# Patient Record
Sex: MALE | Race: WHITE | NOT HISPANIC OR LATINO | Employment: FULL TIME | URBAN - METROPOLITAN AREA
[De-identification: names, ages, dates, MRNs, and addresses within clinical notes are randomized per-mention and may not be internally consistent; named-entity substitution may affect disease eponyms.]

---

## 2017-02-07 DIAGNOSIS — I10 HYPERTENSION GOAL BP (BLOOD PRESSURE) < 140/90: Primary | ICD-10-CM

## 2017-02-08 NOTE — TELEPHONE ENCOUNTER
Amlodipine       Last Written Prescription Date: 2/10/2016  Last Fill Quantity: 90, # refills: 3  Last Office Visit with [facility], [facility] or [facility] prescribing provider: 12/28/2016       POTASSIUM   Date Value Ref Range Status   02/10/2016 4.1 3.4 - 5.3 mmol/L Final     CREATININE   Date Value Ref Range Status   02/10/2016 0.94 0.66 - 1.25 mg/dL Final     BP Readings from Last 3 Encounters:   12/28/16 150/32   12/15/16 138/64   10/14/16 148/48

## 2017-02-09 DIAGNOSIS — E78.5 HYPERLIPIDEMIA LDL GOAL <130: Primary | ICD-10-CM

## 2017-02-09 NOTE — TELEPHONE ENCOUNTER
Atorvastatin     Last Written Prescription Date: 8/16/16  Last Fill Quantity: 90, # refills: 1  Last Office Visit with G, P or [facility] prescribing provider: 12/15/16       CHOL      168   2/10/2016  HDL       72   2/10/2016  LDL       74   2/10/2016  TRIG      111   2/10/2016  CHOLHDLRATIO      3.0   11/6/2013  Cassandra Lerner MA 2/9/2017

## 2017-02-10 RX ORDER — AMLODIPINE BESYLATE 2.5 MG/1
TABLET ORAL
Qty: 90 TABLET | Refills: 0 | Status: SHIPPED | OUTPATIENT
Start: 2017-02-10 | End: 2017-05-12

## 2017-02-10 NOTE — TELEPHONE ENCOUNTER
Routing refill request to provider for review/approval because:  Labs out of range:  BP  Ila Marr RN

## 2017-02-13 NOTE — TELEPHONE ENCOUNTER
Left message for patient to call back and speak with any .    Thank you,  Dona Montoya   for [facility]

## 2017-02-14 ENCOUNTER — MYC MEDICAL ADVICE (OUTPATIENT)
Dept: FAMILY MEDICINE | Facility: CLINIC | Age: 47
End: 2017-02-14

## 2017-02-14 RX ORDER — ATORVASTATIN CALCIUM 40 MG/1
40 TABLET, FILM COATED ORAL DAILY
Qty: 90 TABLET | Refills: 0 | Status: SHIPPED
Start: 2017-02-14 | End: 2017-05-12

## 2017-02-14 NOTE — TELEPHONE ENCOUNTER
Your Atorvastatin has been approved for 90 days. You are due for a fasting lipid check this month. Future orders have been placed. Please call and set up lat appt. ....................LEDA Faith

## 2017-02-24 ENCOUNTER — MYC MEDICAL ADVICE (OUTPATIENT)
Dept: FAMILY MEDICINE | Facility: CLINIC | Age: 47
End: 2017-02-24

## 2017-02-24 DIAGNOSIS — E78.5 HYPERLIPIDEMIA LDL GOAL <130: ICD-10-CM

## 2017-02-24 DIAGNOSIS — I10 HYPERTENSION GOAL BP (BLOOD PRESSURE) < 140/90: Primary | ICD-10-CM

## 2017-02-28 ENCOUNTER — HOSPITAL ENCOUNTER (OUTPATIENT)
Dept: CARDIOLOGY | Facility: CLINIC | Age: 47
Discharge: HOME | End: 2017-02-28
Attending: INTERNAL MEDICINE | Admitting: INTERNAL MEDICINE
Payer: COMMERCIAL

## 2017-02-28 ENCOUNTER — TELEPHONE (OUTPATIENT)
Dept: CARDIOLOGY | Facility: CLINIC | Age: 47
End: 2017-02-28

## 2017-02-28 DIAGNOSIS — E78.5 HYPERLIPIDEMIA LDL GOAL <130: ICD-10-CM

## 2017-02-28 DIAGNOSIS — I10 HYPERTENSION GOAL BP (BLOOD PRESSURE) < 140/90: ICD-10-CM

## 2017-02-28 DIAGNOSIS — I35.1 AORTIC REGURGITATION: ICD-10-CM

## 2017-02-28 LAB
ANION GAP SERPL CALCULATED.3IONS-SCNC: 7 MMOL/L (ref 3–14)
BUN SERPL-MCNC: 13 MG/DL (ref 7–30)
CALCIUM SERPL-MCNC: 8.7 MG/DL (ref 8.5–10.1)
CHLORIDE SERPL-SCNC: 106 MMOL/L (ref 94–109)
CHOLEST SERPL-MCNC: 161 MG/DL
CO2 SERPL-SCNC: 29 MMOL/L (ref 20–32)
CREAT SERPL-MCNC: 0.87 MG/DL (ref 0.66–1.25)
GFR SERPL CREATININE-BSD FRML MDRD: ABNORMAL ML/MIN/1.7M2
GLUCOSE SERPL-MCNC: 108 MG/DL (ref 70–99)
HDLC SERPL-MCNC: 59 MG/DL
LDLC SERPL CALC-MCNC: 73 MG/DL
NONHDLC SERPL-MCNC: 102 MG/DL
POTASSIUM SERPL-SCNC: 3.8 MMOL/L (ref 3.4–5.3)
SODIUM SERPL-SCNC: 142 MMOL/L (ref 133–144)
TRIGL SERPL-MCNC: 144 MG/DL

## 2017-02-28 PROCEDURE — 80061 LIPID PANEL: CPT | Performed by: FAMILY MEDICINE

## 2017-02-28 PROCEDURE — 36415 COLL VENOUS BLD VENIPUNCTURE: CPT | Performed by: FAMILY MEDICINE

## 2017-02-28 PROCEDURE — 93306 TTE W/DOPPLER COMPLETE: CPT | Mod: 26 | Performed by: INTERNAL MEDICINE

## 2017-02-28 PROCEDURE — 80048 BASIC METABOLIC PNL TOTAL CA: CPT | Performed by: FAMILY MEDICINE

## 2017-02-28 PROCEDURE — 93306 TTE W/DOPPLER COMPLETE: CPT

## 2017-02-28 NOTE — TELEPHONE ENCOUNTER
last OV 10-  We talked again about the fact that he has significant AI. He has not manifested profound evidence that he needs to rush to getting an aortic valve replacement, but rather that we would continue to follow it and intervene as needed. He understands that the timetable for worsening of his AI and symptoms is unpredictable. I asked to see him in 6 months to review at all.         Echocardiogram done early not scheduled until April 2017  Interpretation Summary     Left ventricular systolic function is normal. The visual ejection fraction is  estimated at 60%. The left ventricle is borderline dilated.  A bicuspid aortic valve cannot be excluded. There may be partial fusion of the  right and left leaflets.  No hemodynamically significant valvular aortic stenosis noted but there is  again probably moderate to mod-severe (2-3+ or 3+) aortic regurgitation with  minimal holodiastolic flow reversal in the descending aorta..  The aortic root is normal size.  The aortic insufficiency appears to be moderate or moderately severe and the  blood pressure aremains elevated. Over time, the LV has slowly increased in  size and is now borderline dilated.

## 2017-03-01 DIAGNOSIS — Q23.1 CONGENITAL INSUFFICIENCY OF AORTIC VALVE: Primary | ICD-10-CM

## 2017-03-01 NOTE — TELEPHONE ENCOUNTER
Patient returned call and I reviewed results of Echo 2/28/17 with him. Informed him that the degree of aortic stenosis looked similar to the previous study done 10/6/16. Preet will be due for 6 month follow up with Dr. Gonzalez in 4/2017. I provided him with the number to the scheduling desk at our [facility] to set up his 6 month follow up. He stated understanding and agreement with this plan. LYNDON Noel RN

## 2017-03-23 ENCOUNTER — TELEPHONE (OUTPATIENT)
Dept: FAMILY MEDICINE | Facility: CLINIC | Age: 47
End: 2017-03-23

## 2017-03-23 NOTE — TELEPHONE ENCOUNTER
Panel Management Review      Patient has the following on his problem list:     Hypertension   Last three blood pressure readings:  BP Readings from Last 3 Encounters:   12/28/16 (!) 150/32   12/15/16 138/64   10/14/16 148/48     Blood pressure: FAILED    HTN Guidelines:  Age 18-59 BP range:  Less than 140/90  Age 60-85 with Diabetes:  Less than 140/90  Age 60-85 without Diabetes:  less than 150/90      Composite cancer screening  Chart review shows that this patient is due/due soon for the following None  Summary:    Patient is due/failing the following:   BP CHECK    Action needed:   Patient needs office visit for bp check.    Type of outreach:    Phone, left message for patient to call back.     Questions for provider review:    None                                                                                                                                    Jayleen Padilla CMA      Chart routed to Care Team .

## 2017-03-24 NOTE — TELEPHONE ENCOUNTER
Patient did call back.  He'll call another time to schedule, he would like to coordinate the visit with one with Niesha MORRIS

## 2017-04-09 DIAGNOSIS — I10 HYPERTENSION GOAL BP (BLOOD PRESSURE) < 140/90: ICD-10-CM

## 2017-04-11 RX ORDER — LISINOPRIL 20 MG/1
TABLET ORAL
Qty: 90 TABLET | Refills: 0 | Status: SHIPPED | OUTPATIENT
Start: 2017-04-11 | End: 2018-06-22

## 2017-04-11 NOTE — TELEPHONE ENCOUNTER
Routing refill request to provider for review/approval because:  Bp 150/32 ?  Huma Bush RN

## 2017-04-11 NOTE — TELEPHONE ENCOUNTER
Lisinopril       Last Written Prescription Date: 2/10/2016  Last Fill Quantity: 180, # refills: 3  Last Office Visit with G, P or [facility] prescribing provider: 12/28/2016       Potassium   Date Value Ref Range Status   02/28/2017 3.8 3.4 - 5.3 mmol/L Final     Creatinine   Date Value Ref Range Status   02/28/2017 0.87 0.66 - 1.25 mg/dL Final     BP Readings from Last 3 Encounters:   12/28/16 (!) 150/32   12/15/16 138/64   10/14/16 148/48

## 2017-04-25 ENCOUNTER — OFFICE VISIT (OUTPATIENT)
Dept: CARDIOLOGY | Facility: CLINIC | Age: 47
End: 2017-04-25
Attending: INTERNAL MEDICINE
Payer: COMMERCIAL

## 2017-04-25 VITALS
BODY MASS INDEX: 25.67 KG/M2 | SYSTOLIC BLOOD PRESSURE: 146 MMHG | OXYGEN SATURATION: 97 % | HEIGHT: 69 IN | WEIGHT: 173.3 LBS | HEART RATE: 92 BPM | RESPIRATION RATE: 12 BRPM | DIASTOLIC BLOOD PRESSURE: 50 MMHG

## 2017-04-25 DIAGNOSIS — I10 ESSENTIAL HYPERTENSION: Primary | ICD-10-CM

## 2017-04-25 DIAGNOSIS — Q23.1 CONGENITAL INSUFFICIENCY OF AORTIC VALVE: ICD-10-CM

## 2017-04-25 PROCEDURE — 99214 OFFICE O/P EST MOD 30 MIN: CPT | Performed by: INTERNAL MEDICINE

## 2017-04-25 RX ORDER — CARVEDILOL 6.25 MG/1
6.25 TABLET ORAL 2 TIMES DAILY WITH MEALS
Qty: 60 TABLET | Refills: 11 | Status: SHIPPED | OUTPATIENT
Start: 2017-04-25 | End: 2018-05-21

## 2017-04-25 ASSESSMENT — PAIN SCALES - GENERAL: PAINLEVEL: NO PAIN (0)

## 2017-04-25 NOTE — LETTER
4/25/2017    Yan Lucas MD  [address]   [place] [address]    RE: Toney Lemos       Dear Colleague,    I had the pleasure of seeing Toney Lemos in the [facility].    I saw Toney Lemos today.  We have followed him for chronic moderately severe aortic insufficiency.  Serially he generally has not had dramatic symptoms, although he has noted he is a little slower than he used to be, although at age 47.  He does not identify definite shortness of breath or profound limitations.  He lives his life quietly and actively but is not an avid exerciser.      He has had a bit of a tendency to hypertension.      PHYSICAL EXAMINATION:   VITAL SIGNS:  Showed a blood pressure that has been tending to run a bit on the high side at 146/50, heart rate 80 beats per minute.  He weighs 173 pounds.   HEAD:  Normal.   NECK:  Free of neck vein distention or bruit.  Carotid upstroke is brisk but not pulsatile.   HEART:  Regular with a 1/6 systolic ejection murmur and a 1 to 2/6 diastolic murmur of AI heard best at the right midsternal border and left midsternal border.  No S3 present.  PMI feels normal.   LUNGS:  Clear.   ABDOMEN:  Soft without organomegaly.   EXTREMITIES:  Free of edema.  Pedal pulses are +2.        Toney is 47.  A surveillance echo was done in February of this year that showed his left ventricle has crept up to be upper limits of normal, systolic function is well preserved, end-diastolic volume is 4.4 cm and nowhere near the point of demanding consideration of aortic valve replacement, which is around 5.5 cm.  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      Today, I added carvedilol 6.25 mg twice a day.  He and his family are going on a cruise, and he will initiate this treatment when he returns.  I have asked him to follow up with you with regards to blood pressure assessment.  If the  carvedilol is inadequate, it could be titrated.  A little bit of beta blocker, I think, would be helpful.  He has also had a tendency to PVCs, and a little beta blockade might feel decent.  I have generally had better luck with carvedilol in young healthy men than I have had with metoprolol.  The side effects tend to be a little less, but you never know.      His lisinopril is 20 mg a day; that could be titrated.  His Norvasc is 2.5 mg a day.     Outpatient Encounter Prescriptions as of 4/25/2017   Medication Sig Dispense Refill     carvedilol (COREG) 6.25 MG tablet Take 1 tablet (6.25 mg) by mouth 2 times daily (with meals) (Patient not taking: Reported on 5/24/2017) 60 tablet 11     lisinopril (PRINIVIL/ZESTRIL) 20 MG tablet TAKE 1 TABLET(20 MG) BY MOUTH TWICE DAILY 90 tablet 0     [DISCONTINUED] atorvastatin (LIPITOR) 40 MG tablet Take 1 tablet (40 mg) by mouth daily 90 tablet 0     [DISCONTINUED] amLODIPine (NORVASC) 2.5 MG tablet TAKE 1 TABLET(2.5 MG) BY MOUTH DAILY 90 tablet 0     Dextromethorphan-guaiFENesin (CORICIDIN HBP CONGESTION/COUGH)  MG CAPS Take 2 tablets by mouth daily       DM-Doxylamine-Acetaminophen (NYQUIL COLD & FLU PO)        promethazine-codeine (PHENERGAN WITH CODEINE) 6.25-10 MG/5ML syrup Take 5-10 mLs by mouth nightly as needed for cough (Patient not taking: Reported on 5/24/2017) 240 mL 0     CLARITIN 10 MG PO TABS 1 TABLET DAILY AS NEEDED       [DISCONTINUED] azithromycin (ZITHROMAX) 250 MG tablet Two tablets first day, then one tablet daily for four days 6 tablet 0     No facility-administered encounter medications on file as of 4/25/2017.       IMPRESSION:   1.  Mild progressive LV enlargement, although not significantly at this time as noted above.   2.  +2 to more likely +3 aortic insufficiency.   3.  Well-preserved systolic function.   4.  Treated hypertension.   5.  Treated hyperlipidemia.   6.  Carvedilol 6.25 mg b.i.d. added.      From my standpoint, I will see him in 6  months.  I ordered an echo at that time.  If there are any questions with regards to his antihypertensive or cardiac treatment, let me know.  Thanks for the privilege.  Warm regards.     Sincerely,    MONIQUE GRESHAM MD     [facility]

## 2017-04-25 NOTE — LETTER
4/25/2017      RE: Toney Lemos  [address] [facility] [address]       Dear Colleague,    Thank you for the opportunity to participate in the care of your patient, Toney Lemos, at the [facility]. Please see a copy of my visit note below.    HPI and Plan:   See dictation    Interpretation Summary og echo 2-:     Left ventricular systolic function is normal. The visual ejection fraction is  estimated at 60%. The left ventricle is borderline dilated.  A bicuspid aortic valve cannot be excluded. There may be partial fusion of the  right and left leaflets.  No hemodynamically significant valvular aortic stenosis noted but there is  again probably moderate to mod-severe (2-3+ or 3+) aortic regurgitation with  minimal holodiastolic flow reversal in the descending aorta..  The aortic root is normal size.  The aortic insufficiency appears to be moderate or moderately severe and the  blood pressure aremains elevated. Over time, the LV has slowly increased in  size and is now borderline dilated.    Orders Placed This Encounter   Procedures     Follow-Up with Cardiologist     Echocardiogram     Orders Placed This Encounter   Medications     carvedilol (COREG) 6.25 MG tablet     Sig: Take 1 tablet (6.25 mg) by mouth 2 times daily (with meals)     Dispense:  60 tablet     Refill:  11     There are no discontinued medications.      Encounter Diagnoses   Name Primary?     Congenital insufficiency of aortic valve      Essential hypertension Yes       CURRENT MEDICATIONS:  Current Outpatient Prescriptions   Medication Sig Dispense Refill     carvedilol (COREG) 6.25 MG tablet Take 1 tablet (6.25 mg) by mouth 2 times daily (with meals) 60 tablet 11     lisinopril (PRINIVIL/ZESTRIL) 20 MG tablet TAKE 1 TABLET(20 MG) BY MOUTH TWICE DAILY 90 tablet 0     atorvastatin (LIPITOR) 40 MG tablet Take 1 tablet (40 mg) by mouth daily 90 tablet 0      amLODIPine (NORVASC) 2.5 MG tablet TAKE 1 TABLET(2.5 MG) BY MOUTH DAILY 90 tablet 0     Dextromethorphan-guaiFENesin (CORICIDIN HBP CONGESTION/COUGH)  MG CAPS Take 2 tablets by mouth daily       DM-Doxylamine-Acetaminophen (NYQUIL COLD & FLU PO)        promethazine-codeine (PHENERGAN WITH CODEINE) 6.25-10 MG/5ML syrup Take 5-10 mLs by mouth nightly as needed for cough 240 mL 0     CLARITIN 10 MG PO TABS 1 TABLET DAILY AS NEEDED       azithromycin (ZITHROMAX) 250 MG tablet Two tablets first day, then one tablet daily for four days (Patient not taking: Reported on 4/25/2017) 6 tablet 0       ALLERGIES     Allergies   Allergen Reactions     No Known Drug Allergies      Seasonal Allergies        PAST MEDICAL HISTORY:  Past Medical History:   Diagnosis Date     Aortic valve disorders      Hypertension        PAST SURGICAL HISTORY:  No past surgical history on file.    FAMILY HISTORY:  Family History   Problem Relation Age of Onset     HEART DISEASE Brother      CAD     HEART DISEASE Maternal Grandmother      Lipids Maternal Grandmother      Hypertension Maternal Grandmother      Hypertension Paternal Grandfather      HEART DISEASE Paternal Grandfather      heart murmur     Prostate Cancer Paternal Grandfather      CANCER Paternal Grandfather      bone ca     Lipids Paternal Grandfather      DIABETES Maternal Grandfather      senior onset     Hypertension Maternal Grandfather      CEREBROVASCULAR DISEASE Maternal Grandfather      CANCER Paternal Grandmother      rectal cancer     Lipids Paternal Grandmother      Pancreatic Cancer Cousin        SOCIAL HISTORY:  Social History     Social History     Marital status:      Spouse name: N/A     Number of children: N/A     Years of education: N/A     Social History Main Topics     Smoking status: Never Smoker     Smokeless tobacco: Never Used     Alcohol use 0.0 oz/week     0 Standard drinks or equivalent per week      Comment: beer or two wkly     Drug use: No      "Sexual activity: Yes     Partners: Female      Comment: partner is on bcp     Other Topics Concern      Service No     Blood Transfusions No     Caffeine Concern No     Occupational Exposure No     Hobby Hazards No     Sleep Concern Yes     Stress Concern No     Weight Concern No     Special Diet Yes     Back Care Yes     Exercise Yes     Bike Helmet Yes     Seat Belt Yes     Self-Exams Yes     Parent/Sibling W/ Cabg, Mi Or Angioplasty Before 65f 55m? Yes     brother had heart attack &  at age 38yrs     Social History Narrative         Review of Systems:  Skin:  Negative       Eyes:  Negative      ENT:  Negative      Respiratory:  Negative sleep apnea     Cardiovascular:  Negative;chest pain;edema;fatigue;lightheadedness;dizziness      Gastroenterology: Negative heartburn rare  Genitourinary:  Negative      Musculoskeletal:  Negative back pain on occasion/ neck  Neurologic:  Positive for numbness or tingling of hands Fingers will tingle on both hands   Psychiatric:  Negative excessive stress    Heme/Lymph/Imm:  Negative      Endocrine:  Negative        Physical Exam:  Vitals: /50 (BP Location: Right arm, Cuff Size: Adult Large)  Pulse 92  Resp 12  Ht 1.753 m (5' 9\")  Wt 78.6 kg (173 lb 4.8 oz)  SpO2 97%  BMI 25.59 kg/m2    Constitutional:  cooperative;alert and oriented;well developed;well nourished        Skin:  warm and dry to the touch        Head:  normocephalic        Eyes:  pupils equal and round        ENT:  no pallor or cyanosis        Neck:  JVP normal;no carotid bruit        Chest:  clear to auscultation          Cardiac: regular rhythm       systolic ejection murmur;grade 1;RUSB   decrescendo;early diastolic murmur;LUSB;grade 2      Abdomen:  abdomen soft;no masses;non-tender        Vascular: pulses full and equal                                        Extremities and Back:  no deformities, clubbing, cyanosis, erythema observed              Neurological:  affect appropriate, " oriented to time, person and place          Recent Lab Results:  LIPID RESULTS:  Lab Results   Component Value Date    CHOL 161 02/28/2017    HDL 59 02/28/2017    LDL 73 02/28/2017    TRIG 144 02/28/2017    CHOLHDLRATIO 3.0 11/06/2013       LIVER ENZYME RESULTS:  Lab Results   Component Value Date    AST 20 02/10/2016    ALT 39 02/10/2016       CBC RESULTS:  Lab Results   Component Value Date    WBC 6.2 02/10/2016    RBC 4.76 02/10/2016    HGB 15.1 02/10/2016    HCT 43.8 02/10/2016    MCV 92 02/10/2016    MCH 31.7 02/10/2016    MCHC 34.5 02/10/2016    RDW 13.2 02/10/2016     02/10/2016       BMP RESULTS:  Lab Results   Component Value Date     02/28/2017    POTASSIUM 3.8 02/28/2017    CHLORIDE 106 02/28/2017    CO2 29 02/28/2017    ANIONGAP 7 02/28/2017     (H) 02/28/2017    BUN 13 02/28/2017    CR 0.87 02/28/2017    GFRESTIMATED >90  Non  GFR Calc   02/28/2017    GFRESTBLACK >90   GFR Calc   02/28/2017    BRIANA 8.7 02/28/2017        A1C RESULTS:  No results found for: A1C    INR RESULTS:  No results found for: INR        CC  Monique Gonzalez MD   [facility]  [address]  [place] [address]        Please do not hesitate to contact me if you have any questions/concerns.     Sincerely,     MONIQUE GONZALEZ MD

## 2017-04-25 NOTE — MR AVS SNAPSHOT
After Visit Summary   4/25/2017    Toney Lemos    MRN: [id number]           Patient Information     Date Of Birth          1970        Visit Information        Provider Department      4/25/2017 3:00 PM Charbel Gonzalez MD [facility]        Today's Diagnoses     Essential hypertension    -  1    Congenital insufficiency of aortic valve           Follow-ups after your visit        Additional Services     Follow-Up with Cardiologist                 Your next 10 appointments already scheduled     May 09, 2017 11:00 AM CDT   PHYSICAL with Yan Lucas MD   [facility]    [address] [place] [id number]   [phone]              Future tests that were ordered for you today     Open Future Orders        Priority Expected Expires Ordered    Follow-Up with Cardiologist Routine 10/22/2017 4/25/2018 4/25/2017    Echocardiogram Routine 10/22/2017 4/25/2018 4/25/2017            Who to contact     If you have questions or need follow up information about today's clinic visit or your schedule please contact [facility] directly at [phone].  Normal or non-critical lab and imaging results will be communicated to you by [email or web address], letter or phone within 4 business days after the clinic has received the results. If you do not hear from us within 7 days, please contact the clinic through [email or web address] or phone. If you have a critical or abnormal lab result, we will notify you by phone as soon as possible.  Submit refill requests through [employer] or call your pharmacy and they will forward the refill request to us. Please allow 3 business days for your refill to be completed.          Additional Information About Your Visit        [email or web address] Information     [employer] gives you secure access to your electronic health record. If you see a primary care provider, you can also send messages to your care team and make appointments.  "If you have questions, please call your primary care clinic.  If you do not have a primary care provider, please call [phone] and they will assist you.        Care EveryWhere ID     This is your Care EveryWhere ID. This could be used by other organizations to access your [place] medical records  [id number]        Your Vitals Were     Pulse Respirations Height Pulse Oximetry BMI (Body Mass Index)       92 12 1.753 m (5' 9\") 97% 25.59 kg/m2        Blood Pressure from Last 3 Encounters:   04/25/17 146/50   12/28/16 (!) 150/32   12/15/16 138/64    Weight from Last 3 Encounters:   04/25/17 78.6 kg (173 lb 4.8 oz)   12/28/16 78.9 kg (174 lb)   12/15/16 81.8 kg (180 lb 6.4 oz)              We Performed the Following     Follow-Up with Cardiologist          Today's Medication Changes          These changes are accurate as of: 4/25/17  3:45 PM.  If you have any questions, ask your nurse or doctor.               Start taking these medicines.        Dose/Directions    carvedilol 6.25 MG tablet   Commonly known as:  COREG   Used for:  Essential hypertension, Congenital insufficiency of aortic valve        Dose:  6.25 mg   Take 1 tablet (6.25 mg) by mouth 2 times daily (with meals)   Quantity:  60 tablet   Refills:  11            Where to get your medicines      These medications were sent to [facility] [address] [place] [address] [facility] [address]  [address] [facility], [facility] [address]     Phone:  [phone]     carvedilol 6.25 MG tablet                Primary Care Provider Office Phone # Fax #    Yan Lucas [phone]       [address] [place] [address]        Thank you!     Thank you for choosing [facility]  for your care. Our goal is always to provide you with excellent care. Hearing back from our patients is one way we can continue to improve our services. Please take a few minutes to complete the " written survey that you may receive in the mail after your visit with us. Thank you!             Your Updated Medication List - Protect others around you: Learn how to safely use, store and throw away your medicines at www.disposemymeds.org.          This list is accurate as of: 4/25/17  3:45 PM.  Always use your most recent med list.                   Brand Name Dispense Instructions for use    amLODIPine 2.5 MG tablet    NORVASC    90 tablet    TAKE 1 TABLET(2.5 MG) BY MOUTH DAILY       atorvastatin 40 MG tablet    LIPITOR    90 tablet    Take 1 tablet (40 mg) by mouth daily       azithromycin 250 MG tablet    ZITHROMAX    6 tablet    Two tablets first day, then one tablet daily for four days       carvedilol 6.25 MG tablet    COREG    60 tablet    Take 1 tablet (6.25 mg) by mouth 2 times daily (with meals)       CLARITIN 10 MG tablet   Generic drug:  loratadine      1 TABLET DAILY AS NEEDED       CORICIDIN HBP CONGESTION/COUGH  MG Caps   Generic drug:  Dextromethorphan-guaiFENesin      Take 2 tablets by mouth daily       lisinopril 20 MG tablet    PRINIVIL/ZESTRIL    90 tablet    TAKE 1 TABLET(20 MG) BY MOUTH TWICE DAILY       NYQUIL COLD & FLU PO          promethazine-codeine 6.25-10 MG/5ML syrup    PHENERGAN with codeine    240 mL    Take 5-10 mLs by mouth nightly as needed for cough

## 2017-04-25 NOTE — PROGRESS NOTES
HPI and Plan:   See dictation    Interpretation Summary og echo 2-:     Left ventricular systolic function is normal. The visual ejection fraction is  estimated at 60%. The left ventricle is borderline dilated.  A bicuspid aortic valve cannot be excluded. There may be partial fusion of the  right and left leaflets.  No hemodynamically significant valvular aortic stenosis noted but there is  again probably moderate to mod-severe (2-3+ or 3+) aortic regurgitation with  minimal holodiastolic flow reversal in the descending aorta..  The aortic root is normal size.  The aortic insufficiency appears to be moderate or moderately severe and the  blood pressure aremains elevated. Over time, the LV has slowly increased in  size and is now borderline dilated.    Orders Placed This Encounter   Procedures     Follow-Up with Cardiologist     Echocardiogram     Orders Placed This Encounter   Medications     carvedilol (COREG) 6.25 MG tablet     Sig: Take 1 tablet (6.25 mg) by mouth 2 times daily (with meals)     Dispense:  60 tablet     Refill:  11     There are no discontinued medications.      Encounter Diagnoses   Name Primary?     Congenital insufficiency of aortic valve      Essential hypertension Yes       CURRENT MEDICATIONS:  Current Outpatient Prescriptions   Medication Sig Dispense Refill     carvedilol (COREG) 6.25 MG tablet Take 1 tablet (6.25 mg) by mouth 2 times daily (with meals) 60 tablet 11     lisinopril (PRINIVIL/ZESTRIL) 20 MG tablet TAKE 1 TABLET(20 MG) BY MOUTH TWICE DAILY 90 tablet 0     atorvastatin (LIPITOR) 40 MG tablet Take 1 tablet (40 mg) by mouth daily 90 tablet 0     amLODIPine (NORVASC) 2.5 MG tablet TAKE 1 TABLET(2.5 MG) BY MOUTH DAILY 90 tablet 0     Dextromethorphan-guaiFENesin (CORICIDIN HBP CONGESTION/COUGH)  MG CAPS Take 2 tablets by mouth daily       DM-Doxylamine-Acetaminophen (NYQUIL COLD & FLU PO)        promethazine-codeine (PHENERGAN WITH CODEINE) 6.25-10 MG/5ML syrup Take  5-10 mLs by mouth nightly as needed for cough 240 mL 0     CLARITIN 10 MG PO TABS 1 TABLET DAILY AS NEEDED       azithromycin (ZITHROMAX) 250 MG tablet Two tablets first day, then one tablet daily for four days (Patient not taking: Reported on 4/25/2017) 6 tablet 0       ALLERGIES     Allergies   Allergen Reactions     No Known Drug Allergies      Seasonal Allergies        PAST MEDICAL HISTORY:  Past Medical History:   Diagnosis Date     Aortic valve disorders      Hypertension        PAST SURGICAL HISTORY:  No past surgical history on file.    FAMILY HISTORY:  Family History   Problem Relation Age of Onset     HEART DISEASE Brother      CAD     HEART DISEASE Maternal Grandmother      Lipids Maternal Grandmother      Hypertension Maternal Grandmother      Hypertension Paternal Grandfather      HEART DISEASE Paternal Grandfather      heart murmur     Prostate Cancer Paternal Grandfather      CANCER Paternal Grandfather      bone ca     Lipids Paternal Grandfather      DIABETES Maternal Grandfather      senior onset     Hypertension Maternal Grandfather      CEREBROVASCULAR DISEASE Maternal Grandfather      CANCER Paternal Grandmother      rectal cancer     Lipids Paternal Grandmother      Pancreatic Cancer Cousin        SOCIAL HISTORY:  Social History     Social History     Marital status:      Spouse name: N/A     Number of children: N/A     Years of education: N/A     Social History Main Topics     Smoking status: Never Smoker     Smokeless tobacco: Never Used     Alcohol use 0.0 oz/week     0 Standard drinks or equivalent per week      Comment: beer or two wkly     Drug use: No     Sexual activity: Yes     Partners: Female      Comment: partner is on bcp     Other Topics Concern      Service No     Blood Transfusions No     Caffeine Concern No     Occupational Exposure No     Hobby Hazards No     Sleep Concern Yes     Stress Concern No     Weight Concern No     Special Diet Yes     Back Care Yes      "Exercise Yes     Bike Helmet Yes     Seat Belt Yes     Self-Exams Yes     Parent/Sibling W/ Cabg, Mi Or Angioplasty Before 65f 55m? Yes     brother had heart attack &  at age 38yrs     Social History Narrative         Review of Systems:  Skin:  Negative       Eyes:  Negative      ENT:  Negative      Respiratory:  Negative sleep apnea     Cardiovascular:  Negative;chest pain;edema;fatigue;lightheadedness;dizziness      Gastroenterology: Negative heartburn rare  Genitourinary:  Negative      Musculoskeletal:  Negative back pain on occasion/ neck  Neurologic:  Positive for numbness or tingling of hands Fingers will tingle on both hands   Psychiatric:  Negative excessive stress    Heme/Lymph/Imm:  Negative      Endocrine:  Negative        Physical Exam:  Vitals: /50 (BP Location: Right arm, Cuff Size: Adult Large)  Pulse 92  Resp 12  Ht 1.753 m (5' 9\")  Wt 78.6 kg (173 lb 4.8 oz)  SpO2 97%  BMI 25.59 kg/m2    Constitutional:  cooperative;alert and oriented;well developed;well nourished        Skin:  warm and dry to the touch        Head:  normocephalic        Eyes:  pupils equal and round        ENT:  no pallor or cyanosis        Neck:  JVP normal;no carotid bruit        Chest:  clear to auscultation          Cardiac: regular rhythm       systolic ejection murmur;grade 1;RUSB   decrescendo;early diastolic murmur;LUSB;grade 2      Abdomen:  abdomen soft;no masses;non-tender        Vascular: pulses full and equal                                        Extremities and Back:  no deformities, clubbing, cyanosis, erythema observed              Neurological:  affect appropriate, oriented to time, person and place          Recent Lab Results:  LIPID RESULTS:  Lab Results   Component Value Date    CHOL 161 2017    HDL 59 2017    LDL 73 2017    TRIG 144 2017    CHOLHDLRATIO 3.0 2013       LIVER ENZYME RESULTS:  Lab Results   Component Value Date    AST 20 02/10/2016    ALT 39 " 02/10/2016       CBC RESULTS:  Lab Results   Component Value Date    WBC 6.2 02/10/2016    RBC 4.76 02/10/2016    HGB 15.1 02/10/2016    HCT 43.8 02/10/2016    MCV 92 02/10/2016    MCH 31.7 02/10/2016    MCHC 34.5 02/10/2016    RDW 13.2 02/10/2016     02/10/2016       BMP RESULTS:  Lab Results   Component Value Date     02/28/2017    POTASSIUM 3.8 02/28/2017    CHLORIDE 106 02/28/2017    CO2 29 02/28/2017    ANIONGAP 7 02/28/2017     (H) 02/28/2017    BUN 13 02/28/2017    CR 0.87 02/28/2017    GFRESTIMATED >90  Non  GFR Calc   02/28/2017    GFRESTBLACK >90   GFR Calc   02/28/2017    BRIANA 8.7 02/28/2017        A1C RESULTS:  No results found for: A1C    INR RESULTS:  No results found for: INR        CC  Charbel Gonzalez MD   [facility]  [address]  [place] [address]

## 2017-04-26 NOTE — PROGRESS NOTES
HISTORY OF PRESENT ILLNESS:  I saw Toney Lemos today.  We have followed him for chronic moderately severe aortic insufficiency.  Serially he generally has not had dramatic symptoms, although he has noted he is a little slower than he used to be, although at age 47.  He does not identify definite shortness of breath or profound limitations.  He lives his life quietly and actively but is not an avid exerciser.      He has had a bit of a tendency to hypertension.      PHYSICAL EXAMINATION:   VITAL SIGNS:  Showed a blood pressure that has been tending to run a bit on the high side at 146/50, heart rate 80 beats per minute.  He weighs 173 pounds.   HEAD:  Normal.   NECK:  Free of neck vein distention or bruit.  Carotid upstroke is brisk but not pulsatile.   HEART:  Regular with a 1/6 systolic ejection murmur and a 1 to 2/6 diastolic murmur of AI heard best at the right midsternal border and left midsternal border.  No S3 present.  PMI feels normal.   LUNGS:  Clear.   ABDOMEN:  Soft without organomegaly.   EXTREMITIES:  Free of edema.  Pedal pulses are +2.        Toney is 47.  A surveillance echo was done in February of this year that showed his left ventricle has crept up to be upper limits of normal, systolic function is well preserved, end-diastolic volume is 4.4 cm and nowhere near the point of demanding consideration of aortic valve replacement, which is around 5.5 cm.  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      Today, I added carvedilol 6.25 mg twice a day.  He and his family are going on a cruise, and he will initiate this treatment when he returns.  I have asked him to follow up with you with regards to blood pressure assessment.  If the carvedilol is inadequate, it could be titrated.  A little bit of beta blocker, I think, would be helpful.  He has also had a tendency to PVCs, and a little beta blockade might feel decent.  I have generally had  better luck with carvedilol in young healthy men than I have had with metoprolol.  The side effects tend to be a little less, but you never know.      His lisinopril is 20 mg a day; that could be titrated.  His Norvasc is 2.5 mg a day.      IMPRESSION:   1.  Mild progressive LV enlargement, although not significantly at this time as noted above.   2.  +2 to more likely +3 aortic insufficiency.   3.  Well-preserved systolic function.   4.  Treated hypertension.   5.  Treated hyperlipidemia.   6.  Carvedilol 6.25 mg b.i.d. added.      From my standpoint, I will see him in 6 months.  I ordered an echo at that time.  If there are any questions with regards to his antihypertensive or cardiac treatment, let me know.  Thanks for the privilege.  Warm regards.      cc:   [place] [address]         MONIQUE GRESHAM MD, Merged with [facility]             D: 2017 15:49   T: 2017 14:01   MT: SHELBY      Name:     NAHID BONE   MRN:      [id number]        Account:      [id number]   :      1970           Service Date: 2017      Document: [id number]

## 2017-05-12 DIAGNOSIS — I10 HYPERTENSION GOAL BP (BLOOD PRESSURE) < 140/90: ICD-10-CM

## 2017-05-12 DIAGNOSIS — E78.5 HYPERLIPIDEMIA LDL GOAL <130: ICD-10-CM

## 2017-05-12 NOTE — TELEPHONE ENCOUNTER
Chief Complaint   Patient presents with     Refill Request     Amlodipine and atorvastatin     Amlodipine:      Last Written Prescription Date: 2/10/17  Last Fill Quantity: 90, # refills: 0  Last Office Visit with [facility], [facility] or  Health prescribing provider: 12/15/16  Next 5 appointments (look out 90 days)     May 24, 2017  8:20 AM CDT   PHYSICAL with Yan Lucas MD   [facility] ([address] [id number] [phone]                   Potassium   Date Value Ref Range Status   02/28/2017 3.8 3.4 - 5.3 mmol/L Final     Creatinine   Date Value Ref Range Status   02/28/2017 0.87 0.66 - 1.25 mg/dL Final     BP Readings from Last 3 Encounters:   04/25/17 146/50   12/28/16 (!) 150/32   12/15/16 138/64     Chief Complaint   Patient presents with     Refill Request     Amlodipine and atorvastatin    Atorvastatin:     Last Written Prescription Date: 2/14/17  Last Fill Quantity: 90, # refills: 0  Last Office Visit with [facility], [facility] or  Health prescribing provider: 12/15/16  Next 5 appointments (look out 90 days)     May 24, 2017  8:20 AM CDT   [facility]YSICAL with Yan Lucas MD   [facility]    [address] [place] [id number] [phone]                   Lab Results   Component Value Date    CHOL 161 02/28/2017     Lab Results   Component Value Date    HDL 59 02/28/2017     Lab Results   Component Value Date    LDL 73 02/28/2017     Lab Results   Component Value Date    TRIG 144 02/28/2017     Lab Results   Component Value Date    CHOLHDLRATIO 3.0 11/06/2013

## 2017-05-15 RX ORDER — AMLODIPINE BESYLATE 2.5 MG/1
TABLET ORAL
Qty: 90 TABLET | Refills: 0 | Status: SHIPPED | OUTPATIENT
Start: 2017-05-15 | End: 2017-08-09

## 2017-05-15 RX ORDER — ATORVASTATIN CALCIUM 40 MG/1
40 TABLET, FILM COATED ORAL DAILY
Qty: 90 TABLET | Refills: 0 | Status: SHIPPED | OUTPATIENT
Start: 2017-05-15 | End: 2017-08-09

## 2017-05-15 NOTE — TELEPHONE ENCOUNTER
Amlodipine  Routing refill request to provider for review/approval because:  Elevated blood pressure  BP Readings from Last 3 Encounters:   04/25/17 146/50   12/28/16 (!) 150/32   12/15/16 138/64       Atorvastatin  Medication is being filled for 1 time refill only due to:  Patient needs to be seen because it has been more than one year since last visit.--appt scheduled 5.24.17    Randy Garcia RN, BSN

## 2017-05-24 ENCOUNTER — OFFICE VISIT (OUTPATIENT)
Dept: FAMILY MEDICINE | Facility: CLINIC | Age: 47
End: 2017-05-24
Payer: COMMERCIAL

## 2017-05-24 VITALS
TEMPERATURE: 98.1 F | DIASTOLIC BLOOD PRESSURE: 78 MMHG | HEART RATE: 84 BPM | OXYGEN SATURATION: 97 % | WEIGHT: 181.7 LBS | BODY MASS INDEX: 26.83 KG/M2 | SYSTOLIC BLOOD PRESSURE: 132 MMHG | RESPIRATION RATE: 20 BRPM

## 2017-05-24 DIAGNOSIS — I10 HYPERTENSION GOAL BP (BLOOD PRESSURE) < 140/90: ICD-10-CM

## 2017-05-24 DIAGNOSIS — Z00.00 ROUTINE GENERAL MEDICAL EXAMINATION AT A HEALTH CARE FACILITY: Primary | ICD-10-CM

## 2017-05-24 DIAGNOSIS — Z12.11 SPECIAL SCREENING FOR MALIGNANT NEOPLASMS, COLON: ICD-10-CM

## 2017-05-24 DIAGNOSIS — Z13.220 SCREENING FOR HYPERLIPIDEMIA: ICD-10-CM

## 2017-05-24 PROBLEM — Z82.49 FH: EARLY CORONARY ARTERY DISEASE: Status: ACTIVE | Noted: 2017-05-24

## 2017-05-24 LAB
ANION GAP SERPL CALCULATED.3IONS-SCNC: 3 MMOL/L (ref 3–14)
BUN SERPL-MCNC: 17 MG/DL (ref 7–30)
CALCIUM SERPL-MCNC: 8.6 MG/DL (ref 8.5–10.1)
CHLORIDE SERPL-SCNC: 108 MMOL/L (ref 94–109)
CO2 SERPL-SCNC: 29 MMOL/L (ref 20–32)
CREAT SERPL-MCNC: 0.94 MG/DL (ref 0.66–1.25)
GFR SERPL CREATININE-BSD FRML MDRD: 87 ML/MIN/1.7M2
GLUCOSE SERPL-MCNC: 111 MG/DL (ref 70–99)
POTASSIUM SERPL-SCNC: 4 MMOL/L (ref 3.4–5.3)
SODIUM SERPL-SCNC: 140 MMOL/L (ref 133–144)

## 2017-05-24 PROCEDURE — 36415 COLL VENOUS BLD VENIPUNCTURE: CPT | Performed by: FAMILY MEDICINE

## 2017-05-24 PROCEDURE — 80048 BASIC METABOLIC PNL TOTAL CA: CPT | Performed by: FAMILY MEDICINE

## 2017-05-24 PROCEDURE — 99213 OFFICE O/P EST LOW 20 MIN: CPT | Mod: 25 | Performed by: FAMILY MEDICINE

## 2017-05-24 PROCEDURE — 99396 PREV VISIT EST AGE 40-64: CPT | Performed by: FAMILY MEDICINE

## 2017-05-24 ASSESSMENT — PAIN SCALES - GENERAL: PAINLEVEL: NO PAIN (0)

## 2017-05-24 NOTE — NURSING NOTE
"Chief Complaint   Patient presents with     Physical       Initial /78 (BP Location: Left arm, Patient Position: Chair, Cuff Size: Adult Regular)  Pulse 84  Temp 98.1  F (36.7  C) (Temporal)  Resp 20  Wt 181 lb 11.2 oz (82.4 kg)  SpO2 97%  BMI 26.83 kg/m2 Estimated body mass index is 26.83 kg/(m^2) as calculated from the following:    Height as of 4/25/17: 5' 9\" (1.753 m).    Weight as of this encounter: 181 lb 11.2 oz (82.4 kg).  Medication Reconciliation: complete   Jayleen Padilla CMA     "

## 2017-05-24 NOTE — MR AVS SNAPSHOT
After Visit Summary   5/24/2017    Toney Lemos    MRN: [id number]           Patient Information     Date Of Birth          1970        Visit Information        Provider Department      5/24/2017 8:20 AM Yan Lucas MD [facility]        Today's Diagnoses     Routine general medical examination at a health care facility    -  1    Screening for hyperlipidemia        Special screening for malignant neoplasms, colon        Hypertension goal BP (blood pressure) < 140/90          Care Instructions      Preventive Health Recommendations  Male Ages 40 to 49    Yearly exam:             See your health care provider every year in order to  o   Review health changes.   o   Discuss preventive care.    o   Review your medicines if your doctor has prescribed any.    You should be tested each year for STDs (sexually transmitted diseases) if you re at risk.     Have a cholesterol test every 5 years.     Have a colonoscopy (test for colon cancer) if someone in your family has had colon cancer or polyps before age 50.     After age 45, have a diabetes test (fasting glucose). If you are at risk for diabetes, you should have this test every 3 years.      Talk with your health care provider about whether or not a prostate cancer screening test (PSA) is right for you.    Shots: Get a flu shot each year. Get a tetanus shot every 10 years.     Nutrition:    Eat at least 5 servings of fruits and vegetables daily.     Eat whole-grain bread, whole-wheat pasta and brown rice instead of white grains and rice.     Talk to your provider about Calcium and Vitamin D.     Lifestyle    Exercise for at least 150 minutes a week (30 minutes a day, 5 days a week). This will help you control your weight and prevent disease.     Limit alcohol to one drink per day.     No smoking.     Wear sunscreen to prevent skin cancer.     See your dentist every six months for an exam and cleaning.              Follow-ups  after your visit        Your next 10 appointments already scheduled     Nov 21, 2017  8:20 AM CST   SHORT with Yan Lucas MD   [facility]    [address] [place] [id number] [phone]              Future tests that were ordered for you today     Open Future Orders        Priority Expected Expires Ordered    Fecal colorectal cancer screen (FIT) Routine 6/14/2017 8/16/2017 5/24/2017    Lipid panel reflex to direct LDL Routine  5/24/2018 5/24/2017            Who to contact     If you have questions or need follow up information about today's clinic visit or your schedule please contact [facility] directly at [phone].  Normal or non-critical lab and imaging results will be communicated to you by ScribeStormhart, letter or phone within 4 business days after the clinic has received the results. If you do not hear from us within 7 days, please contact the clinic through AdoTubet or phone. If you have a critical or abnormal lab result, we will notify you by phone as soon as possible.  Submit refill requests through Giveter or call your pharmacy and they will forward the refill request to us. Please allow 3 business days for your refill to be completed.          Additional Information About Your Visit        ScribeStormharAdeptence Information     Giveter gives you secure access to your electronic health record. If you see a primary care provider, you can also send messages to your care team and make appointments. If you have questions, please call your primary care clinic.  If you do not have a primary care provider, please call [phone] and they will assist you.        Care EveryWhere ID     This is your Care EveryWhere ID. This could be used by other organizations to access your [place] medical records  [id number]        Your Vitals Were     Pulse Temperature Respirations Pulse Oximetry BMI (Body Mass Index)       84 98.1  F (36.7  C) (Temporal)  20 97% 26.83 kg/m2        Blood Pressure from Last 3 Encounters:   05/24/17 132/78   04/25/17 146/50   12/28/16 (!) 150/32    Weight from Last 3 Encounters:   05/24/17 181 lb 11.2 oz (82.4 kg)   04/25/17 173 lb 4.8 oz (78.6 kg)   12/28/16 174 lb (78.9 kg)              We Performed the Following     Basic metabolic panel        Primary Care Provider Office Phone # Fax #    Yan Lucas [phone]       [address] [place] [address]        Thank you!     Thank you for choosing [facility]  for your care. Our goal is always to provide you with excellent care. Hearing back from our patients is one way we can continue to improve our services. Please take a few minutes to complete the written survey that you may receive in the mail after your visit with us. Thank you!             Your Updated Medication List - Protect others around you: Learn how to safely use, store and throw away your medicines at www.disposemymeds.org.          This list is accurate as of: 5/24/17  2:36 PM.  Always use your most recent med list.                   Brand Name Dispense Instructions for use    amLODIPine 2.5 MG tablet    NORVASC    90 tablet    TAKE 1 TABLET(2.5 MG) BY MOUTH DAILY       atorvastatin 40 MG tablet    LIPITOR    90 tablet    Take 1 tablet (40 mg) by mouth daily       carvedilol 6.25 MG tablet    COREG    60 tablet    Take 1 tablet (6.25 mg) by mouth 2 times daily (with meals)       CLARITIN 10 MG tablet   Generic drug:  loratadine      1 TABLET DAILY AS NEEDED       CORICIDIN HBP CONGESTION/COUGH  MG Caps   Generic drug:  Dextromethorphan-guaiFENesin      Take 2 tablets by mouth daily       lisinopril 20 MG tablet    PRINIVIL/ZESTRIL    90 tablet    TAKE 1 TABLET(20 MG) BY MOUTH TWICE DAILY       NYQUIL COLD & FLU PO          promethazine-codeine 6.25-10 MG/5ML syrup    PHENERGAN with codeine    240 mL    Take 5-10 mLs by mouth nightly as needed  for cough

## 2017-05-24 NOTE — PROGRESS NOTES
SUBJECTIVE:     CC: Toney Lemos is an 47 year old male who presents for preventative health visit.     Physical   Annual:     Getting at least 3 servings of Calcium per day::  NO    Bi-annual eye exam::  NO    Dental care twice a year::  Yes    Sleep apnea or symptoms of sleep apnea::  Excessive snoring and Sleep apnea    Diet::  Regular (no restrictions)    Frequency of exercise::  None    Taking medications regularly::  Yes    Medication side effects::  Not applicable    Additional concerns today::  No    bp check - 130s.  Checking daily. Tolerating all medication well. He is also on a daily statin. It was this was started 10+ years ago. This is mainly based on his high lipid panel. He also carries a significant early family history of coronary artery disease. His brother  at age 30 from ACS. We discussed the use of a statin in this role as primary prevention.    The 10-year ASCVD risk score (Prisca HEBERT Jr, et al., 2013) is: 1.6%    Values used to calculate the score:      Age: 47 years      Sex: Male      Is Non- : No      Diabetic: No      Tobacco smoker: No      Systolic Blood Pressure: 132 mmHg      Is BP treated: Yes      HDL Cholesterol: 59 mg/dL      Total Cholesterol: 161 mg/dL         Today's PHQ-2 Score:   PHQ-2 (  [employer]) 2017   Q1: Little interest or pleasure in doing things -   Q2: Feeling down, depressed or hopeless -   PHQ-2 Score -   Q1: Little interest or pleasure in doing things Not at all   Q2: Feeling down, depressed or hopeless Not at all   PHQ-2 Score 0       Abuse: Current or Past(Physical, Sexual or Emotional)- No  Do you feel safe in your environment - Yes    Social History   Substance Use Topics     Smoking status: Never Smoker     Smokeless tobacco: Never Used     Alcohol use 0.0 oz/week     0 Standard drinks or equivalent per week      Comment: beer or two wkly     The patient does not drink >3 drinks per day nor >7 drinks per week.    Last PSA:    PSA   Date Value Ref Range Status   02/10/2016 0.19 0 - 4 ug/L Final       Recent Labs   Lab Test  02/28/17   0831  02/10/16   1003  11/06/13   0833  09/06/12   0957   CHOL  161  168  151  210*   HDL  59  72  58  56   LDL  73  74  81  131*   TRIG  144  111  66  120   CHOLHDLRATIO   --    --   3.0  4.0   NHDL  102  96   --    --        Reviewed orders with patient. Reviewed health maintenance and updated orders accordingly - Yes    Reviewed and updated as needed this visit by clinical staff  Tobacco  Allergies  Meds         Reviewed and updated as needed this visit by Provider            ROS:  C: NEGATIVE for fever, chills, change in weight  I: NEGATIVE for worrisome rashes, moles or lesions  E: NEGATIVE for vision changes or irritation  ENT: NEGATIVE for ear, mouth and throat problems  R: NEGATIVE for significant cough or SOB  CV: NEGATIVE for chest pain, palpitations or peripheral edema  GI: NEGATIVE for nausea, abdominal pain, heartburn, or change in bowel habits   male: negative for dysuria, hematuria, decreased urinary stream, erectile dysfunction, urethral discharge  M: NEGATIVE for significant arthralgias or myalgia  N: NEGATIVE for weakness, dizziness or paresthesias  P: NEGATIVE for changes in mood or affect      OBJECTIVE:     /78 (BP Location: Left arm, Patient Position: Chair, Cuff Size: Adult Regular)  Pulse 84  Temp 98.1  F (36.7  C) (Temporal)  Resp 20  Wt 181 lb 11.2 oz (82.4 kg)  SpO2 97%  BMI 26.83 kg/m2  EXAM:  GENERAL: healthy, alert and no distress  EYES: Eyes grossly normal to inspection, PERRL and conjunctivae and sclerae normal  HENT: ear canals and TM's normal, nose and mouth without ulcers or lesions  NECK: no adenopathy, no asymmetry, masses, or scars and thyroid normal to palpation  RESP: lungs clear to auscultation - no rales, rhonchi or wheezes  CV: regular rate and rhythm, normal S1 S2, no S3 or S4, no murmur, click or rub, no peripheral edema, and peripheral pulses  "strong  ABDOMEN: soft, nontender, no hepatosplenomegaly, no masses and bowel sounds normal  MS: no gross musculoskeletal defects noted, no edema  SKIN: no suspicious lesions or rashes  NEURO: Normal strength and tone, mentation intact and speech normal  PSYCH: mentation appears normal, affect normal/bright    ASSESSMENT/PLAN:         ICD-10-CM    1. Routine general medical examination at a health care facility Z00.00    2. Screening for hyperlipidemia Z13.220 Lipid panel reflex to direct LDL   3. Special screening for malignant neoplasms, colon Z12.11 Fecal colorectal cancer screen (FIT)   4. Hypertension goal BP (blood pressure) < 140/90 I10 Basic metabolic panel     Long discussion about the role of statin primary prevention, as well as the importance of blood pressure controlled. Continue home monitoring. We'll change today. Updated labs. He wishes to stop his atorvastatin and see where his cholesterol and about 6 months. I agreed.    COUNSELING:   Reviewed preventive health counseling, as reflected in patient instructions         reports that he has never smoked. He has never used smokeless tobacco.    Estimated body mass index is 26.83 kg/(m^2) as calculated from the following:    Height as of 4/25/17: 5' 9\" (1.753 m).    Weight as of this encounter: 181 lb 11.2 oz (82.4 kg).       Counseling Resources:  ATP IV Guidelines  Pooled Cohorts Equation Calculator  FRAX Risk Assessment  ICSI Preventive Guidelines  Dietary Guidelines for Americans, 2010  USDA's MyPlate  ASA Prophylaxis  Lung CA Screening    Yan Lucas MD  [facility]  "

## 2017-06-27 PROCEDURE — 82274 ASSAY TEST FOR BLOOD FECAL: CPT | Performed by: FAMILY MEDICINE

## 2017-06-29 DIAGNOSIS — Z12.11 SPECIAL SCREENING FOR MALIGNANT NEOPLASMS, COLON: ICD-10-CM

## 2017-06-29 LAB — HEMOCCULT STL QL IA: NEGATIVE

## 2017-08-09 DIAGNOSIS — E78.5 HYPERLIPIDEMIA LDL GOAL <130: ICD-10-CM

## 2017-08-09 DIAGNOSIS — I10 HYPERTENSION GOAL BP (BLOOD PRESSURE) < 140/90: ICD-10-CM

## 2017-08-09 NOTE — TELEPHONE ENCOUNTER
atorvastatin     Last Written Prescription Date: 5/15/2017  Last Fill Quantity: 90, # refills: 0  [place] [facility] [place] [facility] or  Health prescribing provider: 12/28/2016       Lab Results   Component Value Date    CHOL 161 02/28/2017     Lab Results   Component Value Date    HDL 59 02/28/2017     Lab Results   Component Value Date    LDL 73 02/28/2017     Lab Results   Component Value Date    TRIG 144 02/28/2017     Lab Results   Component Value Date    CHOLHDLRATIO 3.0 11/06/2013     Amlodipine      Last Written Prescription Date: 5/15/2017  Last Fill Quantity: 90, # refills: 0  Last [place] [facility] [place] [facility] or [facility] prescribing provider: 12/28/2016       Potassium   Date Value Ref Range Status   05/24/2017 4.0 3.4 - 5.3 mmol/L Final     Creatinine   Date Value Ref Range Status   05/24/2017 0.94 0.66 - 1.25 mg/dL Final     BP Readings from Last 3 Encounters:   05/24/17 132/78   04/25/17 146/50   12/28/16 (!) 150/32

## 2017-08-10 RX ORDER — ATORVASTATIN CALCIUM 40 MG/1
TABLET, FILM COATED ORAL
Qty: 90 TABLET | Refills: 1 | Status: SHIPPED | OUTPATIENT
Start: 2017-08-10 | End: 2018-02-06

## 2017-08-10 RX ORDER — AMLODIPINE BESYLATE 2.5 MG/1
TABLET ORAL
Qty: 90 TABLET | Refills: 1 | Status: SHIPPED | OUTPATIENT
Start: 2017-08-10 | End: 2018-02-06

## 2017-08-25 ENCOUNTER — HOSPITAL ENCOUNTER (EMERGENCY)
Facility: CLINIC | Age: 47
Discharge: HOME | End: 2017-08-25
Attending: FAMILY MEDICINE | Admitting: FAMILY MEDICINE
Payer: COMMERCIAL

## 2017-08-25 VITALS
HEART RATE: 72 BPM | RESPIRATION RATE: 16 BRPM | SYSTOLIC BLOOD PRESSURE: 120 MMHG | OXYGEN SATURATION: 99 % | TEMPERATURE: 97.1 F | DIASTOLIC BLOOD PRESSURE: 78 MMHG

## 2017-08-25 DIAGNOSIS — L03.113 CELLULITIS OF RIGHT UPPER EXTREMITY: Primary | ICD-10-CM

## 2017-08-25 PROCEDURE — 93005 ELECTROCARDIOGRAM TRACING: CPT | Performed by: FAMILY MEDICINE

## 2017-08-25 PROCEDURE — 99284 EMERGENCY DEPT VISIT MOD MDM: CPT | Mod: Z6 | Performed by: FAMILY MEDICINE

## 2017-08-25 PROCEDURE — 99283 EMERGENCY DEPT VISIT LOW MDM: CPT | Performed by: FAMILY MEDICINE

## 2017-08-25 RX ORDER — CEPHALEXIN 500 MG/1
500 CAPSULE ORAL 4 TIMES DAILY
Qty: 28 CAPSULE | Refills: 0 | Status: SHIPPED | OUTPATIENT
Start: 2017-08-25 | End: 2017-09-01

## 2017-08-25 NOTE — ED AVS SNAPSHOT
[facility] Emergency Department    [address] [place] [address]    Phone:  [phone]    Fax:  [phone]                                       Toney Lemos   MRN: [id number]    Department:  [facility] Emergency Department   Date of Visit:  8/25/2017           After Visit Summary Signature Page     I have received my discharge instructions, and my questions have been answered. I have discussed any challenges I see with this plan with the nurse or doctor.    ..........................................................................................................................................  Patient/Patient Representative Signature      ..........................................................................................................................................  Patient Representative Print Name and Relationship to Patient    ..................................................               ................................................  Date                                            Time    ..........................................................................................................................................  Reviewed by Signature/Title    ...................................................              ..............................................  Date                                                            Time

## 2017-08-25 NOTE — ED AVS SNAPSHOT
[facility] Emergency Department    [address]     [place] [address]    Phone:  [phone]    Fax:  [phone]                                       Toney Lemos   MRN: [id number]    Department:  [facility] Emergency Department   Date of Visit:  8/25/2017           Patient Information     Date Of Birth          1970        Your diagnoses for this visit were:     Cellulitis of right upper extremity- right forearm        You were seen by Toney Noriega MD.      Follow-up Information     Schedule an appointment as soon as possible for a visit with Yan Lucas MD.    Specialty:  Family Practice    Why:  As needed    Contact information:    [address] [place] [address] [phone]          Discharge Instructions         Discharge Instructions for Cellulitis (Skin Infection) of the right arm  You have been diagnosed with cellulitis. This is an infection in the deepest layer of the skin. In some cases, the infection also affects the muscle. Cellulitis is caused by bacteria. The bacteria can enter the body through broken skin. This can happen with a cut, scratch, animal bite, or an insect bite that has been scratched. You may have been treated in the hospital with antibiotics and fluids. You will likely be given a prescription for antibiotics to take at home. This sheet will help you take care of yourself at home.  Home care  When you are home:    I sent a prescription to the pharmacy upstairs.  Take the prescribed antibiotic medicine you are given as directed until it is gone. Take it even if you feel better. It treats the infection and stops it from returning. Not taking all the medicine can make future infections hard to treat.    Keep the infected area clean.    When possible, raise the infected area above the level of your heart. This helps keep swelling down.    Talk with your healthcare provider if you are in pain. Ask what kind of over-the-counter  medicine you can take for pain.    Apply clean bandages as advised.    Take your temperature once a day for a week.    Wash your hands often to prevent spreading the infection.  In the future, wash your hands before and after you touch cuts, scratches, or bandages. This will help prevent infection.   Here are a couple of other things that will help:    1. Zyrtec is an over the counter medicine that will help stop this itching, and you can take two of the 10 mg tabs daily.     2. Zantac is an over the counter medicine that will help stop this itching and you can take two of the 150 mg tablets daily.    3. Benadryl is helpful as well, and you can take one or two of them, but these will likely make you tired.  This can be helpful at night.        When to call your healthcare provider  Call your healthcare provider immediately if you have any of the following:    Difficulty or pain when moving the joints above or below the infected area    Discharge or pus draining from the area    Fever of 100.4 F (38 C) or higher, or as directed by your healthcare provider    Pain that gets worse in or around the infected     Redness that gets worse in or around the infected area, particularly if the area of redness expands to a wider area    Shaking chills    Swelling of the infected area    Vomiting       Thank you for choosing our Emergency Department for your care.     Sincerely,    Dr Preet Noriega M.D.            Future Appointments        Provider Department Dept Phone Center    11/21/2017 8:20 AM Yan Lucas MD [facility] [phone] [facility]      24 Hour Appointment Hotline       To make an appointment at any [facility], call [phone] ([phone]). If you don't have a family doctor or clinic, we will help you find one. [facility] are conveniently located to serve the needs of you and your family.             Review of your medicines      START taking        Dose / Directions  Last dose taken    cephALEXin 500 MG capsule   Commonly known as:  KEFLEX   Dose:  500 mg   Quantity:  28 capsule        Take 1 capsule (500 mg) by mouth 4 times daily for 7 days   Refills:  0          Our records show that you are taking the medicines listed below. If these are incorrect, please call your family doctor or clinic.        Dose / Directions Last dose taken    amLODIPine 2.5 MG tablet   Commonly known as:  NORVASC   Quantity:  90 tablet        TAKE 1 TABLET BY MOUTH EVERY DAY   Refills:  1        atorvastatin 40 MG tablet   Commonly known as:  LIPITOR   Quantity:  90 tablet        TAKE 1 TABLET BY MOUTH EVERY DAY   Refills:  1        carvedilol 6.25 MG tablet   Commonly known as:  COREG   Dose:  6.25 mg   Quantity:  60 tablet        Take 1 tablet (6.25 mg) by mouth 2 times daily (with meals)   Refills:  11        CLARITIN 10 MG tablet   Generic drug:  loratadine        1 TABLET DAILY AS NEEDED   Refills:  0        CORICIDIN HBP CONGESTION/COUGH  MG Caps   Dose:  2 tablet   Generic drug:  Dextromethorphan-guaiFENesin        Take 2 tablets by mouth daily   Refills:  0        lisinopril 20 MG tablet   Commonly known as:  PRINIVIL/ZESTRIL   Quantity:  90 tablet        TAKE 1 TABLET(20 MG) BY MOUTH TWICE DAILY   Refills:  0        NYQUIL COLD & FLU PO        Refills:  0        promethazine-codeine 6.25-10 MG/5ML syrup   Commonly known as:  PHENERGAN with codeine   Dose:  5-10 mL   Quantity:  240 mL        Take 5-10 mLs by mouth nightly as needed for cough   Refills:  0                Prescriptions were sent or printed at these locations (1 Prescription)                   [facility]   [address] [address]    Telephone:  [phone]   Fax:  [phone]   Hours:                  These medications are not ready yet because we are checking if your insurance will help you pay for them. Call your pharmacy to confirm that your medication is ready for pickup.  It may take up to 24 hours for them to receive the prescription. If the prescription is not ready within 3 business days, please contact your clinic or your provider (1 of 1)         cephALEXin (KEFLEX) 500 MG capsule                Procedures and tests performed during your visit     EKG 12-lead, tracing only      Orders Needing Specimen Collection     None      Pending Results     No orders found from 8/23/2017 to 8/26/2017.            Pending Culture Results     No orders found from 8/23/2017 to 8/26/2017.            Pending Results Instructions     If you had any lab results that were not finalized at the time of your Discharge, you can call the ED Lab Result RN at [phone]. You will be contacted by this team for any positive Lab results or changes in treatment. The nurses are available 7 days a week from 10A to 6:30P.  You can leave a message 24 hours per day and they will return your call.        Thank you for choosing [facility]       Thank you for choosing [facility] for your care. Our goal is always to provide you with excellent care. Hearing back from our patients is one way we can continue to improve our services. Please take a few minutes to complete the written survey that you may receive in the mail after you visit with us. Thank you!        [employer] Information     [employer] gives you secure access to your electronic health record. If you see a primary care provider, you can also send messages to your care team and make appointments. If you have questions, please call your primary care clinic.  If you do not have a primary care provider, please call [phone] and they will assist you.        Care EveryWhere ID     This is your Care EveryWhere ID. This could be used by other organizations to access your [facility] medical records  [id number]        Equal Access to Services     NEGAR LINDA : Kelly Fonseca, courtney solomon, cirilo rodríguez  la'sapna andrea. So [facility] [phone].    ATENCIÓN: Si habla español, tiene a torres disposición servicios gratuitos de asistencia lingüística. Llame al [phone].    We comply with applicable federal civil rights laws and [place] laws. We do not discriminate on the basis of race, color, national origin, age, disability sex, sexual orientation or gender identity.            After Visit Summary       This is your record. Keep this with you and show to your community pharmacist(s) and doctor(s) at your next visit.

## 2017-08-25 NOTE — ED NOTES
Pt presents with redness and swelling to right lower arm. Pt mentions he was bit by a bee on Saturday. Pt noticed redness and swelling yesterday. Ice placed to area.

## 2017-08-25 NOTE — ED PROVIDER NOTES
History     Chief Complaint   Patient presents with     Insect Bite     Left lower arm pain and redness. Bee sting one week ago.      The history is provided by the patient.     Toney Lemos is a 47 year old male who is here with redness on his right arm.  He had been stung by a bee on Saturday, 6 days ago.  By Sunday morning most of the swelling and redness was gone.  A couple of days ago he noticed some itching and yesterday he had some swelling and redness over the area.  He tried to put Benadryl cream on it and today there is more swelling and more redness.  He doesn't have pain but it is pretty itchy and warm to the touch.    I have reviewed the Medications, Allergies, Past Medical and Surgical History, and Social History in the Epic system.    Allergies:   Allergies   Allergen Reactions     No Known Drug Allergies      Seasonal Allergies          No current facility-administered medications on file prior to encounter.   Current Outpatient Prescriptions on File Prior to Encounter:  amLODIPine (NORVASC) 2.5 MG tablet TAKE 1 TABLET BY MOUTH EVERY DAY   atorvastatin (LIPITOR) 40 MG tablet TAKE 1 TABLET BY MOUTH EVERY DAY   carvedilol (COREG) 6.25 MG tablet Take 1 tablet (6.25 mg) by mouth 2 times daily (with meals) (Patient not taking: Reported on 5/24/2017)   lisinopril (PRINIVIL/ZESTRIL) 20 MG tablet TAKE 1 TABLET(20 MG) BY MOUTH TWICE DAILY   Dextromethorphan-guaiFENesin (CORICIDIN HBP CONGESTION/COUGH)  MG CAPS Take 2 tablets by mouth daily   DM-Doxylamine-Acetaminophen (NYQUIL COLD & FLU PO)    promethazine-codeine (PHENERGAN WITH CODEINE) 6.25-10 MG/5ML syrup Take 5-10 mLs by mouth nightly as needed for cough (Patient not taking: Reported on 5/24/2017)   CLARITIN 10 MG PO TABS 1 TABLET DAILY AS NEEDED       Patient Active Problem List   Diagnosis     Congenital insufficiency of aortic valve     Backache     HYPERLIPIDEMIA LDL GOAL <130     Hypertension goal BP (blood pressure) < 140/90      "Encounter for long-term current use of medication     FH: early coronary artery disease- bro 30s       History reviewed. No pertinent surgical history.    Social History   Substance Use Topics     Smoking status: Never Smoker     Smokeless tobacco: Never Used     Alcohol use 0.0 oz/week     0 Standard drinks or equivalent per week      Comment: beer or two wkly       Most Recent Immunizations   Administered Date(s) Administered     TDAP Vaccine (Adacel) 09/13/2010       BMI: Estimated body mass index is 26.83 kg/(m^2) as calculated from the following:    Height as of 4/25/17: 1.753 m (5' 9\").    Weight as of 5/24/17: 82.4 kg (181 lb 11.2 oz).      Review of Systems   Skin: Positive for rash.   All other systems reviewed and are negative.      Physical Exam   BP: 128/78  Pulse: 72  Temp: 97.1  F (36.2  C)  Resp: 16  SpO2: 99 %    Physical Exam   Constitutional: He is oriented to person, place, and time. He appears well-developed and well-nourished. No distress.   HENT:   Head: Normocephalic and atraumatic.   Right Ear: External ear normal.   Left Ear: External ear normal.   Eyes: Conjunctivae and EOM are normal.   Neck: Normal range of motion. Neck supple.   Pulmonary/Chest: No respiratory distress.   Neurological: He is alert and oriented to person, place, and time.   Skin:   He has an area of redness on the right forearm which is erythematous and raised.  He's been putting ice on it, so it is not warm to the touch right now.  It appears consistent with cellulitis.   Nursing note and vitals reviewed.      ED Course     ED Course     Procedures      Assessments & Plan (with Medical Decision Making)  Toney is a 47-year-old male here with a skin rash on his right arm.  He was stung by a bee about one week ago and the redness had dissipated by the next day.  He had no symptoms for a few days, and then yesterday noticed redness.  Today the redness has increased and he has itching.  It was warm to the touch, although he " has ice on it here in the ED.  Vital signs are stable here.  Exam is consistent with cellulitis.  We will treat with Keflex.       I have reviewed the nursing notes.    I have reviewed the findings, diagnosis, plan and need for follow up with the patient.       New Prescriptions    CEPHALEXIN (KEFLEX) 500 MG CAPSULE    Take 1 capsule (500 mg) by mouth 4 times daily for 7 days       Final diagnoses:   Cellulitis of right upper extremity- right forearm       8/25/2017   [facility] EMERGENCY DEPARTMENT     Toney Noriega MD  08/25/17 0773

## 2017-08-25 NOTE — DISCHARGE INSTRUCTIONS
Discharge Instructions for Cellulitis (Skin Infection) of the right arm  You have been diagnosed with cellulitis. This is an infection in the deepest layer of the skin. In some cases, the infection also affects the muscle. Cellulitis is caused by bacteria. The bacteria can enter the body through broken skin. This can happen with a cut, scratch, animal bite, or an insect bite that has been scratched. You may have been treated in the hospital with antibiotics and fluids. You will likely be given a prescription for antibiotics to take at home. This sheet will help you take care of yourself at home.  Home care  When you are home:    I sent a prescription to the pharmacy upstairs.  Take the prescribed antibiotic medicine you are given as directed until it is gone. Take it even if you feel better. It treats the infection and stops it from returning. Not taking all the medicine can make future infections hard to treat.    Keep the infected area clean.    When possible, raise the infected area above the level of your heart. This helps keep swelling down.    Talk with your healthcare provider if you are in pain. Ask what kind of over-the-counter medicine you can take for pain.    Apply clean bandages as advised.    Take your temperature once a day for a week.    Wash your hands often to prevent spreading the infection.  In the future, wash your hands before and after you touch cuts, scratches, or bandages. This will help prevent infection.   Here are a couple of other things that will help:    1. Zyrtec is an over the counter medicine that will help stop this itching, and you can take two of the 10 mg tabs daily.     2. Zantac is an over the counter medicine that will help stop this itching and you can take two of the 150 mg tablets daily.    3. Benadryl is helpful as well, and you can take one or two of them, but these will likely make you tired.  This can be helpful at night.        When to call your healthcare  provider  Call your healthcare provider immediately if you have any of the following:    Difficulty or pain when moving the joints above or below the infected area    Discharge or pus draining from the area    Fever of 100.4 F (38 C) or higher, or as directed by your healthcare provider    Pain that gets worse in or around the infected     Redness that gets worse in or around the infected area, particularly if the area of redness expands to a wider area    Shaking chills    Swelling of the infected area    Vomiting       Thank you for choosing our Emergency Department for your care.     Sincerely,    Dr Preet Noriega M.D.

## 2017-10-06 ENCOUNTER — HOSPITAL ENCOUNTER (OUTPATIENT)
Dept: CARDIOLOGY | Facility: CLINIC | Age: 47
Discharge: HOME | End: 2017-10-06
Attending: INTERNAL MEDICINE | Admitting: INTERNAL MEDICINE
Payer: COMMERCIAL

## 2017-10-06 DIAGNOSIS — Q23.1 CONGENITAL INSUFFICIENCY OF AORTIC VALVE: ICD-10-CM

## 2017-10-06 DIAGNOSIS — I10 ESSENTIAL HYPERTENSION: ICD-10-CM

## 2017-10-06 DIAGNOSIS — Z13.220 SCREENING FOR HYPERLIPIDEMIA: ICD-10-CM

## 2017-10-06 LAB
CHOLEST SERPL-MCNC: 170 MG/DL
HDLC SERPL-MCNC: 62 MG/DL
LDLC SERPL CALC-MCNC: 83 MG/DL
NONHDLC SERPL-MCNC: 108 MG/DL
TRIGL SERPL-MCNC: 124 MG/DL

## 2017-10-06 PROCEDURE — 36415 COLL VENOUS BLD VENIPUNCTURE: CPT | Performed by: FAMILY MEDICINE

## 2017-10-06 PROCEDURE — 80061 LIPID PANEL: CPT | Performed by: FAMILY MEDICINE

## 2017-10-06 PROCEDURE — 93306 TTE W/DOPPLER COMPLETE: CPT

## 2017-10-06 PROCEDURE — 93306 TTE W/DOPPLER COMPLETE: CPT | Mod: 26 | Performed by: INTERNAL MEDICINE

## 2017-10-18 ENCOUNTER — OFFICE VISIT (OUTPATIENT)
Dept: CARDIOLOGY | Facility: CLINIC | Age: 47
End: 2017-10-18
Payer: COMMERCIAL

## 2017-10-18 VITALS
HEART RATE: 64 BPM | DIASTOLIC BLOOD PRESSURE: 60 MMHG | OXYGEN SATURATION: 97 % | SYSTOLIC BLOOD PRESSURE: 128 MMHG | HEIGHT: 69 IN | BODY MASS INDEX: 26.62 KG/M2 | WEIGHT: 179.7 LBS

## 2017-10-18 DIAGNOSIS — I10 ESSENTIAL HYPERTENSION: ICD-10-CM

## 2017-10-18 DIAGNOSIS — Q23.1 CONGENITAL INSUFFICIENCY OF AORTIC VALVE: ICD-10-CM

## 2017-10-18 PROCEDURE — 99214 OFFICE O/P EST MOD 30 MIN: CPT | Performed by: INTERNAL MEDICINE

## 2017-10-18 NOTE — PROGRESS NOTES
2017         Yan Lucas MD    [address] [address]      RE: Toney Lemos   MRN: [id number]   : 1970      Dear Yan:      I saw Toney and his wife today.  Toney is 47.  He is an incredibly sweet max who has had a chronic congenital bicuspid aortic valve that has gone on to have +2 to +3, more likely +3 aortic insufficiency.  This has been relatively asymptomatic.  We have followed him clinically with physical exams but primarily echocardiography which I have done on a yearly basis.  His most recent echo done 10 days ago showed an LV at the upper limits of normal, normal systolic function with an EF of 60%-65% which is typically seen with volume overload of the left ventricle.  The other cardiac valves were normal.  Right heart pressures could not be calculated.  His AR was approximated at +3 which has been similar to his previous assessments.  Once again he denies any obvious symptoms of exertional shortness of breath, palpitations, dizziness, syncope, chest pain, etc.  No signs of left or right heart failure.      He is remarkably stable.  He seems to be doing well clinically.  I anticipate at some point he will need an aortic valve replacement.  We discussed this at length.  We reviewed the issues briefly of tissue versus mechanical valve.  At his young age, I would suspect he would choose a mechanical valve and accept the annoyance of long-term anticoagulation.      PAST MEDICAL HISTORY:  Includes:   1.  Mild hypertension.   2.  Hyperlipidemia, on a statin.   3.  Bicuspid aortic valve, congenital, with moderately severe aortic insufficiency.      CURRENT MEDICATIONS:  Mainly directed at trying to minimize any tendency to hypertension.  He is on:    1.  Carvedilol 6.25 mg b.i.d.   2.  Amlodipine 2.5 mg a day.   3.  Lisinopril 20 mg b.i.d.    4.  Atorvastatin 40 mg at bedtime.      PHYSICAL EXAMINATION:     GENERAL:  Very stable.   VITAL SIGNS:  Blood  pressure was 120/50, heart rate 64 beats a minute.  He weighs 179 pounds which is stable.   HEAD:  Normal.   NECK:  Free of neck vein distention or bruit.  Carotid upstroke is brisk, typical of the increased systolic ejection associated with aortic insufficiency.   HEART:  Regular with a 2/6 decrescendo murmur of AI at the left sternal border.  No S3 is heard.   LUNGS:  Clear.   ABDOMEN:  Soft without organomegaly.   EXTREMITIES:  Show +2 to +3 pedal and radial pulses.      LDL is 83 on the atorvastatin.  Renal function normal.      IMPRESSION:  Stable aortic insufficiency.  I will recheck an echo and see him in a year.  Currently, his heart is being tolerant of this degree of AI.  If he has problems, let us know.  Dyspnea would be his symptom suggesting the AI was more significant.      PROGNOSIS:  Good      Sincerely,         MONIQUE GRESHAM MD, [facility]             D: 10/18/2017 09:38   T: 10/18/2017 11:12   MT: KONSTANTIN      Name:     NAHID BONE   MRN:      [id number]        Account:      [id number]   :      1970           Service Date: 10/18/2017      Document: [id number]

## 2017-10-18 NOTE — LETTER
2017         Yan Lucas MD    [address] [address]      RE: Toney Lemos   MRN: [id number]   : 1970      Dear Yan:      I saw Toney and his wife today.  Toney is 47.  He is an incredibly sweet max who has had a chronic congenital bicuspid aortic valve that has gone on to have +2 to +3, more likely +3 aortic insufficiency.  This has been relatively asymptomatic.  We have followed him clinically with physical exams but primarily echocardiography which I have done on a yearly basis.  His most recent echo done 10 days ago showed an LV at the upper limits of normal, normal systolic function with an EF of 60%-65% which is typically seen with volume overload of the left ventricle.  The other cardiac valves were normal.  Right heart pressures could not be calculated.  His AR was approximated at +3 which has been similar to his previous assessments.  Once again he denies any obvious symptoms of exertional shortness of breath, palpitations, dizziness, syncope, chest pain, etc.  No signs of left or right heart failure.      He is remarkably stable.  He seems to be doing well clinically.  I anticipate at some point he will need an aortic valve replacement.  We discussed this at length.  We reviewed the issues briefly of tissue versus mechanical valve.  At his young age, I would suspect he would choose a mechanical valve and accept the annoyance of long-term anticoagulation.      PAST MEDICAL HISTORY:  Includes:   1.  Mild hypertension.   2.  Hyperlipidemia, on a statin.   3.  Bicuspid aortic valve, congenital, with moderately severe aortic insufficiency.      CURRENT MEDICATIONS:  Mainly directed at trying to minimize any tendency to hypertension.  He is on:    1.  Carvedilol 6.25 mg b.i.d.   2.  Amlodipine 2.5 mg a day.   3.  Lisinopril 20 mg b.i.d.    4.  Atorvastatin 40 mg at bedtime.      PHYSICAL EXAMINATION:     GENERAL:  Very stable.   VITAL SIGNS:  Blood  pressure was 120/50, heart rate 64 beats a minute.  He weighs 179 pounds which is stable.   HEAD:  Normal.   NECK:  Free of neck vein distention or bruit.  Carotid upstroke is brisk, typical of the increased systolic ejection associated with aortic insufficiency.   HEART:  Regular with a 2/6 decrescendo murmur of AI at the left sternal border.  No S3 is heard.   LUNGS:  Clear.   ABDOMEN:  Soft without organomegaly.   EXTREMITIES:  Show +2 to +3 pedal and radial pulses.      LDL is 83 on the atorvastatin.  Renal function normal.      IMPRESSION:  Stable aortic insufficiency.  I will recheck an echo and see him in a year.  Currently, his heart is being tolerant of this degree of AI.  If he has problems, let us know.  Dyspnea would be his symptom suggesting the AI was more significant.      PROGNOSIS:  Good      Sincerely,         MONIQUE GRESHAM MD, FACC

## 2017-10-18 NOTE — PROGRESS NOTES
HPI and Plan:   See dictation  I recommend continuing current treatment plans in the chart.    No orders of the defined types were placed in this encounter.    No orders of the defined types were placed in this encounter.    Medications Discontinued During This Encounter   Medication Reason     CLARITIN 10 MG PO TABS Therapy completed     Dextromethorphan-guaiFENesin (CORICIDIN HBP CONGESTION/COUGH)  MG CAPS Therapy completed     DM-Doxylamine-Acetaminophen (NYQUIL COLD & FLU PO) Therapy completed         Encounter Diagnoses   Name Primary?     Congenital insufficiency of aortic valve      Essential hypertension        CURRENT MEDICATIONS:  Current Outpatient Prescriptions   Medication Sig Dispense Refill     amLODIPine (NORVASC) 2.5 MG tablet TAKE 1 TABLET BY MOUTH EVERY DAY 90 tablet 1     atorvastatin (LIPITOR) 40 MG tablet TAKE 1 TABLET BY MOUTH EVERY DAY 90 tablet 1     carvedilol (COREG) 6.25 MG tablet Take 1 tablet (6.25 mg) by mouth 2 times daily (with meals) 60 tablet 11     lisinopril (PRINIVIL/ZESTRIL) 20 MG tablet TAKE 1 TABLET(20 MG) BY MOUTH TWICE DAILY (Patient not taking: Reported on 10/18/2017) 90 tablet 0     promethazine-codeine (PHENERGAN WITH CODEINE) 6.25-10 MG/5ML syrup Take 5-10 mLs by mouth nightly as needed for cough (Patient not taking: Reported on 5/24/2017) 240 mL 0       ALLERGIES     Allergies   Allergen Reactions     No Known Drug Allergies      Seasonal Allergies        PAST MEDICAL HISTORY:  Past Medical History:   Diagnosis Date     Aortic valve disorders      Hypertension        PAST SURGICAL HISTORY:  History reviewed. No pertinent surgical history.    FAMILY HISTORY:  Family History   Problem Relation Age of Onset     HEART DISEASE Brother      CAD     HEART DISEASE Maternal Grandmother      Lipids Maternal Grandmother      Hypertension Maternal Grandmother      Hypertension Paternal Grandfather      HEART DISEASE Paternal Grandfather      heart murmur     Prostate Cancer  "Paternal Grandfather      CANCER Paternal Grandfather      bone ca     Lipids Paternal Grandfather      DIABETES Maternal Grandfather      senior onset     Hypertension Maternal Grandfather      CEREBROVASCULAR DISEASE Maternal Grandfather      CANCER Paternal Grandmother      rectal cancer     Lipids Paternal Grandmother      Pancreatic Cancer Cousin        SOCIAL HISTORY:  Social History     Social History     Marital status:      Spouse name: N/A     Number of children: N/A     Years of education: N/A     Social History Main Topics     Smoking status: Never Smoker     Smokeless tobacco: Never Used     Alcohol use 0.0 oz/week     0 Standard drinks or equivalent per week      Comment: beer or two wkly     Drug use: No     Sexual activity: Yes     Partners: Female      Comment: partner is on bcp     Other Topics Concern      Service No     Blood Transfusions No     Caffeine Concern No     Occupational Exposure No     Hobby Hazards No     Sleep Concern Yes     Stress Concern No     Weight Concern No     Special Diet Yes     Back Care Yes     Exercise Yes     Bike Helmet Yes     Seat Belt Yes     Self-Exams Yes     Parent/Sibling W/ Cabg, Mi Or Angioplasty Before 65f 55m? Yes     brother had heart attack &  at age 38yrs     Social History Narrative         Review of Systems:  Skin:  Negative       Eyes:  Negative      ENT:  Negative      Respiratory:  Positive for sleep apnea     Cardiovascular:  chest pain;edema;fatigue;lightheadedness;dizziness;Negative for;palpitations      Gastroenterology: Negative      Genitourinary:  Negative      Musculoskeletal:  Negative      Neurologic:  Negative      Psychiatric:  Negative      Heme/Lymph/Imm:  Negative      Endocrine:  Negative        Physical Exam:  Vitals: /60 (BP Location: Right arm, Patient Position: Fowlers, Cuff Size: Adult Large)  Pulse 64  Ht 1.753 m (5' 9\")  Wt 81.5 kg (179 lb 11.2 oz)  SpO2 97%  BMI 26.54 kg/m2    Constitutional:  " cooperative;alert and oriented;well developed;well nourished        Skin:  warm and dry to the touch        Head:  normocephalic        Eyes:  pupils equal and round        ENT:  no pallor or cyanosis        Neck:  JVP normal;no carotid bruit        Chest:  clear to auscultation          Cardiac: regular rhythm       systolic ejection murmur;grade 1;RUSB   decrescendo;early diastolic murmur;LUSB;grade 2      Abdomen:  abdomen soft;no masses;non-tender        Vascular: pulses full and equal                                   brisk carotid impulse with his AR.    Extremities and Back:  no deformities, clubbing, cyanosis, erythema observed              Neurological:  affect appropriate, oriented to time, person and place          Recent Lab Results:  LIPID RESULTS:  Lab Results   Component Value Date    CHOL 170 10/06/2017    HDL 62 10/06/2017    LDL 83 10/06/2017    TRIG 124 10/06/2017    CHOLHDLRATIO 3.0 11/06/2013       LIVER ENZYME RESULTS:  Lab Results   Component Value Date    AST 20 02/10/2016    ALT 39 02/10/2016       CBC RESULTS:  Lab Results   Component Value Date    WBC 6.2 02/10/2016    RBC 4.76 02/10/2016    HGB 15.1 02/10/2016    HCT 43.8 02/10/2016    MCV 92 02/10/2016    MCH 31.7 02/10/2016    MCHC 34.5 02/10/2016    RDW 13.2 02/10/2016     02/10/2016       BMP RESULTS:  Lab Results   Component Value Date     05/24/2017    POTASSIUM 4.0 05/24/2017    CHLORIDE 108 05/24/2017    CO2 29 05/24/2017    ANIONGAP 3 05/24/2017     (H) 05/24/2017    BUN 17 05/24/2017    CR 0.94 05/24/2017    GFRESTIMATED 87 05/24/2017    GFRESTBLACK >90   GFR Calc   05/24/2017    BRIANA 8.6 05/24/2017        A1C RESULTS:  No results found for: A1C    INR RESULTS:  No results found for: INR        CC  Charbel Gonzalez MD  [address]  [place] [address]

## 2017-10-18 NOTE — LETTER
10/18/2017      RE: Toney Lemos  [address] [facility] [address]       Dear Colleague,    Thank you for the opportunity to participate in the care of your patient, Toney Lemos, at the [facility]. Please see a copy of my visit note below.    HPI and Plan:   See dictation  I recommend continuing current treatment plans in the chart.    No orders of the defined types were placed in this encounter.    No orders of the defined types were placed in this encounter.    Medications Discontinued During This Encounter   Medication Reason     CLARITIN 10 MG PO TABS Therapy completed     Dextromethorphan-guaiFENesin (CORICIDIN HBP CONGESTION/COUGH)  MG CAPS Therapy completed     DM-Doxylamine-Acetaminophen (NYQUIL COLD & FLU PO) Therapy completed         Encounter Diagnoses   Name Primary?     Congenital insufficiency of aortic valve      Essential hypertension        CURRENT MEDICATIONS:  Current Outpatient Prescriptions   Medication Sig Dispense Refill     amLODIPine (NORVASC) 2.5 MG tablet TAKE 1 TABLET BY MOUTH EVERY DAY 90 tablet 1     atorvastatin (LIPITOR) 40 MG tablet TAKE 1 TABLET BY MOUTH EVERY DAY 90 tablet 1     carvedilol (COREG) 6.25 MG tablet Take 1 tablet (6.25 mg) by mouth 2 times daily (with meals) 60 tablet 11     lisinopril (PRINIVIL/ZESTRIL) 20 MG tablet TAKE 1 TABLET(20 MG) BY MOUTH TWICE DAILY (Patient not taking: Reported on 10/18/2017) 90 tablet 0     promethazine-codeine (PHENERGAN WITH CODEINE) 6.25-10 MG/5ML syrup Take 5-10 mLs by mouth nightly as needed for cough (Patient not taking: Reported on 5/24/2017) 240 mL 0       ALLERGIES     Allergies   Allergen Reactions     No Known Drug Allergies      Seasonal Allergies        PAST MEDICAL HISTORY:  Past Medical History:   Diagnosis Date     Aortic valve disorders      Hypertension        PAST SURGICAL HISTORY:  History reviewed. No pertinent surgical  history.    FAMILY HISTORY:  Family History   Problem Relation Age of Onset     HEART DISEASE Brother      CAD     HEART DISEASE Maternal Grandmother      Lipids Maternal Grandmother      Hypertension Maternal Grandmother      Hypertension Paternal Grandfather      HEART DISEASE Paternal Grandfather      heart murmur     Prostate Cancer Paternal Grandfather      CANCER Paternal Grandfather      bone ca     Lipids Paternal Grandfather      DIABETES Maternal Grandfather      senior onset     Hypertension Maternal Grandfather      CEREBROVASCULAR DISEASE Maternal Grandfather      CANCER Paternal Grandmother      rectal cancer     Lipids Paternal Grandmother      Pancreatic Cancer Cousin        SOCIAL HISTORY:  Social History     Social History     Marital status:      Spouse name: N/A     Number of children: N/A     Years of education: N/A     Social History Main Topics     Smoking status: Never Smoker     Smokeless tobacco: Never Used     Alcohol use 0.0 oz/week     0 Standard drinks or equivalent per week      Comment: beer or two wkly     Drug use: No     Sexual activity: Yes     Partners: Female      Comment: partner is on bcp     Other Topics Concern      Service No     Blood Transfusions No     Caffeine Concern No     Occupational Exposure No     Hobby Hazards No     Sleep Concern Yes     Stress Concern No     Weight Concern No     Special Diet Yes     Back Care Yes     Exercise Yes     Bike Helmet Yes     Seat Belt Yes     Self-Exams Yes     Parent/Sibling W/ Cabg, Mi Or Angioplasty Before 65f 55m? Yes     brother had heart attack &  at age 38yrs     Social History Narrative         Review of Systems:  Skin:  Negative       Eyes:  Negative      ENT:  Negative      Respiratory:  Positive for sleep apnea     Cardiovascular:  chest pain;edema;fatigue;lightheadedness;dizziness;Negative for;palpitations      Gastroenterology: Negative      Genitourinary:  Negative      Musculoskeletal:  Negative     "  Neurologic:  Negative      Psychiatric:  Negative      Heme/Lymph/Imm:  Negative      Endocrine:  Negative        Physical Exam:  Vitals: /60 (BP Location: Right arm, Patient Position: Fowlers, Cuff Size: Adult Large)  Pulse 64  Ht 1.753 m (5' 9\")  Wt 81.5 kg (179 lb 11.2 oz)  SpO2 97%  BMI 26.54 kg/m2    Constitutional:  cooperative;alert and oriented;well developed;well nourished        Skin:  warm and dry to the touch        Head:  normocephalic        Eyes:  pupils equal and round        ENT:  no pallor or cyanosis        Neck:  JVP normal;no carotid bruit        Chest:  clear to auscultation          Cardiac: regular rhythm       systolic ejection murmur;grade 1;RUSB   decrescendo;early diastolic murmur;LUSB;grade 2      Abdomen:  abdomen soft;no masses;non-tender        Vascular: pulses full and equal                                   brisk carotid impulse with his AR.    Extremities and Back:  no deformities, clubbing, cyanosis, erythema observed              Neurological:  affect appropriate, oriented to time, person and place          Recent Lab Results:  LIPID RESULTS:  Lab Results   Component Value Date    CHOL 170 10/06/2017    HDL 62 10/06/2017    LDL 83 10/06/2017    TRIG 124 10/06/2017    CHOLHDLRATIO 3.0 11/06/2013       LIVER ENZYME RESULTS:  Lab Results   Component Value Date    AST 20 02/10/2016    ALT 39 02/10/2016       CBC RESULTS:  Lab Results   Component Value Date    WBC 6.2 02/10/2016    RBC 4.76 02/10/2016    HGB 15.1 02/10/2016    HCT 43.8 02/10/2016    MCV 92 02/10/2016    MCH 31.7 02/10/2016    MCHC 34.5 02/10/2016    RDW 13.2 02/10/2016     02/10/2016       BMP RESULTS:  Lab Results   Component Value Date     05/24/2017    POTASSIUM 4.0 05/24/2017    CHLORIDE 108 05/24/2017    CO2 29 05/24/2017    ANIONGAP 3 05/24/2017     (H) 05/24/2017    BUN 17 05/24/2017    CR 0.94 05/24/2017    GFRESTIMATED 87 05/24/2017    GFRESTBLACK >90   GFR " Calc   2017    BRIANA 8.6 2017        A1C RESULTS:  No results found for: A1C    INR RESULTS:  No results found for: INR        CC  Charbel Gonzalez MD  [address]  [place] [address]                    2017         Yan Lucas MD    [address] [address]      RE: Toney Lemos   MRN: [id number]   : 1970      Dear Yan:      I saw Toney and his wife today.  Toney is 47.  He is an incredibly sweet max who has had a chronic congenital bicuspid aortic valve that has gone on to have +2 to +3, more likely +3 aortic insufficiency.  This has been relatively asymptomatic.  We have followed him clinically with physical exams but primarily echocardiography which I have done on a yearly basis.  His most recent echo done 10 days ago showed an LV at the upper limits of normal, normal systolic function with an EF of 60%-65% which is typically seen with volume overload of the left ventricle.  The other cardiac valves were normal.  Right heart pressures could not be calculated.  His AR was approximated at +3 which has been similar to his previous assessments.  Once again he denies any obvious symptoms of exertional shortness of breath, palpitations, dizziness, syncope, chest pain, etc.  No signs of left or right heart failure.      He is remarkably stable.  He seems to be doing well clinically.  I anticipate at some point he will need an aortic valve replacement.  We discussed this at length.  We reviewed the issues briefly of tissue versus mechanical valve.  At his young age, I would suspect he would choose a mechanical valve and accept the annoyance of long-term anticoagulation.      PAST MEDICAL HISTORY:  Includes:   1.  Mild hypertension.   2.  Hyperlipidemia, on a statin.   3.  Bicuspid aortic valve, congenital, with moderately severe aortic insufficiency.      CURRENT MEDICATIONS:  Mainly directed at trying to minimize any tendency to  hypertension.  He is on:    1.  Carvedilol 6.25 mg b.i.d.   2.  Amlodipine 2.5 mg a day.   3.  Lisinopril 20 mg b.i.d.    4.  Atorvastatin 40 mg at bedtime.      PHYSICAL EXAMINATION:     GENERAL:  Very stable.   VITAL SIGNS:  Blood pressure was 120/50, heart rate 64 beats a minute.  He weighs 179 pounds which is stable.   HEAD:  Normal.   NECK:  Free of neck vein distention or bruit.  Carotid upstroke is brisk, typical of the increased systolic ejection associated with aortic insufficiency.   HEART:  Regular with a 2/6 decrescendo murmur of AI at the left sternal border.  No S3 is heard.   LUNGS:  Clear.   ABDOMEN:  Soft without organomegaly.   EXTREMITIES:  Show +2 to +3 pedal and radial pulses.      LDL is 83 on the atorvastatin.  Renal function normal.      IMPRESSION:  Stable aortic insufficiency.  I will recheck an echo and see him in a year.  Currently, his heart is being tolerant of this degree of AI.  If he has problems, let us know.  Dyspnea would be his symptom suggesting the AI was more significant.      PROGNOSIS:  Good      Sincerely,         MONIQUE GRESHAM MD, [facility]             D: 10/18/2017 09:38   T: 10/18/2017 11:12   MT: KONSTANTIN      Name:     NAHID BONE   MRN:      [id number]        Account:      [id number]   :      1970           Service Date: 10/18/2017      Document: [id number]       Please do not hesitate to contact me if you have any questions/concerns.     Sincerely,     MONIQUE GRESHAM MD

## 2017-10-18 NOTE — MR AVS SNAPSHOT
After Visit Summary   10/18/2017    Toney Lemos    MRN: [id number]           Patient Information     Date Of Birth          1970        Visit Information        Provider Department      10/18/2017 9:00 AM Charbel Gonzalez MD [facility]        Today's Diagnoses     Congenital insufficiency of aortic valve        Essential hypertension           Follow-ups after your visit        Your next 10 appointments already scheduled     Nov 21, 2017  8:20 AM CST   SHORT with Yan Lucas MD   [facility]    [address] [place] [id number] [phone]              Who to contact     If you have questions or need follow up information about today's clinic visit or your schedule please contact [facility] directly at [phone].  Normal or non-critical lab and imaging results will be communicated to you by MyChart, letter or phone within 4 business days after the clinic has received the results. If you do not hear from us within 7 days, please contact the clinic through MyChart or phone. If you have a critical or abnormal lab result, we will notify you by phone as soon as possible.  Submit refill requests through [facility] or call your pharmacy and they will forward the refill request to us. Please allow 3 business days for your refill to be completed.          Additional Information About Your Visit        MyChart Information     [facility] gives you secure access to your electronic health record. If you see a primary care provider, you can also send messages to your care team and make appointments. If you have questions, please call your primary care clinic.  If you do not have a primary care provider, please call [phone] and they will assist you.        Care EveryWhere ID     This is your Care EveryWhere ID. This could be used by other organizations to access your [facility] medical records  [id number]       "  Your Vitals Were     Pulse Height Pulse Oximetry BMI (Body Mass Index)          64 1.753 m (5' 9\") 97% 26.54 kg/m2         Blood Pressure from Last 3 Encounters:   10/18/17 128/60   08/25/17 120/78   05/24/17 132/78    Weight from Last 3 Encounters:   10/18/17 81.5 kg (179 lb 11.2 oz)   05/24/17 82.4 kg (181 lb 11.2 oz)   04/25/17 78.6 kg (173 lb 4.8 oz)              We Performed the Following     Follow-Up with Cardiologist        Primary Care Provider Office Phone # Fax #    Yan Lucas [phone] [address] [place] [address]        Equal Access to Services     NEGAR LINDA : Hadii aad ku hadasho Soomaali, waaxda luqadaha, qaybta kaalmada adeegyada, cirilo birch . So [facility] [phone].    ATENCIÓN: Si habla español, tiene a torres disposición servicios gratuitos de asistencia lingüística. Llame al [phone].    We comply with applicable federal civil rights laws and [place] laws. We do not discriminate on the basis of race, color, national origin, age, disability, sex, sexual orientation, or gender identity.            Thank you!     Thank you for choosing [facility]  for your care. Our goal is always to provide you with excellent care. Hearing back from our patients is one way we can continue to improve our services. Please take a few minutes to complete the written survey that you may receive in the mail after your visit with us. Thank you!             Your Updated Medication List - Protect others around you: Learn how to safely use, store and throw away your medicines at www.disposemymeds.org.          This list is accurate as of: 10/18/17  9:32 AM.  Always use your most recent med list.                   Brand Name Dispense Instructions for use Diagnosis    amLODIPine 2.5 MG tablet    NORVASC    90 tablet    TAKE 1 TABLET BY MOUTH EVERY DAY    Hypertension goal BP (blood pressure) < 140/90       atorvastatin 40 MG tablet "    LIPITOR    90 tablet    TAKE 1 TABLET BY MOUTH EVERY DAY    Hyperlipidemia LDL goal <130       carvedilol 6.25 MG tablet    COREG    60 tablet    Take 1 tablet (6.25 mg) by mouth 2 times daily (with meals)    Essential hypertension, Congenital insufficiency of aortic valve       lisinopril 20 MG tablet    PRINIVIL/ZESTRIL    90 tablet    TAKE 1 TABLET(20 MG) BY MOUTH TWICE DAILY    Hypertension goal BP (blood pressure) < 140/90       promethazine-codeine 6.25-10 MG/5ML syrup    PHENERGAN with codeine    240 mL    Take 5-10 mLs by mouth nightly as needed for cough    Upper respiratory tract infection, unspecified type

## 2017-11-20 ENCOUNTER — TELEPHONE (OUTPATIENT)
Dept: FAMILY MEDICINE | Facility: CLINIC | Age: 47
End: 2017-11-20

## 2017-11-20 DIAGNOSIS — E78.5 HYPERLIPIDEMIA LDL GOAL <130: Primary | ICD-10-CM

## 2017-11-20 NOTE — TELEPHONE ENCOUNTER
Please advise. Does patient need this again just had one in Oct. And results per Lance look great  Cassandra Madsen MA 11/20/2017

## 2017-11-20 NOTE — TELEPHONE ENCOUNTER
Patient called back. Info was given. He will call back to get that scheduled.  Thank you,  Dona Montoya   for [facility]

## 2017-11-20 NOTE — TELEPHONE ENCOUNTER
Reason for Call: Request for an order or referral:    Order or referral being requested: Cholesterol    Date needed: as soon as possible    Has the patient been seen by the PCP for this problem? YES    Additional comments: Pt would like to do this today so the results are at his appt tomorrow. Can a covering provider put these orders out today so he can come in? Please call pt back and advise.     Phone number Patient can be reached at:  Cell number on file:    Telephone Information:   Mobile [phone]       Best Time:  anytime    Can we leave a detailed message on this number?  YES    Call taken on 11/20/2017 at 10:58 AM by Anastasiya Espinoza

## 2017-11-20 NOTE — TELEPHONE ENCOUNTER
I have attempted to contact this patient by phone with the following results: left message to return my call on answering machine.    Per Lance, this is ok to do. Placed order. Please set up appt for his cholesterol recheck and do a lab only appt for him the day before that for fasting labs.      Mindy Horowitz, [facility]

## 2018-02-06 DIAGNOSIS — I10 HYPERTENSION GOAL BP (BLOOD PRESSURE) < 140/90: ICD-10-CM

## 2018-02-06 DIAGNOSIS — E78.5 HYPERLIPIDEMIA LDL GOAL <130: ICD-10-CM

## 2018-02-07 RX ORDER — AMLODIPINE BESYLATE 2.5 MG/1
TABLET ORAL
Qty: 90 TABLET | Refills: 2 | Status: SHIPPED | OUTPATIENT
Start: 2018-02-07 | End: 2018-11-05

## 2018-02-07 RX ORDER — ATORVASTATIN CALCIUM 40 MG/1
TABLET, FILM COATED ORAL
Qty: 90 TABLET | Refills: 2 | Status: SHIPPED | OUTPATIENT
Start: 2018-02-07 | End: 2018-11-05

## 2018-02-07 NOTE — TELEPHONE ENCOUNTER
"Requested Prescriptions   Pending Prescriptions Disp Refills     atorvastatin (LIPITOR) 40 MG tablet [Pharmacy Med Name: ATORVASTATIN 40MG TABLETS] 90 tablet 0     Sig: TAKE 1 TABLET BY MOUTH EVERY DAY    Statins Protocol Passed    2/6/2018  6:27 AM       Passed - LDL on file in past 12 months    Recent Labs   Lab Test  10/06/17   0939   LDL  83            Passed - No abnormal creatine kinase in past 12 months    No lab results found.         Passed - Recent or future visit with authorizing provider    Patient had office visit in the last year or has a visit in the next 30 days with authorizing provider.  See \"Patient Info\" tab in inbasket, or \"Choose Columns\" in Meds & Orders section of the refill encounter.            Passed - Patient is age 18 or older        amLODIPine (NORVASC) 2.5 MG tablet [Pharmacy Med Name: AMLODIPINE BESYLATE 2.5MG TABLETS] 90 tablet 0     Sig: TAKE 1 TABLET BY MOUTH EVERY DAY    Calcium Channel Blockers Protocol  Passed    2/6/2018  6:27 AM       Passed - Blood pressure under 140/90    BP Readings from Last 3 Encounters:   10/18/17 128/60   08/25/17 120/78   05/24/17 132/78                Passed - Recent or future visit with authorizing provider    Patient had office visit in the last year or has a visit in the next 30 days with authorizing provider.  See \"Patient Info\" tab in inbasket, or \"Choose Columns\" in Meds & Orders section of the refill encounter.            Passed - Patient is age 18 or older       Passed - Normal serum creatinine on file in past 12 months    Recent Labs   Lab Test  05/24/17   0904   CR  0.94                 Last Written Prescription Date:  8/10/17  Last Fill Quantity: 90,  # refills: 1   Last Office Visit with [facility] provider:  5/24/17   Future Office Visit:         Last Written Prescription Date:  8/10/17  Last Fill Quantity: 90,  # refills: 1   Last Office Visit with [facility] provider:  5/24/17   Future Office Visit:       "

## 2018-02-07 NOTE — TELEPHONE ENCOUNTER
Prescription approved per [facility] [place] [place] Refill Protocol...................LEDA Faith

## 2018-05-21 DIAGNOSIS — I10 ESSENTIAL HYPERTENSION: ICD-10-CM

## 2018-05-21 DIAGNOSIS — Q23.1 CONGENITAL INSUFFICIENCY OF AORTIC VALVE: ICD-10-CM

## 2018-05-21 RX ORDER — CARVEDILOL 6.25 MG/1
6.25 TABLET ORAL 2 TIMES DAILY WITH MEALS
Qty: 180 TABLET | Refills: 3 | Status: SHIPPED | OUTPATIENT
Start: 2018-05-21 | End: 2019-05-21

## 2018-06-22 ENCOUNTER — OFFICE VISIT (OUTPATIENT)
Dept: FAMILY MEDICINE | Facility: CLINIC | Age: 48
End: 2018-06-22
Payer: COMMERCIAL

## 2018-06-22 VITALS
DIASTOLIC BLOOD PRESSURE: 78 MMHG | SYSTOLIC BLOOD PRESSURE: 126 MMHG | HEART RATE: 80 BPM | OXYGEN SATURATION: 96 % | BODY MASS INDEX: 26.61 KG/M2 | TEMPERATURE: 97.4 F | WEIGHT: 180.2 LBS

## 2018-06-22 DIAGNOSIS — Z11.4 SCREENING FOR HIV (HUMAN IMMUNODEFICIENCY VIRUS): ICD-10-CM

## 2018-06-22 DIAGNOSIS — E78.5 HYPERLIPIDEMIA LDL GOAL <130: ICD-10-CM

## 2018-06-22 DIAGNOSIS — H69.93 DISORDER OF BOTH EUSTACHIAN TUBES: ICD-10-CM

## 2018-06-22 DIAGNOSIS — I10 HYPERTENSION GOAL BP (BLOOD PRESSURE) < 140/90: Primary | ICD-10-CM

## 2018-06-22 LAB
ANION GAP SERPL CALCULATED.3IONS-SCNC: 9 MMOL/L (ref 3–14)
BUN SERPL-MCNC: 11 MG/DL (ref 7–30)
CALCIUM SERPL-MCNC: 8.9 MG/DL (ref 8.5–10.1)
CHLORIDE SERPL-SCNC: 106 MMOL/L (ref 94–109)
CHOLEST SERPL-MCNC: 163 MG/DL
CO2 SERPL-SCNC: 26 MMOL/L (ref 20–32)
CREAT SERPL-MCNC: 0.92 MG/DL (ref 0.66–1.25)
GFR SERPL CREATININE-BSD FRML MDRD: 88 ML/MIN/1.7M2
GLUCOSE SERPL-MCNC: 112 MG/DL (ref 70–99)
HDLC SERPL-MCNC: 58 MG/DL
HIV 1+2 AB+HIV1 P24 AG SERPL QL IA: NONREACTIVE
LDLC SERPL CALC-MCNC: 88 MG/DL
NONHDLC SERPL-MCNC: 105 MG/DL
POTASSIUM SERPL-SCNC: 4.4 MMOL/L (ref 3.4–5.3)
SODIUM SERPL-SCNC: 141 MMOL/L (ref 133–144)
TRIGL SERPL-MCNC: 84 MG/DL

## 2018-06-22 PROCEDURE — 87389 HIV-1 AG W/HIV-1&-2 AB AG IA: CPT | Performed by: FAMILY MEDICINE

## 2018-06-22 PROCEDURE — 99213 OFFICE O/P EST LOW 20 MIN: CPT | Performed by: FAMILY MEDICINE

## 2018-06-22 PROCEDURE — 36415 COLL VENOUS BLD VENIPUNCTURE: CPT | Performed by: FAMILY MEDICINE

## 2018-06-22 PROCEDURE — 80048 BASIC METABOLIC PNL TOTAL CA: CPT | Performed by: FAMILY MEDICINE

## 2018-06-22 PROCEDURE — 80061 LIPID PANEL: CPT | Performed by: FAMILY MEDICINE

## 2018-06-22 ASSESSMENT — PAIN SCALES - GENERAL: PAINLEVEL: MILD PAIN (2)

## 2018-06-22 NOTE — PROGRESS NOTES
SUBJECTIVE:                                                      Chief Complaint   Patient presents with     Otalgia        Acute Illness   Acute illness concerns: ear ache  Onset: 1 week    Fever: no    Chills/Sweats: YES- possible medication reaction    Headache (location?): no    Sinus Pressure:no    Conjunctivitis:  no    Ear Pain: YES: right mostly, left bugs him too    Rhinorrhea: no    Congestion: no    Sore Throat: no     Cough: no    Wheeze: no    Decreased Appetite: no    Nausea: no    Vomiting: no    Diarrhea:  no    Dysuria/Freq.: no    Fatigue/Achiness: no    Sick/Strep Exposure: no     Therapies Tried and outcome: n/a      Toney is a 48 year old few days of fluid in the ear. Now getting a little painful on the R, and feels fluid on the L. Just finished seasonal allergies    ROS:  Constitutional, HEENT, cardiovascular, pulmonary, gi and gu systems are negative, except as otherwise noted.    OBJECTIVE:                                                    /78 (BP Location: Right arm, Patient Position: Chair, Cuff Size: Adult Regular)  Pulse 80  Temp 97.4  F (36.3  C) (Temporal)  Wt 180 lb 3.2 oz (81.7 kg)  SpO2 96%  BMI 26.61 kg/m2  Body mass index is 26.61 kg/(m^2).    GENERAL: healthy, alert and no distress  EYES: Eyes grossly normal to inspection, PERRL and conjunctivae and sclerae normal  HENT: ear canals and TM's normal, nose and mouth without ulcers or lesions  NECK: no adenopathy, no asymmetry, masses, or scars and thyroid normal to palpation  RESP: lungs clear to auscultation - no rales, rhonchi or wheezes  CV: regular rate and rhythm, normal S1 S2, no S3 or S4, no murmur, click or rub, no peripheral edema and peripheral pulses strong  ABDOMEN: soft, nontender, no hepatosplenomegaly, no masses and bowel sounds normal  MS: no gross musculoskeletal defects noted, no edema    Diagnostic Test Results:  none      ASSESSMENT/PLAN:                                                         ICD-10-CM    1. Hypertension goal BP (blood pressure) < 140/90 I10 Basic metabolic panel  (Ca, Cl, CO2, Creat, Gluc, K, Na, BUN)   2. Hyperlipidemia LDL goal <130 E78.5 Lipid panel reflex to direct LDL Fasting   3. Screening for HIV (human immunodeficiency virus) Z11.4 HIV Antigen Antibody Combo   4. Disorder of both eustachian tubes H69.93        Likely summation tube dysfunction.  Discussed his benign course.  May try a nasal steroid or antihistamine over-the-counter.  Lab work ordered an updated see him back for physical    Yan Lucas MD  [facility]

## 2018-06-22 NOTE — MR AVS SNAPSHOT
After Visit Summary   6/22/2018    Toney Lemos    MRN: [id number]           Patient Information     Date Of Birth          1970        Visit Information        Provider Department      6/22/2018 8:00 AM Yan Lucas MD [facility]        Today's Diagnoses     Hypertension goal BP (blood pressure) < 140/90    -  1    Hyperlipidemia LDL goal <130        Screening for HIV (human immunodeficiency virus)        Disorder of both eustachian tubes           Follow-ups after your visit        Your next 10 appointments already scheduled     Jul 12, 2018  8:40 AM CDT   PHYSICAL with Yan Lucas MD   [facility]    [address] [place] [address] [phone]              Who to contact     If you have questions or need follow up information about today's clinic visit or your schedule please contact [facility] directly at [phone].  Normal or non-critical lab and imaging results will be communicated to you by MyChart, letter or phone within 4 business days after the clinic has received the results. If you do not hear from us within 7 days, please contact the clinic through bodaplanes[employer] or phone. If you have a critical or abnormal lab result, we will notify you by phone as soon as possible.  Submit refill requests through [employer] or call your pharmacy and they will forward the refill request to us. Please allow 3 business days for your refill to be completed.          Additional Information About Your Visit        bodaplaneshart Information     [employer] gives you secure access to your electronic health record. If you see a primary care provider, you can also send messages to your care team and make appointments. If you have questions, please call your primary care clinic.  If you do not have a primary care provider, please call [phone] and they will assist you.        Care EveryWhere ID     This is your  Care EveryWhere ID. This could be used by other organizations to access your [place] medical records  [id number]        Your Vitals Were     Pulse Temperature Pulse Oximetry BMI (Body Mass Index)          80 97.4  F (36.3  C) (Temporal) 96% 26.61 kg/m2         Blood Pressure from Last 3 Encounters:   06/22/18 126/78   10/18/17 128/60   08/25/17 120/78    Weight from Last 3 Encounters:   06/22/18 180 lb 3.2 oz (81.7 kg)   10/18/17 179 lb 11.2 oz (81.5 kg)   05/24/17 181 lb 11.2 oz (82.4 kg)              We Performed the Following     Basic metabolic panel  (Ca, Cl, CO2, Creat, Gluc, K, Na, BUN)     HIV Antigen Antibody Combo     Lipid panel reflex to direct LDL Fasting        Primary Care Provider Office Phone # Fax #    Yan Lucas [phone] [address] [place] [address]        Equal Access to Services     THUY LINDA : Hadii aad ku hadasho Soomaali, waaxda luqadaha, qaybta kaalmada adeegyada, waxay idiin haybreen silas birch . So [facility] [phone].    ATENCIÓN: Si habla español, tiene a torres disposición servicios gratuitos de asistencia lingüística. LlSt[facility] [phone].    We comply with applicable federal civil rights laws and [place] laws. We do not discriminate on the basis of race, color, national origin, age, disability, sex, sexual orientation, or gender identity.            Thank you!     Thank you for choosing [facility]  for your care. Our goal is always to provide you with excellent care. Hearing back from our patients is one way we can continue to improve our services. Please take a few minutes to complete the written survey that you may receive in the mail after your visit with us. Thank you!             Your Updated Medication List - Protect others around you: Learn how to safely use, store and throw away your medicines at www.disposemymeds.org.          This list is accurate as of 6/22/18 11:18 AM.  Always use your most recent med  list.                   Brand Name Dispense Instructions for use Diagnosis    amLODIPine 2.5 MG tablet    NORVASC    90 tablet    TAKE 1 TABLET BY MOUTH EVERY DAY    Hypertension goal BP (blood pressure) < 140/90       atorvastatin 40 MG tablet    LIPITOR    90 tablet    TAKE 1 TABLET BY MOUTH EVERY DAY    Hyperlipidemia LDL goal <130       carvedilol 6.25 MG tablet    COREG    180 tablet    Take 1 tablet (6.25 mg) by mouth 2 times daily (with meals)    Essential hypertension, Congenital insufficiency of aortic valve       promethazine-codeine 6.25-10 MG/5ML syrup    PHENERGAN with codeine    240 mL    Take 5-10 mLs by mouth nightly as needed for cough    Upper respiratory tract infection, unspecified type

## 2018-06-22 NOTE — NURSING NOTE
Health Maintenance Due   Topic Date Due     HIV SCREEN (SYSTEM ASSIGNED)  04/09/1989     BMP Q1 YR  05/24/2018     PHQ-2 Q1 YR  05/24/2018       Health Maintenance reviewed at today's visit patient asked to schedule/complete:   Patient is aware.

## 2018-07-12 ENCOUNTER — OFFICE VISIT (OUTPATIENT)
Dept: FAMILY MEDICINE | Facility: CLINIC | Age: 48
End: 2018-07-12
Payer: COMMERCIAL

## 2018-07-12 VITALS
OXYGEN SATURATION: 97 % | BODY MASS INDEX: 26.55 KG/M2 | TEMPERATURE: 97.9 F | HEART RATE: 80 BPM | SYSTOLIC BLOOD PRESSURE: 122 MMHG | WEIGHT: 179.8 LBS | DIASTOLIC BLOOD PRESSURE: 66 MMHG

## 2018-07-12 DIAGNOSIS — R73.01 ELEVATED FASTING GLUCOSE: ICD-10-CM

## 2018-07-12 DIAGNOSIS — Z80.0 FH: COLON CANCER: ICD-10-CM

## 2018-07-12 DIAGNOSIS — Z00.00 ANNUAL PHYSICAL EXAM: Primary | ICD-10-CM

## 2018-07-12 DIAGNOSIS — Z23 NEED FOR VACCINATION: ICD-10-CM

## 2018-07-12 PROCEDURE — 99396 PREV VISIT EST AGE 40-64: CPT | Mod: 25 | Performed by: FAMILY MEDICINE

## 2018-07-12 PROCEDURE — 90471 IMMUNIZATION ADMIN: CPT | Performed by: FAMILY MEDICINE

## 2018-07-12 PROCEDURE — 90715 TDAP VACCINE 7 YRS/> IM: CPT | Performed by: FAMILY MEDICINE

## 2018-07-12 ASSESSMENT — PAIN SCALES - GENERAL: PAINLEVEL: MILD PAIN (2)

## 2018-07-12 NOTE — MR AVS SNAPSHOT
After Visit Summary   7/12/2018    Toney Lemos    MRN: [id number]           Patient Information     Date Of Birth          1970        Visit Information        Provider Department      7/12/2018 8:40 AM Yan Lucas MD [facility]        Today's Diagnoses     Annual physical exam    -  1    FH: colon cancer        Need for vaccination        Elevated fasting glucose           Follow-ups after your visit        Future tests that were ordered for you today     Open Future Orders        Priority Expected Expires Ordered    Fecal colorectal cancer screen (FIT) Routine 8/2/2018 10/4/2018 7/12/2018            Who to contact     If you have questions or need follow up information about today's clinic visit or your schedule please contact [facility] directly at [phone].  Normal or non-critical lab and imaging results will be communicated to you by [employer], letter or phone within 4 business days after the clinic has received the results. If you do not hear from us within 7 days, please contact the clinic through [employer] or phone. If you have a critical or abnormal lab result, we will notify you by phone as soon as possible.  Submit refill requests through [employer] or call your pharmacy and they will forward the refill request to us. Please allow 3 business days for your refill to be completed.          Additional Information About Your Visit        MyChart Information     [employer] gives you secure access to your electronic health record. If you see a primary care provider, you can also send messages to your care team and make appointments. If you have questions, please call your primary care clinic.  If you do not have a primary care provider, please call [phone] and they will assist you.        Care EveryWhere ID     This is your Care EveryWhere ID. This could be used by other organizations to access your [place] medical records  [id number]        Your  Vitals Were     Pulse Temperature Pulse Oximetry BMI (Body Mass Index)          80 97.9  F (36.6  C) (Temporal) 97% 26.55 kg/m2         Blood Pressure from Last 3 Encounters:   07/12/18 122/66   06/22/18 126/78   10/18/17 128/60    Weight from Last 3 Encounters:   07/12/18 179 lb 12.8 oz (81.6 kg)   06/22/18 180 lb 3.2 oz (81.7 kg)   10/18/17 179 lb 11.2 oz (81.5 kg)              We Performed the Following     1st  Administration  [00289]     TDAP VACCINE (ADACEL) [85656.002]        Primary Care Provider Office Phone # Fax #    Yan Lucas [phone] [address] [place] [address]        Equal Access to Services     THUY LINDA : Hadii aad ku hadasho Sotushar, waaxda luqadaha, qaybta kaalmada adeegyafrancisco, cirilo birch . So [facility] [place] [phone].    ATENCIÓN: Si habla español, tiene a torres disposición servicios gratuitos de asistencia lingüística. Edelmira al [phone].    We comply with applicable federal civil rights laws and [place] laws. We do not discriminate on the basis of race, color, national origin, age, disability, sex, sexual orientation, or gender identity.            Thank you!     Thank you for choosing [facility]  for your care. Our goal is always to provide you with excellent care. Hearing back from our patients is one way we can continue to improve our services. Please take a few minutes to complete the written survey that you may receive in the mail after your visit with us. Thank you!             Your Updated Medication List - Protect others around you: Learn how to safely use, store and throw away your medicines at www.disposemymeds.org.          This list is accurate as of 7/12/18 11:59 PM.  Always use your most recent med list.                   Brand Name Dispense Instructions for use Diagnosis    amLODIPine 2.5 MG tablet    NORVASC    90 tablet    TAKE 1 TABLET BY MOUTH EVERY DAY    Hypertension goal BP (blood  pressure) < 140/90       atorvastatin 40 MG tablet    LIPITOR    90 tablet    TAKE 1 TABLET BY MOUTH EVERY DAY    Hyperlipidemia LDL goal <130       carvedilol 6.25 MG tablet    COREG    180 tablet    Take 1 tablet (6.25 mg) by mouth 2 times daily (with meals)    Essential hypertension, Congenital insufficiency of aortic valve       promethazine-codeine 6.25-10 MG/5ML syrup    PHENERGAN with codeine    240 mL    Take 5-10 mLs by mouth nightly as needed for cough    Upper respiratory tract infection, unspecified type

## 2018-07-12 NOTE — NURSING NOTE
Screening Questionnaire for Adult Immunization    Are you sick today?   No   Do you have allergies to medications, food, a vaccine component or latex?   No   Have you ever had a serious reaction after receiving a vaccination?   No   Do you have a long-term health problem with heart disease, lung disease, asthma, kidney disease, metabolic disease (e.g. diabetes), anemia, or other blood disorder?   Yes   Do you have cancer, leukemia, HIV/AIDS, or any other immune system problem?   No   In the past 3 months, have you taken medications that affect  your immune system, such as prednisone, other steroids, or anticancer drugs; drugs for the treatment of rheumatoid arthritis, Crohn s disease, or psoriasis; or have you had radiation treatments?   No   Have you had a seizure, or a brain or other nervous system problem?   No   During the past year, have you received a transfusion of blood or blood     products, or been given immune (gamma) globulin or antiviral drug?   No   For women: Are you pregnant or is there a chance you could become        pregnant during the next month?   No   Have you received any vaccinations in the past 4 weeks?   No     Immunization questionnaire was positive for at least one answer.  Notified Dr. Lucas.        Per orders of Dr. Lucas , injection of TDAP given by Polina López. Patient instructed to remain in clinic for 15 minutes afterwards, and to report any adverse reaction to me immediately.       Screening performed by Polina López on 7/12/2018 at 9:46 AM.

## 2018-07-12 NOTE — PROGRESS NOTES
SUBJECTIVE:   CC: Toney Lemos is an 48 year old male who presents for preventative health visit.     Physical   Annual:     Getting at least 3 servings of Calcium per day:  NO    Bi-annual eye exam:  NO    Dental care twice a year:  Yes    Sleep apnea or symptoms of sleep apnea:  Excessive snoring and Sleep apnea    Diet:  Low salt    Frequency of exercise:  None    Taking medications regularly:  Yes    Medication side effects:  Not applicable    Additional concerns today:  No      Wt Readings from Last 4 Encounters:   07/12/18 179 lb 12.8 oz (81.6 kg)   06/22/18 180 lb 3.2 oz (81.7 kg)   10/18/17 179 lb 11.2 oz (81.5 kg)   05/24/17 181 lb 11.2 oz (82.4 kg)           Today's PHQ-2 Score:   PHQ-2 ( 1999 Pfizer) 7/9/2018   Q1: Little interest or pleasure in doing things 0   Q2: Feeling down, depressed or hopeless 0   PHQ-2 Score 0   Q1: Little interest or pleasure in doing things Not at all   Q2: Feeling down, depressed or hopeless Not at all   PHQ-2 Score 0       Abuse: Current or Past(Physical, Sexual or Emotional)- No  Do you feel safe in your environment - Yes    Social History   Substance Use Topics     Smoking status: Never Smoker     Smokeless tobacco: Never Used     Alcohol use 0.0 oz/week     0 Standard drinks or equivalent per week      Comment: beer or two wkly     Alcohol Use 7/9/2018   If you drink alcohol do you typically have greater than 3 drinks per day OR greater than 7 drinks per week? No   No flowsheet data found.    Last PSA:   PSA   Date Value Ref Range Status   02/10/2016 0.19 0 - 4 ug/L Final       Reviewed orders with patient. Reviewed health maintenance and updated orders accordingly - Yes      Reviewed and updated as needed this visit by clinical staff  Tobacco  Allergies         Reviewed and updated as needed this visit by Provider            Review of Systems  ROS: 10 point ROS neg other than the symptoms noted above in the HPI.    OBJECTIVE:   /66 (BP Location: Right arm,  "Patient Position: Chair, Cuff Size: Adult Regular)  Pulse 80  Temp 97.9  F (36.6  C) (Temporal)  Wt 179 lb 12.8 oz (81.6 kg)  SpO2 97%  BMI 26.55 kg/m2    Physical Exam  GENERAL: healthy, alert and no distress  EYES: Eyes grossly normal to inspection, PERRL and conjunctivae and sclerae normal  HENT: ear canals and TM's normal, nose and mouth without ulcers or lesions  NECK: no adenopathy, no asymmetry, masses, or scars and thyroid normal to palpation  RESP: lungs clear to auscultation - no rales, rhonchi or wheezes  CV: regular rate and rhythm, normal S1 S2, no S3 or S4, no murmur, click or rub, no peripheral edema, and peripheral pulses strong  ABDOMEN: soft, nontender, no hepatosplenomegaly, no masses and bowel sounds normal  MS: no gross musculoskeletal defects noted, no edema  SKIN: no suspicious lesions or rashes  NEURO: Normal strength and tone, mentation intact and speech normal  PSYCH: mentation appears normal, affect normal/bright        ASSESSMENT/PLAN:       ICD-10-CM    1. Annual physical exam Z00.00    2. FH: colon cancer Z80.0 Fecal colorectal cancer screen (FIT)   3. Need for vaccination Z23 TDAP VACCINE (ADACEL) [97824.002]     1st  Administration  [20981]   4. Elevated fasting glucose R73.01      Has multiple second-degree relatives with colon cancer.  He elects for starting annual fecal occult blood testing, declines colonoscopy.  History of elevated fasting glucose and will recheck again this year.  Encouraged to lose weight.  He agrees.  Other annual topics covered.      COUNSELING:   Reviewed preventive health counseling, as reflected in patient instructions    BP Readings from Last 1 Encounters:   07/12/18 122/66     Estimated body mass index is 26.55 kg/(m^2) as calculated from the following:    Height as of 10/18/17: 5' 9\" (1.753 m).    Weight as of this encounter: 179 lb 12.8 oz (81.6 kg).           reports that he has never smoked. He has never used smokeless tobacco.      Counseling " Resources:  ATP IV Guidelines  Pooled Cohorts Equation Calculator  FRAX Risk Assessment  ICSI Preventive Guidelines  Dietary Guidelines for Americans, 2010  USDA's MyPlate  ASA Prophylaxis  Lung CA Screening    Yan Lucas MD  [facility]

## 2018-07-13 PROBLEM — R73.01 ELEVATED FASTING GLUCOSE: Status: ACTIVE | Noted: 2018-07-13

## 2018-11-05 DIAGNOSIS — E78.5 HYPERLIPIDEMIA LDL GOAL <130: ICD-10-CM

## 2018-11-05 DIAGNOSIS — I10 HYPERTENSION GOAL BP (BLOOD PRESSURE) < 140/90: ICD-10-CM

## 2018-11-06 RX ORDER — ATORVASTATIN CALCIUM 40 MG/1
40 TABLET, FILM COATED ORAL DAILY
Qty: 90 TABLET | Refills: 1 | Status: SHIPPED | OUTPATIENT
Start: 2018-11-06 | End: 2019-04-25

## 2018-11-06 RX ORDER — AMLODIPINE BESYLATE 2.5 MG/1
2.5 TABLET ORAL DAILY
Qty: 90 TABLET | Refills: 1 | Status: SHIPPED | OUTPATIENT
Start: 2018-11-06 | End: 2019-04-25

## 2018-11-06 NOTE — TELEPHONE ENCOUNTER
"Amlodipine  Last Written Prescription Date:  02/07/2018  Last Fill Quantity: 90,  # refills: 2   Last office visit: 7/12/2018 with prescribing provider:  Lance   Future Office Visit:  None    Atorvastatin   Last Written Prescription Date:  02/07/2018  Last Fill Quantity: 90,  # refills: 2   Last office visit: 7/12/2018 with prescribing provider:  Lance   Future Office Visit:  None  Prescription approved per [facility] Refill Protocol.    Requested Prescriptions   Pending Prescriptions Disp Refills     atorvastatin (LIPITOR) 40 MG tablet [Pharmacy Med Name: ATORVASTATIN 40MG TABLETS] 90 tablet 0     Sig: TAKE 1 TABLET BY MOUTH EVERY DAY    Statins Protocol Passed    11/5/2018  6:30 AM       Passed - LDL on file in past 12 months    Recent Labs   Lab Test  06/22/18   0829   LDL  88          Passed - No abnormal creatine kinase in past 12 months    No lab results found.        Passed - Recent (12 mo) or future (30 days) visit within the authorizing provider's specialty    Patient had office visit in the last 12 months or has a visit in the next 30 days with authorizing provider or within the authorizing provider's specialty.  See \"Patient Info\" tab in inbasket, or \"Choose Columns\" in Meds & Orders section of the refill encounter.         Passed - Patient is age 18 or older        amLODIPine (NORVASC) 2.5 MG tablet [Pharmacy Med Name: AMLODIPINE BESYLATE 2.5MG TABLETS] 90 tablet 0     Sig: TAKE 1 TABLET BY MOUTH EVERY DAY    Calcium Channel Blockers Protocol  Passed    11/5/2018  6:30 AM       Passed - Blood pressure under 140/90 in past 12 months    BP Readings from Last 3 Encounters:   07/12/18 122/66   06/22/18 126/78   10/18/17 128/60          Passed - Recent (12 mo) or future (30 days) visit within the authorizing provider's specialty    Patient had office visit in the last 12 months or has a visit in the next 30 days with authorizing provider or within the authorizing provider's specialty.  See \"Patient Info\" tab " "in inbasket, or \"Choose Columns\" in Meds & Orders section of the refill encounter.        Passed - Patient is age 18 or older       Passed - Normal serum creatinine on file in past 12 months    Recent Labs   Lab Test  06/22/18   0829   CR  0.92           "

## 2019-04-25 DIAGNOSIS — I10 HYPERTENSION GOAL BP (BLOOD PRESSURE) < 140/90: ICD-10-CM

## 2019-04-25 DIAGNOSIS — E78.5 HYPERLIPIDEMIA LDL GOAL <130: ICD-10-CM

## 2019-04-25 NOTE — LETTER
[address] [address]  Phone: [phone]  Fax: [phone]        April 30, 2019      Toney Lemos                                                                                                                                [address] [address] [place] [id number]            Dear Mr. Lemos,    We are concerned about your health care.  We recently provided you with a medication refill.  Many medications require routine follow-up with your Doctor.      At this time we ask that: You schedule a routine office visit with your physician to follow your medication    Your prescription: Has been refilled for 1 month so you may have time for the above noted follow-up.      Thank you,      Yan Lucas MD

## 2019-04-29 DIAGNOSIS — E78.5 HYPERLIPIDEMIA LDL GOAL <130: ICD-10-CM

## 2019-04-29 DIAGNOSIS — I10 HYPERTENSION GOAL BP (BLOOD PRESSURE) < 140/90: ICD-10-CM

## 2019-04-29 RX ORDER — AMLODIPINE BESYLATE 2.5 MG/1
TABLET ORAL
Qty: 60 TABLET | Refills: 0 | Status: SHIPPED | OUTPATIENT
Start: 2019-04-29 | End: 2019-04-29

## 2019-04-29 RX ORDER — ATORVASTATIN CALCIUM 40 MG/1
TABLET, FILM COATED ORAL
Qty: 60 TABLET | Refills: 0 | Status: SHIPPED | OUTPATIENT
Start: 2019-04-29 | End: 2019-04-29

## 2019-04-29 NOTE — TELEPHONE ENCOUNTER
"Alyssia refill given per RN protocol.   Please contact patient to have them schedule the following:  Due for annual exam after 7/12/2019  Alesia Salvador RN, BSN    Norvasc  Last Written Prescription Date:  11/6/2018  Last Fill Quantity: 90,  # refills: 1   Last office visit: 7/12/2018 with prescribing provider:  joan   Lipitor  Last Written Prescription Date:  11/6/2018  Last Fill Quantity: 90,  # refills: 1   Last office visit: 7/12/2018 with prescribing provider:  joan   Future Office Visit:      Requested Prescriptions   Pending Prescriptions Disp Refills     amLODIPine (NORVASC) 2.5 MG tablet [Pharmacy Med Name: AMLODIPINE BESYLATE 2.5MG TABLETS] 90 tablet 0     Sig: TAKE 1 TABLET(2.5 MG) BY MOUTH DAILY       Calcium Channel Blockers Protocol  Passed - 4/25/2019  6:30 AM        Passed - Blood pressure under 140/90 in past 12 months     BP Readings from Last 3 Encounters:   07/12/18 122/66   06/22/18 126/78   10/18/17 128/60                 Passed - Recent (12 mo) or future (30 days) visit within the authorizing provider's specialty     Patient had office visit in the last 12 months or has a visit in the next 30 days with authorizing provider or within the authorizing provider's specialty.  See \"Patient Info\" tab in inbasket, or \"Choose Columns\" in Meds & Orders section of the refill encounter.              Passed - Medication is active on med list        Passed - Patient is age 18 or older        Passed - Normal serum creatinine on file in past 12 months     Recent Labs   Lab Test 06/22/18  0829   CR 0.92             atorvastatin (LIPITOR) 40 MG tablet [Pharmacy Med Name: ATORVASTATIN 40MG TABLETS] 90 tablet 0     Sig: TAKE 1 TABLET(40 MG) BY MOUTH DAILY       Statins Protocol Passed - 4/25/2019  6:30 AM        Passed - LDL on file in past 12 months     Recent Labs   Lab Test 06/22/18  0829   LDL 88             Passed - No abnormal creatine kinase in past 12 months     No lab results found.             " "Passed - Recent (12 mo) or future (30 days) visit within the authorizing provider's specialty     Patient had office visit in the last 12 months or has a visit in the next 30 days with authorizing provider or within the authorizing provider's specialty.  See \"Patient Info\" tab in inbasket, or \"Choose Columns\" in Meds & Orders section of the refill encounter.              Passed - Medication is active on med list        Passed - Patient is age 18 or older          Alesia Salvador RN on 4/29/2019 at 8:15 AM    "

## 2019-04-30 RX ORDER — AMLODIPINE BESYLATE 2.5 MG/1
TABLET ORAL
Qty: 90 TABLET | Refills: 0 | Status: SHIPPED | OUTPATIENT
Start: 2019-04-30 | End: 2019-07-17

## 2019-04-30 RX ORDER — ATORVASTATIN CALCIUM 40 MG/1
TABLET, FILM COATED ORAL
Qty: 90 TABLET | Refills: 0 | Status: SHIPPED | OUTPATIENT
Start: 2019-04-30 | End: 2019-07-17

## 2019-04-30 NOTE — TELEPHONE ENCOUNTER
"Patient requesting 90 supply.     Norvasc  Last Written Prescription Date:  04/29/2019  Last Fill Quantity: 60,  # refills: 0   Last office visit: 7/12/2018 with prescribing provider:  Lance   Future Office Visit:  none  Prescription approved per [facility].    Lipitor  Last Written Prescription Date:  04/29/2019  Last Fill Quantity: 60,  # refills: 0   Last office visit: 7/12/2018 with prescribing provider:  Lance   Future Office Visit:  None  Prescription approved per [facility].    Requested Prescriptions   Pending Prescriptions Disp Refills     atorvastatin (LIPITOR) 40 MG tablet [Pharmacy Med Name: ATORVASTATIN 40MG TABLETS] 90 tablet 0     Sig: TAKE 1 TABLET BY MOUTH DAILY       Statins Protocol Passed - 4/29/2019  9:00 AM        Passed - LDL on file in past 12 months     Recent Labs   Lab Test 06/22/18  0829   LDL 88           Passed - No abnormal creatine kinase in past 12 months     No lab results found.         Passed - Recent (12 mo) or future (30 days) visit within the authorizing provider's specialty     Patient had office visit in the last 12 months or has a visit in the next 30 days with authorizing provider or within the authorizing provider's specialty.  See \"Patient Info\" tab in inbasket, or \"Choose Columns\" in Meds & Orders section of the refill encounter.          Passed - Medication is active on med list        Passed - Patient is age 18 or older        amLODIPine (NORVASC) 2.5 MG tablet [Pharmacy Med Name: AMLODIPINE BESYLATE 2.5MG TABLETS] 90 tablet 0     Sig: TAKE 1 TABLET BY MOUTH DAILY       Calcium Channel Blockers Protocol  Passed - 4/29/2019  9:00 AM        Passed - Blood pressure under 140/90 in past 12 months     BP Readings from Last 3 Encounters:   07/12/18 122/66   06/22/18 126/78   10/18/17 128/60           Passed - Recent (12 mo) or future (30 days) visit within the authorizing provider's specialty     Patient had office visit in the last 12 months or has a " "visit in the next 30 days with authorizing provider or within the authorizing provider's specialty.  See \"Patient Info\" tab in inbasket, or \"Choose Columns\" in Meds & Orders section of the refill encounter.          Passed - Medication is active on med list        Passed - Patient is age 18 or older        Passed - Normal serum creatinine on file in past 12 months     Recent Labs   Lab Test 06/22/18  0829   CR 0.92         Carole Benjamin RN   "

## 2019-04-30 NOTE — TELEPHONE ENCOUNTER
2nd attempt to reach out to patient. Left message for patient to call back and speak with any . Will route back to team so a letter can be sent.    Thank you,  Dona Montoya   for [facility]

## 2019-05-21 DIAGNOSIS — I10 ESSENTIAL HYPERTENSION: ICD-10-CM

## 2019-05-21 DIAGNOSIS — Q23.1 CONGENITAL INSUFFICIENCY OF AORTIC VALVE: ICD-10-CM

## 2019-05-21 RX ORDER — CARVEDILOL 6.25 MG/1
6.25 TABLET ORAL 2 TIMES DAILY WITH MEALS
Qty: 180 TABLET | Refills: 0 | Status: SHIPPED | OUTPATIENT
Start: 2019-05-21 | End: 2019-07-17

## 2019-07-15 ASSESSMENT — ENCOUNTER SYMPTOMS
ABDOMINAL PAIN: 0
HEMATURIA: 0
ARTHRALGIAS: 0
NERVOUS/ANXIOUS: 0
FREQUENCY: 0
NAUSEA: 0
JOINT SWELLING: 0
EYE PAIN: 0
DIARRHEA: 0
CONSTIPATION: 0
MYALGIAS: 0
DYSURIA: 0
CHILLS: 0
SHORTNESS OF BREATH: 0
HEMATOCHEZIA: 0
HEARTBURN: 0
COUGH: 0
DIZZINESS: 0
PALPITATIONS: 0
HEADACHES: 0
SORE THROAT: 0
FEVER: 0
WEAKNESS: 0
PARESTHESIAS: 0

## 2019-07-17 ENCOUNTER — OFFICE VISIT (OUTPATIENT)
Dept: FAMILY MEDICINE | Facility: CLINIC | Age: 49
End: 2019-07-17
Payer: COMMERCIAL

## 2019-07-17 VITALS
WEIGHT: 175 LBS | HEIGHT: 69 IN | DIASTOLIC BLOOD PRESSURE: 70 MMHG | SYSTOLIC BLOOD PRESSURE: 138 MMHG | BODY MASS INDEX: 25.92 KG/M2 | OXYGEN SATURATION: 97 % | RESPIRATION RATE: 18 BRPM | HEART RATE: 80 BPM | TEMPERATURE: 96.8 F

## 2019-07-17 DIAGNOSIS — I10 ESSENTIAL HYPERTENSION: ICD-10-CM

## 2019-07-17 DIAGNOSIS — Z00.00 ROUTINE GENERAL MEDICAL EXAMINATION AT A HEALTH CARE FACILITY: Primary | ICD-10-CM

## 2019-07-17 DIAGNOSIS — Q23.1 CONGENITAL INSUFFICIENCY OF AORTIC VALVE: ICD-10-CM

## 2019-07-17 DIAGNOSIS — I10 HYPERTENSION GOAL BP (BLOOD PRESSURE) < 140/90: ICD-10-CM

## 2019-07-17 DIAGNOSIS — E78.5 HYPERLIPIDEMIA LDL GOAL <130: ICD-10-CM

## 2019-07-17 LAB
ANION GAP SERPL CALCULATED.3IONS-SCNC: 6 MMOL/L (ref 3–14)
BUN SERPL-MCNC: 12 MG/DL (ref 7–30)
CALCIUM SERPL-MCNC: 8.5 MG/DL (ref 8.5–10.1)
CHLORIDE SERPL-SCNC: 106 MMOL/L (ref 94–109)
CHOLEST SERPL-MCNC: 181 MG/DL
CO2 SERPL-SCNC: 27 MMOL/L (ref 20–32)
CREAT SERPL-MCNC: 0.91 MG/DL (ref 0.66–1.25)
CREAT UR-MCNC: 89 MG/DL
ERYTHROCYTE [DISTWIDTH] IN BLOOD BY AUTOMATED COUNT: 12.6 % (ref 10–15)
GFR SERPL CREATININE-BSD FRML MDRD: >90 ML/MIN/{1.73_M2}
GLUCOSE SERPL-MCNC: 109 MG/DL (ref 70–99)
HCT VFR BLD AUTO: 44.1 % (ref 40–53)
HDLC SERPL-MCNC: 65 MG/DL
HGB BLD-MCNC: 15.4 G/DL (ref 13.3–17.7)
LDLC SERPL CALC-MCNC: 87 MG/DL
MCH RBC QN AUTO: 32.5 PG (ref 26.5–33)
MCHC RBC AUTO-ENTMCNC: 34.9 G/DL (ref 31.5–36.5)
MCV RBC AUTO: 93 FL (ref 78–100)
MICROALBUMIN UR-MCNC: <5 MG/L
MICROALBUMIN/CREAT UR: NORMAL MG/G CR (ref 0–17)
NONHDLC SERPL-MCNC: 116 MG/DL
PLATELET # BLD AUTO: 190 10E9/L (ref 150–450)
POTASSIUM SERPL-SCNC: 4.1 MMOL/L (ref 3.4–5.3)
RBC # BLD AUTO: 4.74 10E12/L (ref 4.4–5.9)
SODIUM SERPL-SCNC: 139 MMOL/L (ref 133–144)
TRIGL SERPL-MCNC: 145 MG/DL
WBC # BLD AUTO: 6.7 10E9/L (ref 4–11)

## 2019-07-17 PROCEDURE — 99213 OFFICE O/P EST LOW 20 MIN: CPT | Mod: 25 | Performed by: FAMILY MEDICINE

## 2019-07-17 PROCEDURE — 36415 COLL VENOUS BLD VENIPUNCTURE: CPT | Performed by: FAMILY MEDICINE

## 2019-07-17 PROCEDURE — 80061 LIPID PANEL: CPT | Performed by: FAMILY MEDICINE

## 2019-07-17 PROCEDURE — 85027 COMPLETE CBC AUTOMATED: CPT | Performed by: FAMILY MEDICINE

## 2019-07-17 PROCEDURE — 99396 PREV VISIT EST AGE 40-64: CPT | Performed by: FAMILY MEDICINE

## 2019-07-17 PROCEDURE — 80048 BASIC METABOLIC PNL TOTAL CA: CPT | Performed by: FAMILY MEDICINE

## 2019-07-17 PROCEDURE — 82043 UR ALBUMIN QUANTITATIVE: CPT | Performed by: FAMILY MEDICINE

## 2019-07-17 RX ORDER — AMLODIPINE BESYLATE 2.5 MG/1
5 TABLET ORAL DAILY
Qty: 180 TABLET | Refills: 3 | Status: SHIPPED | OUTPATIENT
Start: 2019-07-17 | End: 2019-09-19

## 2019-07-17 RX ORDER — AMLODIPINE BESYLATE 2.5 MG/1
2.5 TABLET ORAL DAILY
Qty: 90 TABLET | Refills: 3 | Status: SHIPPED | OUTPATIENT
Start: 2019-07-17 | End: 2019-07-17

## 2019-07-17 RX ORDER — CARVEDILOL 6.25 MG/1
6.25 TABLET ORAL 2 TIMES DAILY WITH MEALS
Qty: 180 TABLET | Refills: 3 | Status: SHIPPED | OUTPATIENT
Start: 2019-07-17 | End: 2020-01-15

## 2019-07-17 RX ORDER — ATORVASTATIN CALCIUM 40 MG/1
40 TABLET, FILM COATED ORAL DAILY
Qty: 90 TABLET | Refills: 3 | Status: SHIPPED | OUTPATIENT
Start: 2019-07-17 | End: 2020-02-24

## 2019-07-17 ASSESSMENT — ENCOUNTER SYMPTOMS
CHILLS: 0
HEMATOCHEZIA: 0
ARTHRALGIAS: 0
PALPITATIONS: 0
SORE THROAT: 0
NAUSEA: 0
HEADACHES: 0
MYALGIAS: 0
SHORTNESS OF BREATH: 0
HEMATURIA: 0
DIZZINESS: 0
NERVOUS/ANXIOUS: 0
DYSURIA: 0
CONSTIPATION: 0
EYE PAIN: 0
ABDOMINAL PAIN: 0
DIARRHEA: 0
PARESTHESIAS: 0
HEARTBURN: 0
FREQUENCY: 0
WEAKNESS: 0
COUGH: 0
FEVER: 0
JOINT SWELLING: 0

## 2019-07-17 ASSESSMENT — MIFFLIN-ST. JEOR: SCORE: 1652.58

## 2019-07-17 ASSESSMENT — PAIN SCALES - GENERAL: PAINLEVEL: NO PAIN (0)

## 2019-07-17 NOTE — PROGRESS NOTES
SUBJECTIVE:   CC: Toney Lemos is an 49 year old male who presents for preventative health visit.     Healthy Habits:     Getting at least 3 servings of Calcium per day:  NO    Bi-annual eye exam:  NO    Dental care twice a year:  Yes    Sleep apnea or symptoms of sleep apnea:  Sleep apnea    Diet:  Low salt    Frequency of exercise:  None    Taking medications regularly:  Yes    Medication side effects:  None    PHQ-2 Total Score: 0    Additional concerns today:  No          Hypertension Follow-up      Do you check your blood pressure regularly outside of the clinic? Yes     Are you following a low salt diet? No    Are your blood pressures ever more than 140 on the top number (systolic) OR more   than 90 on the bottom number (diastolic), for example 140/90? Yes  130s/60s      Today's PHQ-2 Score:   PHQ-2 ( 1999 [employer]) 7/15/2019   Q1: Little interest or pleasure in doing things 0   Q2: Feeling down, depressed or hopeless 0   PHQ-2 Score 0   Q1: Little interest or pleasure in doing things Not at all   Q2: Feeling down, depressed or hopeless Not at all   PHQ-2 Score 0       Abuse: Current or Past(Physical, Sexual or Emotional)- No  Do you feel safe in your environment? Yes    Social History     Tobacco Use     Smoking status: Never Smoker     Smokeless tobacco: Never Used   Substance Use Topics     Alcohol use: Yes     Alcohol/week: 0.0 oz     Comment: beer or two wkly     If you drink alcohol do you typically have >3 drinks per day or >7 drinks per week? No    Alcohol Use 7/15/2019   Prescreen: >3 drinks/day or >7 drinks/week? No   Prescreen: >3 drinks/day or >7 drinks/week? -   No flowsheet data found.    Last PSA:   PSA   Date Value Ref Range Status   02/10/2016 0.19 0 - 4 ug/L Final       Reviewed orders with patient. Reviewed health maintenance and updated orders accordingly - Yes      Reviewed and updated as needed this visit by clinical staff  Tobacco  Allergies  Meds         Reviewed and updated as  "needed this visit by Provider            Review of Systems   Constitutional: Negative for chills and fever.   HENT: Negative for congestion, ear pain, hearing loss and sore throat.    Eyes: Negative for pain and visual disturbance.   Respiratory: Negative for cough and shortness of breath.    Cardiovascular: Negative for chest pain, palpitations and peripheral edema.   Gastrointestinal: Negative for abdominal pain, constipation, diarrhea, heartburn, hematochezia and nausea.   Genitourinary: Negative for discharge, dysuria, frequency, genital sores, hematuria, impotence and urgency.   Musculoskeletal: Negative for arthralgias, joint swelling and myalgias.   Skin: Negative for rash.   Neurological: Negative for dizziness, weakness, headaches and paresthesias.   Psychiatric/Behavioral: Negative for mood changes. The patient is not nervous/anxious.          OBJECTIVE:   /70   Pulse 80   Temp 96.8  F (36  C) (Temporal)   Resp 18   Ht 1.75 m (5' 8.9\")   Wt 79.4 kg (175 lb)   SpO2 97%   BMI 25.92 kg/m      Physical Exam  GENERAL: healthy, alert and no distress  EYES: Eyes grossly normal to inspection, PERRL and conjunctivae and sclerae normal  HENT: ear canals and TM's normal, nose and mouth without ulcers or lesions  NECK: no adenopathy, no asymmetry, masses, or scars and thyroid normal to palpation  RESP: lungs clear to auscultation - no rales, rhonchi or wheezes  CV: regular rate and rhythm, normal S1 S2, no S3 or S4, no murmur, click or rub, no peripheral edema and peripheral pulses strong  ABDOMEN: soft, nontender, no hepatosplenomegaly, no masses and bowel sounds normal  MS: no gross musculoskeletal defects noted, no edema  SKIN: no suspicious lesions or rashes  NEURO: Normal strength and tone, mentation intact and speech normal  PSYCH: mentation appears normal, affect normal/bright    Diagnostic Test Results:  Labs reviewed in Epic    ASSESSMENT/PLAN:       ICD-10-CM    1. Routine general medical " "examination at a health care facility Z00.00    2. Hypertension goal BP (blood pressure) < 140/90 I10 Basic metabolic panel     Lipid panel reflex to direct LDL Fasting     Albumin Random Urine Quantitative with Creat Ratio     CBC with platelets     amLODIPine (NORVASC) 2.5 MG tablet     DISCONTINUED: amLODIPine (NORVASC) 2.5 MG tablet   3. Hyperlipidemia LDL goal <130 E78.5 atorvastatin (LIPITOR) 40 MG tablet   4. Essential hypertension I10 carvedilol (COREG) 6.25 MG tablet   5. Congenital insufficiency of aortic valve Q23.1 carvedilol (COREG) 6.25 MG tablet     Home blood pressures have been ranging in the high 130s.  Does have a notable diastolic systolic gap.  We will cautiously increase his amlodipine to 5 mg daily and see how he does with that.  He will watch out for symptoms of hypotension.  We looked at his last 2 echoes had a long discussion about the risks and benefits related to his aortic disease.  He starting to show structural heart change.  I basically asked him more seriously considered the valve surgery.  He is weighing that against being on lifelong anticoagulation which she is appropriately cautious about.  He had a grandfather with a poor outcome.  But I think the very real danger of heart failure is outweighing that.  He agrees.  May be with tighter blood pressure control, we can also take some strain off the heart.  Other annual topics covered.  Labs updated today.  Further recommendations to follow.  He will establish with a new cardiologist    COUNSELING:   Reviewed preventive health counseling, as reflected in patient instructions    Estimated body mass index is 25.92 kg/m  as calculated from the following:    Height as of this encounter: 1.75 m (5' 8.9\").    Weight as of this encounter: 79.4 kg (175 lb).          reports that he has never smoked. He has never used smokeless tobacco.      Counseling Resources:  ATP IV Guidelines  Pooled Cohorts Equation Calculator  FRAX Risk " Assessment  ICSI Preventive Guidelines  Dietary Guidelines for Americans, 2010  USDA's MyPlate  ASA Prophylaxis  Lung CA Screening    Yan Lucas MD  [facility]

## 2019-07-30 ENCOUNTER — MYC MEDICAL ADVICE (OUTPATIENT)
Dept: FAMILY MEDICINE | Facility: CLINIC | Age: 49
End: 2019-07-30

## 2019-07-30 DIAGNOSIS — Q23.1 CONGENITAL INSUFFICIENCY OF AORTIC VALVE: Primary | ICD-10-CM

## 2019-08-05 NOTE — TELEPHONE ENCOUNTER
Referral to Cardiology/Dr Mederos is made.  [employer] message is sent to Preet inform him.  Number for [facility] is given so he can call and make an appointment.

## 2019-08-09 ENCOUNTER — TELEPHONE (OUTPATIENT)
Dept: FAMILY MEDICINE | Facility: CLINIC | Age: 49
End: 2019-08-09

## 2019-09-16 ENCOUNTER — MYC MEDICAL ADVICE (OUTPATIENT)
Dept: FAMILY MEDICINE | Facility: CLINIC | Age: 49
End: 2019-09-16

## 2019-09-16 DIAGNOSIS — I10 HYPERTENSION GOAL BP (BLOOD PRESSURE) < 140/90: ICD-10-CM

## 2019-09-19 ENCOUNTER — MYC MEDICAL ADVICE (OUTPATIENT)
Dept: FAMILY MEDICINE | Facility: CLINIC | Age: 49
End: 2019-09-19

## 2019-09-19 RX ORDER — AMLODIPINE BESYLATE 2.5 MG/1
5 TABLET ORAL DAILY
Qty: 180 TABLET | Refills: 3 | Status: SHIPPED | OUTPATIENT
Start: 2019-09-19 | End: 2020-02-24

## 2019-10-07 DIAGNOSIS — G47.33 OSA (OBSTRUCTIVE SLEEP APNEA): Primary | ICD-10-CM

## 2019-10-14 ENCOUNTER — HOSPITAL ENCOUNTER (OUTPATIENT)
Dept: CARDIOLOGY | Facility: CLINIC | Age: 49
Discharge: HOME | End: 2019-10-14
Attending: INTERNAL MEDICINE | Admitting: INTERNAL MEDICINE
Payer: COMMERCIAL

## 2019-10-14 DIAGNOSIS — Q23.1 CONGENITAL INSUFFICIENCY OF AORTIC VALVE: ICD-10-CM

## 2019-10-14 DIAGNOSIS — I10 ESSENTIAL HYPERTENSION: ICD-10-CM

## 2019-10-14 PROCEDURE — 93306 TTE W/DOPPLER COMPLETE: CPT

## 2019-10-14 PROCEDURE — 93306 TTE W/DOPPLER COMPLETE: CPT | Mod: 26 | Performed by: INTERNAL MEDICINE

## 2019-10-15 ENCOUNTER — DOCUMENTATION ONLY (OUTPATIENT)
Dept: CARDIOLOGY | Facility: CLINIC | Age: 49
End: 2019-10-15

## 2019-10-15 DIAGNOSIS — I35.9 AORTIC VALVE DISORDER: Primary | ICD-10-CM

## 2019-10-15 NOTE — PROGRESS NOTES
Echo ready for review. Scheduled follow up with Dr. Mederos on 11/5/19. Was formerly seen by Dr. Gonzalez in 2017.   Aortic regurgitation has worsened from moderately severe to now severe.   Routing to Dr. Mederos to review and advise.     Echo: 10/14/19     Interpretation Summary     1. Possible bicuspid aortic valve with severe aortic valve regurgitation, no  significant stenosis.  2. Aortic regurgitant volume 72 mL, effective regurgitant orifice area 0.38  cmÂ . Holodiastolic flow reversal in the descending thoracic aorta.  3. The aortic valve cusps were not well visualized but is likely bicuspid  based on doming of the valve.  4. The left ventricle is mildly dilated (measurements not obtained) with  mildly decreased systolic function. Biplane LVEF 52% with global hypokinesis.     Compared to the previous study dated 10/6/2017, significant changes have  occurred. Left ventricle is more dilated, LVEF has decreased from 65% to 52%,  aortic valve regurgitation has worsened from moderately severe to severe.  Surgical consultation recommended.  _____________________________________________________________________________  __        Left Ventricle  The left ventricle is mildly dilated. There is normal left ventricular wall  thickness. Mildly decreased left ventricular systolic function. LVEF 52% based  on biplane 2D tracing. Grade I or early diastolic dysfunction.     Right Ventricle  The right ventricle is normal in size and function.     Atria  The left atrium is mildly dilated. Right atrial size is normal. There is no  color Doppler evidence of an atrial shunt.     Mitral Valve  The mitral valve leaflets appear normal. There is no evidence of stenosis,  fluttering, or prolapse. There is trace mitral regurgitation.        Tricuspid Valve  Normal tricuspid valve. There is trace tricuspid regurgitation. Right  ventricular systolic pressure could not be approximated due to inadequate  tricuspid regurgitation.     Aortic  Valve  A bicuspid aortic valve cannot be excluded. There is severe (4+) aortic  regurgitation. No hemodynamically significant valvular aortic stenosis.     Pulmonic Valve  The pulmonic valve is not well seen, but is grossly normal. There is trace  pulmonic valvular regurgitation.     Vessels  The aortic root is normal size. 3.6 cm. Normal size ascending aorta. 3.5 cm.  The inferior vena cava is normal.     Pericardium  There is no pericardial effusion.        Rhythm  Sinus rhythm was noted.  ______________________________________________________________________  Report approved by: Dr Beth Tran 10/14/2019 05:09 PM

## 2019-10-16 NOTE — PROGRESS NOTES
I have not seen the patient in past but the recent most echo is worrisome for severe AR and decline in LVEF and increase in LV size. I agree with reader's recommendation of CV surgery evaluation for consideration of AV replacement. Could you please arrange CV surgery consultation. Can keep the clinic appointment with me as well.    Thanks  Evin

## 2019-10-16 NOTE — PROGRESS NOTES
CV surgery consult ordered. Called patient to discuss. Unable to reach. Left VM requesting callback.     Addendum: Patient returned call. Agrees with plan. Routing to Negar Anderson, CV Surgery Care Coordinator.    Awilda Ceja, KECIAN, RN, PHN  Care Coordinator - Cardiology

## 2019-10-18 ENCOUNTER — MYC MEDICAL ADVICE (OUTPATIENT)
Dept: CARDIOLOGY | Facility: CLINIC | Age: 49
End: 2019-10-18

## 2019-10-28 ENCOUNTER — OFFICE VISIT (OUTPATIENT)
Dept: CARDIOLOGY | Facility: CLINIC | Age: 49
End: 2019-10-28
Payer: COMMERCIAL

## 2019-10-28 VITALS
DIASTOLIC BLOOD PRESSURE: 58 MMHG | WEIGHT: 172 LBS | HEIGHT: 69 IN | SYSTOLIC BLOOD PRESSURE: 150 MMHG | BODY MASS INDEX: 25.48 KG/M2 | HEART RATE: 72 BPM

## 2019-10-28 DIAGNOSIS — Q23.1 CONGENITAL INSUFFICIENCY OF AORTIC VALVE: Primary | ICD-10-CM

## 2019-10-28 DIAGNOSIS — I35.9 AORTIC VALVE DISORDER: ICD-10-CM

## 2019-10-28 ASSESSMENT — MIFFLIN-ST. JEOR: SCORE: 1638.95

## 2019-10-28 NOTE — LETTER
10/28/2019      RE: Toney Lemos  [id number] [facility] [address]       Dear Colleague,    Thank you for the opportunity to participate in the care of your patient, Toney Lemos, at the [facility]. Please see a copy of my visit note below.    CV Surgery    Patient seen, clinic note dictated #[id number].    Cindy Matthews MD    Please do not hesitate to contact me if you have any questions/concerns.     Sincerely,     Cindy Matthews MD

## 2019-10-29 NOTE — PROGRESS NOTES
Service Date: 10/28/2019      REFERRING CARDIOLOGIST:  Evin Mederos MD      REASON FOR CONSULTATION:  Evaluation for severe aortic valve insufficiency.      HISTORY OF PRESENT ILLNESS:  Mr. Lemos is a very pleasant 49-year-old gentleman with a known bicuspid valve and aortic insufficiency for almost 2 decades.  He is a patient of Dr. Gonzalez, who was following him for quite a while, but since he retired, his care was carried on by Dr. Mederos.        He has been relatively asymptomatic for over the years, but his most recent echocardiogram performed demonstrated progression of his aortic valve insufficiency to the severe range and also a new finding of left ventricular dilatation.  He also endorses having very mild symptoms of more fatigue this year.  For these reasons, he was referred to me for surgical evaluation for aortic valve replacement.      PAST MEDICAL HISTORY:  Mild hypertension, hyperlipidemia and a bicuspid aortic valve with aortic valve insufficiency.      ALLERGIES:  NO KNOWN DRUG ALLERGIES.      CURRENT MEDICATIONS:   1.  Amlodipine 5 mg p.o. daily.   2.  Lipitor 40 mg p.o. daily.   3.  Carvedilol 6.25 mg p.o. daily.      FAMILY HISTORY:  Significant for premature coronary artery disease as one of his brothers  from coronary disease in his late 30s.  He also had a grandfather who had a bicuspid aortic valve requiring aortic valve replacement at age 49.      SOCIAL HISTORY:  Denies any smoking.  He does not have any problem with alcohol use.      REVIEW OF SYSTEMS:  As per HPI.  All other 10-point review of systems is completed and were otherwise negative unless stated above.      PHYSICAL EXAMINATION:   VITAL SIGNS:  Blood pressure is 150/58, pulse is 72.  He is 78 kg, 5 feet 8 inches.     GENERAL:  He appears well, in no acute distress.   HEENT:  Within normal limits.   NECK:  Supple, no lymphadenopathy.   CARDIOVASCULAR:  Regular rate and rhythm, normal S1 and S2.  Grade 2/6 diastolic murmur at  the right upper sternal border.   LUNGS:  Clear bilaterally.   ABDOMEN:  Soft, nontender, nondistended.   EXTREMITIES:  Negative for edema, cyanosis or clubbing.   NEUROLOGIC:  A&O x3 with no focal deficits.      LABORATORY DATA:  His most recent transthoracic echo from 10/14/2019 demonstrated likely bicuspid valve with severe regurgitation, no stenosis.  Mildly dilated left ventricle with mildly decreased systolic LV function with EF of 52%.  These are all new changes from his last echocardiogram from 2017.  His RV function is normal.  His mitral valve is normal as is his tricuspid valve.  His aortic root appears to be within normal range at 3.6 cm on the echocardiogram.      IMPRESSION AND PLAN:  Mr. Lemos is a very pleasant, otherwise healthy 49-year-old gentleman with known bicuspid aortic valve, who now has severe aortic valve insufficiency with new findings of LV dilatation and a mildly diminished LV systolic function.  My recommendation is aortic valve replacement with a mechanical valve.  He will need a coronary angiogram and I would also like to get a CT angiogram of the chest to make sure he does not have any aortic root or ascending aortic dilatation given his bicuspid aortic valve status.        If these studies are both normal, my recommendation is aortic valve replacement via a standard sternotomy approach.  I explained to the patient the reason for recommending the proposed operation, the diagnosis in detail and the risks and benefits of the operation.  I also went over the potential complications associated with the operation, which include but are not strictly limited to infection, bleeding, stroke, postop MI, postop arrhythmias, pulmonary or renal complications.  I think overall he is an excellent surgical candidate and I quoted an operative mortality risk of 1% or less.        The patient understands and agrees to proceed.  After the preoperative workup studies are completed, we will schedule him  for a median sternotomy, aortic valve replacement with a mechanical valve within the next 1-2 months.      It was a pleasure to meet Mr. Bone and thank you very much for this referral.         GEOVANNI SHULTZ MD             D: 10/28/2019   T: 10/29/2019   MT: LÓPEZ      Name:     NAHID BONE   MRN:      [id number]        Account:      [id number]   :      1970           Service Date: 10/28/2019      Document: [id number]

## 2019-11-04 ENCOUNTER — HEALTH MAINTENANCE LETTER (OUTPATIENT)
Age: 49
End: 2019-11-04

## 2019-11-12 ENCOUNTER — TELEPHONE (OUTPATIENT)
Dept: CARDIOLOGY | Facility: CLINIC | Age: 49
End: 2019-11-12

## 2019-11-12 NOTE — TELEPHONE ENCOUNTER
Talked to pt to schedule surgery with Dr. Matthews. Scheduled surgery for 2/3. Will call if anything changes

## 2019-11-12 NOTE — TELEPHONE ENCOUNTER
Per task, pt needs to schedule surgery with Dr. Barnhart after 1/8. LM to schedule. Will try again later

## 2019-12-10 ENCOUNTER — OFFICE VISIT (OUTPATIENT)
Dept: CARDIOLOGY | Facility: CLINIC | Age: 49
End: 2019-12-10
Payer: COMMERCIAL

## 2019-12-10 VITALS
BODY MASS INDEX: 26.51 KG/M2 | OXYGEN SATURATION: 98 % | DIASTOLIC BLOOD PRESSURE: 58 MMHG | HEIGHT: 69 IN | WEIGHT: 179 LBS | HEART RATE: 70 BPM | SYSTOLIC BLOOD PRESSURE: 132 MMHG

## 2019-12-10 DIAGNOSIS — Q23.1 CONGENITAL INSUFFICIENCY OF AORTIC VALVE: ICD-10-CM

## 2019-12-10 DIAGNOSIS — I10 ESSENTIAL HYPERTENSION: ICD-10-CM

## 2019-12-10 DIAGNOSIS — I35.9 AORTIC VALVE DISORDER: Primary | ICD-10-CM

## 2019-12-10 PROCEDURE — 99214 OFFICE O/P EST MOD 30 MIN: CPT | Performed by: NURSE PRACTITIONER

## 2019-12-10 ASSESSMENT — MIFFLIN-ST. JEOR: SCORE: 1670.73

## 2019-12-10 NOTE — PROGRESS NOTES
Cardiology Clinic Progress Note  Toney Lemos MRN# [id number]   YOB: 1970 Age: 49 year old     Primary cardiologist: Dr. Mederos    Reason for visit: Preprocedural assessment prior to a coronary angiogram    History of presenting illness:    Toney Lemos, a pleasant 49 year old patient who has a known bicuspid aortic valve and aortic insufficiency.  He was previously asymptomatic but most recently an echocardiogram demonstrated progression of his aortic valve insufficiency with a new finding of LV dilation.  He does have some very mild symptoms of fatigue and therefore was referred to CV surgery and met with Dr. Matthews in 2019. The patient's angiogram is scheduled for 2020 and aortic valve replacement is scheduled for 2020.    Past medical history :    1. Bicuspid aortic valve (grandfather had bicuspid aortic valve with aortic valve replacement at age 49) with aortic insufficiency  2. Family history for premature coronary artery disease (1 of his brother  from coronary artery disease in his late 30s)  3. Hypertension  4. Hyperlipidemia         Assessment and Plan:     ASSESSMENT:    1. Bicuspid aortic valve with severe aortic insufficiency    Mild left ventricular [id number] with preserved EF    Remains asymptomatic    Scheduled for aortic valve replacement with Dr. Matthews in 2020    2. Hypertension    Well-controlled on amlodipine 5 mg daily and carvedilol 6.25 mg daily    3. Hyperlipidemia    Atorvastatin 40 mg daily    PLAN:     1. Proceed with coronary angiogram  2. Follow-up post procedurally     All risks and benefits for this procedure have been explained to this patient and accepted.  This includes but is not limited to death, heart attack, stroke, blood clots, cardiac or vascular perforation, bleeding including the need for blood transfusion and the risk thereof, allergic reaction to x-ray dye, arrhythmia necessitating cardioversion, contrast dye  "nephropathy (including risks of temporary or permanent dialysis).  We talked about intracoronary stenting and the risks thereof and bypass surgery.      No history of bleeding problems or current bleeding, and no scheduled surgeries or procedures in the next year. Patient understands and wishes to proceed with it.         Review of Systems:     Review of Systems:  Skin:  Negative     Eyes:  Negative    ENT:  Negative    Respiratory:  Positive for sleep apnea  Cardiovascular:  Negative for;chest pain;edema;lightheadedness;dizziness Positive for;palpitations  Gastroenterology: Negative    Genitourinary:  Negative    Musculoskeletal:  Negative    Neurologic:  Negative    Psychiatric:  Negative    Heme/Lymph/Imm:  Negative    Endocrine:  Negative              Physical Exam:     Vitals: /58 (BP Location: Right arm, Patient Position: Fowlers, Cuff Size: Adult Regular)   Pulse 70   Ht 1.75 m (5' 8.9\")   Wt 81.2 kg (179 lb)   SpO2 98%   BMI 26.51 kg/m    Constitutional:  cooperative;well developed        Skin:  warm and dry to the touch        Head:  normocephalic        Eyes:  pupils equal and round        ENT:  no pallor or cyanosis        Neck:  JVP normal        Chest:  clear to auscultation        Cardiac: regular rhythm       systolic ejection murmur;grade 1;RUSB   decrescendo;early diastolic murmur;LUSB;grade 2      Abdomen:  abdomen soft;no masses;non-tender        Vascular: pulses full and equal                                 brisk carotid impulse with his AR.    Extremities and Back:  no deformities, clubbing, cyanosis, erythema observed        Neurological:  affect appropriate               Medications:     Current Outpatient Medications   Medication Sig Dispense Refill     amLODIPine (NORVASC) 2.5 MG tablet Take 2 tablets (5 mg) by mouth daily 180 tablet 3     atorvastatin (LIPITOR) 40 MG tablet Take 1 tablet (40 mg) by mouth daily 90 tablet 3     carvedilol (COREG) 6.25 MG tablet Take 1 tablet (6.25 " mg) by mouth 2 times daily (with meals) 180 tablet 3       Family History   Problem Relation Age of Onset     Heart Disease Maternal Grandmother      Lipids Maternal Grandmother      Hypertension Maternal Grandmother      Hypertension Paternal Grandfather      Heart Disease Paternal Grandfather         heart murmur     Prostate Cancer Paternal Grandfather      Cancer Paternal Grandfather         bone ca     Lipids Paternal Grandfather      Diabetes Maternal Grandfather         senior onset     Hypertension Maternal Grandfather      Cerebrovascular Disease Maternal Grandfather      Cancer Paternal Grandmother         rectal cancer     Lipids Paternal Grandmother      Pancreatic Cancer Cousin      Heart Disease Brother         CAD, age 38       Social History     Socioeconomic History     Marital status:      Spouse name: Not on file     Number of children: Not on file     Years of education: Not on file     Highest education level: Not on file   Occupational History     Not on file   Social Needs     Financial resource strain: Not on file     Food insecurity:     Worry: Not on file     Inability: Not on file     Transportation needs:     Medical: Not on file     Non-medical: Not on file   Tobacco Use     Smoking status: Never Smoker     Smokeless tobacco: Never Used   Substance and Sexual Activity     Alcohol use: Yes     Alcohol/week: 0.0 standard drinks     Comment: beer or two wkly     Drug use: No     Sexual activity: Yes     Partners: Female     Comment: partner is on bcp   Lifestyle     Physical activity:     Days per week: Not on file     Minutes per session: Not on file     Stress: Not on file   Relationships     Social connections:     Talks on phone: Not on file     Gets together: Not on file     Attends Restoration service: Not on file     Active member of club or organization: Not on file     Attends meetings of clubs or organizations: Not on file     Relationship status: Not on file     Intimate  partner violence:     Fear of current or ex partner: Not on file     Emotionally abused: Not on file     Physically abused: Not on file     Forced sexual activity: Not on file   Other Topics Concern      Service No     Blood Transfusions No     Caffeine Concern No     Occupational Exposure No     Hobby Hazards No     Sleep Concern Yes     Stress Concern No     Weight Concern No     Special Diet Yes     Back Care Yes     Exercise Yes     Bike Helmet Yes     Seat Belt Yes     Self-Exams Yes     Parent/sibling w/ CABG, MI or angioplasty before 65F 55M? Yes     Comment: brother had heart attack &  at age 38yrs   Social History Narrative     Not on file            Past Medical History:     Past Medical History:   Diagnosis Date     Aortic valve disorders      Hypertension               Past Surgical History:   No past surgical history on file.           Allergies:   No known drug allergies and Seasonal allergies       Data:   All laboratory data reviewed:    Recent Labs   Lab Test 19  0822 18  0829 10/06/17  0939   LDL 87 88 83   HDL 65 58 62   NHDL 116 105 108   CHOL 181 163 170   TRIG 145 84 124       Lab Results   Component Value Date    WBC 6.7 2019    RBC 4.74 2019    HGB 15.4 2019    HCT 44.1 2019    MCV 93 2019    MCH 32.5 2019    MCHC 34.9 2019    RDW 12.6 2019     2019       Lab Results   Component Value Date     2019    POTASSIUM 4.1 2019    CHLORIDE 106 2019    CO2 27 2019    ANIONGAP 6 2019     (H) 2019    BUN 12 2019    CR 0.91 2019    GFRESTIMATED >90 2019    GFRESTBLACK >90 2019    BRIANA 8.5 2019      Lab Results   Component Value Date    AST 20 02/10/2016    ALT 39 02/10/2016       No results found for: A1C    No results found for: INR      MACRINA DICKSON CNP  [facility]  Pager: [phone]  RN phone: [phone]

## 2019-12-10 NOTE — PATIENT INSTRUCTIONS
TODAY'S RECOMMENDATIONS    1. Coronary angiogram  2. Follow up post procedure    If you have questions or concerns please call clinic at [phone].    Please call [phone] for scheduling.      It was a pleasure seeing you today!

## 2019-12-10 NOTE — LETTER
12/10/2019    Yan Lucas MD  [address] [place] [address]    RE: Toney Lemos       Dear Colleague,    I had the pleasure of seeing Toney Lemos in the [facility].    Cardiology Clinic Progress Note  Toney Lemos MRN# [id number]   YOB: 1970 Age: 49 year old     Primary cardiologist: Dr. Mederos    Reason for visit: Preprocedural assessment prior to a coronary angiogram    History of presenting illness:    Toney Lemos, a pleasant 49 year old patient who has a known bicuspid aortic valve and aortic insufficiency.  He was previously asymptomatic but most recently an echocardiogram demonstrated progression of his aortic valve insufficiency with a new finding of LV dilation.  He does have some very mild symptoms of fatigue and therefore was referred to CV surgery and met with Dr. Matthews in 2019. The patient's angiogram is scheduled for 2020 and aortic valve replacement is scheduled for 2020.    Past medical history :    1. Bicuspid aortic valve (grandfather had bicuspid aortic valve with aortic valve replacement at age 49) with aortic insufficiency  2. Family history for premature coronary artery disease (1 of his brother  from coronary artery disease in his late 30s)  3. Hypertension  4. Hyperlipidemia         Assessment and Plan:     ASSESSMENT:    1. Bicuspid aortic valve with severe aortic insufficiency    Mild left ventricular [id number] with preserved EF    Remains asymptomatic    Scheduled for aortic valve replacement with Dr. Matthews in 2020    2. Hypertension    Well-controlled on amlodipine 5 mg daily and carvedilol 6.25 mg daily    3. Hyperlipidemia    Atorvastatin 40 mg daily    PLAN:     1. Proceed with coronary angiogram  2. Follow-up post procedurally     All risks and benefits for this procedure have been explained to this patient and accepted.  This includes but is not limited to death, heart  "attack, stroke, blood clots, cardiac or vascular perforation, bleeding including the need for blood transfusion and the risk thereof, allergic reaction to x-ray dye, arrhythmia necessitating cardioversion, contrast dye nephropathy (including risks of temporary or permanent dialysis).  We talked about intracoronary stenting and the risks thereof and bypass surgery.      No history of bleeding problems or current bleeding, and no scheduled surgeries or procedures in the next year. Patient understands and wishes to proceed with it.         Review of Systems:     Review of Systems:  Skin:  Negative     Eyes:  Negative    ENT:  Negative    Respiratory:  Positive for sleep apnea  Cardiovascular:  Negative for;chest pain;edema;lightheadedness;dizziness Positive for;palpitations  Gastroenterology: Negative    Genitourinary:  Negative    Musculoskeletal:  Negative    Neurologic:  Negative    Psychiatric:  Negative    Heme/Lymph/Imm:  Negative    Endocrine:  Negative              Physical Exam:     Vitals: /58 (BP Location: Right arm, Patient Position: Fowlers, Cuff Size: Adult Regular)   Pulse 70   Ht 1.75 m (5' 8.9\")   Wt 81.2 kg (179 lb)   SpO2 98%   BMI 26.51 kg/m     Constitutional:  cooperative;well developed        Skin:  warm and dry to the touch        Head:  normocephalic        Eyes:  pupils equal and round        ENT:  no pallor or cyanosis        Neck:  JVP normal        Chest:  clear to auscultation        Cardiac: regular rhythm       systolic ejection murmur;grade 1;RUSB   decrescendo;early diastolic murmur;LUSB;grade 2      Abdomen:  abdomen soft;no masses;non-tender        Vascular: pulses full and equal                                 brisk carotid impulse with his AR.    Extremities and Back:  no deformities, clubbing, cyanosis, erythema observed        Neurological:  affect appropriate               Medications:     Current Outpatient Medications   Medication Sig Dispense Refill     amLODIPine " (NORVASC) 2.5 MG tablet Take 2 tablets (5 mg) by mouth daily 180 tablet 3     atorvastatin (LIPITOR) 40 MG tablet Take 1 tablet (40 mg) by mouth daily 90 tablet 3     carvedilol (COREG) 6.25 MG tablet Take 1 tablet (6.25 mg) by mouth 2 times daily (with meals) 180 tablet 3       Family History   Problem Relation Age of Onset     Heart Disease Maternal Grandmother      Lipids Maternal Grandmother      Hypertension Maternal Grandmother      Hypertension Paternal Grandfather      Heart Disease Paternal Grandfather         heart murmur     Prostate Cancer Paternal Grandfather      Cancer Paternal Grandfather         bone ca     Lipids Paternal Grandfather      Diabetes Maternal Grandfather         senior onset     Hypertension Maternal Grandfather      Cerebrovascular Disease Maternal Grandfather      Cancer Paternal Grandmother         rectal cancer     Lipids Paternal Grandmother      Pancreatic Cancer Cousin      Heart Disease Brother         CAD, age 38       Social History     Socioeconomic History     Marital status:      Spouse name: Not on file     Number of children: Not on file     Years of education: Not on file     Highest education level: Not on file   Occupational History     Not on file   Social Needs     Financial resource strain: Not on file     Food insecurity:     Worry: Not on file     Inability: Not on file     Transportation needs:     Medical: Not on file     Non-medical: Not on file   Tobacco Use     Smoking status: Never Smoker     Smokeless tobacco: Never Used   Substance and Sexual Activity     Alcohol use: Yes     Alcohol/week: 0.0 standard drinks     Comment: beer or two wkly     Drug use: No     Sexual activity: Yes     Partners: Female     Comment: partner is on bcp   Lifestyle     Physical activity:     Days per week: Not on file     Minutes per session: Not on file     Stress: Not on file   Relationships     Social connections:     Talks on phone: Not on file     Gets together:  Not on file     Attends Worship service: Not on file     Active member of club or organization: Not on file     Attends meetings of clubs or organizations: Not on file     Relationship status: Not on file     Intimate partner violence:     Fear of current or ex partner: Not on file     Emotionally abused: Not on file     Physically abused: Not on file     Forced sexual activity: Not on file   Other Topics Concern      Service No     Blood Transfusions No     Caffeine Concern No     Occupational Exposure No     Hobby Hazards No     Sleep Concern Yes     Stress Concern No     Weight Concern No     Special Diet Yes     Back Care Yes     Exercise Yes     Bike Helmet Yes     Seat Belt Yes     Self-Exams Yes     Parent/sibling w/ CABG, MI or angioplasty before 65F 55M? Yes     Comment: brother had heart attack &  at age 38yrs   Social History Narrative     Not on file            Past Medical History:     Past Medical History:   Diagnosis Date     Aortic valve disorders      Hypertension               Past Surgical History:   No past surgical history on file.           Allergies:   No known drug allergies and Seasonal allergies       Data:   All laboratory data reviewed:    Recent Labs   Lab Test 19  0822 18  0829 10/06/17  0939   LDL 87 88 83   HDL 65 58 62   NHDL 116 105 108   CHOL 181 163 170   TRIG 145 84 124       Lab Results   Component Value Date    WBC 6.7 2019    RBC 4.74 2019    HGB 15.4 2019    HCT 44.1 2019    MCV 93 2019    MCH 32.5 2019    MCHC 34.9 2019    RDW 12.6 2019     2019       Lab Results   Component Value Date     2019    POTASSIUM 4.1 2019    CHLORIDE 106 2019    CO2 27 2019    ANIONGAP 6 2019     (H) 2019    BUN 12 2019    CR 0.91 2019    GFRESTIMATED >90 2019    GFRESTBLACK >90 2019    BRIANA 8.5 2019      Lab Results   Component Value  Date    AST 20 02/10/2016    ALT 39 02/10/2016       No results found for: A1C    No results found for: INR      MACRINA DICKSON CNP  [facility]  Pager: [phone]  RN phone: [phone]    Thank you for allowing me to participate in the care of your patient.    Sincerely,     MACRINA DICKSON CNP     [facility]

## 2019-12-23 ENCOUNTER — HOSPITAL ENCOUNTER (OUTPATIENT)
Dept: CT IMAGING | Facility: CLINIC | Age: 49
Discharge: HOME | End: 2019-12-23
Attending: SURGERY | Admitting: SURGERY
Payer: COMMERCIAL

## 2019-12-23 DIAGNOSIS — Q23.1 CONGENITAL INSUFFICIENCY OF AORTIC VALVE: ICD-10-CM

## 2019-12-23 LAB — RADIOLOGIST FLAGS: ABNORMAL

## 2019-12-23 PROCEDURE — 71275 CT ANGIOGRAPHY CHEST: CPT

## 2019-12-23 PROCEDURE — 25000125 ZZHC RX 250: Performed by: SURGERY

## 2019-12-23 PROCEDURE — 25000128 H RX IP 250 OP 636: Performed by: SURGERY

## 2019-12-23 RX ORDER — IOPAMIDOL 755 MG/ML
500 INJECTION, SOLUTION INTRAVASCULAR ONCE
Status: COMPLETED | OUTPATIENT
Start: 2019-12-23 | End: 2019-12-23

## 2019-12-23 RX ADMIN — SODIUM CHLORIDE 80 ML: 9 INJECTION, SOLUTION INTRAVENOUS at 10:42

## 2019-12-23 RX ADMIN — IOPAMIDOL 75 ML: 755 INJECTION, SOLUTION INTRAVENOUS at 10:42

## 2020-01-03 ENCOUNTER — TELEPHONE (OUTPATIENT)
Dept: CARDIOLOGY | Facility: CLINIC | Age: 50
End: 2020-01-03

## 2020-01-03 RX ORDER — LIDOCAINE 40 MG/G
CREAM TOPICAL
Status: CANCELLED | OUTPATIENT
Start: 2020-01-03

## 2020-01-03 RX ORDER — SODIUM CHLORIDE 9 MG/ML
INJECTION, SOLUTION INTRAVENOUS CONTINUOUS
Status: CANCELLED | OUTPATIENT
Start: 2020-01-03

## 2020-01-03 RX ORDER — POTASSIUM CHLORIDE 1500 MG/1
20 TABLET, EXTENDED RELEASE ORAL
Status: CANCELLED | OUTPATIENT
Start: 2020-01-03

## 2020-01-03 NOTE — TELEPHONE ENCOUNTER
Left detailed message for  patient to review Left heart cath scheduled for Monday 1/6/2020 at [facility] to arrive at 6:30 AM for a  8:30 AM procedure. Instructed patient to not eat or drink after midnight and take AM medications. Verified per patients chart to NOT have a known contrast allergy. Instructed he will NOT be able to drive  home and will need someone to be available 24 hours post heart cath.  Asked that he call into the clinic, to team 3 nurse line with any questions or concerns. Orders placed in EPIC.     Clarification: 1/3/2020-1:45 PM  Patient called back. Confirmed that he understood the above instructions, however he changed the date of his procedure to Thursday 1/9/2020.

## 2020-01-09 ENCOUNTER — TELEPHONE (OUTPATIENT)
Dept: CARDIOLOGY | Facility: CLINIC | Age: 50
End: 2020-01-09

## 2020-01-09 ENCOUNTER — HOSPITAL ENCOUNTER (OUTPATIENT)
Facility: CLINIC | Age: 50
Discharge: HOME | End: 2020-01-09
Admitting: INTERNAL MEDICINE
Payer: COMMERCIAL

## 2020-01-09 ENCOUNTER — SURGERY (OUTPATIENT)
Age: 50
End: 2020-01-09
Payer: COMMERCIAL

## 2020-01-09 ENCOUNTER — APPOINTMENT (OUTPATIENT)
Dept: GENERAL RADIOLOGY | Facility: CLINIC | Age: 50
End: 2020-01-09
Payer: COMMERCIAL

## 2020-01-09 ENCOUNTER — TELEPHONE (OUTPATIENT)
Dept: NURSING | Facility: CLINIC | Age: 50
End: 2020-01-09

## 2020-01-09 VITALS
SYSTOLIC BLOOD PRESSURE: 127 MMHG | TEMPERATURE: 98.1 F | HEART RATE: 65 BPM | DIASTOLIC BLOOD PRESSURE: 58 MMHG | OXYGEN SATURATION: 96 % | HEIGHT: 69 IN | WEIGHT: 174 LBS | RESPIRATION RATE: 20 BRPM | BODY MASS INDEX: 25.77 KG/M2

## 2020-01-09 DIAGNOSIS — I25.10 ATHEROSCLEROSIS OF NATIVE CORONARY ARTERY OF NATIVE HEART WITHOUT ANGINA PECTORIS: ICD-10-CM

## 2020-01-09 DIAGNOSIS — Q23.1 CONGENITAL INSUFFICIENCY OF AORTIC VALVE: ICD-10-CM

## 2020-01-09 PROBLEM — Z98.890 STATUS POST CORONARY ANGIOGRAM: Status: ACTIVE | Noted: 2020-01-09

## 2020-01-09 LAB
ALBUMIN SERPL-MCNC: 4.2 G/DL (ref 3.4–5)
ALBUMIN UR-MCNC: NEGATIVE MG/DL
ALP SERPL-CCNC: 94 U/L (ref 40–150)
ALT SERPL W P-5'-P-CCNC: 40 U/L (ref 0–70)
ANION GAP SERPL CALCULATED.3IONS-SCNC: 4 MMOL/L (ref 3–14)
APPEARANCE UR: CLEAR
APTT PPP: 30 SEC (ref 22–37)
AST SERPL W P-5'-P-CCNC: 21 U/L (ref 0–45)
BACTERIA #/AREA URNS HPF: ABNORMAL /HPF
BILIRUB SERPL-MCNC: 0.8 MG/DL (ref 0.2–1.3)
BILIRUB UR QL STRIP: NEGATIVE
BUN SERPL-MCNC: 14 MG/DL (ref 7–30)
CALCIUM SERPL-MCNC: 8.8 MG/DL (ref 8.5–10.1)
CHLORIDE SERPL-SCNC: 112 MMOL/L (ref 94–109)
CO2 SERPL-SCNC: 27 MMOL/L (ref 20–32)
COLOR UR AUTO: YELLOW
CREAT SERPL-MCNC: 0.81 MG/DL (ref 0.66–1.25)
ERYTHROCYTE [DISTWIDTH] IN BLOOD BY AUTOMATED COUNT: 13 % (ref 10–15)
GFR SERPL CREATININE-BSD FRML MDRD: >90 ML/MIN/{1.73_M2}
GLUCOSE SERPL-MCNC: 107 MG/DL (ref 70–99)
GLUCOSE UR STRIP-MCNC: NEGATIVE MG/DL
HBA1C MFR BLD: 5.3 % (ref 0–5.6)
HCT VFR BLD AUTO: 42.6 % (ref 40–53)
HGB BLD-MCNC: 14.8 G/DL (ref 13.3–17.7)
HGB UR QL STRIP: NEGATIVE
HYALINE CASTS #/AREA URNS LPF: 1 /LPF (ref 0–2)
INR PPP: 0.95 (ref 0.86–1.14)
KETONES UR STRIP-MCNC: NEGATIVE MG/DL
LEUKOCYTE ESTERASE UR QL STRIP: NEGATIVE
MCH RBC QN AUTO: 32.5 PG (ref 26.5–33)
MCHC RBC AUTO-ENTMCNC: 34.7 G/DL (ref 31.5–36.5)
MCV RBC AUTO: 94 FL (ref 78–100)
MUCOUS THREADS #/AREA URNS LPF: PRESENT /LPF
NITRATE UR QL: NEGATIVE
PH UR STRIP: 6.5 PH (ref 5–7)
PLATELET # BLD AUTO: 186 10E9/L (ref 150–450)
POTASSIUM SERPL-SCNC: 3.9 MMOL/L (ref 3.4–5.3)
PROT SERPL-MCNC: 7.3 G/DL (ref 6.8–8.8)
RBC # BLD AUTO: 4.55 10E12/L (ref 4.4–5.9)
RBC #/AREA URNS AUTO: 1 /HPF (ref 0–2)
SODIUM SERPL-SCNC: 143 MMOL/L (ref 133–144)
SOURCE: ABNORMAL
SP GR UR STRIP: 1.01 (ref 1–1.03)
UROBILINOGEN UR STRIP-MCNC: NORMAL MG/DL (ref 0–2)
WBC # BLD AUTO: 6 10E9/L (ref 4–11)
WBC #/AREA URNS AUTO: <1 /HPF (ref 0–5)

## 2020-01-09 PROCEDURE — 93454 CORONARY ARTERY ANGIO S&I: CPT | Performed by: INTERNAL MEDICINE

## 2020-01-09 PROCEDURE — 40000852 ZZH STATISTIC HEART CATH LAB OR EP LAB

## 2020-01-09 PROCEDURE — 83036 HEMOGLOBIN GLYCOSYLATED A1C: CPT

## 2020-01-09 PROCEDURE — 85027 COMPLETE CBC AUTOMATED: CPT

## 2020-01-09 PROCEDURE — 25000125 ZZHC RX 250: Performed by: INTERNAL MEDICINE

## 2020-01-09 PROCEDURE — 36415 COLL VENOUS BLD VENIPUNCTURE: CPT

## 2020-01-09 PROCEDURE — 86923 COMPATIBILITY TEST ELECTRIC: CPT

## 2020-01-09 PROCEDURE — 99152 MOD SED SAME PHYS/QHP 5/>YRS: CPT | Performed by: INTERNAL MEDICINE

## 2020-01-09 PROCEDURE — 93010 ELECTROCARDIOGRAM REPORT: CPT | Performed by: INTERNAL MEDICINE

## 2020-01-09 PROCEDURE — 93005 ELECTROCARDIOGRAM TRACING: CPT

## 2020-01-09 PROCEDURE — 86901 BLOOD TYPING SEROLOGIC RH(D): CPT

## 2020-01-09 PROCEDURE — C1769 GUIDE WIRE: HCPCS | Performed by: INTERNAL MEDICINE

## 2020-01-09 PROCEDURE — 86900 BLOOD TYPING SEROLOGIC ABO: CPT

## 2020-01-09 PROCEDURE — C1894 INTRO/SHEATH, NON-LASER: HCPCS | Performed by: INTERNAL MEDICINE

## 2020-01-09 PROCEDURE — 71046 X-RAY EXAM CHEST 2 VIEWS: CPT

## 2020-01-09 PROCEDURE — 85610 PROTHROMBIN TIME: CPT

## 2020-01-09 PROCEDURE — 25800030 ZZH RX IP 258 OP 636: Performed by: INTERNAL MEDICINE

## 2020-01-09 PROCEDURE — 25000132 ZZH RX MED GY IP 250 OP 250 PS 637: Performed by: INTERNAL MEDICINE

## 2020-01-09 PROCEDURE — 27210794 ZZH OR GENERAL SUPPLY STERILE: Performed by: INTERNAL MEDICINE

## 2020-01-09 PROCEDURE — 85730 THROMBOPLASTIN TIME PARTIAL: CPT

## 2020-01-09 PROCEDURE — 40000065 ZZH STATISTIC EKG NON-CHARGEABLE

## 2020-01-09 PROCEDURE — C1887 CATHETER, GUIDING: HCPCS | Performed by: INTERNAL MEDICINE

## 2020-01-09 PROCEDURE — 25000128 H RX IP 250 OP 636: Performed by: INTERNAL MEDICINE

## 2020-01-09 PROCEDURE — 40000235 ZZH STATISTIC TELEMETRY

## 2020-01-09 PROCEDURE — 93454 CORONARY ARTERY ANGIO S&I: CPT | Mod: 26 | Performed by: INTERNAL MEDICINE

## 2020-01-09 PROCEDURE — 86850 RBC ANTIBODY SCREEN: CPT

## 2020-01-09 PROCEDURE — 81001 URINALYSIS AUTO W/SCOPE: CPT

## 2020-01-09 PROCEDURE — 80053 COMPREHEN METABOLIC PANEL: CPT

## 2020-01-09 RX ORDER — FENTANYL CITRATE 50 UG/ML
INJECTION, SOLUTION INTRAMUSCULAR; INTRAVENOUS
Status: DISCONTINUED | OUTPATIENT
Start: 2020-01-09 | End: 2020-01-09 | Stop reason: HOSPADM

## 2020-01-09 RX ORDER — LIDOCAINE 40 MG/G
CREAM TOPICAL
Status: DISCONTINUED | OUTPATIENT
Start: 2020-01-09 | End: 2020-01-09 | Stop reason: HOSPADM

## 2020-01-09 RX ORDER — HEPARIN SODIUM 1000 [USP'U]/ML
INJECTION, SOLUTION INTRAVENOUS; SUBCUTANEOUS
Status: DISCONTINUED | OUTPATIENT
Start: 2020-01-09 | End: 2020-01-09 | Stop reason: HOSPADM

## 2020-01-09 RX ORDER — VERAPAMIL HYDROCHLORIDE 2.5 MG/ML
INJECTION, SOLUTION INTRAVENOUS
Status: DISCONTINUED | OUTPATIENT
Start: 2020-01-09 | End: 2020-01-09 | Stop reason: HOSPADM

## 2020-01-09 RX ORDER — ACETAMINOPHEN 325 MG/1
650 TABLET ORAL EVERY 4 HOURS PRN
Status: DISCONTINUED | OUTPATIENT
Start: 2020-01-09 | End: 2020-01-09 | Stop reason: HOSPADM

## 2020-01-09 RX ORDER — NALOXONE HYDROCHLORIDE 0.4 MG/ML
.1-.4 INJECTION, SOLUTION INTRAMUSCULAR; INTRAVENOUS; SUBCUTANEOUS
Status: DISCONTINUED | OUTPATIENT
Start: 2020-01-09 | End: 2020-01-09 | Stop reason: HOSPADM

## 2020-01-09 RX ORDER — ATROPINE SULFATE 0.1 MG/ML
0.5 INJECTION INTRAVENOUS EVERY 5 MIN PRN
Status: DISCONTINUED | OUTPATIENT
Start: 2020-01-09 | End: 2020-01-09 | Stop reason: HOSPADM

## 2020-01-09 RX ORDER — POTASSIUM CHLORIDE 1500 MG/1
20 TABLET, EXTENDED RELEASE ORAL
Status: COMPLETED | OUTPATIENT
Start: 2020-01-09 | End: 2020-01-09

## 2020-01-09 RX ORDER — NALOXONE HYDROCHLORIDE 0.4 MG/ML
.2-.4 INJECTION, SOLUTION INTRAMUSCULAR; INTRAVENOUS; SUBCUTANEOUS
Status: DISCONTINUED | OUTPATIENT
Start: 2020-01-09 | End: 2020-01-09 | Stop reason: HOSPADM

## 2020-01-09 RX ORDER — FLUMAZENIL 0.1 MG/ML
0.2 INJECTION, SOLUTION INTRAVENOUS
Status: DISCONTINUED | OUTPATIENT
Start: 2020-01-09 | End: 2020-01-09 | Stop reason: HOSPADM

## 2020-01-09 RX ORDER — SODIUM CHLORIDE 9 MG/ML
INJECTION, SOLUTION INTRAVENOUS CONTINUOUS
Status: DISCONTINUED | OUTPATIENT
Start: 2020-01-09 | End: 2020-01-09 | Stop reason: HOSPADM

## 2020-01-09 RX ORDER — NITROGLYCERIN 5 MG/ML
VIAL (ML) INTRAVENOUS
Status: DISCONTINUED | OUTPATIENT
Start: 2020-01-09 | End: 2020-01-09 | Stop reason: HOSPADM

## 2020-01-09 RX ORDER — FENTANYL CITRATE 50 UG/ML
25-50 INJECTION, SOLUTION INTRAMUSCULAR; INTRAVENOUS
Status: DISCONTINUED | OUTPATIENT
Start: 2020-01-09 | End: 2020-01-09 | Stop reason: HOSPADM

## 2020-01-09 RX ADMIN — MIDAZOLAM 1 MG: 1 INJECTION INTRAMUSCULAR; INTRAVENOUS at 08:51

## 2020-01-09 RX ADMIN — HEPARIN SODIUM 5000 UNITS: 1000 INJECTION, SOLUTION INTRAVENOUS; SUBCUTANEOUS at 08:57

## 2020-01-09 RX ADMIN — FENTANYL CITRATE 50 MCG: 50 INJECTION, SOLUTION INTRAMUSCULAR; INTRAVENOUS at 08:51

## 2020-01-09 RX ADMIN — FENTANYL CITRATE 50 MCG: 50 INJECTION, SOLUTION INTRAMUSCULAR; INTRAVENOUS at 08:58

## 2020-01-09 RX ADMIN — POTASSIUM CHLORIDE 20 MEQ: 1500 TABLET, EXTENDED RELEASE ORAL at 07:58

## 2020-01-09 RX ADMIN — VERAPAMIL HYDROCHLORIDE 2.5 MG: 2.5 INJECTION, SOLUTION INTRAVENOUS at 08:57

## 2020-01-09 RX ADMIN — LIDOCAINE HYDROCHLORIDE 2 ML: 10 INJECTION, SOLUTION EPIDURAL; INFILTRATION; INTRACAUDAL; PERINEURAL at 08:56

## 2020-01-09 RX ADMIN — MIDAZOLAM 1 MG: 1 INJECTION INTRAMUSCULAR; INTRAVENOUS at 08:54

## 2020-01-09 RX ADMIN — SODIUM CHLORIDE: 9 INJECTION, SOLUTION INTRAVENOUS at 07:04

## 2020-01-09 RX ADMIN — NITROGLYCERIN 400 MCG: 5 INJECTION, SOLUTION INTRAVENOUS at 08:57

## 2020-01-09 ASSESSMENT — MIFFLIN-ST. JEOR: SCORE: 1649.64

## 2020-01-09 NOTE — PROGRESS NOTES
Pt returned to [facility] via cart from cath lab. Denies pain, nausea or vomiting. Resting comfortably. Family at bedside and MD in to see pt.

## 2020-01-09 NOTE — PROGRESS NOTES
Attempted to remove first 3 ml air from TR band. Immediate bleeding from site and additional 3 ml air added back to band. No bleeding noted.

## 2020-01-09 NOTE — PRE-PROCEDURE
GENERAL PRE-PROCEDURE:   Procedure:  Coronary angiogram  Date/Time:  1/9/2020 8:18 AM    Verbal consent obtained?: Yes    Written consent obtained?: Yes    Risks and benefits: Risks, benefits and alternatives were discussed    Consent given by:  Patient  Patient states understanding of procedure being performed: Yes    Patient's understanding of procedure matches consent: Yes    Procedure consent matches procedure scheduled: Yes    Expected level of sedation:  Moderate  Appropriately NPO:  Yes  ASA Class:  Class 3- Severe systemic disease, definite functional limitations  Mallampati  :  Grade 2- soft palate, base of uvula, tonsillar pillars, and portion of posterior pharyngeal wall visible  Lungs:  Lungs clear with good breath sounds bilaterally  Heart:  Normal heart sounds and rate and diastolic murmur  Statement of review:  I have reviewed the lab findings, diagnostic data, medications, and the plan for sedation  I have examined the patient, reviewed the history, medications and pre procedural tests. I have explained to the patient the risks of death, MI, stroke, hematoma,  possible peripheral vascular complications, arrhythmia, the use of FFR in clinical decision-making during  coronary angiography. The patient voiced understanding and wishes to proceed. The patient has a good right radial pulse, normal ulnar pulse and a normal Malcolm's sign.

## 2020-01-09 NOTE — PROGRESS NOTES
Pre procedure plan of care reviewed with pt and spouse. All questions answered and pt appears to accept and understand.

## 2020-01-09 NOTE — DISCHARGE INSTRUCTIONS
Cardiac Angiogram Discharge Instructions - Radial    After you go home:      Have an adult stay with you until tomorrow.    Drink extra fluids for 2 days.    You may resume your normal diet.    No smoking       For 24 hours - due to the sedation you received:    Relax and take it easy.    Do NOT make any important or legal decisions.    Do NOT drive or operate machines at home or at work.    Do NOT drink alcohol.    Care of Wrist Puncture Site:      For the first 24 hrs - check the puncture site every 1-2 hours while awake.    It is normal to have soreness at the puncture site and mild tingling in your hand for up to 3 days.    Remove the bandaid after 24 hours. If there is minor oozing, apply another bandaid and remove it after 12 hours.    You may shower tomorrow.  Do NOT take a bath, or use a hot tub or pool for at least 3 days. Do NOT scrub the site. Do not use lotion or powder near the puncture site.           Activity:        For 2 days:     do not use your hand or arm to support your weight (such as rising from a chair)     do not bend your wrist (such as lifting a garage door).    do not lift more than 5 pounds or exercise your arm (such as tennis, golf or bowling).    Do NOT do any heavy activity such as exercise, lifting, or straining.     Bleeding:      If you start bleeding from the site in your wrist, sit down and press firmly on/above the site for 10 minutes.     Once bleeding stops, keep arm still for 2 hours.     Call [facility] as soon as you can.       Call 911 right away if you have heavy bleeding or bleeding that does not stop.      Medicines:          Take your medications,  unless your provider tells you not to.      If you have stopped any medicines, check with your provider about when to restart them.    Follow Up Appointments:      Follow up with [facility] Nurse Practitioner at [facility] of patient preference in 7-10 days.    Call the clinic if:      You have a large or growing hard  lump around the site.    The site is red, swollen, hot or tender.    Blood or fluid is draining from the site.    You have chills or a fever greater than 101 F (38 C).    Your arm feels numb, cool or changes color.    You have hives, a rash or unusual itching.    Any questions or concerns.          [facility] [place]:    [phone] UMP (7 days a week)

## 2020-01-09 NOTE — TELEPHONE ENCOUNTER
CV Surgery:    Patient left care suites before we were able to do pre-op teaching. Called and talked with patient and wife, Na. Dental clearance done and sent to us. They have the soap and will do showers before surgery.     Will plan AVR with Dr. Matthews on 2/3/20 at 0720. They will call with any questions.     Moni Cartagena PA-C  CV Surgery

## 2020-01-09 NOTE — TELEPHONE ENCOUNTER
Wife calling regarding procedure pt had done today at [facility] Cath lab. . Wife asking for specific areas IV   was placed now due to pain. Pain is in opposite arm/shoulder where they thought the IV was. Unable to answer question. Advised she call the cath lab directly. No triage was needed.  Jennifer Mai RN [facility]

## 2020-01-09 NOTE — PROGRESS NOTES
Discharge instructions reviewed with pt and wife prior to discharge to home. All questions answered and pt appears to accept and understand.  No additional questions at this time.  Pt aware of follow up appt scheduled at [facility].

## 2020-01-09 NOTE — PROGRESS NOTES
Care Suites Admission Nursing Note    Reason for admission: coronary angiogram  CS arrival time: 0630  Accompanied by: wife  Name/phone of DC : see epic  Medications held: none  Consent signed: MD to obtain  Abnormal assessment/labs: pending  If abnormal, provider notified: pending  Education/questions answered: yes  Plan: per procedural plan of care

## 2020-01-09 NOTE — PROCEDURES
[facility]    Procedure: *Cath without PCI  Date/Time: 1/9/2020 9:14 AM  Performed by: Faisal Telles MD  Authorized by: Faisal Telles MD     UNIVERSAL PROTOCOL   Site Marked: Yes  Prior Images Obtained and Reviewed:  Yes  Required items: Required blood products, implants, devices and special equipment available    Patient identity confirmed:  Verbally with patient  Patient was reevaluated immediately before administering moderate or deep sedation or anesthesia  Confirmation Checklist:  Patient's identity using two indicators  Time out: Immediately prior to the procedure a time out was called    Universal Protocol: the Joint Commission Universal Protocol was followed    Preparation: Patient was prepped and draped in usual sterile fashion           ANESTHESIA    Anesthesia: Local infiltration  Local Anesthetic:  Lidocaine 1% without epinephrine      SEDATION    Patient Sedated: Yes    Sedation:  Fentanyl and midazolam  Vital signs: Vital signs monitored during sedation    PROCEDURE   Patient Tolerance:  Patient tolerated the procedure well with no immediate complications    Length of time physician/provider present for 1:1 monitoring during sedation: 15     Findings  Normal RCA dominant Coronaries.    Koki

## 2020-01-10 ENCOUNTER — TELEPHONE (OUTPATIENT)
Dept: CARDIOLOGY | Facility: CLINIC | Age: 50
End: 2020-01-10

## 2020-01-10 NOTE — TELEPHONE ENCOUNTER
"Pt called reporting left shoulder pain. The pain is in the muscle area (deltoid) and hurts really bad when he moves it. Started after he has his angio yesterday and woke up with it hurting more. Pain is dull and pain is alleviated if he doesn't move it. Pain 9 out of 10 with activity.The incision site is right wrist.    Per , \"Hi Lianne, I am sorry to hear his left shoulder is bothering him, but it is very unlikely to be related to his heart cath. It could be musculoskeletal, but if it is really bothering him a lot, he should see his primary care providers. His procedure was very brief and we noted NO complications. Thanks \"    Called patient and discussed recommendations. Pt to call PMD if pain continues to get worse. Pt understands. Visit on 1/15/20 w/ RAJAN post angio. LEDA [place]    "

## 2020-01-12 LAB — INTERPRETATION ECG - MUSE: NORMAL

## 2020-01-15 ENCOUNTER — OFFICE VISIT (OUTPATIENT)
Dept: CARDIOLOGY | Facility: CLINIC | Age: 50
End: 2020-01-15
Payer: COMMERCIAL

## 2020-01-15 VITALS
SYSTOLIC BLOOD PRESSURE: 146 MMHG | DIASTOLIC BLOOD PRESSURE: 58 MMHG | OXYGEN SATURATION: 97 % | HEART RATE: 70 BPM | HEIGHT: 69 IN | BODY MASS INDEX: 26.63 KG/M2 | WEIGHT: 179.8 LBS

## 2020-01-15 DIAGNOSIS — Q23.1 CONGENITAL INSUFFICIENCY OF AORTIC VALVE: ICD-10-CM

## 2020-01-15 DIAGNOSIS — I10 ESSENTIAL HYPERTENSION: ICD-10-CM

## 2020-01-15 PROCEDURE — 99214 OFFICE O/P EST MOD 30 MIN: CPT | Performed by: NURSE PRACTITIONER

## 2020-01-15 RX ORDER — CARVEDILOL 12.5 MG/1
12.5 TABLET ORAL 2 TIMES DAILY WITH MEALS
Qty: 180 TABLET | Refills: 3 | Status: ON HOLD | OUTPATIENT
Start: 2020-01-15 | End: 2020-02-09

## 2020-01-15 ASSESSMENT — MIFFLIN-ST. JEOR: SCORE: 1674.36

## 2020-01-15 NOTE — PATIENT INSTRUCTIONS
TODAY'S RECOMMENDATIONS    1. Increase carvedilol to 12.5 mg BID  2. Follow up as directed by CV surgery    If you have questions or concerns please call clinic at [phone].    Please call [phone] for scheduling.      It was a pleasure seeing you today!

## 2020-01-15 NOTE — LETTER
1/15/2020    Yan Lucas MD  [address] [place] [address]    RE: Toney Lemos       Dear Colleague,    I had the pleasure of seeing Toney Lemos in the [facility].    Cardiology Clinic Progress Note  Toney Lemos MRN# [id number]   YOB: 1970 Age: 49 year old     Primary cardiologist: Dr. Mederos    Reason for visit: Post coronary angiogram follow up    History of presenting illness:    Toney Lemos, a pleasant 49 year old patient who has a known bicuspid aortic valve and aortic insufficiency.  He was previously asymptomatic but most recently an echocardiogram demonstrated progression of his aortic valve insufficiency with a new finding of LV dilation.  He does have some very mild symptoms of fatigue and therefore was referred to CV surgery and met with Dr. Matthews in 2019.    He underwent a preoperative coronary angiogram on 2020 via a right radial approach.  There was no evidence of coronary artery disease with 0% stenosis noted in all vessels.  There was severe AI due to bicuspid aortic valve.  The day post procedure he called our office noting some right shoulder pain and it was recommended that he follow-up with primary care provider as it was likely musculoskeletal in nature and not related to the coronary artery catheterization.  He is planned to have a surgical AVR on February 3, 2020.    Past medical history :    1. Bicuspid aortic valve (grandfather had bicuspid aortic valve with aortic valve replacement at age 49) with aortic insufficiency  2. Family history for premature coronary artery disease (1 of his brother  from coronary artery disease in his late 30s)  3. Hypertension  4. Hyperlipidemia         Assessment and Plan:     ASSESSMENT:    1. Bicuspid aortic valve with severe aortic insufficiency    Mild left ventricular dilatation with preserved EF    Remains asymptomatic    No coronary artery disease on recent coronary  "angiogram (1/9/2020)    Scheduled for aortic valve replacement with Dr. Matthews     2. Hypertension    Well-controlled on amlodipine 5 mg daily and carvedilol 6.25 mg daily    3. Hyperlipidemia    Atorvastatin 40 mg daily    PLAN:  1. Increase carvedilol to 12.5 mg BID  2. Proceed with MVR as scheduled          Review of Systems:     Review of Systems:  Skin:  Positive for bruising   Eyes:  Negative    ENT:  Negative    Respiratory:  Positive for sleep apnea  Cardiovascular:  Negative for;chest pain;edema;lightheadedness;dizziness Positive for;palpitations  Gastroenterology: Negative    Genitourinary:  Negative    Musculoskeletal:  Positive for    Neurologic:  Negative    Psychiatric:  Negative    Heme/Lymph/Imm:  Negative    Endocrine:  Negative              Physical Exam:     Vitals: BP (!) 146/58 (BP Location: Right arm, Patient Position: Fowlers, Cuff Size: Adult Regular)   Pulse 70   Ht 1.75 m (5' 8.9\")   Wt 81.6 kg (179 lb 12.8 oz)   SpO2 97%   BMI 26.63 kg/m     Constitutional:  cooperative;well developed        Skin:  warm and dry to the touch        Head:  normocephalic        Eyes:  pupils equal and round        ENT:  no pallor or cyanosis        Neck:  JVP normal        Chest:  clear to auscultation        Cardiac: regular rhythm       systolic ejection murmur;grade 1;RUSB   decrescendo;early diastolic murmur;LUSB;grade 2      Abdomen:  abdomen soft;no masses;non-tender        Vascular: pulses full and equal                                 Right radial site CDI without hematoma    Extremities and Back:  no deformities, clubbing, cyanosis, erythema observed        Neurological:  affect appropriate             Medications:     Current Outpatient Medications   Medication Sig Dispense Refill     amLODIPine (NORVASC) 2.5 MG tablet Take 2 tablets (5 mg) by mouth daily 180 tablet 3     aspirin (ASA) 325 MG EC tablet Take 325 mg by mouth daily       atorvastatin (LIPITOR) 40 MG tablet Take 1 tablet (40 mg) " by mouth daily 90 tablet 3     carvedilol (COREG) 12.5 MG tablet Take 1 tablet (12.5 mg) by mouth 2 times daily (with meals) 180 tablet 3       Family History   Problem Relation Age of Onset     Heart Disease Maternal Grandmother      Lipids Maternal Grandmother      Hypertension Maternal Grandmother      Hypertension Paternal Grandfather      Heart Disease Paternal Grandfather         heart murmur     Prostate Cancer Paternal Grandfather      Cancer Paternal Grandfather         bone ca     Lipids Paternal Grandfather      Diabetes Maternal Grandfather         senior onset     Hypertension Maternal Grandfather      Cerebrovascular Disease Maternal Grandfather      Cancer Paternal Grandmother         rectal cancer     Lipids Paternal Grandmother      Pancreatic Cancer Cousin      Heart Disease Brother         CAD, age 38       Social History     Socioeconomic History     Marital status:      Spouse name: Not on file     Number of children: Not on file     Years of education: Not on file     Highest education level: Not on file   Occupational History     Not on file   Social Needs     Financial resource strain: Not on file     Food insecurity:     Worry: Not on file     Inability: Not on file     Transportation needs:     Medical: Not on file     Non-medical: Not on file   Tobacco Use     Smoking status: Never Smoker     Smokeless tobacco: Never Used   Substance and Sexual Activity     Alcohol use: Yes     Alcohol/week: 0.0 standard drinks     Comment: beer or two wkly     Drug use: No     Sexual activity: Yes     Partners: Female     Comment: partner is on bcp   Lifestyle     Physical activity:     Days per week: Not on file     Minutes per session: Not on file     Stress: Not on file   Relationships     Social connections:     Talks on phone: Not on file     Gets together: Not on file     Attends Islam service: Not on file     Active member of club or organization: Not on file     Attends meetings of  clubs or organizations: Not on file     Relationship status: Not on file     Intimate partner violence:     Fear of current or ex partner: Not on file     Emotionally abused: Not on file     Physically abused: Not on file     Forced sexual activity: Not on file   Other Topics Concern      Service No     Blood Transfusions No     Caffeine Concern No     Occupational Exposure No     Hobby Hazards No     Sleep Concern Yes     Stress Concern No     Weight Concern No     Special Diet Yes     Back Care Yes     Exercise Yes     Bike Helmet Yes     Seat Belt Yes     Self-Exams Yes     Parent/sibling w/ CABG, MI or angioplasty before 65F 55M? Yes     Comment: brother had heart attack &  at age 38yrs   Social History Narrative     Not on file            Past Medical History:     Past Medical History:   Diagnosis Date     Aortic valve disorders      Hypertension               Past Surgical History:     Past Surgical History:   Procedure Laterality Date     CV CORONARY ANGIOGRAM N/A 2020    Procedure: Coronary Angiogram;  Surgeon: Faisal Telles MD;  Location:  [facility]              Allergies:   No known drug allergies and Seasonal allergies       Data:   All laboratory data reviewed:    Recent Labs   Lab Test 19  0822 18  0829 10/06/17  0939   LDL 87 88 83   HDL 65 58 62   NHDL 116 105 108   CHOL 181 163 170   TRIG 145 84 124       Lab Results   Component Value Date    WBC 6.0 2020    RBC 4.55 2020    HGB 14.8 2020    HCT 42.6 2020    MCV 94 2020    MCH 32.5 2020    MCHC 34.7 2020    RDW 13.0 2020     2020       Lab Results   Component Value Date     2020    POTASSIUM 3.9 2020    CHLORIDE 112 (H) 2020    CO2 27 2020    ANIONGAP 4 2020     (H) 2020    BUN 14 2020    CR 0.81 2020    GFRESTIMATED >90 2020    GFRESTBLACK >90 2020    BRIANA 8.8  01/09/2020      Lab Results   Component Value Date    AST 21 01/09/2020    ALT 40 01/09/2020       Lab Results   Component Value Date    A1C 5.3 01/09/2020       Lab Results   Component Value Date    INR 0.95 01/09/2020         MACRINA DICKSON CNP  [facility]  Pager: [phone]  RN phone: [phone]    Thank you for allowing me to participate in the care of your patient.    Sincerely,     MACRINA DICKSON CNP     [facility]

## 2020-01-15 NOTE — PROGRESS NOTES
Cardiology Clinic Progress Note  Toney Lemos MRN# [id number]   YOB: 1970 Age: 49 year old     Primary cardiologist: Dr. Mederos    Reason for visit: Post coronary angiogram follow up    History of presenting illness:    Toney Lemos, a pleasant 49 year old patient who has a known bicuspid aortic valve and aortic insufficiency.  He was previously asymptomatic but most recently an echocardiogram demonstrated progression of his aortic valve insufficiency with a new finding of LV dilation.  He does have some very mild symptoms of fatigue and therefore was referred to CV surgery and met with Dr. Matthews in 2019.    He underwent a preoperative coronary angiogram on 2020 via a right radial approach.  There was no evidence of coronary artery disease with 0% stenosis noted in all vessels.  There was severe AI due to bicuspid aortic valve.  The day post procedure he called our office noting some right shoulder pain and it was recommended that he follow-up with primary care provider as it was likely musculoskeletal in nature and not related to the coronary artery catheterization.  He is planned to have a surgical AVR on February 3, 2020.    Past medical history :    1. Bicuspid aortic valve (grandfather had bicuspid aortic valve with aortic valve replacement at age 49) with aortic insufficiency  2. Family history for premature coronary artery disease (1 of his brother  from coronary artery disease in his late 30s)  3. Hypertension  4. Hyperlipidemia         Assessment and Plan:     ASSESSMENT:    1. Bicuspid aortic valve with severe aortic insufficiency    Mild left ventricular dilatation with preserved EF    Remains asymptomatic    No coronary artery disease on recent coronary angiogram (2020)    Scheduled for aortic valve replacement with Dr. Matthews     2. Hypertension    Well-controlled on amlodipine 5 mg daily and carvedilol 6.25 mg daily    3. Hyperlipidemia    Atorvastatin 40 mg  "daily    PLAN:  1. Increase carvedilol to 12.5 mg BID  2. Proceed with MVR as scheduled          Review of Systems:     Review of Systems:  Skin:  Positive for bruising   Eyes:  Negative    ENT:  Negative    Respiratory:  Positive for sleep apnea  Cardiovascular:  Negative for;chest pain;edema;lightheadedness;dizziness Positive for;palpitations  Gastroenterology: Negative    Genitourinary:  Negative    Musculoskeletal:  Positive for    Neurologic:  Negative    Psychiatric:  Negative    Heme/Lymph/Imm:  Negative    Endocrine:  Negative              Physical Exam:     Vitals: BP (!) 146/58 (BP Location: Right arm, Patient Position: Fowlers, Cuff Size: Adult Regular)   Pulse 70   Ht 1.75 m (5' 8.9\")   Wt 81.6 kg (179 lb 12.8 oz)   SpO2 97%   BMI 26.63 kg/m    Constitutional:  cooperative;well developed        Skin:  warm and dry to the touch        Head:  normocephalic        Eyes:  pupils equal and round        ENT:  no pallor or cyanosis        Neck:  JVP normal        Chest:  clear to auscultation        Cardiac: regular rhythm       systolic ejection murmur;grade 1;RUSB   decrescendo;early diastolic murmur;LUSB;grade 2      Abdomen:  abdomen soft;no masses;non-tender        Vascular: pulses full and equal                                 Right radial site CDI without hematoma    Extremities and Back:  no deformities, clubbing, cyanosis, erythema observed        Neurological:  affect appropriate             Medications:     Current Outpatient Medications   Medication Sig Dispense Refill     amLODIPine (NORVASC) 2.5 MG tablet Take 2 tablets (5 mg) by mouth daily 180 tablet 3     aspirin (ASA) 325 MG EC tablet Take 325 mg by mouth daily       atorvastatin (LIPITOR) 40 MG tablet Take 1 tablet (40 mg) by mouth daily 90 tablet 3     carvedilol (COREG) 12.5 MG tablet Take 1 tablet (12.5 mg) by mouth 2 times daily (with meals) 180 tablet 3       Family History   Problem Relation Age of Onset     Heart Disease Maternal " Grandmother      Lipids Maternal Grandmother      Hypertension Maternal Grandmother      Hypertension Paternal Grandfather      Heart Disease Paternal Grandfather         heart murmur     Prostate Cancer Paternal Grandfather      Cancer Paternal Grandfather         bone ca     Lipids Paternal Grandfather      Diabetes Maternal Grandfather         senior onset     Hypertension Maternal Grandfather      Cerebrovascular Disease Maternal Grandfather      Cancer Paternal Grandmother         rectal cancer     Lipids Paternal Grandmother      Pancreatic Cancer Cousin      Heart Disease Brother         CAD, age 38       Social History     Socioeconomic History     Marital status:      Spouse name: Not on file     Number of children: Not on file     Years of education: Not on file     Highest education level: Not on file   Occupational History     Not on file   Social Needs     Financial resource strain: Not on file     Food insecurity:     Worry: Not on file     Inability: Not on file     Transportation needs:     Medical: Not on file     Non-medical: Not on file   Tobacco Use     Smoking status: Never Smoker     Smokeless tobacco: Never Used   Substance and Sexual Activity     Alcohol use: Yes     Alcohol/week: 0.0 standard drinks     Comment: beer or two wkly     Drug use: No     Sexual activity: Yes     Partners: Female     Comment: partner is on bcp   Lifestyle     Physical activity:     Days per week: Not on file     Minutes per session: Not on file     Stress: Not on file   Relationships     Social connections:     Talks on phone: Not on file     Gets together: Not on file     Attends Quaker service: Not on file     Active member of club or organization: Not on file     Attends meetings of clubs or organizations: Not on file     Relationship status: Not on file     Intimate partner violence:     Fear of current or ex partner: Not on file     Emotionally abused: Not on file     Physically abused: Not on file      Forced sexual activity: Not on file   Other Topics Concern      Service No     Blood Transfusions No     Caffeine Concern No     Occupational Exposure No     Hobby Hazards No     Sleep Concern Yes     Stress Concern No     Weight Concern No     Special Diet Yes     Back Care Yes     Exercise Yes     Bike Helmet Yes     Seat Belt Yes     Self-Exams Yes     Parent/sibling w/ CABG, MI or angioplasty before 65F 55M? Yes     Comment: brother had heart attack &  at age 38yrs   Social History Narrative     Not on file            Past Medical History:     Past Medical History:   Diagnosis Date     Aortic valve disorders      Hypertension               Past Surgical History:     Past Surgical History:   Procedure Laterality Date     CV CORONARY ANGIOGRAM N/A 2020    Procedure: Coronary Angiogram;  Surgeon: Faisal Telles MD;  Location:  [facility]              Allergies:   No known drug allergies and Seasonal allergies       Data:   All laboratory data reviewed:    Recent Labs   Lab Test 19  0822 18  0829 10/06/17  0939   LDL 87 88 83   HDL 65 58 62   NHDL 116 105 108   CHOL 181 163 170   TRIG 145 84 124       Lab Results   Component Value Date    WBC 6.0 2020    RBC 4.55 2020    HGB 14.8 2020    HCT 42.6 2020    MCV 94 2020    MCH 32.5 2020    MCHC 34.7 2020    RDW 13.0 2020     2020       Lab Results   Component Value Date     2020    POTASSIUM 3.9 2020    CHLORIDE 112 (H) 2020    CO2 27 2020    ANIONGAP 4 2020     (H) 2020    BUN 14 2020    CR 0.81 2020    GFRESTIMATED >90 2020    GFRESTBLACK >90 2020    BRIANA 8.8 2020      Lab Results   Component Value Date    AST 21 2020    ALT 40 2020       Lab Results   Component Value Date    A1C 5.3 2020       Lab Results   Component Value Date    INR 0.95 2020          MACRINA DICKSON [facility]  Pager: [phone]  RN phone: [phone]

## 2020-01-20 RX ORDER — CEFAZOLIN SODIUM 2 G/100ML
2 INJECTION, SOLUTION INTRAVENOUS
Status: CANCELLED | OUTPATIENT
Start: 2020-01-20

## 2020-01-20 RX ORDER — CEFAZOLIN SODIUM 1 G/3ML
1 INJECTION, POWDER, FOR SOLUTION INTRAMUSCULAR; INTRAVENOUS SEE ADMIN INSTRUCTIONS
Status: CANCELLED | OUTPATIENT
Start: 2020-01-20

## 2020-01-20 RX ORDER — FAMOTIDINE 20 MG/1
20 TABLET, FILM COATED ORAL
Status: CANCELLED | OUTPATIENT
Start: 2020-01-20

## 2020-01-20 RX ORDER — METOPROLOL TARTRATE 25 MG/1
25 TABLET, FILM COATED ORAL
Status: CANCELLED | OUTPATIENT
Start: 2020-01-20

## 2020-01-20 RX ORDER — MUPIROCIN 20 MG/G
OINTMENT TOPICAL 2 TIMES DAILY
Status: CANCELLED | OUTPATIENT
Start: 2020-01-20 | End: 2020-01-21

## 2020-02-03 ENCOUNTER — APPOINTMENT (OUTPATIENT)
Dept: GENERAL RADIOLOGY | Facility: CLINIC | Age: 50
DRG: 220 | End: 2020-02-03
Attending: NURSE PRACTITIONER
Payer: COMMERCIAL

## 2020-02-03 ENCOUNTER — ANESTHESIA EVENT (OUTPATIENT)
Dept: SURGERY | Facility: CLINIC | Age: 50
DRG: 220 | End: 2020-02-03
Payer: COMMERCIAL

## 2020-02-03 ENCOUNTER — ANESTHESIA (OUTPATIENT)
Dept: SURGERY | Facility: CLINIC | Age: 50
DRG: 220 | End: 2020-02-03
Payer: COMMERCIAL

## 2020-02-03 ENCOUNTER — APPOINTMENT (OUTPATIENT)
Dept: GENERAL RADIOLOGY | Facility: CLINIC | Age: 50
DRG: 220 | End: 2020-02-03
Attending: STUDENT IN AN ORGANIZED HEALTH CARE EDUCATION/TRAINING PROGRAM
Payer: COMMERCIAL

## 2020-02-03 ENCOUNTER — HOSPITAL ENCOUNTER (INPATIENT)
Facility: CLINIC | Age: 50
LOS: 6 days | Discharge: HOME | DRG: 220 | End: 2020-02-09
Attending: SURGERY | Admitting: SURGERY
Payer: COMMERCIAL

## 2020-02-03 DIAGNOSIS — Z95.2 S/P AVR: Primary | ICD-10-CM

## 2020-02-03 DIAGNOSIS — Q23.1 CONGENITAL INSUFFICIENCY OF AORTIC VALVE: ICD-10-CM

## 2020-02-03 PROBLEM — I35.1 AORTIC INSUFFICIENCY: Status: ACTIVE | Noted: 2020-02-03

## 2020-02-03 LAB
ABO + RH BLD: NORMAL
ABO + RH BLD: NORMAL
ALBUMIN SERPL-MCNC: 3.2 G/DL (ref 3.4–5)
ALP SERPL-CCNC: 67 U/L (ref 40–150)
ALT SERPL W P-5'-P-CCNC: 24 U/L (ref 0–70)
ANION GAP SERPL CALCULATED.3IONS-SCNC: 3 MMOL/L (ref 3–14)
ANION GAP SERPL CALCULATED.3IONS-SCNC: 5 MMOL/L (ref 3–14)
APTT PPP: 53 SEC (ref 22–37)
APTT PPP: 61 SEC (ref 22–37)
AST SERPL W P-5'-P-CCNC: 26 U/L (ref 0–45)
BASE DEFICIT BLDA-SCNC: 2.7 MMOL/L
BASE DEFICIT BLDA-SCNC: 4.4 MMOL/L
BILIRUB SERPL-MCNC: 0.8 MG/DL (ref 0.2–1.3)
BLD GP AB SCN SERPL QL: NORMAL
BLD PROD TYP BPU: NORMAL
BLD UNIT ID BPU: 0
BLD UNIT ID BPU: 0
BLOOD BANK CMNT PATIENT-IMP: NORMAL
BLOOD BANK CMNT PATIENT-IMP: NORMAL
BLOOD PRODUCT CODE: NORMAL
BLOOD PRODUCT CODE: NORMAL
BPU ID: NORMAL
BPU ID: NORMAL
BUN SERPL-MCNC: 13 MG/DL (ref 7–30)
BUN SERPL-MCNC: 13 MG/DL (ref 7–30)
CA-I BLD-MCNC: 4.5 MG/DL (ref 4.4–5.2)
CA-I BLD-SCNC: 3.7 MG/DL (ref 4.4–5.2)
CA-I BLD-SCNC: 4.1 MG/DL (ref 4.4–5.2)
CA-I BLD-SCNC: 4.1 MG/DL (ref 4.4–5.2)
CA-I BLD-SCNC: 4.8 MG/DL (ref 4.4–5.2)
CA-I BLD-SCNC: 5 MG/DL (ref 4.4–5.2)
CALCIUM SERPL-MCNC: 7.3 MG/DL (ref 8.5–10.1)
CALCIUM SERPL-MCNC: 8.3 MG/DL (ref 8.5–10.1)
CHLORIDE SERPL-SCNC: 116 MMOL/L (ref 94–109)
CHLORIDE SERPL-SCNC: 116 MMOL/L (ref 94–109)
CO2 BLD-SCNC: 23 MMOL/L (ref 21–28)
CO2 BLD-SCNC: 24 MMOL/L (ref 21–28)
CO2 BLD-SCNC: 26 MMOL/L (ref 21–28)
CO2 BLD-SCNC: 26 MMOL/L (ref 21–28)
CO2 BLDCOV-SCNC: 24 MMOL/L (ref 21–28)
CO2 SERPL-SCNC: 21 MMOL/L (ref 20–32)
CO2 SERPL-SCNC: 23 MMOL/L (ref 20–32)
CPB APPLIED: ABNORMAL
CREAT SERPL-MCNC: 0.68 MG/DL (ref 0.66–1.25)
CREAT SERPL-MCNC: 0.7 MG/DL (ref 0.66–1.25)
ERYTHROCYTE [DISTWIDTH] IN BLOOD BY AUTOMATED COUNT: 12.9 % (ref 10–15)
ERYTHROCYTE [DISTWIDTH] IN BLOOD BY AUTOMATED COUNT: 13 % (ref 10–15)
FIBRINOGEN PPP-MCNC: 181 MG/DL (ref 200–420)
GFR SERPL CREATININE-BSD FRML MDRD: >90 ML/MIN/{1.73_M2}
GFR SERPL CREATININE-BSD FRML MDRD: >90 ML/MIN/{1.73_M2}
GLUCOSE BLDC GLUCOMTR-MCNC: 129 MG/DL (ref 70–99)
GLUCOSE BLDC GLUCOMTR-MCNC: 133 MG/DL (ref 70–99)
GLUCOSE BLDC GLUCOMTR-MCNC: 141 MG/DL (ref 70–99)
GLUCOSE BLDC GLUCOMTR-MCNC: 144 MG/DL (ref 70–99)
GLUCOSE BLDC GLUCOMTR-MCNC: 152 MG/DL (ref 70–99)
GLUCOSE BLDC GLUCOMTR-MCNC: 161 MG/DL (ref 70–99)
GLUCOSE BLDC GLUCOMTR-MCNC: 170 MG/DL (ref 70–99)
GLUCOSE BLDC GLUCOMTR-MCNC: 172 MG/DL (ref 70–99)
GLUCOSE BLDC GLUCOMTR-MCNC: 183 MG/DL (ref 70–99)
GLUCOSE SERPL-MCNC: 153 MG/DL (ref 70–99)
GLUCOSE SERPL-MCNC: 166 MG/DL (ref 70–99)
HCO3 BLD-SCNC: 22 MMOL/L (ref 21–28)
HCO3 BLD-SCNC: 23 MMOL/L (ref 21–28)
HCT VFR BLD AUTO: 33.6 % (ref 40–53)
HCT VFR BLD AUTO: 35.9 % (ref 40–53)
HCT VFR BLD CALC: 30 %PCV (ref 40–53)
HCT VFR BLD CALC: 31 %PCV (ref 40–53)
HCT VFR BLD CALC: 31 %PCV (ref 40–53)
HCT VFR BLD CALC: 33 %PCV (ref 40–53)
HCT VFR BLD CALC: 37 %PCV (ref 40–53)
HGB BLD CALC-MCNC: 10.2 G/DL (ref 13.3–17.7)
HGB BLD CALC-MCNC: 10.5 G/DL (ref 13.3–17.7)
HGB BLD CALC-MCNC: 10.5 G/DL (ref 13.3–17.7)
HGB BLD CALC-MCNC: 11.2 G/DL (ref 13.3–17.7)
HGB BLD CALC-MCNC: 12.6 G/DL (ref 13.3–17.7)
HGB BLD-MCNC: 11.5 G/DL (ref 13.3–17.7)
HGB BLD-MCNC: 12.4 G/DL (ref 13.3–17.7)
INR PPP: 1.26 (ref 0.86–1.14)
INR PPP: 1.41 (ref 0.86–1.14)
LACTATE BLD-SCNC: 2.1 MMOL/L (ref 0.7–2)
MAGNESIUM SERPL-MCNC: 2.5 MG/DL (ref 1.6–2.3)
MCH RBC QN AUTO: 31.9 PG (ref 26.5–33)
MCH RBC QN AUTO: 32 PG (ref 26.5–33)
MCHC RBC AUTO-ENTMCNC: 34.2 G/DL (ref 31.5–36.5)
MCHC RBC AUTO-ENTMCNC: 34.5 G/DL (ref 31.5–36.5)
MCV RBC AUTO: 93 FL (ref 78–100)
MCV RBC AUTO: 93 FL (ref 78–100)
MRSA DNA SPEC QL NAA+PROBE: NEGATIVE
NUM BPU REQUESTED: 4
O2/TOTAL GAS SETTING VFR VENT: ABNORMAL %
O2/TOTAL GAS SETTING VFR VENT: ABNORMAL %
OXYHGB MFR BLD: 97 % (ref 92–100)
OXYHGB MFR BLD: 97 % (ref 92–100)
PCO2 BLD: 38 MM HG (ref 35–45)
PCO2 BLD: 42 MM HG (ref 35–45)
PCO2 BLD: 44 MM HG (ref 35–45)
PCO2 BLD: 44 MM HG (ref 35–45)
PCO2 BLD: 45 MM HG (ref 35–45)
PCO2 BLD: 46 MM HG (ref 35–45)
PCO2 BLDV: 44 MM HG (ref 40–50)
PH BLD: 7.32 PH (ref 7.35–7.45)
PH BLD: 7.32 PH (ref 7.35–7.45)
PH BLD: 7.33 PH (ref 7.35–7.45)
PH BLD: 7.38 PH (ref 7.35–7.45)
PH BLD: 7.38 PH (ref 7.35–7.45)
PH BLD: 7.39 PH (ref 7.35–7.45)
PH BLDV: 7.35 PH (ref 7.32–7.43)
PHOSPHATE SERPL-MCNC: 1.9 MG/DL (ref 2.5–4.5)
PLATELET # BLD AUTO: 101 10E9/L (ref 150–450)
PLATELET # BLD AUTO: 82 10E9/L (ref 150–450)
PO2 BLD: 108 MM HG (ref 80–105)
PO2 BLD: 111 MM HG (ref 80–105)
PO2 BLD: 236 MM HG (ref 80–105)
PO2 BLD: 367 MM HG (ref 80–105)
PO2 BLD: 415 MM HG (ref 80–105)
PO2 BLD: 463 MM HG (ref 80–105)
PO2 BLDV: 50 MM HG (ref 25–47)
POTASSIUM BLD-SCNC: 3.9 MMOL/L (ref 3.4–5.3)
POTASSIUM BLD-SCNC: 5.6 MMOL/L (ref 3.4–5.3)
POTASSIUM BLD-SCNC: 5.7 MMOL/L (ref 3.4–5.3)
POTASSIUM BLD-SCNC: 5.9 MMOL/L (ref 3.4–5.3)
POTASSIUM BLD-SCNC: 6.2 MMOL/L (ref 3.4–5.3)
POTASSIUM SERPL-SCNC: 3.9 MMOL/L (ref 3.4–5.3)
POTASSIUM SERPL-SCNC: 4.3 MMOL/L (ref 3.4–5.3)
POTASSIUM SERPL-SCNC: 5.7 MMOL/L (ref 3.4–5.3)
PROT SERPL-MCNC: 5.4 G/DL (ref 6.8–8.8)
RBC # BLD AUTO: 3.61 10E12/L (ref 4.4–5.9)
RBC # BLD AUTO: 3.88 10E12/L (ref 4.4–5.9)
SAO2 % BLDA FROM PO2: 100 % (ref 92–100)
SAO2 % BLDV FROM PO2: 82 %
SODIUM BLD-SCNC: 136 MMOL/L (ref 133–144)
SODIUM BLD-SCNC: 138 MMOL/L (ref 133–144)
SODIUM BLD-SCNC: 139 MMOL/L (ref 133–144)
SODIUM SERPL-SCNC: 142 MMOL/L (ref 133–144)
SODIUM SERPL-SCNC: 142 MMOL/L (ref 133–144)
SPECIMEN EXP DATE BLD: NORMAL
SPECIMEN SOURCE: NORMAL
TRANSFUSION STATUS PATIENT QL: NORMAL
WBC # BLD AUTO: 10.4 10E9/L (ref 4–11)
WBC # BLD AUTO: 9.4 10E9/L (ref 4–11)

## 2020-02-03 PROCEDURE — 87641 MR-STAPH DNA AMP PROBE: CPT | Performed by: SURGERY

## 2020-02-03 PROCEDURE — 84132 ASSAY OF SERUM POTASSIUM: CPT | Performed by: ANESTHESIOLOGY

## 2020-02-03 PROCEDURE — 40000275 ZZH STATISTIC RCP TIME EA 10 MIN

## 2020-02-03 PROCEDURE — 85384 FIBRINOGEN ACTIVITY: CPT | Performed by: SURGERY

## 2020-02-03 PROCEDURE — 85730 THROMBOPLASTIN TIME PARTIAL: CPT | Performed by: STUDENT IN AN ORGANIZED HEALTH CARE EDUCATION/TRAINING PROGRAM

## 2020-02-03 PROCEDURE — 82330 ASSAY OF CALCIUM: CPT | Performed by: STUDENT IN AN ORGANIZED HEALTH CARE EDUCATION/TRAINING PROGRAM

## 2020-02-03 PROCEDURE — 27810169 ZZH OR IMPLANT GENERAL: Performed by: SURGERY

## 2020-02-03 PROCEDURE — 85027 COMPLETE CBC AUTOMATED: CPT | Performed by: SURGERY

## 2020-02-03 PROCEDURE — 40000986 XR ABDOMEN PORT 1 VW

## 2020-02-03 PROCEDURE — 40000171 ZZH STATISTIC PRE-PROCEDURE ASSESSMENT III: Performed by: SURGERY

## 2020-02-03 PROCEDURE — 25000125 ZZHC RX 250: Performed by: STUDENT IN AN ORGANIZED HEALTH CARE EDUCATION/TRAINING PROGRAM

## 2020-02-03 PROCEDURE — 41000023 ZZH PERA-PERFUSION, SH ONLY,  EACH ADDTL 15 MIN: Performed by: SURGERY

## 2020-02-03 PROCEDURE — 25000566 ZZH SEVOFLURANE, EA 15 MIN: Performed by: SURGERY

## 2020-02-03 PROCEDURE — 84132 ASSAY OF SERUM POTASSIUM: CPT

## 2020-02-03 PROCEDURE — 85610 PROTHROMBIN TIME: CPT | Performed by: SURGERY

## 2020-02-03 PROCEDURE — 87640 STAPH A DNA AMP PROBE: CPT | Performed by: SURGERY

## 2020-02-03 PROCEDURE — 82803 BLOOD GASES ANY COMBINATION: CPT

## 2020-02-03 PROCEDURE — 40000985 XR CHEST PORT 1 VW

## 2020-02-03 PROCEDURE — 25800030 ZZH RX IP 258 OP 636: Performed by: NURSE ANESTHETIST, CERTIFIED REGISTERED

## 2020-02-03 PROCEDURE — 25000125 ZZHC RX 250: Performed by: SURGERY

## 2020-02-03 PROCEDURE — 88305 TISSUE EXAM BY PATHOLOGIST: CPT | Mod: 26 | Performed by: SURGERY

## 2020-02-03 PROCEDURE — 80048 BASIC METABOLIC PNL TOTAL CA: CPT | Performed by: SURGERY

## 2020-02-03 PROCEDURE — 02RF0JZ REPLACEMENT OF AORTIC VALVE WITH SYNTHETIC SUBSTITUTE, OPEN APPROACH: ICD-10-PCS | Performed by: SURGERY

## 2020-02-03 PROCEDURE — 84100 ASSAY OF PHOSPHORUS: CPT | Performed by: STUDENT IN AN ORGANIZED HEALTH CARE EDUCATION/TRAINING PROGRAM

## 2020-02-03 PROCEDURE — 5A1221Z PERFORMANCE OF CARDIAC OUTPUT, CONTINUOUS: ICD-10-PCS | Performed by: SURGERY

## 2020-02-03 PROCEDURE — 85014 HEMATOCRIT: CPT

## 2020-02-03 PROCEDURE — 25000131 ZZH RX MED GY IP 250 OP 636 PS 637: Performed by: STUDENT IN AN ORGANIZED HEALTH CARE EDUCATION/TRAINING PROGRAM

## 2020-02-03 PROCEDURE — 40000256 ZZH STATISTIC CARDIOPULM RESUSCITATION

## 2020-02-03 PROCEDURE — 83605 ASSAY OF LACTIC ACID: CPT | Performed by: STUDENT IN AN ORGANIZED HEALTH CARE EDUCATION/TRAINING PROGRAM

## 2020-02-03 PROCEDURE — 25000128 H RX IP 250 OP 636: Performed by: NURSE ANESTHETIST, CERTIFIED REGISTERED

## 2020-02-03 PROCEDURE — 25800030 ZZH RX IP 258 OP 636: Performed by: ANESTHESIOLOGY

## 2020-02-03 PROCEDURE — 37000008 ZZH ANESTHESIA TECHNICAL FEE, 1ST 30 MIN: Performed by: SURGERY

## 2020-02-03 PROCEDURE — 36000073 ZZH SURGERY LEVEL 6 1ST 30 MIN: Performed by: SURGERY

## 2020-02-03 PROCEDURE — 93005 ELECTROCARDIOGRAM TRACING: CPT

## 2020-02-03 PROCEDURE — 94003 VENT MGMT INPAT SUBQ DAY: CPT

## 2020-02-03 PROCEDURE — 25800030 ZZH RX IP 258 OP 636: Performed by: NURSE PRACTITIONER

## 2020-02-03 PROCEDURE — 25800030 ZZH RX IP 258 OP 636: Performed by: STUDENT IN AN ORGANIZED HEALTH CARE EDUCATION/TRAINING PROGRAM

## 2020-02-03 PROCEDURE — 83735 ASSAY OF MAGNESIUM: CPT | Performed by: STUDENT IN AN ORGANIZED HEALTH CARE EDUCATION/TRAINING PROGRAM

## 2020-02-03 PROCEDURE — 80053 COMPREHEN METABOLIC PANEL: CPT | Performed by: STUDENT IN AN ORGANIZED HEALTH CARE EDUCATION/TRAINING PROGRAM

## 2020-02-03 PROCEDURE — 36415 COLL VENOUS BLD VENIPUNCTURE: CPT | Performed by: ANESTHESIOLOGY

## 2020-02-03 PROCEDURE — 88305 TISSUE EXAM BY PATHOLOGIST: CPT | Performed by: SURGERY

## 2020-02-03 PROCEDURE — 25000125 ZZHC RX 250: Performed by: ANESTHESIOLOGY

## 2020-02-03 PROCEDURE — 25000132 ZZH RX MED GY IP 250 OP 250 PS 637: Performed by: STUDENT IN AN ORGANIZED HEALTH CARE EDUCATION/TRAINING PROGRAM

## 2020-02-03 PROCEDURE — 25000128 H RX IP 250 OP 636: Performed by: SURGERY

## 2020-02-03 PROCEDURE — 27210794 ZZH OR GENERAL SUPPLY STERILE: Performed by: SURGERY

## 2020-02-03 PROCEDURE — 00000146 ZZHCL STATISTIC GLUCOSE BY METER IP

## 2020-02-03 PROCEDURE — P9041 ALBUMIN (HUMAN),5%, 50ML: HCPCS | Performed by: STUDENT IN AN ORGANIZED HEALTH CARE EDUCATION/TRAINING PROGRAM

## 2020-02-03 PROCEDURE — 99291 CRITICAL CARE FIRST HOUR: CPT | Performed by: NURSE PRACTITIONER

## 2020-02-03 PROCEDURE — 25800030 ZZH RX IP 258 OP 636: Performed by: SURGERY

## 2020-02-03 PROCEDURE — 82805 BLOOD GASES W/O2 SATURATION: CPT | Performed by: STUDENT IN AN ORGANIZED HEALTH CARE EDUCATION/TRAINING PROGRAM

## 2020-02-03 PROCEDURE — 25000128 H RX IP 250 OP 636: Performed by: ANESTHESIOLOGY

## 2020-02-03 PROCEDURE — 82330 ASSAY OF CALCIUM: CPT

## 2020-02-03 PROCEDURE — 40000008 ZZH STATISTIC AIRWAY CARE

## 2020-02-03 PROCEDURE — 25000128 H RX IP 250 OP 636: Performed by: STUDENT IN AN ORGANIZED HEALTH CARE EDUCATION/TRAINING PROGRAM

## 2020-02-03 PROCEDURE — 20000003 ZZH R&B ICU

## 2020-02-03 PROCEDURE — 84295 ASSAY OF SERUM SODIUM: CPT

## 2020-02-03 PROCEDURE — 93010 ELECTROCARDIOGRAM REPORT: CPT | Performed by: INTERNAL MEDICINE

## 2020-02-03 PROCEDURE — 85610 PROTHROMBIN TIME: CPT | Performed by: STUDENT IN AN ORGANIZED HEALTH CARE EDUCATION/TRAINING PROGRAM

## 2020-02-03 PROCEDURE — 85027 COMPLETE CBC AUTOMATED: CPT | Performed by: STUDENT IN AN ORGANIZED HEALTH CARE EDUCATION/TRAINING PROGRAM

## 2020-02-03 PROCEDURE — 25000125 ZZHC RX 250: Performed by: NURSE ANESTHETIST, CERTIFIED REGISTERED

## 2020-02-03 PROCEDURE — 25000128 H RX IP 250 OP 636: Performed by: NURSE PRACTITIONER

## 2020-02-03 PROCEDURE — C9113 INJ PANTOPRAZOLE SODIUM, VIA: HCPCS | Performed by: STUDENT IN AN ORGANIZED HEALTH CARE EDUCATION/TRAINING PROGRAM

## 2020-02-03 PROCEDURE — 36000075 ZZH SURGERY LEVEL 6 EA 15 ADDTL MIN: Performed by: SURGERY

## 2020-02-03 PROCEDURE — 41000022 ZZH PER-PERFUSION, SH ONLY,  1ST 30 MIN: Performed by: SURGERY

## 2020-02-03 PROCEDURE — 25000132 ZZH RX MED GY IP 250 OP 250 PS 637: Performed by: SURGERY

## 2020-02-03 PROCEDURE — 25000128 H RX IP 250 OP 636

## 2020-02-03 PROCEDURE — 37000009 ZZH ANESTHESIA TECHNICAL FEE, EACH ADDTL 15 MIN: Performed by: SURGERY

## 2020-02-03 PROCEDURE — 85730 THROMBOPLASTIN TIME PARTIAL: CPT | Performed by: SURGERY

## 2020-02-03 DEVICE — VALVE AORTIC REGENT FLEX-CUFF 25MM 25AGFN-756: Type: IMPLANTABLE DEVICE | Site: AORTA | Status: FUNCTIONAL

## 2020-02-03 RX ORDER — AMOXICILLIN 250 MG
1 CAPSULE ORAL 2 TIMES DAILY
Status: DISCONTINUED | OUTPATIENT
Start: 2020-02-03 | End: 2020-02-09 | Stop reason: HOSPADM

## 2020-02-03 RX ORDER — ALBUTEROL SULFATE 90 UG/1
6 AEROSOL, METERED RESPIRATORY (INHALATION) EVERY 4 HOURS PRN
Status: DISCONTINUED | OUTPATIENT
Start: 2020-02-03 | End: 2020-02-08

## 2020-02-03 RX ORDER — ASPIRIN 81 MG/1
81 TABLET ORAL DAILY
Status: DISCONTINUED | OUTPATIENT
Start: 2020-02-04 | End: 2020-02-09 | Stop reason: HOSPADM

## 2020-02-03 RX ORDER — CEFAZOLIN SODIUM 1 G/3ML
1 INJECTION, POWDER, FOR SOLUTION INTRAMUSCULAR; INTRAVENOUS EVERY 8 HOURS
Status: COMPLETED | OUTPATIENT
Start: 2020-02-03 | End: 2020-02-04

## 2020-02-03 RX ORDER — HYDROMORPHONE HYDROCHLORIDE 1 MG/ML
.3-.5 INJECTION, SOLUTION INTRAMUSCULAR; INTRAVENOUS; SUBCUTANEOUS ONCE
Status: COMPLETED | OUTPATIENT
Start: 2020-02-03 | End: 2020-02-03

## 2020-02-03 RX ORDER — VANCOMYCIN HYDROCHLORIDE 1 G/200ML
1000 INJECTION, SOLUTION INTRAVENOUS EVERY 12 HOURS
Status: COMPLETED | OUTPATIENT
Start: 2020-02-03 | End: 2020-02-04

## 2020-02-03 RX ORDER — CYCLOBENZAPRINE HCL 10 MG
10 TABLET ORAL 3 TIMES DAILY PRN
Status: DISCONTINUED | OUTPATIENT
Start: 2020-02-03 | End: 2020-02-04

## 2020-02-03 RX ORDER — DEXTROSE MONOHYDRATE 100 MG/ML
INJECTION, SOLUTION INTRAVENOUS CONTINUOUS PRN
Status: DISCONTINUED | OUTPATIENT
Start: 2020-02-03 | End: 2020-02-04

## 2020-02-03 RX ORDER — HYDROMORPHONE HYDROCHLORIDE 1 MG/ML
.3-.5 INJECTION, SOLUTION INTRAMUSCULAR; INTRAVENOUS; SUBCUTANEOUS
Status: DISCONTINUED | OUTPATIENT
Start: 2020-02-03 | End: 2020-02-09 | Stop reason: HOSPADM

## 2020-02-03 RX ORDER — FENTANYL CITRATE 50 UG/ML
INJECTION, SOLUTION INTRAMUSCULAR; INTRAVENOUS
Status: COMPLETED
Start: 2020-02-03 | End: 2020-02-03

## 2020-02-03 RX ORDER — CEFAZOLIN SODIUM 1 G/3ML
1 INJECTION, POWDER, FOR SOLUTION INTRAMUSCULAR; INTRAVENOUS SEE ADMIN INSTRUCTIONS
Status: DISCONTINUED | OUTPATIENT
Start: 2020-02-03 | End: 2020-02-03 | Stop reason: HOSPADM

## 2020-02-03 RX ORDER — SODIUM CHLORIDE, SODIUM LACTATE, POTASSIUM CHLORIDE, CALCIUM CHLORIDE 600; 310; 30; 20 MG/100ML; MG/100ML; MG/100ML; MG/100ML
INJECTION, SOLUTION INTRAVENOUS CONTINUOUS
Status: DISCONTINUED | OUTPATIENT
Start: 2020-02-03 | End: 2020-02-03 | Stop reason: HOSPADM

## 2020-02-03 RX ORDER — NALOXONE HYDROCHLORIDE 0.4 MG/ML
.1-.4 INJECTION, SOLUTION INTRAMUSCULAR; INTRAVENOUS; SUBCUTANEOUS
Status: DISCONTINUED | OUTPATIENT
Start: 2020-02-03 | End: 2020-02-09 | Stop reason: HOSPADM

## 2020-02-03 RX ORDER — CHLORHEXIDINE GLUCONATE ORAL RINSE 1.2 MG/ML
15 SOLUTION DENTAL EVERY 12 HOURS
Status: CANCELLED | OUTPATIENT
Start: 2020-02-03

## 2020-02-03 RX ORDER — VECURONIUM BROMIDE 1 MG/ML
INJECTION, POWDER, LYOPHILIZED, FOR SOLUTION INTRAVENOUS PRN
Status: DISCONTINUED | OUTPATIENT
Start: 2020-02-03 | End: 2020-02-03

## 2020-02-03 RX ORDER — MAGNESIUM SULFATE HEPTAHYDRATE 40 MG/ML
2 INJECTION, SOLUTION INTRAVENOUS DAILY PRN
Status: DISCONTINUED | OUTPATIENT
Start: 2020-02-03 | End: 2020-02-09 | Stop reason: HOSPADM

## 2020-02-03 RX ORDER — NALOXONE HYDROCHLORIDE 0.4 MG/ML
.1-.4 INJECTION, SOLUTION INTRAMUSCULAR; INTRAVENOUS; SUBCUTANEOUS
Status: CANCELLED | OUTPATIENT
Start: 2020-02-03

## 2020-02-03 RX ORDER — MUPIROCIN 20 MG/G
OINTMENT TOPICAL 2 TIMES DAILY
Status: DISCONTINUED | OUTPATIENT
Start: 2020-02-03 | End: 2020-02-03 | Stop reason: HOSPADM

## 2020-02-03 RX ORDER — NITROGLYCERIN 20 MG/100ML
INJECTION INTRAVENOUS CONTINUOUS PRN
Status: DISCONTINUED | OUTPATIENT
Start: 2020-02-03 | End: 2020-02-03

## 2020-02-03 RX ORDER — CHLORHEXIDINE GLUCONATE ORAL RINSE 1.2 MG/ML
15 SOLUTION DENTAL EVERY 12 HOURS
Status: DISCONTINUED | OUTPATIENT
Start: 2020-02-03 | End: 2020-02-03 | Stop reason: CLARIF

## 2020-02-03 RX ORDER — DEXMEDETOMIDINE HYDROCHLORIDE 4 UG/ML
.2-.7 INJECTION, SOLUTION INTRAVENOUS CONTINUOUS
Status: DISCONTINUED | OUTPATIENT
Start: 2020-02-03 | End: 2020-02-04

## 2020-02-03 RX ORDER — PROPOFOL 10 MG/ML
5-75 INJECTION, EMULSION INTRAVENOUS CONTINUOUS
Status: DISCONTINUED | OUTPATIENT
Start: 2020-02-03 | End: 2020-02-04 | Stop reason: CLARIF

## 2020-02-03 RX ORDER — FAMOTIDINE 20 MG/1
20 TABLET, FILM COATED ORAL
Status: COMPLETED | OUTPATIENT
Start: 2020-02-03 | End: 2020-02-03

## 2020-02-03 RX ORDER — POTASSIUM CL/LIDO/0.9 % NACL 10MEQ/0.1L
10 INTRAVENOUS SOLUTION, PIGGYBACK (ML) INTRAVENOUS
Status: DISCONTINUED | OUTPATIENT
Start: 2020-02-03 | End: 2020-02-09 | Stop reason: HOSPADM

## 2020-02-03 RX ORDER — HEPARIN SODIUM 1000 [USP'U]/ML
INJECTION, SOLUTION INTRAVENOUS; SUBCUTANEOUS PRN
Status: DISCONTINUED | OUTPATIENT
Start: 2020-02-03 | End: 2020-02-03

## 2020-02-03 RX ORDER — DEXTROSE MONOHYDRATE, SODIUM CHLORIDE, AND POTASSIUM CHLORIDE 50; 1.49; 4.5 G/1000ML; G/1000ML; G/1000ML
INJECTION, SOLUTION INTRAVENOUS CONTINUOUS
Status: DISCONTINUED | OUTPATIENT
Start: 2020-02-03 | End: 2020-02-06

## 2020-02-03 RX ORDER — POTASSIUM CHLORIDE 1500 MG/1
20-40 TABLET, EXTENDED RELEASE ORAL
Status: DISCONTINUED | OUTPATIENT
Start: 2020-02-03 | End: 2020-02-09 | Stop reason: HOSPADM

## 2020-02-03 RX ORDER — METOPROLOL TARTRATE 25 MG/1
25 TABLET, FILM COATED ORAL
Status: DISCONTINUED | OUTPATIENT
Start: 2020-02-03 | End: 2020-02-03 | Stop reason: HOSPADM

## 2020-02-03 RX ORDER — NALOXONE HYDROCHLORIDE 0.4 MG/ML
.1-.4 INJECTION, SOLUTION INTRAMUSCULAR; INTRAVENOUS; SUBCUTANEOUS
Status: DISCONTINUED | OUTPATIENT
Start: 2020-02-03 | End: 2020-02-03

## 2020-02-03 RX ORDER — HEPARIN SODIUM 5000 [USP'U]/.5ML
5000 INJECTION, SOLUTION INTRAVENOUS; SUBCUTANEOUS EVERY 8 HOURS
Status: DISCONTINUED | OUTPATIENT
Start: 2020-02-04 | End: 2020-02-04

## 2020-02-03 RX ORDER — NICOTINE POLACRILEX 4 MG
15-30 LOZENGE BUCCAL
Status: DISCONTINUED | OUTPATIENT
Start: 2020-02-03 | End: 2020-02-08

## 2020-02-03 RX ORDER — ONDANSETRON 2 MG/ML
4 INJECTION INTRAMUSCULAR; INTRAVENOUS EVERY 6 HOURS PRN
Status: DISCONTINUED | OUTPATIENT
Start: 2020-02-03 | End: 2020-02-09 | Stop reason: HOSPADM

## 2020-02-03 RX ORDER — SODIUM CHLORIDE 9 MG/ML
INJECTION, SOLUTION INTRAVENOUS CONTINUOUS PRN
Status: DISCONTINUED | OUTPATIENT
Start: 2020-02-03 | End: 2020-02-03

## 2020-02-03 RX ORDER — FENTANYL CITRATE 50 UG/ML
INJECTION, SOLUTION INTRAMUSCULAR; INTRAVENOUS PRN
Status: DISCONTINUED | OUTPATIENT
Start: 2020-02-03 | End: 2020-02-03

## 2020-02-03 RX ORDER — CEFAZOLIN SODIUM 2 G/100ML
2 INJECTION, SOLUTION INTRAVENOUS
Status: COMPLETED | OUTPATIENT
Start: 2020-02-03 | End: 2020-02-03

## 2020-02-03 RX ORDER — OXYCODONE HYDROCHLORIDE 5 MG/1
5-10 TABLET ORAL
Status: DISCONTINUED | OUTPATIENT
Start: 2020-02-03 | End: 2020-02-09 | Stop reason: HOSPADM

## 2020-02-03 RX ORDER — PROPOFOL 10 MG/ML
5-75 INJECTION, EMULSION INTRAVENOUS CONTINUOUS
Status: DISCONTINUED | OUTPATIENT
Start: 2020-02-03 | End: 2020-02-03

## 2020-02-03 RX ORDER — FENTANYL CITRATE 50 UG/ML
100 INJECTION, SOLUTION INTRAMUSCULAR; INTRAVENOUS
Status: COMPLETED | OUTPATIENT
Start: 2020-02-03 | End: 2020-02-03

## 2020-02-03 RX ORDER — AMOXICILLIN 250 MG
2 CAPSULE ORAL 2 TIMES DAILY
Status: DISCONTINUED | OUTPATIENT
Start: 2020-02-03 | End: 2020-02-09 | Stop reason: HOSPADM

## 2020-02-03 RX ORDER — MEPERIDINE HYDROCHLORIDE 25 MG/ML
12.5-25 INJECTION INTRAMUSCULAR; INTRAVENOUS; SUBCUTANEOUS
Status: COMPLETED | OUTPATIENT
Start: 2020-02-03 | End: 2020-02-03

## 2020-02-03 RX ORDER — ACETAMINOPHEN 325 MG/1
975 TABLET ORAL EVERY 8 HOURS
Status: COMPLETED | OUTPATIENT
Start: 2020-02-03 | End: 2020-02-06

## 2020-02-03 RX ORDER — KETOROLAC TROMETHAMINE 15 MG/ML
15 INJECTION, SOLUTION INTRAMUSCULAR; INTRAVENOUS ONCE
Status: COMPLETED | OUTPATIENT
Start: 2020-02-03 | End: 2020-02-03

## 2020-02-03 RX ORDER — FENTANYL CITRATE 50 UG/ML
100 INJECTION, SOLUTION INTRAMUSCULAR; INTRAVENOUS ONCE
Status: COMPLETED | OUTPATIENT
Start: 2020-02-03 | End: 2020-02-03

## 2020-02-03 RX ORDER — LIDOCAINE 4 G/G
2 PATCH TOPICAL
Status: DISCONTINUED | OUTPATIENT
Start: 2020-02-03 | End: 2020-02-09 | Stop reason: HOSPADM

## 2020-02-03 RX ORDER — LIDOCAINE 40 MG/G
CREAM TOPICAL
Status: DISCONTINUED | OUTPATIENT
Start: 2020-02-03 | End: 2020-02-09 | Stop reason: HOSPADM

## 2020-02-03 RX ORDER — PROTAMINE SULFATE 10 MG/ML
INJECTION, SOLUTION INTRAVENOUS PRN
Status: DISCONTINUED | OUTPATIENT
Start: 2020-02-03 | End: 2020-02-03

## 2020-02-03 RX ORDER — POTASSIUM CHLORIDE 1.5 G/1.58G
20-40 POWDER, FOR SOLUTION ORAL
Status: DISCONTINUED | OUTPATIENT
Start: 2020-02-03 | End: 2020-02-09 | Stop reason: HOSPADM

## 2020-02-03 RX ORDER — POTASSIUM CHLORIDE 7.45 MG/ML
10 INJECTION INTRAVENOUS
Status: DISCONTINUED | OUTPATIENT
Start: 2020-02-03 | End: 2020-02-09 | Stop reason: HOSPADM

## 2020-02-03 RX ORDER — PROPOFOL 10 MG/ML
INJECTION, EMULSION INTRAVENOUS PRN
Status: DISCONTINUED | OUTPATIENT
Start: 2020-02-03 | End: 2020-02-03

## 2020-02-03 RX ORDER — ONDANSETRON 4 MG/1
4 TABLET, ORALLY DISINTEGRATING ORAL EVERY 6 HOURS PRN
Status: DISCONTINUED | OUTPATIENT
Start: 2020-02-03 | End: 2020-02-09 | Stop reason: HOSPADM

## 2020-02-03 RX ORDER — POTASSIUM CHLORIDE 29.8 MG/ML
20 INJECTION INTRAVENOUS
Status: DISCONTINUED | OUTPATIENT
Start: 2020-02-03 | End: 2020-02-09 | Stop reason: HOSPADM

## 2020-02-03 RX ORDER — MAGNESIUM HYDROXIDE 1200 MG/15ML
LIQUID ORAL PRN
Status: DISCONTINUED | OUTPATIENT
Start: 2020-02-03 | End: 2020-02-03 | Stop reason: HOSPADM

## 2020-02-03 RX ORDER — DEXTROSE MONOHYDRATE 25 G/50ML
25-50 INJECTION, SOLUTION INTRAVENOUS
Status: DISCONTINUED | OUTPATIENT
Start: 2020-02-03 | End: 2020-02-08

## 2020-02-03 RX ORDER — GABAPENTIN 300 MG/1
300 CAPSULE ORAL 2 TIMES DAILY
Status: COMPLETED | OUTPATIENT
Start: 2020-02-03 | End: 2020-02-05

## 2020-02-03 RX ORDER — SODIUM CHLORIDE, SODIUM LACTATE, POTASSIUM CHLORIDE, CALCIUM CHLORIDE 600; 310; 30; 20 MG/100ML; MG/100ML; MG/100ML; MG/100ML
INJECTION, SOLUTION INTRAVENOUS CONTINUOUS PRN
Status: DISCONTINUED | OUTPATIENT
Start: 2020-02-03 | End: 2020-02-03

## 2020-02-03 RX ORDER — MAGNESIUM SULFATE HEPTAHYDRATE 40 MG/ML
4 INJECTION, SOLUTION INTRAVENOUS EVERY 4 HOURS PRN
Status: DISCONTINUED | OUTPATIENT
Start: 2020-02-03 | End: 2020-02-09 | Stop reason: HOSPADM

## 2020-02-03 RX ORDER — LIDOCAINE HYDROCHLORIDE 20 MG/ML
INJECTION, SOLUTION INFILTRATION; PERINEURAL PRN
Status: DISCONTINUED | OUTPATIENT
Start: 2020-02-03 | End: 2020-02-03

## 2020-02-03 RX ORDER — ACETAMINOPHEN 325 MG/1
650 TABLET ORAL EVERY 4 HOURS PRN
Status: DISCONTINUED | OUTPATIENT
Start: 2020-02-06 | End: 2020-02-09 | Stop reason: HOSPADM

## 2020-02-03 RX ORDER — PHENYLEPHRINE HCL IN 0.9% NACL 50MG/250ML
.5-2 PLASTIC BAG, INJECTION (ML) INTRAVENOUS CONTINUOUS
Status: DISCONTINUED | OUTPATIENT
Start: 2020-02-03 | End: 2020-02-04

## 2020-02-03 RX ORDER — MUPIROCIN 20 MG/G
0.5 OINTMENT TOPICAL 2 TIMES DAILY
Status: DISCONTINUED | OUTPATIENT
Start: 2020-02-03 | End: 2020-02-03 | Stop reason: CLARIF

## 2020-02-03 RX ORDER — NITROGLYCERIN 10 MG/100ML
INJECTION INTRAVENOUS PRN
Status: DISCONTINUED | OUTPATIENT
Start: 2020-02-03 | End: 2020-02-03

## 2020-02-03 RX ORDER — HYDRALAZINE HYDROCHLORIDE 20 MG/ML
10 INJECTION INTRAMUSCULAR; INTRAVENOUS EVERY 30 MIN PRN
Status: DISCONTINUED | OUTPATIENT
Start: 2020-02-03 | End: 2020-02-09 | Stop reason: HOSPADM

## 2020-02-03 RX ORDER — PROPOFOL 10 MG/ML
INJECTION, EMULSION INTRAVENOUS CONTINUOUS PRN
Status: DISCONTINUED | OUTPATIENT
Start: 2020-02-03 | End: 2020-02-03

## 2020-02-03 RX ORDER — ALBUMIN, HUMAN INJ 5% 5 %
500-1000 SOLUTION INTRAVENOUS
Status: COMPLETED | OUTPATIENT
Start: 2020-02-03 | End: 2020-02-03

## 2020-02-03 RX ORDER — NITROGLYCERIN 20 MG/100ML
.07-2 INJECTION INTRAVENOUS CONTINUOUS
Status: DISCONTINUED | OUTPATIENT
Start: 2020-02-03 | End: 2020-02-04

## 2020-02-03 RX ORDER — PROPOFOL 10 MG/ML
10-20 INJECTION, EMULSION INTRAVENOUS EVERY 30 MIN PRN
Status: DISCONTINUED | OUTPATIENT
Start: 2020-02-03 | End: 2020-02-04

## 2020-02-03 RX ADMIN — NITROGLYCERIN 1.5 MCG/KG/MIN: 20 INJECTION INTRAVENOUS at 21:20

## 2020-02-03 RX ADMIN — SODIUM CHLORIDE 2.5 MG/HR: 900 INJECTION, SOLUTION INTRAVENOUS at 19:34

## 2020-02-03 RX ADMIN — LIDOCAINE HYDROCHLORIDE 80 MG: 20 INJECTION, SOLUTION INFILTRATION; PERINEURAL at 07:25

## 2020-02-03 RX ADMIN — ACETAMINOPHEN 975 MG: 325 TABLET, FILM COATED ORAL at 13:27

## 2020-02-03 RX ADMIN — PROTAMINE SULFATE 250 MG: 10 INJECTION, SOLUTION INTRAVENOUS at 10:07

## 2020-02-03 RX ADMIN — HEPARIN SODIUM 32000 UNITS: 1000 INJECTION, SOLUTION INTRAVENOUS; SUBCUTANEOUS at 08:27

## 2020-02-03 RX ADMIN — SODIUM CHLORIDE: 9 INJECTION, SOLUTION INTRAVENOUS at 08:22

## 2020-02-03 RX ADMIN — OXYCODONE HYDROCHLORIDE 10 MG: 5 TABLET ORAL at 21:43

## 2020-02-03 RX ADMIN — FENTANYL CITRATE 50 MCG: 50 INJECTION, SOLUTION INTRAMUSCULAR; INTRAVENOUS at 06:31

## 2020-02-03 RX ADMIN — GABAPENTIN 300 MG: 300 CAPSULE ORAL at 21:44

## 2020-02-03 RX ADMIN — AMINOCAPROIC ACID 5 G/HR: 250 INJECTION, SOLUTION INTRAVENOUS at 07:44

## 2020-02-03 RX ADMIN — GABAPENTIN 300 MG: 300 CAPSULE ORAL at 13:27

## 2020-02-03 RX ADMIN — KETOROLAC TROMETHAMINE 15 MG: 15 INJECTION, SOLUTION INTRAMUSCULAR; INTRAVENOUS at 18:52

## 2020-02-03 RX ADMIN — NITROGLYCERIN 0.07 MCG/KG/MIN: 20 INJECTION INTRAVENOUS at 10:57

## 2020-02-03 RX ADMIN — OXYCODONE HYDROCHLORIDE 10 MG: 5 TABLET ORAL at 18:42

## 2020-02-03 RX ADMIN — SUGAMMADEX 200 MG: 100 INJECTION, SOLUTION INTRAVENOUS at 11:03

## 2020-02-03 RX ADMIN — FENTANYL CITRATE 100 MCG: 50 INJECTION, SOLUTION INTRAMUSCULAR; INTRAVENOUS at 11:20

## 2020-02-03 RX ADMIN — VANCOMYCIN HYDROCHLORIDE 1000 MG: 1 INJECTION, SOLUTION INTRAVENOUS at 20:16

## 2020-02-03 RX ADMIN — PHENYLEPHRINE HYDROCHLORIDE 0.25 MCG/KG/MIN: 10 INJECTION INTRAVENOUS at 07:48

## 2020-02-03 RX ADMIN — PHENYLEPHRINE HYDROCHLORIDE 100 MCG: 10 INJECTION INTRAVENOUS at 08:38

## 2020-02-03 RX ADMIN — POTASSIUM PHOSPHATE, MONOBASIC AND POTASSIUM PHOSPHATE, DIBASIC 20 MMOL: 224; 236 INJECTION, SOLUTION INTRAVENOUS at 14:13

## 2020-02-03 RX ADMIN — PROPOFOL 100 MG: 10 INJECTION, EMULSION INTRAVENOUS at 07:25

## 2020-02-03 RX ADMIN — MIDAZOLAM HYDROCHLORIDE 2 MG: 1 INJECTION, SOLUTION INTRAMUSCULAR; INTRAVENOUS at 07:20

## 2020-02-03 RX ADMIN — MIDAZOLAM HYDROCHLORIDE 1 MG: 1 INJECTION, SOLUTION INTRAMUSCULAR; INTRAVENOUS at 06:31

## 2020-02-03 RX ADMIN — FENTANYL CITRATE 100 MCG: 50 INJECTION, SOLUTION INTRAMUSCULAR; INTRAVENOUS at 08:21

## 2020-02-03 RX ADMIN — MIDAZOLAM HYDROCHLORIDE 2 MG: 1 INJECTION, SOLUTION INTRAMUSCULAR; INTRAVENOUS at 09:47

## 2020-02-03 RX ADMIN — SODIUM CHLORIDE, POTASSIUM CHLORIDE, SODIUM LACTATE AND CALCIUM CHLORIDE: 600; 310; 30; 20 INJECTION, SOLUTION INTRAVENOUS at 07:20

## 2020-02-03 RX ADMIN — FENTANYL CITRATE 100 MCG: 50 INJECTION, SOLUTION INTRAMUSCULAR; INTRAVENOUS at 08:16

## 2020-02-03 RX ADMIN — FENTANYL CITRATE 100 MCG: 50 INJECTION, SOLUTION INTRAMUSCULAR; INTRAVENOUS at 07:20

## 2020-02-03 RX ADMIN — PHENYLEPHRINE HYDROCHLORIDE 100 MCG: 10 INJECTION INTRAVENOUS at 08:41

## 2020-02-03 RX ADMIN — NITROGLYCERIN 10 MCG: 10 INJECTION INTRAVENOUS at 11:03

## 2020-02-03 RX ADMIN — OXYCODONE HYDROCHLORIDE 5 MG: 5 TABLET ORAL at 14:35

## 2020-02-03 RX ADMIN — PHENYLEPHRINE HYDROCHLORIDE 100 MCG: 10 INJECTION INTRAVENOUS at 07:48

## 2020-02-03 RX ADMIN — MUPIROCIN: 20 OINTMENT TOPICAL at 05:35

## 2020-02-03 RX ADMIN — PHENYLEPHRINE HYDROCHLORIDE 100 MCG: 10 INJECTION INTRAVENOUS at 08:01

## 2020-02-03 RX ADMIN — SODIUM CHLORIDE, POTASSIUM CHLORIDE, SODIUM LACTATE AND CALCIUM CHLORIDE: 600; 310; 30; 20 INJECTION, SOLUTION INTRAVENOUS at 06:13

## 2020-02-03 RX ADMIN — HYDROMORPHONE HYDROCHLORIDE 0.5 MG: 1 INJECTION, SOLUTION INTRAMUSCULAR; INTRAVENOUS; SUBCUTANEOUS at 13:26

## 2020-02-03 RX ADMIN — AMINOCAPROIC ACID 1 G/HR: 250 INJECTION, SOLUTION INTRAVENOUS at 08:53

## 2020-02-03 RX ADMIN — POTASSIUM CHLORIDE, DEXTROSE MONOHYDRATE AND SODIUM CHLORIDE: 150; 5; 450 INJECTION, SOLUTION INTRAVENOUS at 11:39

## 2020-02-03 RX ADMIN — VECURONIUM BROMIDE 5 MG: 1 INJECTION, POWDER, LYOPHILIZED, FOR SOLUTION INTRAVENOUS at 08:19

## 2020-02-03 RX ADMIN — VECURONIUM BROMIDE 5 MG: 1 INJECTION, POWDER, LYOPHILIZED, FOR SOLUTION INTRAVENOUS at 09:30

## 2020-02-03 RX ADMIN — ROCURONIUM BROMIDE 50 MG: 10 INJECTION INTRAVENOUS at 07:26

## 2020-02-03 RX ADMIN — SENNOSIDES AND DOCUSATE SODIUM 1 TABLET: 8.6; 5 TABLET ORAL at 21:46

## 2020-02-03 RX ADMIN — PANTOPRAZOLE SODIUM 40 MG: 40 INJECTION, POWDER, FOR SOLUTION INTRAVENOUS at 12:51

## 2020-02-03 RX ADMIN — CEFAZOLIN 1 G: 1 INJECTION, POWDER, FOR SOLUTION INTRAMUSCULAR; INTRAVENOUS at 17:58

## 2020-02-03 RX ADMIN — MEPERIDINE HYDROCHLORIDE 12.5 MG: 25 INJECTION INTRAMUSCULAR; INTRAVENOUS; SUBCUTANEOUS at 12:20

## 2020-02-03 RX ADMIN — FENTANYL CITRATE 100 MCG: 50 INJECTION, SOLUTION INTRAMUSCULAR; INTRAVENOUS at 09:47

## 2020-02-03 RX ADMIN — HYDROMORPHONE HYDROCHLORIDE 0.5 MG: 1 INJECTION, SOLUTION INTRAMUSCULAR; INTRAVENOUS; SUBCUTANEOUS at 17:27

## 2020-02-03 RX ADMIN — HYDROMORPHONE HYDROCHLORIDE 0.5 MG: 1 INJECTION, SOLUTION INTRAMUSCULAR; INTRAVENOUS; SUBCUTANEOUS at 20:04

## 2020-02-03 RX ADMIN — SODIUM CHLORIDE: 9 INJECTION, SOLUTION INTRAVENOUS at 07:20

## 2020-02-03 RX ADMIN — FENTANYL CITRATE 150 MCG: 50 INJECTION, SOLUTION INTRAMUSCULAR; INTRAVENOUS at 07:25

## 2020-02-03 RX ADMIN — CEFAZOLIN SODIUM 2 G: 2 INJECTION, SOLUTION INTRAVENOUS at 07:33

## 2020-02-03 RX ADMIN — ALBUMIN HUMAN 500 ML: 0.05 INJECTION, SOLUTION INTRAVENOUS at 13:12

## 2020-02-03 RX ADMIN — PHENYLEPHRINE HYDROCHLORIDE 100 MCG: 10 INJECTION INTRAVENOUS at 07:33

## 2020-02-03 RX ADMIN — PROPOFOL 75 MCG/KG/MIN: 10 INJECTION, EMULSION INTRAVENOUS at 10:50

## 2020-02-03 RX ADMIN — PHENYLEPHRINE HYDROCHLORIDE 100 MCG: 10 INJECTION INTRAVENOUS at 07:40

## 2020-02-03 RX ADMIN — ACETAMINOPHEN 975 MG: 325 TABLET, FILM COATED ORAL at 21:48

## 2020-02-03 RX ADMIN — CALCIUM GLUCONATE 1 G: 98 INJECTION, SOLUTION INTRAVENOUS at 15:52

## 2020-02-03 RX ADMIN — CHLORHEXIDINE GLUCONATE 15 ML: 1.2 RINSE ORAL at 12:51

## 2020-02-03 RX ADMIN — PHENYLEPHRINE HYDROCHLORIDE 200 MCG: 10 INJECTION INTRAVENOUS at 10:09

## 2020-02-03 RX ADMIN — MEPERIDINE HYDROCHLORIDE 12.5 MG: 25 INJECTION INTRAMUSCULAR; INTRAVENOUS; SUBCUTANEOUS at 11:57

## 2020-02-03 RX ADMIN — LIDOCAINE HYDROCHLORIDE 0.3 ML: 10 INJECTION, SOLUTION EPIDURAL; INFILTRATION; INTRACAUDAL; PERINEURAL at 06:13

## 2020-02-03 RX ADMIN — SODIUM CHLORIDE 1.5 UNITS/HR: 9 INJECTION, SOLUTION INTRAVENOUS at 17:12

## 2020-02-03 RX ADMIN — MIDAZOLAM HYDROCHLORIDE 2 MG: 1 INJECTION, SOLUTION INTRAMUSCULAR; INTRAVENOUS at 11:20

## 2020-02-03 RX ADMIN — FAMOTIDINE 20 MG: 20 TABLET, FILM COATED ORAL at 06:54

## 2020-02-03 RX ADMIN — MIDAZOLAM HYDROCHLORIDE 2 MG: 1 INJECTION, SOLUTION INTRAMUSCULAR; INTRAVENOUS at 08:43

## 2020-02-03 RX ADMIN — ONDANSETRON 4 MG: 2 INJECTION INTRAMUSCULAR; INTRAVENOUS at 20:15

## 2020-02-03 RX ADMIN — CEFAZOLIN 1 G: 1 INJECTION, POWDER, FOR SOLUTION INTRAMUSCULAR; INTRAVENOUS at 09:33

## 2020-02-03 RX ADMIN — EPINEPHRINE 0.01 MCG/KG/MIN: 1 INJECTION INTRAMUSCULAR; INTRAVENOUS; SUBCUTANEOUS at 09:55

## 2020-02-03 RX ADMIN — LIDOCAINE 2 PATCH: 560 PATCH PERCUTANEOUS; TOPICAL; TRANSDERMAL at 13:54

## 2020-02-03 RX ADMIN — FENTANYL CITRATE 100 MCG: 50 INJECTION, SOLUTION INTRAMUSCULAR; INTRAVENOUS at 08:25

## 2020-02-03 ASSESSMENT — ENCOUNTER SYMPTOMS: SEIZURES: 0

## 2020-02-03 ASSESSMENT — ACTIVITIES OF DAILY LIVING (ADL)
ADLS_ACUITY_SCORE: 11
DRESS: 0-->INDEPENDENT
RETIRED_COMMUNICATION: 0-->UNDERSTANDS/COMMUNICATES WITHOUT DIFFICULTY
SWALLOWING: 0-->SWALLOWS FOODS/LIQUIDS WITHOUT DIFFICULTY
TOILETING: 0-->INDEPENDENT
ADLS_ACUITY_SCORE: 10
AMBULATION: 0-->INDEPENDENT
FALL_HISTORY_WITHIN_LAST_SIX_MONTHS: NO
BATHING: 0-->INDEPENDENT
RETIRED_EATING: 0-->INDEPENDENT
COGNITION: 0 - NO COGNITION ISSUES REPORTED
ADLS_ACUITY_SCORE: 11
TRANSFERRING: 0-->INDEPENDENT

## 2020-02-03 ASSESSMENT — COPD QUESTIONNAIRES: COPD: 0

## 2020-02-03 ASSESSMENT — MIFFLIN-ST. JEOR: SCORE: 1633.31

## 2020-02-03 ASSESSMENT — LIFESTYLE VARIABLES: TOBACCO_USE: 0

## 2020-02-03 NOTE — ANESTHESIA POSTPROCEDURE EVALUATION
Patient: Toney Lemos    Procedure(s):  AORTIC VALVE REPLACEMENT WITH [employer] REGENT MECHANICAL HEART VALVE 25 MM; ON PUMP WITH TADEO    Diagnosis:Aortic valve stenosis, etiology of cardiac valve disease unspecified [I35.0]  Diagnosis Additional Information: No value filed.    Anesthesia Type:  General, ETT    Note:  Anesthesia Post Evaluation    Patient location during evaluation: ICU  Patient participation: Unable to evaluate secondary to administered sedation  Level of consciousness: obtunded/minimal responses  Pain management: unable to assess  Airway patency: patent  Cardiovascular status: acceptable  Respiratory status: acceptable and ETT  Hydration status: acceptable  PONV: unable to assess     Anesthetic complications: None    Comments: To ICU on 15L/min O2, ventilation adequate via ambu. Propofol for sedation, minimal blood pressure support with nitroglycerin. All vital signs stable. Report given to ICU provider and nursing staff. No complications.           Last vitals:  Vitals:    02/03/20 1645 02/03/20 1700 02/03/20 1715   BP:      Pulse:      Resp: 10 24 17   Temp: 37.7  C (99.9  F) 37.6  C (99.7  F) 37.5  C (99.5  F)   SpO2: 98% 98% 98%         Electronically Signed By: Ebony Mandel MD  February 3, 2020  5:26 PM

## 2020-02-03 NOTE — BRIEF OP NOTE
[facility]    Brief Operative Note    Pre-operative diagnosis: Aortic valve regurgitation  Post-operative diagnosis Same as pre-operative diagnosis    Procedure: Procedure(s):  AORTIC VALVE REPLACEMENT WITH [employer] REGENT MECHANICAL HEART VALVE 25 MM; ON PUMP WITH TADEO  Surgeon: Surgeon(s) and Role:     * Cindy Matthews MD - Primary     * Cam Taylor MD - Fellow - Assisting  Anesthesia: General   Estimated blood loss: 950mL  Drains: 32Fr mediastinal chest tubes x 2, 1 angled and 1 straight    Specimens:   ID Type Source Tests Collected by Time Destination   A : AORTIC VALVE AND LEAFLETS Tissue Heart SURGICAL PATHOLOGY EXAM Cindy Matthews MD 2/3/2020  9:00 AM      Findings:   Severe AI, AVR with [employer] 25mm mechanical valve.  Complications: None.  Implants:   Implant Name Type Inv. Item Serial No.  Lot No. LRB No. Used   VALVE AORTIC REGENT FLEX-CUFF 25MM [id number] Valve VALVE AORTIC REGENT FLEX-CUFF 25MM [id number] [employer]  N/A 1

## 2020-02-03 NOTE — H&P
[facility]  Critical Care Consult  Critical Care Service  Date of Admission:  2/3/2020  Date of Service (when I saw the patient): 02/03/20  Assessment & Plan      Toney Lemos is a 49 year old male who presents with mechanical aortic valve replacement. He presents to the ICU for recovery following the aforementioned procedure. The critical care team was asked to follow the patient to assist with ventilator management and weaning.     Neuro  #: AMS, 2nd to surgery and anesthesia  #: Acute pain  #: Sedation  Plan:  -- Scheduled acetaminophen, lidoderm with PRN dilaudid and oxycodone.  -- Propofol for sedation as needed until extubation    CV  #: S/p Mechanical Valve Replacement  #: HTN  #: HLD  Plan:  -- Per pathway  -- Cardiac meds per CV surgery, NTG and nicardipine to keep MAP<120  --Resume ASA in am if no excessive bleeding post procedure  --Hold PTA antihypertensives     Resp  #: Post operative ventilator management  Plan:  -- AC ventilation /16/5   -- PST when hemodynamically stable    GI/Nutrition  #: No active issues  Plan:  -- NPO pending extubation.   -- Protonix daily    Renal  #: No underlying renal dysfunction.  Baseline creatinine appears to be 0.7  Plan:  --monitor function and electrolytes as needed with replacement per ICU protocols. Calcium replaced X 1.   -- generally avoid nephrotoxic agents such as NSAID, IV contrast unless specifically required  -- adjust medications as needed for renal clearance  -- follow I/O's as appropriate.  -- maintain euvolemia     ID:  #: No acute issues  Plan:  - Perioperative prophylactic abx per CV surgery  - Anticipate transient leukocytosis in response to surgery, will trend    Endocrine  #: Stress Hyperglycemia  Plan:  --  Insulin gtt per protocol if indicated  -- Keep BG  <180 for optimal healing    Heme:  #: Acute blood loss anemia  #: Coagulopathy (heparin induced), reversed   Plan:  -- Monitor hemoglobin, follow chest tube output,  observe for s/s bleeding  -- Transfuse to keep > 7.0  -- Amicar per CV surgery  -- Hold heparin and ASA post-surgically per CV surgery parameters    MSK  #: Post-operative weakness   Plan:  OT/PT with sternal precautions when appropriate    Skin  # : Sternotomy  Plan: Wound care per CV surgery    General cares:  DVT Prophylaxis: Pneumatic Compression Devices  GI Prophylaxis: PPI  Restraints: Restraints for medical healing needed: YES  Family update by me today: Yes   Current lines are required for patient management  Access:  RIJ  Arterial Line  OG   ETT  Wright   Chest tube X 2, 1 angled, 1 straight    Miriam Pantoja    Time Spent on this Encounter   Billing:  I spent 45 minutes bedside and on the inpatient unit today managing the critical care of Toney eLmos in relation to the issues listed in this note.    Code Status   Full Code    Primary Care Physician   Yan Lucas    Chief Complaint   Bicuspid aortic valve and aortic valve regurgitation requiring aortic valve replacement.     Unable to obtain a history from the patient due to intubation    History of Present Illness   Toney Lemos is a well-appearing 49 year old male with PMH significant for HTN and HLD who presents with bicuspid aortic valve and progressive aortic insufficiency now requiring aortic valve replacement.  He has recently been complaining of increased fatigue and was found to have new LV dilation requiring mechanical valve replacement. Mr. Lemos presented to the intensive care unit for recovery following his procedure.    Mr. Lemos was an easy intubation. CPB was 81 minutes. Total period of aortic cross clamp was 68 minutes. Mr. Bone received 2.3 L crystalloid, 356 mL cell saver, and exogenous blood products. EBL was 949 mL and  mL during the procedure. He was shocked X 1 with 20 joules while coming off the pump.      Past Medical History    I have reviewed this patient's medical history and updated it with pertinent  information if needed.   Past Medical History:   Diagnosis Date     Aortic valve disorders      Hypertension        Past Surgical History   I have reviewed this patient's surgical history and updated it with pertinent information if needed.  Past Surgical History:   Procedure Laterality Date     CV CORONARY ANGIOGRAM N/A 1/9/2020    Procedure: Coronary Angiogram;  Surgeon: Faisal Telles MD;  Location: [facility] CARDIAC CATH LAB       Prior to Admission Medications   Prior to Admission Medications   Prescriptions Last Dose Informant Patient Reported? Taking?   amLODIPine (NORVASC) 2.5 MG tablet 2/2/2020 at PM Self No Yes   Sig: Take 2 tablets (5 mg) by mouth daily   Patient taking differently: Take 2.5 mg by mouth 2 times daily    atorvastatin (LIPITOR) 40 MG tablet 2/2/2020 at PM Self No Yes   Sig: Take 1 tablet (40 mg) by mouth daily   carvedilol (COREG) 12.5 MG tablet 2/3/2020 at 0330 Self No Yes   Sig: Take 1 tablet (12.5 mg) by mouth 2 times daily (with meals)      Facility-Administered Medications: None     Allergies   Allergies   Allergen Reactions     No Known Drug Allergies      Seasonal Allergies        Social History   I have reviewed this patient's social history and updated it with pertinent information if needed. Toney LEA Lemos  reports that he has never smoked. He has never used smokeless tobacco. He reports current alcohol use. He reports that he does not use drugs.    Family History   I have reviewed this patient's family history and updated it with pertinent information if needed.   Family History   Problem Relation Age of Onset     Heart Disease Maternal Grandmother      Lipids Maternal Grandmother      Hypertension Maternal Grandmother      Hypertension Paternal Grandfather      Heart Disease Paternal Grandfather         heart murmur     Prostate Cancer Paternal Grandfather      Cancer Paternal Grandfather         bone ca     Lipids Paternal Grandfather      Diabetes Maternal Grandfather          senior onset     Hypertension Maternal Grandfather      Cerebrovascular Disease Maternal Grandfather      Cancer Paternal Grandmother         rectal cancer     Lipids Paternal Grandmother      Pancreatic Cancer Cousin      Heart Disease Brother         CAD, age 38       Review of Systems   Review of systems not obtained due to patient factors - intubation and sedation    Physical Exam   Temp: 98.2  F (36.8  C) Temp src: Bladder Temp  Min: 97.5  F (36.4  C)  Max: 98.2  F (36.8  C) BP: 116/66 Pulse: 82 Heart Rate: 80 Resp: 17 SpO2: 99 % O2 Device: Mechanical Ventilator Oxygen Delivery: 2 LPM  Vital Signs with Ranges  Temp:  [97.5  F (36.4  C)-98.2  F (36.8  C)] 98.2  F (36.8  C)  Pulse:  [70-82] 82  Heart Rate:  [64-80] 80  Resp:  [12-24] 17  BP: (101-173)/(40-73) 116/66  MAP:  [69 mmHg-106 mmHg] 106 mmHg  Arterial Line BP: ()/(53-87) 135/87  FiO2 (%):  [50 %] 50 %  SpO2:  [97 %-100 %] 99 %  173 lbs 14.4 oz    GEN: Pale, well nourished, sedated, lying in bed, NAD  EYES: PERRL, Anicteric sclera.   HEENT:  Normocephalic, atraumatic, trachea midline, ETT secure  CV: RRR, no gallops, rubs, or murmurs  PULM/CHEST: Clear breath sounds bilaterally without rhonchi, crackles or wheeze, symmetric chest rise  GI: normal bowel sounds, soft, non-tender, no rebound tenderness or guarding, no masses  : choudhary catheter in place, urine yellow and clear  EXTREMITIES: Trace peripheral edema, moving all extremities, peripheral pulses intact  NEURO: Unable to assess secondary to post-operative sedation  SKIN: No rashes, sores or ulcerations    Imaging personally reviewed:  ECG, CXR, Abdominal XRAY    Data   Results for orders placed or performed during the hospital encounter of 02/03/20 (from the past 24 hour(s))   Methicillin Resistant Staph Aureus PCR   Result Value Ref Range    Specimen Description Nares     Methicillin Resist/Sens S. aureus PCR Negative NEG^Negative   Potassium   Result Value Ref Range    Potassium 3.9  3.4 - 5.3 mmol/L   ISTAT gases elec ica art POCT   Result Value Ref Range    pH Arterial 7.38 7.35 - 7.45 pH    pCO2 Arterial 38 35 - 45 mm Hg    pO2 Arterial 236 (H) 80 - 105 mm Hg    Bicarbonate Arterial 23 21 - 28 mmol/L    O2 Sat Arterial 100 92 - 100 %    Sodium 139 133 - 144 mmol/L    Potassium 3.9 3.4 - 5.3 mmol/L    Calcium Ionized 4.8 4.4 - 5.2 mg/dL    Hemoglobin 12.6 (L) 13.3 - 17.7 g/dL    Hematocrit - POCT 37 (L) 40.0 - 53.0 %PCV   Glucose by meter   Result Value Ref Range    Glucose 129 (H) 70 - 99 mg/dL   ISTAT gases elec ica laisha POCT   Result Value Ref Range    Ph Venous 7.35 7.32 - 7.43 pH    PCO2 Venous 44 40 - 50 mm Hg    PO2 Venous 50 (H) 25 - 47 mm Hg    Bicarbonate Venous 24 21 - 28 mmol/L    O2 Sat Venous 82 %    Sodium 139 133 - 144 mmol/L    Potassium 5.6 (H) 3.4 - 5.3 mmol/L    Calcium Ionized 4.1 (L) 4.4 - 5.2 mg/dL    CPB Applied       CPB Algorithm Applied to Hemoglobin and/or Hematocrit    Hemoglobin 10.5 (L) 13.3 - 17.7 g/dL    Hematocrit - POCT 31 (L) 40.0 - 53.0 %PCV   ISTAT gases elec ica art POCT   Result Value Ref Range    pH Arterial 7.39 7.35 - 7.45 pH    pCO2 Arterial 44 35 - 45 mm Hg    pO2 Arterial 463 (H) 80 - 105 mm Hg    Bicarbonate Arterial 26 21 - 28 mmol/L    O2 Sat Arterial 100 92 - 100 %    Sodium 136 133 - 144 mmol/L    Potassium 5.9 (H) 3.4 - 5.3 mmol/L    Calcium Ionized 4.1 (L) 4.4 - 5.2 mg/dL    CPB Applied       CPB Algorithm Applied to Hemoglobin and/or Hematocrit    Hemoglobin 10.2 (L) 13.3 - 17.7 g/dL    Hematocrit - POCT 30 (L) 40.0 - 53.0 %PCV   ISTAT gases elec ica art POCT   Result Value Ref Range    pH Arterial 7.38 7.35 - 7.45 pH    pCO2 Arterial 44 35 - 45 mm Hg    pO2 Arterial 367 (H) 80 - 105 mm Hg    Bicarbonate Arterial 26 21 - 28 mmol/L    O2 Sat Arterial 100 92 - 100 %    Sodium 139 133 - 144 mmol/L    Potassium 6.2 (HH) 3.4 - 5.3 mmol/L    Calcium Ionized 3.7 (L) 4.4 - 5.2 mg/dL    CPB Applied       CPB Algorithm Applied to Hemoglobin and/or  Hematocrit    Hemoglobin 11.2 (L) 13.3 - 17.7 g/dL    Hematocrit - POCT 33 (L) 40.0 - 53.0 %PCV   Basic metabolic panel   Result Value Ref Range    Sodium 142 133 - 144 mmol/L    Potassium 5.7 (H) 3.4 - 5.3 mmol/L    Chloride 116 (H) 94 - 109 mmol/L    Carbon Dioxide 23 20 - 32 mmol/L    Anion Gap 3 3 - 14 mmol/L    Glucose 166 (H) 70 - 99 mg/dL    Urea Nitrogen 13 7 - 30 mg/dL    Creatinine 0.68 0.66 - 1.25 mg/dL    GFR Estimate >90 >60 mL/min/[1.73_m2]    GFR Estimate If Black >90 >60 mL/min/[1.73_m2]    Calcium 8.3 (L) 8.5 - 10.1 mg/dL   CBC with platelets   Result Value Ref Range    WBC 9.4 4.0 - 11.0 10e9/L    RBC Count 3.61 (L) 4.4 - 5.9 10e12/L    Hemoglobin 11.5 (L) 13.3 - 17.7 g/dL    Hematocrit 33.6 (L) 40.0 - 53.0 %    MCV 93 78 - 100 fl    MCH 31.9 26.5 - 33.0 pg    MCHC 34.2 31.5 - 36.5 g/dL    RDW 12.9 10.0 - 15.0 %    Platelet Count 82 (L) 150 - 450 10e9/L   Fibrinogen activity   Result Value Ref Range    Fibrinogen 181 (L) 200 - 420 mg/dL   INR   Result Value Ref Range    INR 1.41 (H) 0.86 - 1.14   Partial thromboplastin time   Result Value Ref Range    PTT 53 (H) 22 - 37 sec   ISTAT gases elec ica art POCT   Result Value Ref Range    pH Arterial 7.33 (L) 7.35 - 7.45 pH    pCO2 Arterial 46 (H) 35 - 45 mm Hg    pO2 Arterial 415 (H) 80 - 105 mm Hg    Bicarbonate Arterial 24 21 - 28 mmol/L    O2 Sat Arterial 100 92 - 100 %    Sodium 138 133 - 144 mmol/L    Potassium 5.7 (H) 3.4 - 5.3 mmol/L    Calcium Ionized 5.0 4.4 - 5.2 mg/dL    CPB Applied       CPB Algorithm Applied to Hemoglobin and/or Hematocrit    Hemoglobin 10.5 (L) 13.3 - 17.7 g/dL    Hematocrit - POCT 31 (L) 40.0 - 53.0 %PCV   EKG 12-lead, tracing only   Result Value Ref Range    Interpretation ECG Click View Image link to view waveform and result    Glucose by meter   Result Value Ref Range    Glucose 144 (H) 70 - 99 mg/dL   INR   Result Value Ref Range    INR 1.26 (H) 0.86 - 1.14   Partial thromboplastin time   Result Value Ref Range    PTT  61 (H) 22 - 37 sec   Blood gas arterial and oxyhgb   Result Value Ref Range    pH Arterial 7.32 (L) 7.35 - 7.45 pH    pCO2 Arterial 42 35 - 45 mm Hg    pO2 Arterial 108 (H) 80 - 105 mm Hg    Bicarbonate Arterial 22 21 - 28 mmol/L    FIO2 50%     Oxyhemoglobin Arterial 97 92 - 100 %    Base Deficit Art 4.4 mmol/L   CBC with platelets   Result Value Ref Range    WBC 10.4 4.0 - 11.0 10e9/L    RBC Count 3.88 (L) 4.4 - 5.9 10e12/L    Hemoglobin 12.4 (L) 13.3 - 17.7 g/dL    Hematocrit 35.9 (L) 40.0 - 53.0 %    MCV 93 78 - 100 fl    MCH 32.0 26.5 - 33.0 pg    MCHC 34.5 31.5 - 36.5 g/dL    RDW 13.0 10.0 - 15.0 %    Platelet Count 101 (L) 150 - 450 10e9/L   Comprehensive metabolic panel   Result Value Ref Range    Sodium 142 133 - 144 mmol/L    Potassium 4.3 3.4 - 5.3 mmol/L    Chloride 116 (H) 94 - 109 mmol/L    Carbon Dioxide 21 20 - 32 mmol/L    Anion Gap 5 3 - 14 mmol/L    Glucose 153 (H) 70 - 99 mg/dL    Urea Nitrogen 13 7 - 30 mg/dL    Creatinine 0.70 0.66 - 1.25 mg/dL    GFR Estimate >90 >60 mL/min/[1.73_m2]    GFR Estimate If Black >90 >60 mL/min/[1.73_m2]    Calcium 7.3 (L) 8.5 - 10.1 mg/dL    Bilirubin Total 0.8 0.2 - 1.3 mg/dL    Albumin 3.2 (L) 3.4 - 5.0 g/dL    Protein Total 5.4 (L) 6.8 - 8.8 g/dL    Alkaline Phosphatase 67 40 - 150 U/L    ALT 24 0 - 70 U/L    AST 26 0 - 45 U/L   Lactic acid whole blood   Result Value Ref Range    Lactic Acid 2.1 (H) 0.7 - 2.0 mmol/L   Calcium ionized whole blood   Result Value Ref Range    Calcium Ionized Whole Blood 4.5 4.4 - 5.2 mg/dL   Magnesium   Result Value Ref Range    Magnesium 2.5 (H) 1.6 - 2.3 mg/dL   Phosphorus   Result Value Ref Range    Phosphorus 1.9 (L) 2.5 - 4.5 mg/dL   XR Chest Port 1 View    Narrative    CHEST ONE VIEW PORTABLE   2/3/2020 12:06 PM     HISTORY:  Status post aortic valve replacement.    COMPARISON: 1/9/2020.      Impression    IMPRESSION: Tip of the endotracheal tube is 1.6 cm above the lakshmi.  Nasogastric tube extends below left hemidiaphragm.  There is a right  internal jugular line with the tip in the SVC. Anterior mediastinal  drain is projected over the heart. There are new median sternotomy  wires and there is a prosthetic cardiac valve. No pneumothorax or  pleural effusion. No airspace consolidation.    GABRIELLA MARTINEZ MD

## 2020-02-03 NOTE — PROGRESS NOTES
RT called to place the pt on the vent. Upon arrival pt already on the vent with low min ventilation. Vent alarm parameters re-setted still  increasing peak pressure noted. Vent taken off,  pt embu-bagged. difficult ventilation met due pt patient biting down ETT, Vital signs dropped momentarily . Code blue called. Oral airway used to maintain positive pressure ventilation until med given. SpO2 remained in upper 90s    Luis F Velasquez, RT on 2/3/2020 at 11:26 AM

## 2020-02-03 NOTE — PROGRESS NOTES
Medication History Completed by Medication Scribe  Admission medication history interview status for the 2/3/2020  admission is complete. See EPIC admission navigator for prior to admission medications     Medication history sources: Patient, Patient's family/friend (Spouse), Surescripts and H&P  Medication history source reliability: Good  Adherence assessment: N/A Not Observed    Significant changes made to the medication list:  None      Additional medication history information:   None    Medication reconciliation completed by provider prior to medication history? No    Time spent in this activity: 20 minutes      Prior to Admission medications    Medication Sig Last Dose Taking? Auth Provider   amLODIPine (NORVASC) 2.5 MG tablet Take 2 tablets (5 mg) by mouth daily  Patient taking differently: Take 2.5 mg by mouth 2 times daily  2/2/2020 at PM Yes Yan Lucas MD   atorvastatin (LIPITOR) 40 MG tablet Take 1 tablet (40 mg) by mouth daily 2/2/2020 at PM Yes Yan Lucas MD   carvedilol (COREG) 12.5 MG tablet Take 1 tablet (12.5 mg) by mouth 2 times daily (with meals) 2/3/2020 at 0330 Yes Sandra Mejia APRN CNP

## 2020-02-03 NOTE — ANESTHESIA CARE TRANSFER NOTE
Patient: Toney Lemos    Procedure(s):  AORTIC VALVE REPLACEMENT WITH [facility] VALVE 25 MM; ON PUMP WITH TADEO    Diagnosis: Aortic valve stenosis, etiology of cardiac valve disease unspecified [I35.0]  Diagnosis Additional Information: No value filed.    Anesthesia Type:   General, ETT     Note:  Airway :ETT  Patient transferred to:ICU  Comments: Patient connected to SpO2, EKG, and arterial blood pressure transport monitors and accompanied by CRNA, circulating RN, surgeon (fellow) to ICU room. Patient ventilated by CRNA with ambu via ETT with O2 at 15 liters per minute during transport.     On arrival to ICU, endotracheal tube position unchanged, equal, bilateral breath sounds auscultated in ICU room, patient placed on ICU ventilator by respiratory therapist, teeth and oral mucosa intact and unchanged at handoff of care. At anesthesia handoff of care, clinical monitors and alarms on and functioning, report on patient's clinical status given to ICU RN, report on patient's clinical status given to intensivist, ICU staff questions answered.    /77  HR 64  O2 100%  RR 12, , FiO2 50%, PEEP 5    ICU Handoff: Call for PAUSE to initiate/utilize ICU HANDOFF, Identified Patient, Identified Responsible Provider, Reviewed the Pertinent Medical History, Discussed Surgical Course, Reviewed Intra-OP Anesthesia Management and Issues during Anesthesia, Set Expectations for Post Procedure Period and Allowed Opportunity for Questions and Acknowledgement of Understanding      Vitals: (Last set prior to Anesthesia Care Transfer)    CRNA VITALS  2/3/2020 1026 - 2/3/2020 1115      2/3/2020             Resp Rate (observed):  (!) 2    Resp Rate (set):  10                Electronically Signed By: Christine Marie Volp Hodgkins, CRNA, APRN CRNA  February 3, 2020  11:15 AM

## 2020-02-03 NOTE — CODE/RAPID RESPONSE
Reversal given 11:05 a.m.  11:10 a.m. patient became rigid decerebrate appearing leg movements, stomach appeared distended versus taut, and ventilator was unable to ventilate.  RT began bagging.  Arterial line went flat, arms were also very rigid.  Code blue was called.  CPR done X 4 compressions.  RT found patient was biting down on ETT.  Fentanyl and versed given.  Propofol increased.  Bite block placed, no issue with ventilation once bite opened and sedation given.

## 2020-02-03 NOTE — ANESTHESIA PROCEDURE NOTES
CENTRAL LINE INSERTION PROCEDURE NOTE:   Pre-Procedure  Performed by Ebony Mandel MD  Location: pre-op      Pre-Anesthestic Checklist: patient identified, IV checked, site marked, risks and benefits discussed, informed consent, monitors and equipment checked, pre-op evaluation and at physician/surgeon's request    Timeout  Correct Patient: Yes   Correct Procedure: Yes   Correct Site: Yes   Correct Laterality: Yes   Correct Position: Yes   Site Marked: Yes   .   Procedure Documentation   Procedure: central line  Position: Trendelenburg  Patient Prep/Sterile Barriers; chlorhexidine gluconate and isopropyl alcohol      Insertion Site:internal jugular, right    Skin infiltrated with 2 mL of 2% lidocaine.  Catheter: 9 Vincentian, 10 cm (sheath introducer)  Assessment/Narrative    Catheter: 9 Vincentian, 10 cm (sheath introducer)     Secured by suture  Tegaderm and Biopatch dressing used.  blood aspirated from all lumens  All lumens flushed: Yes    Comments:  A time out was preformed identifying the correct procedure, the correct location with the nursing staff.  The right neck was prepped with 2% chlorhexidine and draped with a full length sterile sheet in the usual fashion.  1% lidocaine was administered subcutaneously for local anesthesia.  The right internal jugular vein  was accessed under ultrasound guidance with an 18 gauge thin wall needle, a 1.75 inch catheter was advanced over the needle and the needle was removed. Normal venous pressure was confirmed using a fluid column technique. A wire was then advanced through the catheter and catheter was then removed. A 9 English cordis was advanced over the wire via the seldinger technique. Blood was withdrawn from all lumens and flushed with normal saline.  The catheter was sutured in place and a sterile dressing was applied over the site prior to removal of drapes.

## 2020-02-03 NOTE — CONSULTS
CARDIAC SURGERY NUTRITION CONSULT    Received standing order to assess and educate patient.    Will follow and complete assessment once patient is extubated and/or is transferred to medical unit.    Patient will receive nutrition education during the Outpatient Cardiac Rehab Program (nutrition classes/dietitian counseling).    Erica Goodman RD, LD, [facility]   Clinical Dietitian - [facility]

## 2020-02-03 NOTE — ANESTHESIA PREPROCEDURE EVALUATION
Anesthesia Pre-Procedure Evaluation    Patient: Toney Lemos   MRN: [id number] : 1970          Preoperative Diagnosis: Aortic valve stenosis, etiology of cardiac valve disease unspecified [I35.0]    Procedure(s):  AORTIC VALVE REPLACEMENT    Past Medical History:   Diagnosis Date     Aortic valve disorders      Hypertension      Past Surgical History:   Procedure Laterality Date     CV CORONARY ANGIOGRAM N/A 2020    Procedure: Coronary Angiogram;  Surgeon: Faisal Telles MD;  Location: [facility] CARDIAC CATH LAB     Allergies   Allergen Reactions     No Known Drug Allergies      Seasonal Allergies      Prior to Admission medications    Medication Sig Start Date End Date Taking? Authorizing Provider   amLODIPine (NORVASC) 2.5 MG tablet Take 2 tablets (5 mg) by mouth daily  Patient taking differently: Take 2.5 mg by mouth 2 times daily  19  Yes Yan Lucas MD   atorvastatin (LIPITOR) 40 MG tablet Take 1 tablet (40 mg) by mouth daily 19  Yes Yan Lucas MD   carvedilol (COREG) 12.5 MG tablet Take 1 tablet (12.5 mg) by mouth 2 times daily (with meals) 1/15/20  Yes Sandra Mejia APRN CNP     RECENT LABS:   ECG: Sinus rhythm  Cannot rule out Anterior infarct , age undetermined  Abnormal ECG  No previous ECGs available  ECHO: Interpretation Summary     1. Possible bicuspid aortic valve with severe aortic valve regurgitation, no  significant stenosis.  2. Aortic regurgitant volume 72 mL, effective regurgitant orifice area 0.38  cmÂ . Holodiastolic flow reversal in the descending thoracic aorta.  3. The aortic valve cusps were not well visualized but is likely bicuspid  based on doming of the valve.  4. The left ventricle is mildly dilated (measurements not obtained) with  mildly decreased systolic function. Biplane LVEF 52% with global hypokinesis.     Compared to the previous study dated 10/6/2017, significant changes have  occurred. Left ventricle is more dilated, LVEF  has decreased from 65% to 52%,  aortic valve regurgitation has worsened from moderately severe to severe.  Surgical consultation recommended.  Cath:  1) Normal RCA dominant coronary arteries  2) severe Aortic insufficiency due to bicuspid aortic valve    Anesthesia Evaluation     . Pt has had prior anesthetic. Type: General    No history of anesthetic complications          ROS/MED HX    ENT/Pulmonary:     (+)sleep apnea, doesn't use CPAP , . .   (-) tobacco use, asthma and COPD   Neurologic:      (-) seizures, CVA and migraines   Cardiovascular:     (+) Dyslipidemia, hypertension----. : . . . :. valvular problems/murmurs type: AI bicuspid valve:.      (-) CAD and ARMAS   METS/Exercise Tolerance:  >4 METS   Hematologic:        (-) history of blood clots and anemia   Musculoskeletal:        (-) arthritis   GI/Hepatic:        (-) GERD and liver disease   Renal/Genitourinary:      (-) renal disease and nephrolithiasis   Endo:      (-) Type I DM, Type II DM, thyroid disease and obesity   Psychiatric:        (-) psychiatric history   Infectious Disease:        (-) Recent Fever   Malignancy:         Other:                          Physical Exam  Normal systems: cardiovascular, pulmonary and dental    Airway   Mallampati: II  TM distance: >3 FB  Neck ROM: full    Dental     Cardiovascular   Rhythm and rate: regular and normal      Pulmonary    breath sounds clear to auscultation            Lab Results   Component Value Date    WBC 6.0 01/09/2020    HGB 14.8 01/09/2020    HCT 42.6 01/09/2020     01/09/2020    SED 6 10/06/2004     01/09/2020    POTASSIUM 3.9 01/09/2020    CHLORIDE 112 (H) 01/09/2020    CO2 27 01/09/2020    BUN 14 01/09/2020    CR 0.81 01/09/2020     (H) 01/09/2020    BRIANA 8.8 01/09/2020    ALBUMIN 4.2 01/09/2020    PROTTOTAL 7.3 01/09/2020    ALT 40 01/09/2020    AST 21 01/09/2020    ALKPHOS 94 01/09/2020    BILITOTAL 0.8 01/09/2020    PTT 30 01/09/2020    INR 0.95 01/09/2020    TSH 1.55  "03/21/2002       Preop Vitals  BP Readings from Last 3 Encounters:   01/15/20 (!) 146/58   01/09/20 127/58   12/10/19 132/58    Pulse Readings from Last 3 Encounters:   01/15/20 70   01/09/20 65   12/10/19 70      Resp Readings from Last 3 Encounters:   01/09/20 20   07/17/19 18   08/25/17 16    SpO2 Readings from Last 3 Encounters:   01/15/20 97%   01/09/20 96%   12/10/19 98%      Temp Readings from Last 1 Encounters:   01/09/20 36.7  C (98.1  F) (Oral)    Ht Readings from Last 1 Encounters:   02/03/20 1.727 m (5' 8\")      Wt Readings from Last 1 Encounters:   02/03/20 78.9 kg (173 lb 14.4 oz)    Estimated body mass index is 26.44 kg/m  as calculated from the following:    Height as of this encounter: 1.727 m (5' 8\").    Weight as of this encounter: 78.9 kg (173 lb 14.4 oz).       Anesthesia Plan      History & Physical Review      ASA Status:  3 .    NPO Status:  > 8 hours    Plan for General and ETT with Propofol induction. Maintenance will be Balanced.      Additional equipment: Arterial Line, Central Line, CVP and TADEO Amicar, Nitroglycerin, phenylephrine, epinephrine infusions ready  Plan for transfer to ICU intubated, sedated with propofol. Sugammadex in ICU for reversal      Postoperative Care  Postoperative pain management:  Multi-modal analgesia and IV analgesics.      Consents  Anesthetic plan, risks, benefits and alternatives discussed with:  Patient.  Use of blood products discussed: Yes.   Use of blood products discussed with Patient.  Consented to blood products.  .                 Ebony Mandel MD  "

## 2020-02-03 NOTE — ANESTHESIA PROCEDURE NOTES
ARTERIAL LINE PROCEDURE NOTE:   Pre-Procedure  Performed by Ebony Mandel MD  Location: pre-op      Pre-Anesthestic Checklist: patient identified, IV checked, site marked, risks and benefits discussed, informed consent, monitors and equipment checked, pre-op evaluation and at physician/surgeon's request    Timeout  Correct Patient: Yes   Correct Procedure: Yes   Correct Site: Yes   Correct Laterality: Yes   Correct Position: Yes   Site Marked: Yes, N/A   .   Procedure Documentation  Procedure: arterial line    Supine  Insertion Site:left.Skin infiltrated with 2 mL of 1% lidocaine. Injection technique: Seldinger Technique  .  .  Patient Prep/Sterile Barriers; all elements of maximal sterile barrier technique followed, mask, hat, sterile gown, sterile gloves, draped, hand hygiene, chlorhexidine gluconate and isopropyl alcohol    Assessment/Narrative    Catheter: 20 gauge, 1.75 in/4.5 cm quick cath (integral wire)      Tegaderm dressing used.    Arterial waveform: Yes IBP within 10% of NIBP: Yes

## 2020-02-03 NOTE — OP NOTE
Procedure Date: 02/03/2020      REFERRING CARDIOLOGIST:  Evin Mederos MD      PREOPERATIVE DIAGNOSIS:  Severe aortic valve insufficiency, bicuspid aortic valve.      POSTOPERATIVE DIAGNOSIS:  Severe aortic valve insufficiency, bicuspid aortic valve.      SURGEON:  Cindy Matthews MD      ASSISTANT:  Cam Taylor MD      NAME OF OPERATION:  Aortic valve replacement with a 25 mm St. Rolf [place] mechanical valve, intraoperative transesophageal echocardiogram.      ANESTHESIA:  General orotracheal.      INDICATIONS FOR PROCEDURE:  Mr. Lemos is a very pleasant 49-year-old gentleman who I recently saw for severe aortic valve insufficiency with a known bicuspid valve with a recent echo demonstrating some LV dilatation.  He was taken to the operating room today for aortic valve replacement with a mechanical valve.      OPERATIVE FINDINGS:  The patient had a low normal LV systolic size and function.  His aortic valve was indeed bicuspid with fusion of the right and left coronary cusps.  He had a very mild aortic root dilatation, but the ascending aorta was within normal limits. He had bicuspid aortopathy with somewhat thin aortic wall in general.  No other abnormalities were seen.      DESCRIPTION OF PROCEDURE:  After informed consent was obtained, the patient was brought down to the operating room and was placed on the OR table in a supine position.  Intravenous and intra-arterial lines were begun.  While monitoring his blood pressure and EKG tracing, he was anesthetized and intubated using a single lumen endotracheal tube.  His entire chest, abdomen, both groins and legs were prepped down to the toes using multiple layers of DuraPrep.  He was draped in sterile field.  Median sternotomy was performed and the sternal edges were retracted laterally.  The pericardium was opened to suspend the heart in a pericardial cradle.  The patient was fully heparinized.  The ascending aorta and the right atrial appendage were  cannulated.  A retrograde cardioplegia catheter was placed in the coronary sinus without difficulty.  An antegrade needle/aortic root was placed in the ascending aorta as well.  After appropriate ACT level was achieved, cardiopulmonary bypass was established and the patient was kept normothermic during entire operation.  An LV vent was placed via the right superior pulmonary vein.  Carbon dioxide was flooded into the chest to prevent air embolism.  The aorta was then crossclamped and a liter of retrograde cardioplegia was given to fully arrest the heart.  The patient went into good diastolic arrest without any LV distention.  Following this, intermittent retrograde cardioplegia dose was given on average every 15 minutes for myocardial protection while the aorta was crossclamped.      A transverse aortotomy was made and the aortic valve was exposed.  Findings are noted above.  To give extra myocardial protection, we gave a couple 100 mL of cardioplegia down the right and left coronary ostia separately using a handheld probe.  The aortic valve leaflets were excised and the annulus was debrided and flushed out.  The annulus was then sized to a 25 mm St. Rolf mechanical valve.  Annular sutures were placed using horizontal mattress sutures of 2-0 Ethibond with subannular pledgets.  The valve was brought to the field and all sutures were brought through the sewing ring and the valve was seated down without difficulty.  All sutures were tied down securely using the Cor-Knot device.  We tested both mechanical valves and they opened and closed freely.  The aortotomy was then closed in 2 layers of running 4-0 Prolene.  A liter of warm blood cardioplegia was given as a retrograde hot shot and the aortic crossclamp was removed.  Aortic crossclamp time was 68 minutes.  The left ventricle and the ascending aorta were continuously vented to deair the heart.  The patient was eventually weaned off cardiopulmonary bypass with low  dose epinephrine and Shahram-Synephrine drips.  LV function was good and the heart was adequately deaired.  The aortic mechanical valve was seated down well and there was no paravalvular leak.  Once the patient remained stable off bypass, the venous cannula was removed and protamine was administered.  The aortic cannula was subsequently removed as well.  Temporary ventricular pacing wires placed in the RV muscle.  32-Portuguese angled and straight chest tubes were placed in mediastinum as well.  These were all brought out percutaneously below the sternotomy incision and secured to the skin using 2-0 Ethibond.  Total cardiopulmonary bypass time was 81 minutes.  Both pleural spaces are slightly opened.  The mediastinum was irrigated out and hemostasis was confirmed.  The sternum was reapproximated using multiple interrupted single and double wires.  The incision was closed in layers of running Vicryl suture.  Skin was closed using 3-0 Vicryl and was sealed using Dermabond.      There were no intraoperative complications and the patient tolerated the operation well.  No blood products were given intraoperatively.  All sponge counts, needle counts and instrument counts were correct x2 at the end the operation.      ESTIMATED BLOOD LOSS:  Unknown.      SPECIMEN REMOVED:  Aortic valve leaflets.      The patient was brought to the ICU in hemodynamically stable condition and remained intubated.         GEOVANNI SHULTZ MD             D: 2020   T: 2020   MT: QUENTIN      Name:     NAHID BONE   MRN:      [id number]        Account:        [id number]   :      1970           Procedure Date: 2020      Document: [id number]

## 2020-02-03 NOTE — PROGRESS NOTES
"SPIRITUAL HEALTH SERVICES  Spiritual Assessment Progress Note  [facility] ICU   responded to code blue in ICU.  Met with pt's family (wife, mother and mother in law).   SH brought wife into unit to see pt and to talk with staff.  Pt's issues resolved quickly.  Wife was very concerned stating \" We don't have children or pets - he is everything to me.\"    I then talked with family in ICU lounge.  Mother told me that she already lost a son from heart issues and she expressed relief that pt was ok.    Pt is Anabaptism.  Family declined offer for a  to provide sacrament.  Mother described pt as a \"cradle Anabaptism\" - brought up Gnosticist not presently not practicing.    provided emotional support and reassurance.     Family appreciative.  They are planning on staying the night at the hospital to be close by.       will continue to follow.                                                                                                                                            Jolynn Hopkins M.Div., [facility]  Staff    Pager [phone]  "

## 2020-02-04 ENCOUNTER — APPOINTMENT (OUTPATIENT)
Dept: PHYSICAL THERAPY | Facility: CLINIC | Age: 50
DRG: 220 | End: 2020-02-04
Attending: STUDENT IN AN ORGANIZED HEALTH CARE EDUCATION/TRAINING PROGRAM
Payer: COMMERCIAL

## 2020-02-04 ENCOUNTER — APPOINTMENT (OUTPATIENT)
Dept: GENERAL RADIOLOGY | Facility: CLINIC | Age: 50
DRG: 220 | End: 2020-02-04
Attending: STUDENT IN AN ORGANIZED HEALTH CARE EDUCATION/TRAINING PROGRAM
Payer: COMMERCIAL

## 2020-02-04 LAB
ALBUMIN SERPL-MCNC: 3.6 G/DL (ref 3.4–5)
ALP SERPL-CCNC: 50 U/L (ref 40–150)
ALT SERPL W P-5'-P-CCNC: 26 U/L (ref 0–70)
ANION GAP SERPL CALCULATED.3IONS-SCNC: 6 MMOL/L (ref 3–14)
AST SERPL W P-5'-P-CCNC: 36 U/L (ref 0–45)
BILIRUB SERPL-MCNC: 1.3 MG/DL (ref 0.2–1.3)
BLD PROD TYP BPU: NORMAL
BLD PROD TYP BPU: NORMAL
BLD UNIT ID BPU: 0
BLD UNIT ID BPU: 0
BLOOD PRODUCT CODE: NORMAL
BLOOD PRODUCT CODE: NORMAL
BPU ID: NORMAL
BPU ID: NORMAL
BUN SERPL-MCNC: 11 MG/DL (ref 7–30)
CA-I BLD-MCNC: 4.2 MG/DL (ref 4.4–5.2)
CALCIUM SERPL-MCNC: 7.7 MG/DL (ref 8.5–10.1)
CHLORIDE SERPL-SCNC: 108 MMOL/L (ref 94–109)
CO2 SERPL-SCNC: 23 MMOL/L (ref 20–32)
COPATH REPORT: NORMAL
CREAT SERPL-MCNC: 0.76 MG/DL (ref 0.66–1.25)
ERYTHROCYTE [DISTWIDTH] IN BLOOD BY AUTOMATED COUNT: 13 % (ref 10–15)
GFR SERPL CREATININE-BSD FRML MDRD: >90 ML/MIN/{1.73_M2}
GLUCOSE BLDC GLUCOMTR-MCNC: 120 MG/DL (ref 70–99)
GLUCOSE BLDC GLUCOMTR-MCNC: 123 MG/DL (ref 70–99)
GLUCOSE BLDC GLUCOMTR-MCNC: 123 MG/DL (ref 70–99)
GLUCOSE BLDC GLUCOMTR-MCNC: 125 MG/DL (ref 70–99)
GLUCOSE BLDC GLUCOMTR-MCNC: 129 MG/DL (ref 70–99)
GLUCOSE BLDC GLUCOMTR-MCNC: 129 MG/DL (ref 70–99)
GLUCOSE BLDC GLUCOMTR-MCNC: 133 MG/DL (ref 70–99)
GLUCOSE BLDC GLUCOMTR-MCNC: 145 MG/DL (ref 70–99)
GLUCOSE BLDC GLUCOMTR-MCNC: 98 MG/DL (ref 70–99)
GLUCOSE SERPL-MCNC: 135 MG/DL (ref 70–99)
HCT VFR BLD AUTO: 33.2 % (ref 40–53)
HGB BLD-MCNC: 11.5 G/DL (ref 13.3–17.7)
MAGNESIUM SERPL-MCNC: 2.1 MG/DL (ref 1.6–2.3)
MCH RBC QN AUTO: 32.3 PG (ref 26.5–33)
MCHC RBC AUTO-ENTMCNC: 34.6 G/DL (ref 31.5–36.5)
MCV RBC AUTO: 93 FL (ref 78–100)
PHOSPHATE SERPL-MCNC: 2.9 MG/DL (ref 2.5–4.5)
PLATELET # BLD AUTO: 128 10E9/L (ref 150–450)
POTASSIUM SERPL-SCNC: 3.6 MMOL/L (ref 3.4–5.3)
PROT SERPL-MCNC: 6.1 G/DL (ref 6.8–8.8)
RBC # BLD AUTO: 3.56 10E12/L (ref 4.4–5.9)
SODIUM SERPL-SCNC: 137 MMOL/L (ref 133–144)
TRANSFUSION STATUS PATIENT QL: NORMAL
WBC # BLD AUTO: 14.3 10E9/L (ref 4–11)

## 2020-02-04 PROCEDURE — 25000128 H RX IP 250 OP 636: Performed by: STUDENT IN AN ORGANIZED HEALTH CARE EDUCATION/TRAINING PROGRAM

## 2020-02-04 PROCEDURE — 25000128 H RX IP 250 OP 636: Performed by: SURGERY

## 2020-02-04 PROCEDURE — 25000132 ZZH RX MED GY IP 250 OP 250 PS 637: Performed by: NURSE PRACTITIONER

## 2020-02-04 PROCEDURE — 71045 X-RAY EXAM CHEST 1 VIEW: CPT

## 2020-02-04 PROCEDURE — 25800030 ZZH RX IP 258 OP 636: Performed by: STUDENT IN AN ORGANIZED HEALTH CARE EDUCATION/TRAINING PROGRAM

## 2020-02-04 PROCEDURE — 93010 ELECTROCARDIOGRAM REPORT: CPT | Performed by: INTERNAL MEDICINE

## 2020-02-04 PROCEDURE — 83735 ASSAY OF MAGNESIUM: CPT | Performed by: STUDENT IN AN ORGANIZED HEALTH CARE EDUCATION/TRAINING PROGRAM

## 2020-02-04 PROCEDURE — 80053 COMPREHEN METABOLIC PANEL: CPT | Performed by: STUDENT IN AN ORGANIZED HEALTH CARE EDUCATION/TRAINING PROGRAM

## 2020-02-04 PROCEDURE — 00000146 ZZHCL STATISTIC GLUCOSE BY METER IP

## 2020-02-04 PROCEDURE — 85027 COMPLETE CBC AUTOMATED: CPT | Performed by: STUDENT IN AN ORGANIZED HEALTH CARE EDUCATION/TRAINING PROGRAM

## 2020-02-04 PROCEDURE — 25000131 ZZH RX MED GY IP 250 OP 636 PS 637: Performed by: STUDENT IN AN ORGANIZED HEALTH CARE EDUCATION/TRAINING PROGRAM

## 2020-02-04 PROCEDURE — 12000000 ZZH R&B MED SURG/OB

## 2020-02-04 PROCEDURE — 97110 THERAPEUTIC EXERCISES: CPT | Mod: GP | Performed by: PHYSICAL THERAPIST

## 2020-02-04 PROCEDURE — 25000128 H RX IP 250 OP 636: Performed by: NURSE PRACTITIONER

## 2020-02-04 PROCEDURE — 25000132 ZZH RX MED GY IP 250 OP 250 PS 637: Performed by: STUDENT IN AN ORGANIZED HEALTH CARE EDUCATION/TRAINING PROGRAM

## 2020-02-04 PROCEDURE — 97161 PT EVAL LOW COMPLEX 20 MIN: CPT | Mod: GP | Performed by: PHYSICAL THERAPIST

## 2020-02-04 PROCEDURE — 82330 ASSAY OF CALCIUM: CPT | Performed by: STUDENT IN AN ORGANIZED HEALTH CARE EDUCATION/TRAINING PROGRAM

## 2020-02-04 PROCEDURE — C9113 INJ PANTOPRAZOLE SODIUM, VIA: HCPCS | Performed by: STUDENT IN AN ORGANIZED HEALTH CARE EDUCATION/TRAINING PROGRAM

## 2020-02-04 PROCEDURE — 97530 THERAPEUTIC ACTIVITIES: CPT | Mod: GP | Performed by: PHYSICAL THERAPIST

## 2020-02-04 PROCEDURE — 93005 ELECTROCARDIOGRAM TRACING: CPT

## 2020-02-04 PROCEDURE — 84100 ASSAY OF PHOSPHORUS: CPT | Performed by: STUDENT IN AN ORGANIZED HEALTH CARE EDUCATION/TRAINING PROGRAM

## 2020-02-04 RX ORDER — HEPARIN SODIUM 5000 [USP'U]/.5ML
5000 INJECTION, SOLUTION INTRAVENOUS; SUBCUTANEOUS EVERY 8 HOURS
Status: DISCONTINUED | OUTPATIENT
Start: 2020-02-04 | End: 2020-02-08 | Stop reason: DRUGHIGH

## 2020-02-04 RX ORDER — ATORVASTATIN CALCIUM 40 MG/1
40 TABLET, FILM COATED ORAL DAILY
Status: DISCONTINUED | OUTPATIENT
Start: 2020-02-04 | End: 2020-02-09 | Stop reason: HOSPADM

## 2020-02-04 RX ORDER — KETOROLAC TROMETHAMINE 15 MG/ML
15 INJECTION, SOLUTION INTRAMUSCULAR; INTRAVENOUS EVERY 8 HOURS
Status: COMPLETED | OUTPATIENT
Start: 2020-02-04 | End: 2020-02-05

## 2020-02-04 RX ORDER — POLYETHYLENE GLYCOL 3350 17 G/17G
17 POWDER, FOR SOLUTION ORAL 2 TIMES DAILY
Status: DISCONTINUED | OUTPATIENT
Start: 2020-02-04 | End: 2020-02-09 | Stop reason: HOSPADM

## 2020-02-04 RX ORDER — METOPROLOL TARTRATE 25 MG/1
25 TABLET, FILM COATED ORAL 2 TIMES DAILY
Status: DISCONTINUED | OUTPATIENT
Start: 2020-02-04 | End: 2020-02-05

## 2020-02-04 RX ORDER — PANTOPRAZOLE SODIUM 40 MG/1
40 TABLET, DELAYED RELEASE ORAL
Status: DISCONTINUED | OUTPATIENT
Start: 2020-02-05 | End: 2020-02-09 | Stop reason: HOSPADM

## 2020-02-04 RX ORDER — METHOCARBAMOL 750 MG/1
750 TABLET, FILM COATED ORAL 4 TIMES DAILY
Status: DISCONTINUED | OUTPATIENT
Start: 2020-02-04 | End: 2020-02-09 | Stop reason: HOSPADM

## 2020-02-04 RX ADMIN — SENNOSIDES AND DOCUSATE SODIUM 2 TABLET: 8.6; 5 TABLET ORAL at 20:44

## 2020-02-04 RX ADMIN — OXYCODONE HYDROCHLORIDE 10 MG: 5 TABLET ORAL at 03:44

## 2020-02-04 RX ADMIN — OXYCODONE HYDROCHLORIDE 10 MG: 5 TABLET ORAL at 00:43

## 2020-02-04 RX ADMIN — METHOCARBAMOL TABLETS 750 MG: 750 TABLET, COATED ORAL at 17:20

## 2020-02-04 RX ADMIN — ACETAMINOPHEN 975 MG: 325 TABLET, FILM COATED ORAL at 13:27

## 2020-02-04 RX ADMIN — NITROGLYCERIN 1.6 MCG/KG/MIN: 20 INJECTION INTRAVENOUS at 03:20

## 2020-02-04 RX ADMIN — CEFAZOLIN 1 G: 1 INJECTION, POWDER, FOR SOLUTION INTRAMUSCULAR; INTRAVENOUS at 01:34

## 2020-02-04 RX ADMIN — KETOROLAC TROMETHAMINE 15 MG: 15 INJECTION, SOLUTION INTRAMUSCULAR; INTRAVENOUS at 08:05

## 2020-02-04 RX ADMIN — SENNOSIDES AND DOCUSATE SODIUM 1 TABLET: 8.6; 5 TABLET ORAL at 09:30

## 2020-02-04 RX ADMIN — METHOCARBAMOL TABLETS 750 MG: 750 TABLET, COATED ORAL at 09:29

## 2020-02-04 RX ADMIN — POLYETHYLENE GLYCOL 3350 17 G: 17 POWDER, FOR SOLUTION ORAL at 20:43

## 2020-02-04 RX ADMIN — CYCLOBENZAPRINE HYDROCHLORIDE 10 MG: 10 TABLET, FILM COATED ORAL at 00:15

## 2020-02-04 RX ADMIN — HEPARIN SODIUM 5000 UNITS: 5000 INJECTION, SOLUTION INTRAVENOUS; SUBCUTANEOUS at 11:50

## 2020-02-04 RX ADMIN — HYDRALAZINE HYDROCHLORIDE 10 MG: 20 INJECTION INTRAMUSCULAR; INTRAVENOUS at 08:00

## 2020-02-04 RX ADMIN — ACETAMINOPHEN 975 MG: 325 TABLET, FILM COATED ORAL at 22:25

## 2020-02-04 RX ADMIN — KETOROLAC TROMETHAMINE 15 MG: 15 INJECTION, SOLUTION INTRAMUSCULAR; INTRAVENOUS at 15:42

## 2020-02-04 RX ADMIN — PANTOPRAZOLE SODIUM 40 MG: 40 INJECTION, POWDER, FOR SOLUTION INTRAVENOUS at 08:28

## 2020-02-04 RX ADMIN — GABAPENTIN 300 MG: 300 CAPSULE ORAL at 20:44

## 2020-02-04 RX ADMIN — POTASSIUM CHLORIDE 20 MEQ: 1500 TABLET, EXTENDED RELEASE ORAL at 06:20

## 2020-02-04 RX ADMIN — HEPARIN SODIUM 5000 UNITS: 5000 INJECTION, SOLUTION INTRAVENOUS; SUBCUTANEOUS at 20:43

## 2020-02-04 RX ADMIN — OXYCODONE HYDROCHLORIDE 5 MG: 5 TABLET ORAL at 21:03

## 2020-02-04 RX ADMIN — METOPROLOL TARTRATE 25 MG: 25 TABLET ORAL at 09:23

## 2020-02-04 RX ADMIN — POTASSIUM CHLORIDE, DEXTROSE MONOHYDRATE AND SODIUM CHLORIDE: 150; 5; 450 INJECTION, SOLUTION INTRAVENOUS at 11:57

## 2020-02-04 RX ADMIN — OXYCODONE HYDROCHLORIDE 10 MG: 5 TABLET ORAL at 06:20

## 2020-02-04 RX ADMIN — POLYETHYLENE GLYCOL 3350 17 G: 17 POWDER, FOR SOLUTION ORAL at 10:33

## 2020-02-04 RX ADMIN — LIDOCAINE 1 PATCH: 560 PATCH PERCUTANEOUS; TOPICAL; TRANSDERMAL at 09:33

## 2020-02-04 RX ADMIN — ACETAMINOPHEN 975 MG: 325 TABLET, FILM COATED ORAL at 06:20

## 2020-02-04 RX ADMIN — VANCOMYCIN HYDROCHLORIDE 1000 MG: 1 INJECTION, SOLUTION INTRAVENOUS at 09:20

## 2020-02-04 RX ADMIN — HYDROMORPHONE HYDROCHLORIDE 0.3 MG: 1 INJECTION, SOLUTION INTRAMUSCULAR; INTRAVENOUS; SUBCUTANEOUS at 00:02

## 2020-02-04 RX ADMIN — ATORVASTATIN CALCIUM 40 MG: 40 TABLET, FILM COATED ORAL at 10:33

## 2020-02-04 RX ADMIN — OXYCODONE HYDROCHLORIDE 10 MG: 5 TABLET ORAL at 10:55

## 2020-02-04 RX ADMIN — CEFAZOLIN 1 G: 1 INJECTION, POWDER, FOR SOLUTION INTRAMUSCULAR; INTRAVENOUS at 08:31

## 2020-02-04 RX ADMIN — SODIUM CHLORIDE 3 UNITS/HR: 9 INJECTION, SOLUTION INTRAVENOUS at 03:53

## 2020-02-04 RX ADMIN — ASPIRIN 81 MG: 81 TABLET, DELAYED RELEASE ORAL at 09:26

## 2020-02-04 RX ADMIN — METHOCARBAMOL TABLETS 750 MG: 750 TABLET, COATED ORAL at 22:25

## 2020-02-04 RX ADMIN — METOPROLOL TARTRATE 25 MG: 25 TABLET ORAL at 20:44

## 2020-02-04 RX ADMIN — METHOCARBAMOL TABLETS 750 MG: 750 TABLET, COATED ORAL at 13:27

## 2020-02-04 RX ADMIN — GABAPENTIN 300 MG: 300 CAPSULE ORAL at 09:26

## 2020-02-04 ASSESSMENT — ACTIVITIES OF DAILY LIVING (ADL)
ADLS_ACUITY_SCORE: 10
ADLS_ACUITY_SCORE: 12
ADLS_ACUITY_SCORE: 10

## 2020-02-04 ASSESSMENT — MIFFLIN-ST. JEOR
SCORE: 1644.5
SCORE: 1668.23

## 2020-02-04 NOTE — PROGRESS NOTES
[facility]  Progress Note  Critical Care Service  Date of Admission: 2/3/2020  Date of Service (when I saw the patient): 02/04/20    Today's Plans and Changes   -Continue Supportive cares   -The Critical Care service will continue to follow peripherally while the patient is within the ICU. We are readily available should issues arise. Please feel free to contact us for critical care issues with which we may be of assistance. For all other concerns, please contact primary service first.     Neuro:  #: Post-operative pain and sedation needs  - Continue scheduled ketorola, acetaminophen, lidocaine patches, and gabapentin for pain.  - Hydromorphone, cyclobenzaprine , and oxycodone available prn    CV:  #:Aortic Valve insufficency s/p aortic valve replacement on 2/3/2020  #: HTN  #HLD   - Vasopressors and inotropes weaned off >20 hours  - Restart atorvastatin and ASA per primary team  -Nicardipine and Nitroglycerine drips to keep MAP<120, will wean off as PO antihypertensives reinitiated.   -PRN hydralazine PRN  - Epicardial ventricular pacing wires in place, currently disconnected    Resp:  #: Post-operative ventilator management  - Extubated on 2/3 at 1720  - Supplemental O2 to keep sats >90%  - IS and OOB for pulmonary hygiene q 2 hours while awake    GI/Nutrition:  #: No active issues  - Passed bedside swallow. Advance diet per primary team    Renal:  ##: No active issues baseline creatinine 0.8  - Monitor function and electrolytes as needed with replacement per ICU protocols.   - Avoid nephrotoxic agents such as NSAID, IV contrast unless specifically required  - Discontinue Wright when appropriate per primary team.    ID:  #: Mild Leukocytosis, expected in post-operative setting  - Completed emipiric antibiotics       Endocrine:  #: Stress induced hyperglycemia, controlled  - Insulin drip to maintain blood glucose <150    Heme:  #: Acute blood loss anemia  #: Thrombocytopenia   - Monitor chest tube  output  - Transfuse for hemoglobin <7  - Trend CBC    MSK:  #: Sternotomy  - Sternal precautions per protocol  - OOB as tolerated  -OT/PT rehab    General cares:  DVT Prophylaxis: Sub q heparin, SCDs  GI Prophylaxis: Protonix daily  Restraints needed: NO  Family update by me today: Yes   Current lines are required for patient management  Access:  - Right internal jugular  - Arterial line  - Wright  - Chest tube x 2    Miriam Pantoja    Time Spent on this Encounter   Billing: I spent 30 minutes bedside and on the inpatient unit today managing the critical care of Tnoey Lemos in relation to the issues listed in this note.    Code Status   Full Code    Physical Exam   Temp: 100.6  F (38.1  C) Temp src: Bladder Temp  Min: 97.7  F (36.5  C)  Max: 100.6  F (38.1  C) BP: (!) 144/78 Pulse: 96 Heart Rate: 87 Resp: 8 SpO2: 92 % O2 Device: Nasal cannula Oxygen Delivery: 5 LPM  Vital Signs with Ranges  Temp:  [97.7  F (36.5  C)-100.6  F (38.1  C)] 100.6  F (38.1  C)  Pulse:  [70-96] 96  Heart Rate:  [] 87  Resp:  [0-30] 8  BP: ()/(58-85) 144/78  MAP:  [63 mmHg-164 mmHg] 65 mmHg  Arterial Line BP: ()/(-) 109/50  FiO2 (%):  [50 %] 50 %  SpO2:  [91 %-100 %] 92 %  176 lbs 5.89 oz    GEN: Pale, resting in bed, opens eyes to voice. NAD  HEENT:  Normocephalic, PERRLA  CV: RRR, mechanical click, no peripheral edema  PULM/CHEST: Clear breath sounds bilaterally without rhonchi, crackles or wheeze, symmetric chest rise  GI: Bowel sounds present, abdomen soft and non-tender, no masses  : Wright catheter in place, urine yellow and clear  NEURO: CN II-XII grossly intact.   SKIN: No rashes noted. Sternotomy incision open to air, edges well approximated, skin without erythema/ecchymosis.  Chest tube insertion UTV, dressing c/d/i    Data   Results for orders placed or performed during the hospital encounter of 02/03/20 (from the past 24 hour(s))   Glucose by meter   Result Value Ref Range    Glucose 129 (H) 70 - 99  mg/dL   ISTAT gases elec ica laisha POCT   Result Value Ref Range    Ph Venous 7.35 7.32 - 7.43 pH    PCO2 Venous 44 40 - 50 mm Hg    PO2 Venous 50 (H) 25 - 47 mm Hg    Bicarbonate Venous 24 21 - 28 mmol/L    O2 Sat Venous 82 %    Sodium 139 133 - 144 mmol/L    Potassium 5.6 (H) 3.4 - 5.3 mmol/L    Calcium Ionized 4.1 (L) 4.4 - 5.2 mg/dL    CPB Applied       CPB Algorithm Applied to Hemoglobin and/or Hematocrit    Hemoglobin 10.5 (L) 13.3 - 17.7 g/dL    Hematocrit - POCT 31 (L) 40.0 - 53.0 %PCV   ISTAT gases elec ica art POCT   Result Value Ref Range    pH Arterial 7.39 7.35 - 7.45 pH    pCO2 Arterial 44 35 - 45 mm Hg    pO2 Arterial 463 (H) 80 - 105 mm Hg    Bicarbonate Arterial 26 21 - 28 mmol/L    O2 Sat Arterial 100 92 - 100 %    Sodium 136 133 - 144 mmol/L    Potassium 5.9 (H) 3.4 - 5.3 mmol/L    Calcium Ionized 4.1 (L) 4.4 - 5.2 mg/dL    CPB Applied       CPB Algorithm Applied to Hemoglobin and/or Hematocrit    Hemoglobin 10.2 (L) 13.3 - 17.7 g/dL    Hematocrit - POCT 30 (L) 40.0 - 53.0 %PCV   ISTAT gases elec ica art POCT   Result Value Ref Range    pH Arterial 7.38 7.35 - 7.45 pH    pCO2 Arterial 44 35 - 45 mm Hg    pO2 Arterial 367 (H) 80 - 105 mm Hg    Bicarbonate Arterial 26 21 - 28 mmol/L    O2 Sat Arterial 100 92 - 100 %    Sodium 139 133 - 144 mmol/L    Potassium 6.2 (HH) 3.4 - 5.3 mmol/L    Calcium Ionized 3.7 (L) 4.4 - 5.2 mg/dL    CPB Applied       CPB Algorithm Applied to Hemoglobin and/or Hematocrit    Hemoglobin 11.2 (L) 13.3 - 17.7 g/dL    Hematocrit - POCT 33 (L) 40.0 - 53.0 %PCV   Basic metabolic panel   Result Value Ref Range    Sodium 142 133 - 144 mmol/L    Potassium 5.7 (H) 3.4 - 5.3 mmol/L    Chloride 116 (H) 94 - 109 mmol/L    Carbon Dioxide 23 20 - 32 mmol/L    Anion Gap 3 3 - 14 mmol/L    Glucose 166 (H) 70 - 99 mg/dL    Urea Nitrogen 13 7 - 30 mg/dL    Creatinine 0.68 0.66 - 1.25 mg/dL    GFR Estimate >90 >60 mL/min/[1.73_m2]    GFR Estimate If Black >90 >60 mL/min/[1.73_m2]    Calcium  8.3 (L) 8.5 - 10.1 mg/dL   CBC with platelets   Result Value Ref Range    WBC 9.4 4.0 - 11.0 10e9/L    RBC Count 3.61 (L) 4.4 - 5.9 10e12/L    Hemoglobin 11.5 (L) 13.3 - 17.7 g/dL    Hematocrit 33.6 (L) 40.0 - 53.0 %    MCV 93 78 - 100 fl    MCH 31.9 26.5 - 33.0 pg    MCHC 34.2 31.5 - 36.5 g/dL    RDW 12.9 10.0 - 15.0 %    Platelet Count 82 (L) 150 - 450 10e9/L   Fibrinogen activity   Result Value Ref Range    Fibrinogen 181 (L) 200 - 420 mg/dL   INR   Result Value Ref Range    INR 1.41 (H) 0.86 - 1.14   Partial thromboplastin time   Result Value Ref Range    PTT 53 (H) 22 - 37 sec   ISTAT gases elec ica art POCT   Result Value Ref Range    pH Arterial 7.33 (L) 7.35 - 7.45 pH    pCO2 Arterial 46 (H) 35 - 45 mm Hg    pO2 Arterial 415 (H) 80 - 105 mm Hg    Bicarbonate Arterial 24 21 - 28 mmol/L    O2 Sat Arterial 100 92 - 100 %    Sodium 138 133 - 144 mmol/L    Potassium 5.7 (H) 3.4 - 5.3 mmol/L    Calcium Ionized 5.0 4.4 - 5.2 mg/dL    CPB Applied       CPB Algorithm Applied to Hemoglobin and/or Hematocrit    Hemoglobin 10.5 (L) 13.3 - 17.7 g/dL    Hematocrit - POCT 31 (L) 40.0 - 53.0 %PCV   Nutrition Services Adult IP Consult    Erica Gonzalez RD, MELODY     2/3/2020  1:45 PM  CARDIAC SURGERY NUTRITION CONSULT    Received standing order to assess and educate patient.    Will follow and complete assessment once patient is extubated   and/or is transferred to medical unit.    Patient will receive nutrition education during the Outpatient   Cardiac Rehab Program (nutrition classes/dietitian counseling).    Erica Goodman RD, MELODY, CNSC   Clinical Dietitian - [facility]      EKG 12-lead, tracing only   Result Value Ref Range    Interpretation ECG Click View Image link to view waveform and result    Glucose by meter   Result Value Ref Range    Glucose 144 (H) 70 - 99 mg/dL   INR   Result Value Ref Range    INR 1.26 (H) 0.86 - 1.14   Partial thromboplastin time   Result Value Ref Range    PTT  61 (H) 22 - 37 sec   Blood gas arterial and oxyhgb   Result Value Ref Range    pH Arterial 7.32 (L) 7.35 - 7.45 pH    pCO2 Arterial 42 35 - 45 mm Hg    pO2 Arterial 108 (H) 80 - 105 mm Hg    Bicarbonate Arterial 22 21 - 28 mmol/L    FIO2 50%     Oxyhemoglobin Arterial 97 92 - 100 %    Base Deficit Art 4.4 mmol/L   CBC with platelets   Result Value Ref Range    WBC 10.4 4.0 - 11.0 10e9/L    RBC Count 3.88 (L) 4.4 - 5.9 10e12/L    Hemoglobin 12.4 (L) 13.3 - 17.7 g/dL    Hematocrit 35.9 (L) 40.0 - 53.0 %    MCV 93 78 - 100 fl    MCH 32.0 26.5 - 33.0 pg    MCHC 34.5 31.5 - 36.5 g/dL    RDW 13.0 10.0 - 15.0 %    Platelet Count 101 (L) 150 - 450 10e9/L   Comprehensive metabolic panel   Result Value Ref Range    Sodium 142 133 - 144 mmol/L    Potassium 4.3 3.4 - 5.3 mmol/L    Chloride 116 (H) 94 - 109 mmol/L    Carbon Dioxide 21 20 - 32 mmol/L    Anion Gap 5 3 - 14 mmol/L    Glucose 153 (H) 70 - 99 mg/dL    Urea Nitrogen 13 7 - 30 mg/dL    Creatinine 0.70 0.66 - 1.25 mg/dL    GFR Estimate >90 >60 mL/min/[1.73_m2]    GFR Estimate If Black >90 >60 mL/min/[1.73_m2]    Calcium 7.3 (L) 8.5 - 10.1 mg/dL    Bilirubin Total 0.8 0.2 - 1.3 mg/dL    Albumin 3.2 (L) 3.4 - 5.0 g/dL    Protein Total 5.4 (L) 6.8 - 8.8 g/dL    Alkaline Phosphatase 67 40 - 150 U/L    ALT 24 0 - 70 U/L    AST 26 0 - 45 U/L   Lactic acid whole blood   Result Value Ref Range    Lactic Acid 2.1 (H) 0.7 - 2.0 mmol/L   Calcium ionized whole blood   Result Value Ref Range    Calcium Ionized Whole Blood 4.5 4.4 - 5.2 mg/dL   Magnesium   Result Value Ref Range    Magnesium 2.5 (H) 1.6 - 2.3 mg/dL   Phosphorus   Result Value Ref Range    Phosphorus 1.9 (L) 2.5 - 4.5 mg/dL   XR Abdomen Port 1 View    Narrative    ABDOMEN PORTABLE ONE VIEW  2/3/2020 12:05 PM    HISTORY: 49-year-old patient with OG placement.    COMPARISON: None      Impression    IMPRESSION: Esophagogastric tube is identified with tip below the  diaphragm, extending to the presumed lateral aspect of  the proximal  gastric body. No obvious intraperitoneal air. No dilated air-filled  loops of bowel.    MARIBELL GUZMAN MD   XR Chest Port 1 View    Narrative    CHEST ONE VIEW PORTABLE   2/3/2020 12:06 PM     HISTORY:  Status post aortic valve replacement.    COMPARISON: 1/9/2020.      Impression    IMPRESSION: Tip of the endotracheal tube is 1.6 cm above the lakshmi.  Nasogastric tube extends below left hemidiaphragm. There is a right  internal jugular line with the tip in the SVC. Anterior mediastinal  drain is projected over the heart. There are new median sternotomy  wires and there is a prosthetic cardiac valve. No pneumothorax or  pleural effusion. No airspace consolidation.    GABRIELLA MARTINEZ MD   Glucose by meter   Result Value Ref Range    Glucose 161 (H) 70 - 99 mg/dL   Glucose by meter   Result Value Ref Range    Glucose 183 (H) 70 - 99 mg/dL   Blood gas arterial and oxyhgb   Result Value Ref Range    pH Arterial 7.32 (L) 7.35 - 7.45 pH    pCO2 Arterial 45 35 - 45 mm Hg    pO2 Arterial 111 (H) 80 - 105 mm Hg    Bicarbonate Arterial 23 21 - 28 mmol/L    FIO2 40%     Oxyhemoglobin Arterial 97 92 - 100 %    Base Deficit Art 2.7 mmol/L   Glucose by meter   Result Value Ref Range    Glucose 172 (H) 70 - 99 mg/dL   Glucose by meter   Result Value Ref Range    Glucose 152 (H) 70 - 99 mg/dL   Glucose by meter   Result Value Ref Range    Glucose 170 (H) 70 - 99 mg/dL   Glucose by meter   Result Value Ref Range    Glucose 141 (H) 70 - 99 mg/dL   Glucose by meter   Result Value Ref Range    Glucose 133 (H) 70 - 99 mg/dL   Glucose by meter   Result Value Ref Range    Glucose 129 (H) 70 - 99 mg/dL   Glucose by meter   Result Value Ref Range    Glucose 120 (H) 70 - 99 mg/dL   Glucose by meter   Result Value Ref Range    Glucose 133 (H) 70 - 99 mg/dL   CBC with platelets   Result Value Ref Range    WBC 14.3 (H) 4.0 - 11.0 10e9/L    RBC Count 3.56 (L) 4.4 - 5.9 10e12/L    Hemoglobin 11.5 (L) 13.3 - 17.7 g/dL    Hematocrit  33.2 (L) 40.0 - 53.0 %    MCV 93 78 - 100 fl    MCH 32.3 26.5 - 33.0 pg    MCHC 34.6 31.5 - 36.5 g/dL    RDW 13.0 10.0 - 15.0 %    Platelet Count 128 (L) 150 - 450 10e9/L   Comprehensive metabolic panel   Result Value Ref Range    Sodium 137 133 - 144 mmol/L    Potassium 3.6 3.4 - 5.3 mmol/L    Chloride 108 94 - 109 mmol/L    Carbon Dioxide 23 20 - 32 mmol/L    Anion Gap 6 3 - 14 mmol/L    Glucose 135 (H) 70 - 99 mg/dL    Urea Nitrogen 11 7 - 30 mg/dL    Creatinine 0.76 0.66 - 1.25 mg/dL    GFR Estimate >90 >60 mL/min/[1.73_m2]    GFR Estimate If Black >90 >60 mL/min/[1.73_m2]    Calcium 7.7 (L) 8.5 - 10.1 mg/dL    Bilirubin Total 1.3 0.2 - 1.3 mg/dL    Albumin 3.6 3.4 - 5.0 g/dL    Protein Total 6.1 (L) 6.8 - 8.8 g/dL    Alkaline Phosphatase 50 40 - 150 U/L    ALT 26 0 - 70 U/L    AST 36 0 - 45 U/L   Magnesium   Result Value Ref Range    Magnesium 2.1 1.6 - 2.3 mg/dL   Calcium ionized whole blood   Result Value Ref Range    Calcium Ionized Whole Blood 4.2 (L) 4.4 - 5.2 mg/dL   Phosphorus   Result Value Ref Range    Phosphorus 2.9 2.5 - 4.5 mg/dL   XR Chest Port 1 View    Narrative    EXAM: XR CHEST PORT 1 VW  LOCATION: [facility]  DATE/TIME: 2/4/2020 4:48 AM    INDICATION: Status post aVR  COMPARISON: 02/03/2020      Impression    IMPRESSION: Median sternotomy and cardiac valve replacement. Interval removal of endotracheal and enteric tube. Mediastinal drains and right IJ line. Shallow inspiration. Atelectasis or infiltrate left lung base.   Glucose by meter   Result Value Ref Range    Glucose 123 (H) 70 - 99 mg/dL   EKG 12-lead, tracing only   Result Value Ref Range    Interpretation ECG Click View Image link to view waveform and result    Glucose by meter   Result Value Ref Range    Glucose 125 (H) 70 - 99 mg/dL

## 2020-02-04 NOTE — PROGRESS NOTES
SPIRITUAL HEALTH SERVICES  Spiritual Assessment Progress Note  [facility] ICU   met with pt for the first time.  Family was also present.  Pt was coping well and will be leaving ICU when a bed is ready.    Family was also doing better today and very supportive.      provided support.     No plans to follow unless requested.                                                                                                                                          Jolynn Hopkins M.Div., [facility]  Staff    Pager [phone]

## 2020-02-04 NOTE — PROGRESS NOTES
02/04/20 1340   Quick Adds   Type of Visit Initial PT Evaluation  (CR)   Living Environment   Lives With spouse   Living Arrangements house  (modified split level)   Home Accessibility stairs to enter home;stairs within home   Number of Stairs, Main Entrance 1   Number of Stairs, Within Home, Primary 7   Transportation Anticipated car, drives self;family or friend will provide   Living Environment Comment lives in a modified split level home   Self-Care   Usual Activity Tolerance good   Current Activity Tolerance moderate   Equipment Currently Used at Home none   Activity/Exercise/Self-Care Comment normally independent and active   Functional Level Prior   Ambulation 0-->independent   Transferring 0-->independent   Toileting 0-->independent   Bathing 0-->independent   Communication 0-->understands/communicates without difficulty   Swallowing 0-->swallows foods/liquids without difficulty   Cognition 0 - no cognition issues reported   Fall history within last six months no   Which of the above functional risks had a recent onset or change? ambulation;transferring   Prior Functional Level Comment patient normally independent with mobility and cares   General Information   Onset of Illness/Injury or Date of Surgery - Date 02/03/20   Referring Physician Cam Taylor MD   Patient/Family Goals Statement return home with spouse   Pertinent History of Current Problem (include personal factors and/or comorbidities that impact the POC) per chart: Toney Lemos is a 49 year old male with a PMH of HTN, HLD and bicuspid aortic valve with aortic valve insufficiency that has progressed to the severe range with a new finding of LV dilatation; s/p aortic valve replacement with a 25 mm [employer]. [employer]olf Worden mechanical valve, intraoperative transesophageal echocardiogram on 2/3/2020; see medical record for further information   Precautions/Limitations oxygen therapy device and L/min;sternal precautions   Heart Disease Risk Factors  Medical history;High blood pressure   General Observations Patient in bed; spouse present   Cognitive Status Examination   Orientation orientation to person, place and time   Level of Consciousness alert   Follows Commands and Answers Questions 100% of the time   Personal Safety and Judgment intact   Memory intact   Pain Assessment   Patient Currently in Pain Yes, see Vital Sign flowsheet  (chest tube site and sternal incision discomfort)   Integumentary/Edema   Integumentary/Edema Comments sternal incision; chest tubes   Posture    Posture Comments WFL   Range of Motion (ROM)   ROM Comment UE's limited by sternal precautions; others appear WFL   Strength   Strength Comments mild functional weakness   Bed Mobility   Bed Mobility Comments mod A for supine to sit; HOB elevated   Transfer Skills   Transfer Comments sit<>stand and transfer to chair with min A x 1-2   Gait   Gait Comments able to take steps from bed to bedside chair; min A of 1; 2nd person for lines   Balance   Balance Comments current need for assist   Sensory Examination   Sensory Perception Comments occasional numbness and tingling in fingers on R UE   General Therapy Interventions   Planned Therapy Interventions bed mobility training;gait training;transfer training;strengthening;progressive activity/exercise   Intervention Comments Heart surgery education   Clinical Impression   Criteria for Skilled Therapeutic Intervention yes, treatment indicated   PT Diagnosis impaired gait/transfers post AVR   Influenced by the following impairments sternal precautions, pain; functional weakness; decreased activity tolerance   Functional limitations due to impairments impaired independence with functional mobility; impaired activity tolerance   Clinical Presentation Stable/Uncomplicated   Clinical Presentation Rationale clinical judgement   Clinical Decision Making (Complexity) Low complexity   Therapy Frequency 2x/day   Predicted Duration of Therapy Intervention  "(days/wks) 5 days   Anticipated Discharge Disposition Home with Assist;Home with Outpatient Therapy   Risk & Benefits of therapy have been explained Yes   Patient, Family & other staff in agreement with plan of care Yes   [employer] TM \"6 Clicks\"   2016, Trustees of [facility], under license to [employer].  All rights reserved.   6 Clicks Short Forms Basic Mobility Inpatient Short Form   [employer]  \"6 Clicks\" V.2 Basic Mobility Inpatient Short Form   1. Turning from your back to your side while in a flat bed without using bedrails? 2 - A Lot   2. Moving from lying on your back to sitting on the side of a flat bed without using bedrails? 2 - A Lot   3. Moving to and from a bed to a chair (including a wheelchair)? 3 - A Little   4. Standing up from a chair using your arms (e.g., wheelchair, or bedside chair)? 3 - A Little   5. To walk in hospital room? 3 - A Little   6. Climbing 3-5 steps with a railing? 2 - A Lot   Basic Mobility Raw Score (Score out of 24.Lower scores equate to lower levels of function) 15   Total Evaluation Time   Total Evaluation Time (Minutes) 10     "

## 2020-02-04 NOTE — PLAN OF CARE
Discharge Planner PT   Patient plan for discharge: return home with assist of spouse  Current status: CR: Order received; Initial evaluation completed and treatment initiated POD #1  s/p aortic valve replacement; Prior to admit patient was independent with all mobility without an assistive device and living in a modified split level home with steps. On eval patient having stable VS and some pain at sternum/chest tube site; mod A at shoulders to come to sitting; 2nd person to manage lines; sit>stand and transfer to bedside chair with min  A; educated patient and spouse in sternal precautions, need for Phase I and Phase II CR; wife off for 6 weeks to assist patient as needed; awaiting transfer to   Barriers to return to prior living situation: stairs; need for assist (wife able to provide assist needed)  Recommendations for discharge: Home with assist of spouse for any mobility/cares while patient has sternal precautions; Phase II OP CR  Rationale for recommendations: Patient would benefit from ongoing skilled CR to address current deficits with mobility and for monitored progression of activity.       Entered by: Brianna Collins 02/04/2020 2:12 PM

## 2020-02-04 NOTE — PLAN OF CARE
"Pt A&O moving between 5LNC and Oxy plus mask 5L(mouth breathes when sleeping).  Chest tube total 500ml now becoming more serosang.  UOP over past 12hrs was 550ml.  Nitroglycerin drip maxed and Nicardipine added.  Was able to wean nicardipine off but now restarted and Nitroglycerin remains maxed.  Pain controlled with scheduled tylenol and PRN Oxycodone, however, pain does cause pt to \"clamp\" down.  I.S. to 1500 while up in chair.  Spouse and mother at bedside updated frequently t/o the night.    "

## 2020-02-04 NOTE — PROGRESS NOTES
[facility]  Cardiovascular and Thoracic Surgery Daily Note  February 4, 2020           Assessment and Plan:   Toney Lemos is a 49 year old male with a PMH of HTN, HLD and bicuspid aortic valve with aortic valve insufficiency that has progressed to the severe range with a new finding of LV dilatation. He also endorses having very mild symptoms of more fatigue the past year. As such, he agreed to undergo surgery as outlined below.    POD #1 s/p aortic valve replacement with a 25 mm [employer] [place] mechanical valve, intraoperative transesophageal echocardiogram on 2/3/2020 by Dr. Cindy Matthews.     Main Plans for today:  Mobilize/work with therapies  Pulm hygiene  Remove hemodynamic lines  Wean off vasoactive drips  Transition off of insulin drip  Transfer to station 33    CVS:   # Bicuspid aortic valve and severe aortic valve insufficiency s/p AVR as outlined above.  Most recent echocardiogram demonstrated an LVEF of 52% and mild LV dilatation  # HTN. Currently on NTG and nicardipine drips to maintain ordered BP parameters. HR /ST  # HLD   - Metoprolol 25 mg BID  - Atorvastatin 40 mg daily  # Anticoagulation  - ASA 81 mg daily  - Will start warfarin therapy on POD #2 for mechanical aortic valve with goal INR 2-3  CT output 390 mL/24 hours- leave in today    Pulm:  Extubated per protocol; Saturating well on 4 LPM cannula, IS to 1250 mL; Encourage IS, C&DB, ambulating    Neuro/MSK  Intact; pain well controlled with current regimen    /Renal:  BL Creat 0.6-0.8, today 0.76; Preop weight 78.9 kg  UO adequate, no diuresis today  Vitals:    02/03/20 0557 02/04/20 0015   Weight: 78.9 kg (173 lb 14.4 oz) 80 kg (176 lb 5.9 oz)      Wright present - strict I/O, limited mobility  BMP in am    GI/FEN:   Na+ 137; K+3.6; Replace lytes as needed; tolerating clears ADAT  Orders Placed This Encounter      Advance Diet as Tolerated: Full Liquid Diet     Has passed flatus; no BM  Continue bowel program  #  Stress ulcer prophylaxis: Pantoprazole 40 mg daily    Endo:  Lab Results   Component Value Date    A1C 5.3 2020    Managed on insulin drip postop, will transition to sliding scale goal BG <180    Heme/ID:   Temp (24hrs), Av.5  F (37.5  C), Min:97.7  F (36.5  C), Max:100.6  F (38.1  C)  # Stress induced leukocytosis: WBC 14.3   Completed perioperative antibiotics  # Acute blood loss anemia and thrombocytopenia: Hgb 11.5; Plt 128  CBC in am    Tubes/Drains:  Right internal jugular intro  Arterial line  PIVs  CT/TPWs  Wright catheter    Proph:   BB, statin, ASA, subcutaneous heparin, PCD, PPI    Dispo:   Transfer to station 33 once off of vasoactive drips and stable.            Interval History:   No acute events overnight. States pain is well managed on current regimen, slept well overnight. Tolerating clears, is not passing flatus, + belching, no BM. Breathing is good, able to reach 1250 mL on IS. Up in chair once and tolerated. Denies chest pain, palpitations, dizziness, syncopal symptoms, chills, myalgias or sternal popping/clicking.          Medications:       acetaminophen  975 mg Oral Q8H     aspirin  81 mg Oral Daily     atorvastatin  40 mg Oral Daily     gabapentin  300 mg Oral BID     heparin ANTICOAGULANT  5,000 Units Subcutaneous Q8H     insulin aspart   Subcutaneous TID w/meals     ketorolac  15 mg Intravenous Q8H     lidocaine  2 patch Transdermal Q24H     lidocaine   Transdermal Q8H     methocarbamol  750 mg Oral 4x Daily     metoprolol tartrate  25 mg Oral BID     [START ON 2020] pantoprazole  40 mg Oral QAM AC     polyethylene glycol  17 g Oral BID     senna-docusate  1 tablet Oral BID    Or     senna-docusate  2 tablet Oral BID     sodium chloride (PF)  3 mL Intracatheter Q8H     vancomycin (VANCOCIN) IV  1,000 mg Intravenous Q12H     [START ON 2020] acetaminophen, albuterol, dextrose, glucose **OR** dextrose **OR** glucagon, hydrALAZINE, HYDROmorphone, insulin aspart, lidocaine 4%,  "lidocaine (buffered or not buffered), magnesium sulfate, magnesium sulfate, naloxone, ondansetron **OR** ondansetron, oxyCODONE, potassium chloride, potassium chloride with lidocaine, potassium chloride, potassium chloride, potassium chloride, potassium phosphate (KPHOS) in D5W IV, potassium phosphate (KPHOS) in D5W IV, potassium phosphate (KPHOS) in D5W IV, potassium phosphate (KPHOS) in D5W IV, potassium phosphate (KPHOS) in D5W IV, sodium chloride (PF)          Physical Exam:   Vitals were reviewed  Blood pressure (Abnormal) 144/78, pulse 96, temperature 100.6  F (38.1  C), resp. rate 8, height 1.727 m (5' 8\"), weight 80 kg (176 lb 5.9 oz), SpO2 92 %.  Vitals:    02/03/20 0557 02/04/20 0015   Weight: 78.9 kg (173 lb 14.4 oz) 80 kg (176 lb 5.9 oz)      I/O last 3 completed shifts:  In: 5970.7 [P.O.:150; I.V.:4964.7; Other:356]  Out: 4169 [Urine:2680; Emesis/NG output:50; Blood:949; Chest Tube:490]    General: NAD    Neuro: Intact    Rhythm: NSR    Lungs: CTA anteriorly    Cardiovascular: S1, S2, RRR, no m/g, + rub    Abdomen: S/ND, +BS    Extremeties: No edema    Incision(s): Midline sternal incision CDI    CT: to suction; no airleak, no crepitus         Data:    All labs and imaging reviewed by me.  Labs:    Data   Recent Labs   Lab 02/04/20  0515 02/03/20  1145 02/03/20  1018 02/03/20  1015   WBC 14.3* 10.4  --  9.4   HGB 11.5* 12.4* 10.5* 11.5*   MCV 93 93  --  93   * 101*  --  82*   INR  --  1.26*  --  1.41*    142 138 142   POTASSIUM 3.6 4.3 5.7* 5.7*   CHLORIDE 108 116*  --  116*   CO2 23 21  --  23   BUN 11 13  --  13   CR 0.76 0.70  --  0.68   ANIONGAP 6 5  --  3   BRIANA 7.7* 7.3*  --  8.3*   * 153*  --  166*   ALBUMIN 3.6 3.2*  --   --    PROTTOTAL 6.1* 5.4*  --   --    BILITOTAL 1.3 0.8  --   --    ALKPHOS 50 67  --   --    ALT 26 24  --   --    AST 36 26  --   --      Recent Labs   Lab 02/04/20  0811 02/04/20  0652 02/04/20  0515 02/04/20  0351 02/04/20  0132 02/03/20  8038 " 02/03/20  2300  02/03/20  1145  02/03/20  1015   GLC  --   --  135*  --   --   --   --   --  153*  --  166*   * 123*  --  133* 120* 129* 133*   < >  --    < >  --     < > = values in this interval not displayed.        CXR:  IMPRESSION: Median sternotomy and cardiac valve replacement. Interval removal of endotracheal and enteric tube. Mediastinal drains and right IJ line. Shallow inspiration. Atelectasis or infiltrate left lung base.    FACUNDO Guillaume, CCRN-CSC  CV Surgery  [place] Pager: [phone]  Personal Pager: [phone]

## 2020-02-04 NOTE — PROVIDER NOTIFICATION
MD Notification    Notified Person: MD    Notified Person Name:Dr. Taylor    Notification Date/Time:  1815    Notification Interaction:1830    Purpose of Notification:uncontrolled pain, elevated BP despite nitroglycerin  drip at max    Orders Received: nicardipine drip, extra dose of dilaudid, one dose of toradol    Comments:

## 2020-02-04 NOTE — PLAN OF CARE
Patient extubated 1720.  Increased pain with coughing from extubation.  Additional orders received from MD.  SBP >120 on nitroglycerine drip @ 2 mcg/kg/min.

## 2020-02-04 NOTE — PROGRESS NOTES
Extubation Note    Successful completion of SBT (Yes or No): Yes  Extubation time: 1720    Patient assessment:  Lung sounds: Diminished  Stridor Present (Yes or No):  No  Patient tolerance:  Good    Oxygen device:  Liter flow: 5L oxymask  SpO2: 95%    Plan: Pt extubated to 5L oxymask per MD order

## 2020-02-05 ENCOUNTER — APPOINTMENT (OUTPATIENT)
Dept: PHYSICAL THERAPY | Facility: CLINIC | Age: 50
DRG: 220 | End: 2020-02-05
Attending: SURGERY
Payer: COMMERCIAL

## 2020-02-05 LAB
ANION GAP SERPL CALCULATED.3IONS-SCNC: <1 MMOL/L (ref 3–14)
BUN SERPL-MCNC: 12 MG/DL (ref 7–30)
CALCIUM SERPL-MCNC: 8.2 MG/DL (ref 8.5–10.1)
CHLORIDE SERPL-SCNC: 108 MMOL/L (ref 94–109)
CO2 SERPL-SCNC: 30 MMOL/L (ref 20–32)
CREAT SERPL-MCNC: 0.71 MG/DL (ref 0.66–1.25)
ERYTHROCYTE [DISTWIDTH] IN BLOOD BY AUTOMATED COUNT: 13.4 % (ref 10–15)
GFR SERPL CREATININE-BSD FRML MDRD: >90 ML/MIN/{1.73_M2}
GLUCOSE BLDC GLUCOMTR-MCNC: 103 MG/DL (ref 70–99)
GLUCOSE BLDC GLUCOMTR-MCNC: 111 MG/DL (ref 70–99)
GLUCOSE BLDC GLUCOMTR-MCNC: 120 MG/DL (ref 70–99)
GLUCOSE BLDC GLUCOMTR-MCNC: 128 MG/DL (ref 70–99)
GLUCOSE BLDC GLUCOMTR-MCNC: 149 MG/DL (ref 70–99)
GLUCOSE SERPL-MCNC: 116 MG/DL (ref 70–99)
HCT VFR BLD AUTO: 31.6 % (ref 40–53)
HGB BLD-MCNC: 10.7 G/DL (ref 13.3–17.7)
INR PPP: 1.04 (ref 0.86–1.14)
INTERPRETATION ECG - MUSE: NORMAL
INTERPRETATION ECG - MUSE: NORMAL
MCH RBC QN AUTO: 32.5 PG (ref 26.5–33)
MCHC RBC AUTO-ENTMCNC: 33.9 G/DL (ref 31.5–36.5)
MCV RBC AUTO: 96 FL (ref 78–100)
PLATELET # BLD AUTO: 103 10E9/L (ref 150–450)
POTASSIUM SERPL-SCNC: 4.1 MMOL/L (ref 3.4–5.3)
RBC # BLD AUTO: 3.29 10E12/L (ref 4.4–5.9)
SODIUM SERPL-SCNC: 138 MMOL/L (ref 133–144)
WBC # BLD AUTO: 10.9 10E9/L (ref 4–11)

## 2020-02-05 PROCEDURE — 85027 COMPLETE CBC AUTOMATED: CPT | Performed by: NURSE PRACTITIONER

## 2020-02-05 PROCEDURE — 12000000 ZZH R&B MED SURG/OB

## 2020-02-05 PROCEDURE — 25000128 H RX IP 250 OP 636: Performed by: NURSE PRACTITIONER

## 2020-02-05 PROCEDURE — 80048 BASIC METABOLIC PNL TOTAL CA: CPT | Performed by: NURSE PRACTITIONER

## 2020-02-05 PROCEDURE — 97530 THERAPEUTIC ACTIVITIES: CPT | Mod: GP | Performed by: PHYSICAL THERAPIST

## 2020-02-05 PROCEDURE — 97110 THERAPEUTIC EXERCISES: CPT | Mod: GP | Performed by: PHYSICAL THERAPIST

## 2020-02-05 PROCEDURE — 25000132 ZZH RX MED GY IP 250 OP 250 PS 637

## 2020-02-05 PROCEDURE — 36415 COLL VENOUS BLD VENIPUNCTURE: CPT | Performed by: SURGERY

## 2020-02-05 PROCEDURE — 85610 PROTHROMBIN TIME: CPT | Performed by: SURGERY

## 2020-02-05 PROCEDURE — 36415 COLL VENOUS BLD VENIPUNCTURE: CPT | Performed by: NURSE PRACTITIONER

## 2020-02-05 PROCEDURE — 25000132 ZZH RX MED GY IP 250 OP 250 PS 637: Performed by: NURSE PRACTITIONER

## 2020-02-05 PROCEDURE — 25000132 ZZH RX MED GY IP 250 OP 250 PS 637: Performed by: PHYSICIAN ASSISTANT

## 2020-02-05 PROCEDURE — 00000146 ZZHCL STATISTIC GLUCOSE BY METER IP

## 2020-02-05 RX ORDER — WARFARIN SODIUM 5 MG/1
5 TABLET ORAL
Status: COMPLETED | OUTPATIENT
Start: 2020-02-05 | End: 2020-02-05

## 2020-02-05 RX ORDER — METOPROLOL TARTRATE 50 MG
50 TABLET ORAL 2 TIMES DAILY
Status: DISCONTINUED | OUTPATIENT
Start: 2020-02-05 | End: 2020-02-06

## 2020-02-05 RX ORDER — FUROSEMIDE 20 MG
20 TABLET ORAL DAILY
Status: DISCONTINUED | OUTPATIENT
Start: 2020-02-05 | End: 2020-02-09 | Stop reason: HOSPADM

## 2020-02-05 RX ORDER — METOPROLOL TARTRATE 25 MG/1
25 TABLET, FILM COATED ORAL ONCE
Status: COMPLETED | OUTPATIENT
Start: 2020-02-05 | End: 2020-02-05

## 2020-02-05 RX ADMIN — ACETAMINOPHEN 975 MG: 325 TABLET, FILM COATED ORAL at 05:22

## 2020-02-05 RX ADMIN — OXYCODONE HYDROCHLORIDE 10 MG: 5 TABLET ORAL at 19:22

## 2020-02-05 RX ADMIN — METHOCARBAMOL TABLETS 750 MG: 750 TABLET, COATED ORAL at 09:24

## 2020-02-05 RX ADMIN — METOPROLOL TARTRATE 25 MG: 25 TABLET ORAL at 09:24

## 2020-02-05 RX ADMIN — ACETAMINOPHEN 975 MG: 325 TABLET, FILM COATED ORAL at 22:45

## 2020-02-05 RX ADMIN — KETOROLAC TROMETHAMINE 15 MG: 15 INJECTION, SOLUTION INTRAMUSCULAR; INTRAVENOUS at 00:22

## 2020-02-05 RX ADMIN — HEPARIN SODIUM 5000 UNITS: 5000 INJECTION, SOLUTION INTRAVENOUS; SUBCUTANEOUS at 21:24

## 2020-02-05 RX ADMIN — POLYETHYLENE GLYCOL 3350 17 G: 17 POWDER, FOR SOLUTION ORAL at 09:24

## 2020-02-05 RX ADMIN — WARFARIN SODIUM 5 MG: 5 TABLET ORAL at 18:16

## 2020-02-05 RX ADMIN — HEPARIN SODIUM 5000 UNITS: 5000 INJECTION, SOLUTION INTRAVENOUS; SUBCUTANEOUS at 04:31

## 2020-02-05 RX ADMIN — OXYCODONE HYDROCHLORIDE 5 MG: 5 TABLET ORAL at 05:22

## 2020-02-05 RX ADMIN — METOPROLOL TARTRATE 25 MG: 25 TABLET ORAL at 11:16

## 2020-02-05 RX ADMIN — GABAPENTIN 300 MG: 300 CAPSULE ORAL at 21:24

## 2020-02-05 RX ADMIN — ACETAMINOPHEN 975 MG: 325 TABLET, FILM COATED ORAL at 13:07

## 2020-02-05 RX ADMIN — METHOCARBAMOL TABLETS 750 MG: 750 TABLET, COATED ORAL at 18:16

## 2020-02-05 RX ADMIN — ASPIRIN 81 MG: 81 TABLET, DELAYED RELEASE ORAL at 09:25

## 2020-02-05 RX ADMIN — OXYCODONE HYDROCHLORIDE 10 MG: 5 TABLET ORAL at 10:33

## 2020-02-05 RX ADMIN — METOPROLOL TARTRATE 50 MG: 50 TABLET, FILM COATED ORAL at 21:24

## 2020-02-05 RX ADMIN — METHOCARBAMOL TABLETS 750 MG: 750 TABLET, COATED ORAL at 13:07

## 2020-02-05 RX ADMIN — METHOCARBAMOL TABLETS 750 MG: 750 TABLET, COATED ORAL at 22:46

## 2020-02-05 RX ADMIN — LIDOCAINE 1 PATCH: 560 PATCH PERCUTANEOUS; TOPICAL; TRANSDERMAL at 09:34

## 2020-02-05 RX ADMIN — SENNOSIDES AND DOCUSATE SODIUM 2 TABLET: 8.6; 5 TABLET ORAL at 09:24

## 2020-02-05 RX ADMIN — OXYCODONE HYDROCHLORIDE 5 MG: 5 TABLET ORAL at 22:46

## 2020-02-05 RX ADMIN — HEPARIN SODIUM 5000 UNITS: 5000 INJECTION, SOLUTION INTRAVENOUS; SUBCUTANEOUS at 11:16

## 2020-02-05 RX ADMIN — ATORVASTATIN CALCIUM 40 MG: 40 TABLET, FILM COATED ORAL at 09:25

## 2020-02-05 RX ADMIN — PANTOPRAZOLE SODIUM 40 MG: 40 TABLET, DELAYED RELEASE ORAL at 09:24

## 2020-02-05 RX ADMIN — GABAPENTIN 300 MG: 300 CAPSULE ORAL at 09:24

## 2020-02-05 RX ADMIN — FUROSEMIDE 20 MG: 20 TABLET ORAL at 11:16

## 2020-02-05 ASSESSMENT — ACTIVITIES OF DAILY LIVING (ADL)
ADLS_ACUITY_SCORE: 12

## 2020-02-05 ASSESSMENT — MIFFLIN-ST. JEOR: SCORE: 1644.65

## 2020-02-05 NOTE — PLAN OF CARE
A&OX4.  AVSS, O2 @ 2L/NC at bedtime, otherwise on RA.  SR on the monitor.  Scheduled Tylenol & Robaxin, and PRN Oxycodone.  Sternal incision w/ liquid bandage, LIT, ecchymotic.  CT x 2 to suction, dressing CDI.  Pacer wires intact, capped and in bag.  LS dim.  IS encouraged.  BS active, belching, started passing gas this morning.  Indwelling urinary catheter intact and patent, good U.O.  Up to the bathroom w/ 1 assist.  Slept in between cares.

## 2020-02-05 NOTE — PROGRESS NOTES
[facility]  Cardiovascular and Thoracic Surgery Daily Note          Assessment and Plan:   POD#2 s/p s/p aortic valve replacement with a 25 mm [employer]. [employer]olf [place] mechanical valve, intraoperative transesophageal echocardiogram on 2/3/2020 by Dr. Cindy Matthews  -CVS: HR: 90s, SBP: 130s/70s, ASA, BB inc 50 mg bid, statin, sub q heparin, start coumadin tonight for mechanical valve, INR goal 2-3  -Resp: Extubated per protocol, sating well on RA, encourage IS, CTs to waterseal, anticipate removal tomorrow  -Neuro: grossly intact, pain controlled with current regimen  -Renal: good UO, Crea: 0.71, 2 kg above pre-op weight, lasix PO daily  -GI:  Continue bowel regimen, miralax  -: Remove Wright  -Endo: sliding scale insulin, pre-op A1C 5.3  -FEN: replete lytes as needed, Na: 138, K: 4.1, Orders Placed This Encounter      Regular Diet Adult Thin Liquids (water, ice chips, juice, milk, gelatin, ice cream, etc)  -ID: WBC: 10.9, Temp (24hrs), Av.9  F (37.2  C), Min:97.8  F (36.6  C), Max:100.4  F (38  C), completing keyur-op abx  -Heme: Hgb: 10.7, plt: 103, acute blood loss anemia, thrombocytopenia   -Proph: PCD,ASA, BB, statin sub q heparin  -Dispo: St 33, continue therapies, CTs to waterseal-remove tomorrow, home 2-3 days          Interval History:   Sitting up in chair, taking morning meds, denies pain, tolerating diet, breathing fine, no acute events overnight         Medications:       acetaminophen  975 mg Oral Q8H     aspirin  81 mg Oral Daily     atorvastatin  40 mg Oral Daily     gabapentin  300 mg Oral BID     heparin ANTICOAGULANT  5,000 Units Subcutaneous Q8H     insulin aspart  1-7 Units Subcutaneous TID AC     insulin aspart  1-5 Units Subcutaneous At Bedtime     lidocaine  2 patch Transdermal Q24H     lidocaine   Transdermal Q8H     methocarbamol  750 mg Oral 4x Daily     metoprolol tartrate  25 mg Oral BID     pantoprazole  40 mg Oral QAM AC     polyethylene glycol  17 g Oral BID      "senna-docusate  1 tablet Oral BID    Or     senna-docusate  2 tablet Oral BID     sodium chloride (PF)  3 mL Intracatheter Q8H     [START ON 2/6/2020] acetaminophen, albuterol, glucose **OR** dextrose **OR** glucagon, hydrALAZINE, HYDROmorphone, lidocaine 4%, lidocaine (buffered or not buffered), magnesium sulfate, magnesium sulfate, naloxone, ondansetron **OR** ondansetron, oxyCODONE, potassium chloride, potassium chloride with lidocaine, potassium chloride, potassium chloride, potassium chloride, potassium phosphate (KPHOS) in D5W IV, potassium phosphate (KPHOS) in D5W IV, potassium phosphate (KPHOS) in D5W IV, potassium phosphate (KPHOS) in D5W IV, potassium phosphate (KPHOS) in D5W IV, sodium chloride (PF)          Physical Exam:   Vitals were reviewed  Blood pressure 133/77, pulse 86, temperature 98.8  F (37.1  C), temperature source Oral, resp. rate 18, height 1.727 m (5' 8\"), weight 80 kg (176 lb 6.4 oz), SpO2 95 %.  Rhythm: NSR    Lungs: clear    Cardiovascular: s1/s2, no m/r/g    Abdomen: s/nt/nd    Extremeties: no LE edema    Incision: cdi    CT: to waterseal    Weight:   Vitals:    02/03/20 0557 02/04/20 0015 02/04/20 1845 02/05/20 0500   Weight: 78.9 kg (173 lb 14.4 oz) 80 kg (176 lb 5.9 oz) 82.4 kg (181 lb 9.6 oz) 80 kg (176 lb 6.4 oz)            Data:   Labs:   Lab Results   Component Value Date    WBC 10.9 02/05/2020     Lab Results   Component Value Date    RBC 3.29 02/05/2020     Lab Results   Component Value Date    HGB 10.7 02/05/2020     Lab Results   Component Value Date    HCT 31.6 02/05/2020     No components found for: MCT  Lab Results   Component Value Date    MCV 96 02/05/2020     Lab Results   Component Value Date    MCH 32.5 02/05/2020     Lab Results   Component Value Date    MCHC 33.9 02/05/2020     Lab Results   Component Value Date    RDW 13.4 02/05/2020     Lab Results   Component Value Date     02/05/2020       Last Basic Metabolic Panel:  Lab Results   Component Value Date "     02/05/2020      Lab Results   Component Value Date    POTASSIUM 4.1 02/05/2020     Lab Results   Component Value Date    CHLORIDE 108 02/05/2020     Lab Results   Component Value Date    RBIANA 8.2 02/05/2020     Lab Results   Component Value Date    CO2 30 02/05/2020     Lab Results   Component Value Date    BUN 12 02/05/2020     Lab Results   Component Value Date    CR 0.71 02/05/2020     Lab Results   Component Value Date     02/05/2020       Moni Cartagena PA-C  Pager #: [phone]

## 2020-02-05 NOTE — PROGRESS NOTES
Neuro: Neuros intact. SANZ. Pain well controlled.  Cardiac: SR. BP wnl. Serosanguinous CT output.   Resp: RA. Lungs clear, diminished in bases. Encouraged pulmonary toileting.   GI: Advanced to regular diet. S.S. insulin.  : Kyle, good UOP.   Skin: Sternal incision.   Family: Wife and mom at bedside.     Will transfer to [facility].

## 2020-02-05 NOTE — CONSULTS
"NUTRITION ASSESSMENT      REASON FOR ASSESSMENT:  Cardiac Surgery Nutrition Consult    CURRENT DIET / INTAKE:  Diet: regular  Visited with pt and wife this morning  Pt reports fair appetite  \"Feel like I need to have a BM\"  Wife did bring pt in some food this morning    Pt follows a regular diet at home  \"My wife follows a gluten-free, so by default I sometimes eat that\"      ANTHROPOMETRICS:   Ht: 5'8\"  Wt: (2/3) 78.9 kg  BMI: 26.4  IBW: 70 kg  Weight Status: Overweight BMI 25-29.9  %IBW: 113%    MALNUTRITION:  Patient does not meet two of the following criteria necessary for diagnosing malnutrition:  significant weight loss, reduced intake, subcutaneous fat loss, muscle loss or fluid retention. Nutrition Focused Physical Assessment (NFPA) not appropriate at this time.    NUTRITION DIAGNOSIS:   Increased nutrient needs (protein) R/t higher demand for healing AEB pt is s/p AVR 2/3/20    INTERVENTIONS:    Nutrition Prescription:  Regular    Implementation:  Nutrition Education (Content):  Discussed the importance of adequate nutrition post-op for healing and energy, emphasizing protein foods, and encouraged patient to order a protein food at each meal.  Pt may order Boost supplements prn    Goals:  Patient to consume ~75% at meals in the next 3 - 5 days    Follow Up/Monitoring (InPatient):  Food and Fluid intake -  Monitor for adequacy    Follow Up/Monitoring (OutPatient):  Patient will participate in out-patient cardiac rehab and attend nutrition classes during the program    "

## 2020-02-05 NOTE — PHARMACY-ANTICOAGULATION SERVICE
Clinical Pharmacy - Warfarin Dosing Consult     Pharmacy has been consulted to manage this patient s warfarin therapy.  Indication: Mechanical Aortic Valve Replacement  Therapy Goal: INR 2-3  Warfarin Prior to Admission: No    INR   Date Value Ref Range Status   02/03/2020 1.26 (H) 0.86 - 1.14 Final   02/03/2020 1.41 (H) 0.86 - 1.14 Final       Recommend warfarin 5 mg today.  Pharmacy will monitor Toney TATE [place]bc daily and order warfarin doses to achieve specified goal.      Please contact pharmacy as soon as possible if the warfarin needs to be held for a procedure or if the warfarin goals change.      Yadi Mckeon, JeniferD, BCPS

## 2020-02-05 NOTE — PLAN OF CARE
Transfer from ICU @ 1845. Up with assist of one back to bed. Family @ the bedside. VSS. Pt settled, will continue to monitor.

## 2020-02-05 NOTE — PLAN OF CARE
Discharge Planner PT   Patient plan for discharge: return home with assist of spouse  Current status: CR: BP initially too high to participate in mobility; received meds by nursing and BP decreased to 131/75; SBA for sit<>stand; assist to manage chest tube; ambulated in hallway x 10 minutes with wheelchair follow; limited by fatigue but no dizziness or SOB; heart surgery education provided to patient and spouse; transfer to sitting with CGA and cues to maintain sternal precautions; sit>supine with min A. Positioned with pillows for comfort  Barriers to return to prior living situation: none anticipated; will have assist of spouse  Recommendations for discharge: Home with assist of spouse for any mobility and cares as needed until spinal precautions are lifted; Phase II OP CR  Rationale for recommendations: Patient would benefit from ongoing CR for monitored progression of activity to optimize return of heart health       Entered by: Brianna Collins 02/05/2020 11:51 AM

## 2020-02-06 ENCOUNTER — APPOINTMENT (OUTPATIENT)
Dept: PHYSICAL THERAPY | Facility: CLINIC | Age: 50
DRG: 220 | End: 2020-02-06
Attending: SURGERY
Payer: COMMERCIAL

## 2020-02-06 LAB
ANION GAP SERPL CALCULATED.3IONS-SCNC: 3 MMOL/L (ref 3–14)
BUN SERPL-MCNC: 10 MG/DL (ref 7–30)
CALCIUM SERPL-MCNC: 8.7 MG/DL (ref 8.5–10.1)
CHLORIDE SERPL-SCNC: 105 MMOL/L (ref 94–109)
CO2 SERPL-SCNC: 29 MMOL/L (ref 20–32)
CREAT SERPL-MCNC: 0.69 MG/DL (ref 0.66–1.25)
GFR SERPL CREATININE-BSD FRML MDRD: >90 ML/MIN/{1.73_M2}
GLUCOSE BLDC GLUCOMTR-MCNC: 103 MG/DL (ref 70–99)
GLUCOSE BLDC GLUCOMTR-MCNC: 123 MG/DL (ref 70–99)
GLUCOSE BLDC GLUCOMTR-MCNC: 172 MG/DL (ref 70–99)
GLUCOSE BLDC GLUCOMTR-MCNC: 97 MG/DL (ref 70–99)
GLUCOSE BLDC GLUCOMTR-MCNC: 97 MG/DL (ref 70–99)
GLUCOSE SERPL-MCNC: 108 MG/DL (ref 70–99)
INR PPP: 1.07 (ref 0.86–1.14)
POTASSIUM SERPL-SCNC: 4.1 MMOL/L (ref 3.4–5.3)
SODIUM SERPL-SCNC: 137 MMOL/L (ref 133–144)

## 2020-02-06 PROCEDURE — 00000146 ZZHCL STATISTIC GLUCOSE BY METER IP

## 2020-02-06 PROCEDURE — 97110 THERAPEUTIC EXERCISES: CPT | Mod: GP

## 2020-02-06 PROCEDURE — 25000132 ZZH RX MED GY IP 250 OP 250 PS 637: Performed by: NURSE PRACTITIONER

## 2020-02-06 PROCEDURE — 25000132 ZZH RX MED GY IP 250 OP 250 PS 637: Performed by: PHYSICIAN ASSISTANT

## 2020-02-06 PROCEDURE — 25000128 H RX IP 250 OP 636: Performed by: NURSE PRACTITIONER

## 2020-02-06 PROCEDURE — 85610 PROTHROMBIN TIME: CPT | Performed by: PHYSICIAN ASSISTANT

## 2020-02-06 PROCEDURE — 36415 COLL VENOUS BLD VENIPUNCTURE: CPT | Performed by: PHYSICIAN ASSISTANT

## 2020-02-06 PROCEDURE — 25000132 ZZH RX MED GY IP 250 OP 250 PS 637: Performed by: SURGERY

## 2020-02-06 PROCEDURE — 80048 BASIC METABOLIC PNL TOTAL CA: CPT | Performed by: PHYSICIAN ASSISTANT

## 2020-02-06 PROCEDURE — 12000000 ZZH R&B MED SURG/OB

## 2020-02-06 RX ORDER — METOPROLOL TARTRATE 25 MG/1
25 TABLET, FILM COATED ORAL ONCE
Status: COMPLETED | OUTPATIENT
Start: 2020-02-06 | End: 2020-02-06

## 2020-02-06 RX ORDER — WARFARIN SODIUM 5 MG/1
5 TABLET ORAL
Status: COMPLETED | OUTPATIENT
Start: 2020-02-06 | End: 2020-02-06

## 2020-02-06 RX ADMIN — METOPROLOL TARTRATE 50 MG: 50 TABLET, FILM COATED ORAL at 08:32

## 2020-02-06 RX ADMIN — METOPROLOL SUCCINATE 75 MG: 25 TABLET, EXTENDED RELEASE ORAL at 20:39

## 2020-02-06 RX ADMIN — OXYCODONE HYDROCHLORIDE 5 MG: 5 TABLET ORAL at 19:26

## 2020-02-06 RX ADMIN — OXYCODONE HYDROCHLORIDE 5 MG: 5 TABLET ORAL at 09:31

## 2020-02-06 RX ADMIN — OXYCODONE HYDROCHLORIDE 5 MG: 5 TABLET ORAL at 06:26

## 2020-02-06 RX ADMIN — FUROSEMIDE 20 MG: 20 TABLET ORAL at 08:32

## 2020-02-06 RX ADMIN — ACETAMINOPHEN 650 MG: 325 TABLET, FILM COATED ORAL at 10:42

## 2020-02-06 RX ADMIN — METOPROLOL TARTRATE 25 MG: 25 TABLET ORAL at 12:53

## 2020-02-06 RX ADMIN — HEPARIN SODIUM 5000 UNITS: 5000 INJECTION, SOLUTION INTRAVENOUS; SUBCUTANEOUS at 20:39

## 2020-02-06 RX ADMIN — ASPIRIN 81 MG: 81 TABLET, DELAYED RELEASE ORAL at 08:32

## 2020-02-06 RX ADMIN — METHOCARBAMOL TABLETS 750 MG: 750 TABLET, COATED ORAL at 22:21

## 2020-02-06 RX ADMIN — OXYCODONE HYDROCHLORIDE 5 MG: 5 TABLET ORAL at 22:21

## 2020-02-06 RX ADMIN — METHOCARBAMOL TABLETS 750 MG: 750 TABLET, COATED ORAL at 12:05

## 2020-02-06 RX ADMIN — LIDOCAINE 2 PATCH: 560 PATCH PERCUTANEOUS; TOPICAL; TRANSDERMAL at 08:31

## 2020-02-06 RX ADMIN — WARFARIN SODIUM 5 MG: 5 TABLET ORAL at 17:57

## 2020-02-06 RX ADMIN — HEPARIN SODIUM 5000 UNITS: 5000 INJECTION, SOLUTION INTRAVENOUS; SUBCUTANEOUS at 04:57

## 2020-02-06 RX ADMIN — ACETAMINOPHEN 975 MG: 325 TABLET, FILM COATED ORAL at 06:26

## 2020-02-06 RX ADMIN — METHOCARBAMOL TABLETS 750 MG: 750 TABLET, COATED ORAL at 17:57

## 2020-02-06 RX ADMIN — OXYCODONE HYDROCHLORIDE 5 MG: 5 TABLET ORAL at 15:04

## 2020-02-06 RX ADMIN — OXYCODONE HYDROCHLORIDE 5 MG: 5 TABLET ORAL at 10:42

## 2020-02-06 RX ADMIN — METHOCARBAMOL TABLETS 750 MG: 750 TABLET, COATED ORAL at 08:32

## 2020-02-06 RX ADMIN — ATORVASTATIN CALCIUM 40 MG: 40 TABLET, FILM COATED ORAL at 08:32

## 2020-02-06 RX ADMIN — HEPARIN SODIUM 5000 UNITS: 5000 INJECTION, SOLUTION INTRAVENOUS; SUBCUTANEOUS at 12:05

## 2020-02-06 RX ADMIN — PANTOPRAZOLE SODIUM 40 MG: 40 TABLET, DELAYED RELEASE ORAL at 06:26

## 2020-02-06 ASSESSMENT — ACTIVITIES OF DAILY LIVING (ADL)
ADLS_ACUITY_SCORE: 12
ADLS_ACUITY_SCORE: 10
ADLS_ACUITY_SCORE: 12

## 2020-02-06 ASSESSMENT — MIFFLIN-ST. JEOR: SCORE: 1616.98

## 2020-02-06 NOTE — PLAN OF CARE
Discharge Planner PT   Patient plan for discharge: Home  Current status: Pt SBA with transfers, good adherence to sternal precautions. Pt amb 13 minutes on treadmill speed 1.0-1.3mph, vital signs stable, rates 4-5/10 on OMNI effort scale, rates pain as 5-6.  Barriers to return to prior living situation: None from a mobility standpoint  Recommendations for discharge: Home with outpatient cardiac rehab  Rationale for recommendations: Pt is able to complete mobility necessary for discharge to home, recommend outpatient cardiac rehab to further progress aerobic activity tolerance in monitored setting and for education to promote optimal heart health.       Entered by: Emma So 02/06/2020 10:20 AM

## 2020-02-06 NOTE — PROGRESS NOTES
[facility]  Cardiovascular and Thoracic Surgery Daily Note          Assessment and Plan:   POD#3 s/p s/p aortic valve replacement with a 25 mm [employer]. Rolf [place] mechanical valve, intraoperative transesophageal echocardiogram on 2/3/2020 by Dr. Cindy Matthews    -CVS: HR: 90s, SBP: 130s/70s, ASA, BB inc 50 mg bid and switched to XL, statin, sub q heparin, continue coumadin for mechanical valve, INR goal 2-3    -Resp: Extubated per protocol, sating well on RA, encourage IS, CTs removed this am    -Neuro: grossly intact, pain controlled with current regimen    -Renal: good UO, Crea: 0.69, 2 kg above pre-op weight, 1 kg below pre-op weight    -GI:  Continue bowel regimen, +BM    -: Voiding on own    -Endo: sliding scale insulin, pre-op A1C 5.3    -FEN: replete lytes as needed, Na: 137, K: 4.1, Orders Placed This Encounter      Regular Diet Adult Thin Liquids (water, ice chips, juice, milk, gelatin, ice cream, etc)    -ID: WBC: stable, Temp (24hrs), Av.9  F (37.2  C), Min:98  F (36.7  C), Max:100.8  F (38.2  C), completed keyur-op abx    -Heme: Hgb: stable, plt: stable, acute blood loss anemia, thrombocytopenia     -Proph: PCD,ASA, BB, statin sub q heparin    -Dispo: [facility] [address], continue therapies, CTs removed, home once INR therapeutic          Interval History:   Sitting up in chair, pain controlled, breathing fine, tolerating diet         Medications:       aspirin  81 mg Oral Daily     atorvastatin  40 mg Oral Daily     furosemide  20 mg Oral Daily     heparin ANTICOAGULANT  5,000 Units Subcutaneous Q8H     insulin aspart  1-7 Units Subcutaneous TID AC     insulin aspart  1-5 Units Subcutaneous At Bedtime     lidocaine  2 patch Transdermal Q24H     lidocaine   Transdermal Q8H     methocarbamol  750 mg Oral 4x Daily     metoprolol tartrate  50 mg Oral BID     pantoprazole  40 mg Oral QAM AC     polyethylene glycol  17 g Oral BID     senna-docusate  1 tablet Oral BID    Or     senna-docusate  2 tablet  "Oral BID     sodium chloride (PF)  3 mL Intracatheter Q8H     @[facility] HospitalRN-@          Physical Exam:   Vitals were reviewed  Blood pressure 114/67, pulse 83, temperature 98.2  F (36.8  C), temperature source Oral, resp. rate 18, height 1.727 m (5' 8\"), weight 77.2 kg (170 lb 4.8 oz), SpO2 98 %.  Rhythm: NSR    Lungs: clear    Cardiovascular: s1/s2, no m/r/g    Abdomen: s/nt/nd    Extremeties: no LE edema    Incision: cdi    CT: na    Weight:   Vitals:    02/03/20 0557 02/04/20 0015 02/04/20 1845 02/05/20 0500   Weight: 78.9 kg (173 lb 14.4 oz) 80 kg (176 lb 5.9 oz) 82.4 kg (181 lb 9.6 oz) 80 kg (176 lb 6.4 oz)    02/06/20 0639   Weight: 77.2 kg (170 lb 4.8 oz)            Data:   Labs:   Lab Results   Component Value Date    WBC 10.9 02/05/2020     Lab Results   Component Value Date    RBC 3.29 02/05/2020     Lab Results   Component Value Date    HGB 10.7 02/05/2020     Lab Results   Component Value Date    HCT 31.6 02/05/2020     No components found for: MCT  Lab Results   Component Value Date    MCV 96 02/05/2020     Lab Results   Component Value Date    MCH 32.5 02/05/2020     Lab Results   Component Value Date    MCHC 33.9 02/05/2020     Lab Results   Component Value Date    RDW 13.4 02/05/2020     Lab Results   Component Value Date     02/05/2020       Last Basic Metabolic Panel:  Lab Results   Component Value Date     02/06/2020      Lab Results   Component Value Date    POTASSIUM 4.1 02/06/2020     Lab Results   Component Value Date    CHLORIDE 105 02/06/2020     Lab Results   Component Value Date    BRIANA 8.7 02/06/2020     Lab Results   Component Value Date    CO2 29 02/06/2020     Lab Results   Component Value Date    BUN 10 02/06/2020     Lab Results   Component Value Date    CR 0.69 02/06/2020     Lab Results   Component Value Date     02/06/2020       Moni Cartagena PA-C  Pager #: [phone]      "

## 2020-02-06 NOTE — PLAN OF CARE
A&Ox4.  AVSS, on RA.  SR on the monitor.  Scheduled Tylenol, Robaxin and PRN Oxycodone for pain.  Sternal incision with liquid bandage CDI, LIT, ecchymotic.  CT x 2 to waterseal, dressing CDI, wires capped and in bag.  LS clear, doing IS independently.  BS active, tolerating oral intake, had multiple loose BM yesterday, refused bowel regimen last evening.  Voiding per urinal.  Up to bathroom with SBA, wife at bedside till bedtime.  Slept in between care.

## 2020-02-06 NOTE — PLAN OF CARE
A&OX4, VSS.  Pain controlled with po pain meds. Lungs clear, diminished.  Using IS and flutter valve.  Incision CD&I with dermabond, ecchymotic.  CT to water seal, no AL. Up independently (wife in room), steady on feet/fall precautions DC'd. Voiding well.  Adequate po intake.

## 2020-02-07 ENCOUNTER — APPOINTMENT (OUTPATIENT)
Dept: GENERAL RADIOLOGY | Facility: CLINIC | Age: 50
DRG: 220 | End: 2020-02-07
Attending: PHYSICIAN ASSISTANT
Payer: COMMERCIAL

## 2020-02-07 ENCOUNTER — APPOINTMENT (OUTPATIENT)
Dept: PHYSICAL THERAPY | Facility: CLINIC | Age: 50
DRG: 220 | End: 2020-02-07
Attending: SURGERY
Payer: COMMERCIAL

## 2020-02-07 LAB
GLUCOSE BLDC GLUCOMTR-MCNC: 103 MG/DL (ref 70–99)
GLUCOSE BLDC GLUCOMTR-MCNC: 106 MG/DL (ref 70–99)
GLUCOSE BLDC GLUCOMTR-MCNC: 109 MG/DL (ref 70–99)
GLUCOSE BLDC GLUCOMTR-MCNC: 112 MG/DL (ref 70–99)
GLUCOSE BLDC GLUCOMTR-MCNC: 170 MG/DL (ref 70–99)
INR PPP: 1.27 (ref 0.86–1.14)
PLATELET # BLD AUTO: 201 10E9/L (ref 150–450)

## 2020-02-07 PROCEDURE — 00000146 ZZHCL STATISTIC GLUCOSE BY METER IP

## 2020-02-07 PROCEDURE — 85049 AUTOMATED PLATELET COUNT: CPT | Performed by: PHYSICIAN ASSISTANT

## 2020-02-07 PROCEDURE — 25000132 ZZH RX MED GY IP 250 OP 250 PS 637: Performed by: PHYSICIAN ASSISTANT

## 2020-02-07 PROCEDURE — 97530 THERAPEUTIC ACTIVITIES: CPT | Mod: GP

## 2020-02-07 PROCEDURE — 25000128 H RX IP 250 OP 636: Performed by: NURSE PRACTITIONER

## 2020-02-07 PROCEDURE — 25000132 ZZH RX MED GY IP 250 OP 250 PS 637: Performed by: NURSE PRACTITIONER

## 2020-02-07 PROCEDURE — 85610 PROTHROMBIN TIME: CPT | Performed by: PHYSICIAN ASSISTANT

## 2020-02-07 PROCEDURE — 97110 THERAPEUTIC EXERCISES: CPT | Mod: GP

## 2020-02-07 PROCEDURE — 25000131 ZZH RX MED GY IP 250 OP 636 PS 637: Performed by: NURSE PRACTITIONER

## 2020-02-07 PROCEDURE — 12000000 ZZH R&B MED SURG/OB

## 2020-02-07 PROCEDURE — 36415 COLL VENOUS BLD VENIPUNCTURE: CPT | Performed by: PHYSICIAN ASSISTANT

## 2020-02-07 PROCEDURE — 71046 X-RAY EXAM CHEST 2 VIEWS: CPT

## 2020-02-07 RX ORDER — METOPROLOL SUCCINATE 100 MG/1
100 TABLET, EXTENDED RELEASE ORAL 2 TIMES DAILY
Status: DISCONTINUED | OUTPATIENT
Start: 2020-02-07 | End: 2020-02-09 | Stop reason: HOSPADM

## 2020-02-07 RX ORDER — CALCIUM CARBONATE 500 MG/1
1000 TABLET, CHEWABLE ORAL EVERY 4 HOURS PRN
Status: DISCONTINUED | OUTPATIENT
Start: 2020-02-07 | End: 2020-02-09 | Stop reason: HOSPADM

## 2020-02-07 RX ORDER — WARFARIN SODIUM 7.5 MG/1
7.5 TABLET ORAL
Status: DISCONTINUED | OUTPATIENT
Start: 2020-02-07 | End: 2020-02-07

## 2020-02-07 RX ORDER — WARFARIN SODIUM 10 MG/1
10 TABLET ORAL
Status: COMPLETED | OUTPATIENT
Start: 2020-02-07 | End: 2020-02-07

## 2020-02-07 RX ADMIN — ACETAMINOPHEN 650 MG: 325 TABLET, FILM COATED ORAL at 20:40

## 2020-02-07 RX ADMIN — HEPARIN SODIUM 5000 UNITS: 5000 INJECTION, SOLUTION INTRAVENOUS; SUBCUTANEOUS at 20:40

## 2020-02-07 RX ADMIN — POLYETHYLENE GLYCOL 3350 17 G: 17 POWDER, FOR SOLUTION ORAL at 08:19

## 2020-02-07 RX ADMIN — METOPROLOL SUCCINATE 100 MG: 100 TABLET, EXTENDED RELEASE ORAL at 20:40

## 2020-02-07 RX ADMIN — HEPARIN SODIUM 5000 UNITS: 5000 INJECTION, SOLUTION INTRAVENOUS; SUBCUTANEOUS at 11:01

## 2020-02-07 RX ADMIN — METOPROLOL SUCCINATE 75 MG: 25 TABLET, EXTENDED RELEASE ORAL at 08:19

## 2020-02-07 RX ADMIN — SENNOSIDES AND DOCUSATE SODIUM 2 TABLET: 8.6; 5 TABLET ORAL at 08:20

## 2020-02-07 RX ADMIN — ATORVASTATIN CALCIUM 40 MG: 40 TABLET, FILM COATED ORAL at 08:20

## 2020-02-07 RX ADMIN — FUROSEMIDE 20 MG: 20 TABLET ORAL at 08:20

## 2020-02-07 RX ADMIN — HEPARIN SODIUM 5000 UNITS: 5000 INJECTION, SOLUTION INTRAVENOUS; SUBCUTANEOUS at 03:53

## 2020-02-07 RX ADMIN — WARFARIN SODIUM 10 MG: 10 TABLET ORAL at 17:21

## 2020-02-07 RX ADMIN — INSULIN ASPART 1 UNITS: 100 INJECTION, SOLUTION INTRAVENOUS; SUBCUTANEOUS at 17:41

## 2020-02-07 RX ADMIN — LIDOCAINE 2 PATCH: 560 PATCH PERCUTANEOUS; TOPICAL; TRANSDERMAL at 08:28

## 2020-02-07 RX ADMIN — ACETAMINOPHEN 650 MG: 325 TABLET, FILM COATED ORAL at 14:38

## 2020-02-07 RX ADMIN — METHOCARBAMOL TABLETS 750 MG: 750 TABLET, COATED ORAL at 08:20

## 2020-02-07 RX ADMIN — ASPIRIN 81 MG: 81 TABLET, DELAYED RELEASE ORAL at 08:19

## 2020-02-07 RX ADMIN — PANTOPRAZOLE SODIUM 40 MG: 40 TABLET, DELAYED RELEASE ORAL at 08:20

## 2020-02-07 ASSESSMENT — ACTIVITIES OF DAILY LIVING (ADL)
ADLS_ACUITY_SCORE: 10

## 2020-02-07 ASSESSMENT — MIFFLIN-ST. JEOR: SCORE: 1613.5

## 2020-02-07 NOTE — PLAN OF CARE
Discharge Planner PT   Patient plan for discharge: Home  Current status: Pt demonstrating independence with bed mobility using log roll technique. Pt independent with sit to stand transfers. Pt able to ambulate 10 minutes in halls and up/down 12 stairs independently.  Barriers to return to prior living situation: None from a mobility standpoint  Recommendations for discharge: Home with outpatient cardiac rehab  Rationale for recommendations: Pt able to complete all mobility necessary for discharge to home, has met cardiac rehab goals set on evaluation, will continue to see to progress activity tolerance to 20 minutes on the treadmill at speed 1.7 mph or greater. Would benefit from outpatient cardiac rehab to further progress aerobic activity tolerance and for education to promote overall heart health.       Entered by: Emma So 02/07/2020 1:15 PM

## 2020-02-07 NOTE — PLAN OF CARE
VSS, room air. Tele SR . A/Ox 4. Incisions CDI. Pain controlled with oxy/robaxin. Up with SBA. Reg diet. Blood sugars controlled. BS active. Flatus +. Voiding adequately. LS clear. CXR in AM. Awaiting therapeutic INR. CTM.

## 2020-02-07 NOTE — PLAN OF CARE
Pt up with stand by assist, tolerates diet, oral pian medications given with good results, chest tubes removed, dressing clean dry and intact, lungs clear, O2 sats high 90's on room air, good urine output, passing flatus, did have loose stool overnight, stool softer held this am, tele NSR, will need chest x-ray in am,    The patient is a 1y1m Male complaining of

## 2020-02-07 NOTE — PROGRESS NOTES
[facility]  Cardiovascular and Thoracic Surgery Daily Note          Assessment and Plan:   POD#4 s/p s/p aortic valve replacement with a 25 mm [employer] Rolf [place] mechanical valve, intraoperative transesophageal echocardiogram on 2/3/2020 by Dr. Cindy Matthews     -CVS: HR: 80-90s, SBP: 140s/70s, ASA, BB inc 50 mg bid and switched to XL, statin, sub q heparin, continue coumadin for mechanical valve, INR goal 2-3, INR 1.27     -Resp: Extubated per protocol, sating well on RA, encourage IS, CTs removed this am     -Neuro: grossly intact, pain controlled with current regimen     -Renal: good UO, Crea: stable, down 2 kg below pre-op weight     -GI:  Continue bowel regimen, +BM     -: Voiding on own     -Endo: sliding scale insulin, pre-op A1C 5.3     -FEN: replete lytes as needed, Na & K stable, Orders Placed This Encounter      Regular Diet Adult Thin Liquids (water, ice chips, juice, milk, gelatin, ice cream, etc)     -ID: WBC: stable, Temp (24hrs), Av.2  F (36.8  C), Min:97.6  F (36.4  C), Max:99.1  F (37.3  C), completed keyur-op abx     -Heme: Hgb: stable, plt: stable, acute blood loss anemia, thrombocytopenia     -Proph: PCD,ASA, BB, statin sub q heparin     -Dispo: St [facility], continue therapies, CTs removed, home once INR therapeutic          Interval History:   Sitting up in chair, breathing fine, tolerating diet,          Medications:       aspirin  81 mg Oral Daily     atorvastatin  40 mg Oral Daily     furosemide  20 mg Oral Daily     heparin ANTICOAGULANT  5,000 Units Subcutaneous Q8H     insulin aspart  1-7 Units Subcutaneous TID AC     insulin aspart  1-5 Units Subcutaneous At Bedtime     lidocaine  2 patch Transdermal Q24H     lidocaine   Transdermal Q8H     methocarbamol  750 mg Oral 4x Daily     metoprolol succinate ER  75 mg Oral BID     pantoprazole  40 mg Oral QAM AC     polyethylene glycol  17 g Oral BID     senna-docusate  1 tablet Oral BID    Or     senna-docusate  2 tablet Oral BID  "    sodium chloride (PF)  3 mL Intracatheter Q8H     @MEDSPRN-@          Physical Exam:   Vitals were reviewed  Blood pressure (!) 135/90, pulse 80, temperature 97.6  F (36.4  C), temperature source Oral, resp. rate 16, height 1.727 m (5' 8\"), weight 76.9 kg (169 lb 8.5 oz), SpO2 97 %.  Rhythm: NSR    Lungs: clear    Cardiovascular: s1/s2, no m/r/g    Abdomen: s/nt/nd    Extremeties: no LE edema    Incision: cdi    CT: na    Weight:   Vitals:    02/04/20 0015 02/04/20 1845 02/05/20 0500 02/06/20 0639   Weight: 80 kg (176 lb 5.9 oz) 82.4 kg (181 lb 9.6 oz) 80 kg (176 lb 6.4 oz) 77.2 kg (170 lb 4.8 oz)    02/07/20 0646   Weight: 76.9 kg (169 lb 8.5 oz)            Data:   Labs:   Lab Results   Component Value Date    WBC 10.9 02/05/2020     Lab Results   Component Value Date    RBC 3.29 02/05/2020     Lab Results   Component Value Date    HGB 10.7 02/05/2020     Lab Results   Component Value Date    HCT 31.6 02/05/2020     No components found for: MCT  Lab Results   Component Value Date    MCV 96 02/05/2020     Lab Results   Component Value Date    MCH 32.5 02/05/2020     Lab Results   Component Value Date    MCHC 33.9 02/05/2020     Lab Results   Component Value Date    RDW 13.4 02/05/2020     Lab Results   Component Value Date     02/07/2020       Last Basic Metabolic Panel:  Lab Results   Component Value Date     02/06/2020      Lab Results   Component Value Date    POTASSIUM 4.1 02/06/2020     Lab Results   Component Value Date    CHLORIDE 105 02/06/2020     Lab Results   Component Value Date    BRIANA 8.7 02/06/2020     Lab Results   Component Value Date    CO2 29 02/06/2020     Lab Results   Component Value Date    BUN 10 02/06/2020     Lab Results   Component Value Date    CR 0.69 02/06/2020     Lab Results   Component Value Date     02/06/2020       -CXR: pending    Moni Cartagena PA-C  Pager #: [phone]      "

## 2020-02-08 ENCOUNTER — APPOINTMENT (OUTPATIENT)
Dept: PHYSICAL THERAPY | Facility: CLINIC | Age: 50
DRG: 220 | End: 2020-02-08
Attending: SURGERY
Payer: COMMERCIAL

## 2020-02-08 LAB
ERYTHROCYTE [DISTWIDTH] IN BLOOD BY AUTOMATED COUNT: 12.9 % (ref 10–15)
GLUCOSE BLDC GLUCOMTR-MCNC: 116 MG/DL (ref 70–99)
GLUCOSE BLDC GLUCOMTR-MCNC: 121 MG/DL (ref 70–99)
HCT VFR BLD AUTO: 40.6 % (ref 40–53)
HGB BLD-MCNC: 14.1 G/DL (ref 13.3–17.7)
INR PPP: 1.68 (ref 0.86–1.14)
LMWH PPP CHRO-ACNC: 0.24 IU/ML
MCH RBC QN AUTO: 32.6 PG (ref 26.5–33)
MCHC RBC AUTO-ENTMCNC: 34.7 G/DL (ref 31.5–36.5)
MCV RBC AUTO: 94 FL (ref 78–100)
PLATELET # BLD AUTO: 234 10E9/L (ref 150–450)
RBC # BLD AUTO: 4.33 10E12/L (ref 4.4–5.9)
WBC # BLD AUTO: 7.7 10E9/L (ref 4–11)

## 2020-02-08 PROCEDURE — 40000141 ZZH STATISTIC PERIPHERAL IV START W/O US GUIDANCE

## 2020-02-08 PROCEDURE — 97110 THERAPEUTIC EXERCISES: CPT | Mod: GP

## 2020-02-08 PROCEDURE — 36415 COLL VENOUS BLD VENIPUNCTURE: CPT | Performed by: SURGERY

## 2020-02-08 PROCEDURE — 25000128 H RX IP 250 OP 636: Performed by: NURSE PRACTITIONER

## 2020-02-08 PROCEDURE — 36415 COLL VENOUS BLD VENIPUNCTURE: CPT | Performed by: PHYSICIAN ASSISTANT

## 2020-02-08 PROCEDURE — 25000132 ZZH RX MED GY IP 250 OP 250 PS 637: Performed by: NURSE PRACTITIONER

## 2020-02-08 PROCEDURE — 25000128 H RX IP 250 OP 636: Performed by: SURGERY

## 2020-02-08 PROCEDURE — 85027 COMPLETE CBC AUTOMATED: CPT | Performed by: PHYSICIAN ASSISTANT

## 2020-02-08 PROCEDURE — 85610 PROTHROMBIN TIME: CPT | Performed by: PHYSICIAN ASSISTANT

## 2020-02-08 PROCEDURE — 85520 HEPARIN ASSAY: CPT | Performed by: SURGERY

## 2020-02-08 PROCEDURE — 00000146 ZZHCL STATISTIC GLUCOSE BY METER IP

## 2020-02-08 PROCEDURE — 12000000 ZZH R&B MED SURG/OB

## 2020-02-08 PROCEDURE — 25000132 ZZH RX MED GY IP 250 OP 250 PS 637: Performed by: PHYSICIAN ASSISTANT

## 2020-02-08 PROCEDURE — 25000132 ZZH RX MED GY IP 250 OP 250 PS 637: Performed by: SURGERY

## 2020-02-08 RX ORDER — WARFARIN SODIUM 7.5 MG/1
7.5 TABLET ORAL
Status: COMPLETED | OUTPATIENT
Start: 2020-02-08 | End: 2020-02-08

## 2020-02-08 RX ORDER — LISINOPRIL 5 MG/1
5 TABLET ORAL DAILY
Status: DISCONTINUED | OUTPATIENT
Start: 2020-02-08 | End: 2020-02-09 | Stop reason: HOSPADM

## 2020-02-08 RX ORDER — HEPARIN SODIUM 10000 [USP'U]/100ML
850 INJECTION, SOLUTION INTRAVENOUS CONTINUOUS
Status: DISCONTINUED | OUTPATIENT
Start: 2020-02-08 | End: 2020-02-09

## 2020-02-08 RX ADMIN — ASPIRIN 81 MG: 81 TABLET, DELAYED RELEASE ORAL at 08:38

## 2020-02-08 RX ADMIN — METOPROLOL SUCCINATE 100 MG: 100 TABLET, EXTENDED RELEASE ORAL at 19:54

## 2020-02-08 RX ADMIN — Medication 4250 UNITS: at 12:40

## 2020-02-08 RX ADMIN — METHOCARBAMOL TABLETS 750 MG: 750 TABLET, COATED ORAL at 00:27

## 2020-02-08 RX ADMIN — LISINOPRIL 5 MG: 5 TABLET ORAL at 11:18

## 2020-02-08 RX ADMIN — HEPARIN SODIUM 850 UNITS/HR: 10000 INJECTION, SOLUTION INTRAVENOUS at 12:34

## 2020-02-08 RX ADMIN — WARFARIN SODIUM 7.5 MG: 7.5 TABLET ORAL at 17:13

## 2020-02-08 RX ADMIN — HEPARIN SODIUM 5000 UNITS: 5000 INJECTION, SOLUTION INTRAVENOUS; SUBCUTANEOUS at 05:00

## 2020-02-08 RX ADMIN — ACETAMINOPHEN 650 MG: 325 TABLET, FILM COATED ORAL at 08:48

## 2020-02-08 RX ADMIN — ACETAMINOPHEN 650 MG: 325 TABLET, FILM COATED ORAL at 16:05

## 2020-02-08 RX ADMIN — ATORVASTATIN CALCIUM 40 MG: 40 TABLET, FILM COATED ORAL at 08:38

## 2020-02-08 RX ADMIN — ACETAMINOPHEN 650 MG: 325 TABLET, FILM COATED ORAL at 20:45

## 2020-02-08 RX ADMIN — PANTOPRAZOLE SODIUM 40 MG: 40 TABLET, DELAYED RELEASE ORAL at 06:20

## 2020-02-08 RX ADMIN — FUROSEMIDE 20 MG: 20 TABLET ORAL at 08:38

## 2020-02-08 RX ADMIN — METOPROLOL SUCCINATE 100 MG: 100 TABLET, EXTENDED RELEASE ORAL at 08:38

## 2020-02-08 RX ADMIN — ACETAMINOPHEN 650 MG: 325 TABLET, FILM COATED ORAL at 00:27

## 2020-02-08 ASSESSMENT — ACTIVITIES OF DAILY LIVING (ADL)
ADLS_ACUITY_SCORE: 10

## 2020-02-08 ASSESSMENT — MIFFLIN-ST. JEOR: SCORE: 1590.67

## 2020-02-08 NOTE — PLAN OF CARE
Discharge Planner PT   Patient plan for discharge: Home  Current status: Pt amb 23 minutes on treadmill speed 1.2-2.2mph, vital signs stable, rates activity as 5/10 on OMNI effort scale, no symptoms.  Barriers to return to prior living situation: None from a mobility standpoint  Recommendations for discharge: Home with outpatient cardiac rehab  Rationale for recommendations: Pt able to complete all mobility necessary for discharge to home, would benefit from outpatient cardiac rehab to further progress aerobic activity tolerance in monitored setting and for education.       Entered by: Emma So 02/08/2020 1:18 PM

## 2020-02-08 NOTE — PLAN OF CARE
A&O. VSS on RA. Up ind. CMS intact. Incisions CDI. Tylenol and robaxin for pain. Tele SR. Awaiting therapeutic INR.

## 2020-02-08 NOTE — PROGRESS NOTES
CV Surgery    S: Sitting up in chair, denies pain, tolerating diet, +BM, breathing fine, INR 1.68, start heparin gtt    O: B/P: 138/90, T: 97.9, P: 85, R: 15  General: NAD  Heart: s1/s2, no m/r/g  Lungs: clear  Abd: s/nt/nd  Sternum: cdi  Extremities: no LE edema    Lab Results   Component Value Date    WBC 10.9 02/05/2020     Lab Results   Component Value Date    RBC 3.29 02/05/2020     Lab Results   Component Value Date    HGB 10.7 02/05/2020     Lab Results   Component Value Date    HCT 31.6 02/05/2020     No components found for: MCT  Lab Results   Component Value Date    MCV 96 02/05/2020     Lab Results   Component Value Date    MCH 32.5 02/05/2020     Lab Results   Component Value Date    MCHC 33.9 02/05/2020     Lab Results   Component Value Date    RDW 13.4 02/05/2020     Lab Results   Component Value Date     02/07/2020       Last Basic Metabolic Panel:  Lab Results   Component Value Date     02/06/2020      Lab Results   Component Value Date    POTASSIUM 4.1 02/06/2020     Lab Results   Component Value Date    CHLORIDE 105 02/06/2020     Lab Results   Component Value Date    BRIANA 8.7 02/06/2020     Lab Results   Component Value Date    CO2 29 02/06/2020     Lab Results   Component Value Date    BUN 10 02/06/2020     Lab Results   Component Value Date    CR 0.69 02/06/2020     Lab Results   Component Value Date     02/06/2020       A/P: POD# 5 s/p aortic valve replacement with a 25 mm St. Rolf [place] mechanical valve, intraoperative transesophageal echocardiogram on 2/3/2020 by Dr. Cindy Cartagena PA-C  Pager [phone]

## 2020-02-08 NOTE — PLAN OF CARE
Alert and oriented x4. Vital signs stable on room air. Up independently. Tolerating regular diet. Lung sounds clear. Bowel sounds active, + flatus, BM today. Adequate urine output. Sternal incision LIT, ecchymotic. Pain managed with PRN tylenol. Denies nausea. Tele sinus rhythm. Showered today.

## 2020-02-09 VITALS
OXYGEN SATURATION: 97 % | SYSTOLIC BLOOD PRESSURE: 134 MMHG | RESPIRATION RATE: 16 BRPM | HEART RATE: 93 BPM | DIASTOLIC BLOOD PRESSURE: 79 MMHG | TEMPERATURE: 98.3 F | WEIGHT: 162.04 LBS | BODY MASS INDEX: 24.56 KG/M2 | HEIGHT: 68 IN

## 2020-02-09 LAB
INR PPP: 2.2 (ref 0.86–1.14)
LMWH PPP CHRO-ACNC: 0.16 IU/ML

## 2020-02-09 PROCEDURE — 25000132 ZZH RX MED GY IP 250 OP 250 PS 637: Performed by: NURSE PRACTITIONER

## 2020-02-09 PROCEDURE — 25000132 ZZH RX MED GY IP 250 OP 250 PS 637: Performed by: PHYSICIAN ASSISTANT

## 2020-02-09 PROCEDURE — 85520 HEPARIN ASSAY: CPT | Performed by: PHYSICIAN ASSISTANT

## 2020-02-09 PROCEDURE — 85610 PROTHROMBIN TIME: CPT | Performed by: PHYSICIAN ASSISTANT

## 2020-02-09 PROCEDURE — 36415 COLL VENOUS BLD VENIPUNCTURE: CPT | Performed by: PHYSICIAN ASSISTANT

## 2020-02-09 RX ORDER — METHOCARBAMOL 750 MG/1
750 TABLET, FILM COATED ORAL 4 TIMES DAILY
Qty: 60 TABLET | Refills: 0 | Status: SHIPPED | OUTPATIENT
Start: 2020-02-09 | End: 2020-12-02

## 2020-02-09 RX ORDER — POLYETHYLENE GLYCOL 3350 17 G/17G
17 POWDER, FOR SOLUTION ORAL 2 TIMES DAILY
Qty: 7 PACKET | Refills: 0 | Status: SHIPPED | OUTPATIENT
Start: 2020-02-09 | End: 2020-02-28

## 2020-02-09 RX ORDER — LISINOPRIL 5 MG/1
5 TABLET ORAL DAILY
Qty: 30 TABLET | Refills: 3 | Status: SHIPPED | OUTPATIENT
Start: 2020-02-09 | End: 2020-02-24

## 2020-02-09 RX ORDER — FAMOTIDINE 20 MG/1
20 TABLET, FILM COATED ORAL 2 TIMES DAILY
Qty: 28 TABLET | Refills: 0 | Status: SHIPPED | OUTPATIENT
Start: 2020-02-09 | End: 2020-02-24

## 2020-02-09 RX ORDER — AMOXICILLIN 250 MG
2 CAPSULE ORAL 2 TIMES DAILY PRN
Qty: 30 TABLET | Refills: 0 | Status: SHIPPED | OUTPATIENT
Start: 2020-02-09 | End: 2020-02-28

## 2020-02-09 RX ORDER — METOPROLOL SUCCINATE 100 MG/1
100 TABLET, EXTENDED RELEASE ORAL 2 TIMES DAILY
Qty: 60 TABLET | Refills: 3 | Status: SHIPPED | OUTPATIENT
Start: 2020-02-09 | End: 2020-02-24

## 2020-02-09 RX ORDER — WARFARIN SODIUM 3 MG/1
6 TABLET ORAL
Status: DISCONTINUED | OUTPATIENT
Start: 2020-02-09 | End: 2020-02-09 | Stop reason: HOSPADM

## 2020-02-09 RX ORDER — AMLODIPINE BESYLATE 2.5 MG/1
2.5 TABLET ORAL DAILY
Status: DISCONTINUED | OUTPATIENT
Start: 2020-02-09 | End: 2020-02-09 | Stop reason: HOSPADM

## 2020-02-09 RX ORDER — WARFARIN SODIUM 5 MG/1
5 TABLET ORAL DAILY
Qty: 30 TABLET | Refills: 3 | Status: SHIPPED | OUTPATIENT
Start: 2020-02-09 | End: 2020-02-24

## 2020-02-09 RX ORDER — OXYCODONE HYDROCHLORIDE 5 MG/1
5-10 TABLET ORAL
Qty: 50 TABLET | Refills: 0 | Status: SHIPPED | OUTPATIENT
Start: 2020-02-09 | End: 2020-02-28

## 2020-02-09 RX ORDER — ACETAMINOPHEN 325 MG/1
650 TABLET ORAL EVERY 4 HOURS PRN
Qty: 30 TABLET | Refills: 0 | Status: SHIPPED | OUTPATIENT
Start: 2020-02-09 | End: 2024-02-14

## 2020-02-09 RX ADMIN — AMLODIPINE BESYLATE 2.5 MG: 2.5 TABLET ORAL at 08:39

## 2020-02-09 RX ADMIN — FUROSEMIDE 20 MG: 20 TABLET ORAL at 08:40

## 2020-02-09 RX ADMIN — PANTOPRAZOLE SODIUM 40 MG: 40 TABLET, DELAYED RELEASE ORAL at 06:15

## 2020-02-09 RX ADMIN — ACETAMINOPHEN 650 MG: 325 TABLET, FILM COATED ORAL at 11:08

## 2020-02-09 RX ADMIN — LISINOPRIL 5 MG: 5 TABLET ORAL at 08:40

## 2020-02-09 RX ADMIN — METOPROLOL SUCCINATE 100 MG: 100 TABLET, EXTENDED RELEASE ORAL at 08:40

## 2020-02-09 RX ADMIN — ASPIRIN 81 MG: 81 TABLET, DELAYED RELEASE ORAL at 08:39

## 2020-02-09 RX ADMIN — ATORVASTATIN CALCIUM 40 MG: 40 TABLET, FILM COATED ORAL at 08:40

## 2020-02-09 ASSESSMENT — ACTIVITIES OF DAILY LIVING (ADL)
ADLS_ACUITY_SCORE: 10

## 2020-02-09 ASSESSMENT — MIFFLIN-ST. JEOR: SCORE: 1579.5

## 2020-02-09 NOTE — DISCHARGE SUMMARY
"Discharge Summary    Toney Lemos MRN# [id number]   YOB: 1970 Age: 49 year old     Date of Admission:  2/3/2020  Date of Discharge:  2/9/2020  Admitting Physician:  Cindy Matthews MD  Discharge Physician:  Cindy Matthews,*  Discharging Service:  Cardiovascular and Thoracic Surgery     Home clinic: [facility]  Primary Provider: Yan Lucas          Admission Diagnoses:   Aortic valve stenosis, etiology of cardiac valve disease unspecified [I35.0]          Discharge Diagnosis:   Patient Active Problem List   Diagnosis     Congenital insufficiency of aortic valve     Backache     HYPERLIPIDEMIA LDL GOAL <130     Hypertension goal BP (blood pressure) < 140/90     Encounter for long-term current use of medication     FH: early coronary artery disease- bro 30s     Elevated fasting glucose     Status post coronary angiogram     Aortic insufficiency     S/P AVR (aortic valve replacement)-25 mm St. Rolf [place]      Anticoagulated on Coumadin     Fluid overload                Discharge Disposition:   Discharged to home           Condition on Discharge:   Discharge condition: Stable   Discharge vitals: Blood pressure 134/79, pulse 93, temperature 98.3  F (36.8  C), temperature source Oral, resp. rate 16, height 1.727 m (5' 8\"), weight 73.5 kg (162 lb 0.6 oz), SpO2 97 %.     Code status on discharge: Full Code           Procedures:   On 2/3/20   successfully underwent Aortic valve replacement with a 25 mm St[employer] Rolf [place] mechanical valve, intraoperative transesophageal echocardiogram by Dr. Cindy Matthews.           Medications Prior to Admission:     Medications Prior to Admission   Medication Sig Dispense Refill Last Dose     amLODIPine (NORVASC) 2.5 MG tablet Take 2 tablets (5 mg) by mouth daily (Patient taking differently: Take 2.5 mg by mouth 2 times daily ) 180 tablet 3 2/2/2020 at PM     atorvastatin (LIPITOR) 40 MG tablet Take 1 tablet (40 mg) by mouth daily 90 tablet 3 " 2/2/2020 at PM     [DISCONTINUED] carvedilol (COREG) 12.5 MG tablet Take 1 tablet (12.5 mg) by mouth 2 times daily (with meals) 180 tablet 3 2/3/2020 at 0330             Discharge Medications:     Current Discharge Medication List      START taking these medications    Details   acetaminophen (TYLENOL) 325 MG tablet Take 2 tablets (650 mg) by mouth every 4 hours as needed for mild pain  Qty: 30 tablet, Refills: 0    Associated Diagnoses: S/P AVR      aspirin (ASA) 81 MG EC tablet Take 1 tablet (81 mg) by mouth daily  Qty: 30 tablet, Refills: 0    Associated Diagnoses: S/P AVR      lisinopril (PRINIVIL/ZESTRIL) 5 MG tablet Take 1 tablet (5 mg) by mouth daily  Qty: 30 tablet, Refills: 3    Associated Diagnoses: S/P AVR      methocarbamol (ROBAXIN) 750 MG tablet Take 1 tablet (750 mg) by mouth 4 times daily  Qty: 60 tablet, Refills: 0    Associated Diagnoses: S/P AVR      metoprolol succinate ER (TOPROL-XL) 100 MG 24 hr tablet Take 1 tablet (100 mg) by mouth 2 times daily  Qty: 60 tablet, Refills: 3    Associated Diagnoses: S/P AVR      oxyCODONE (ROXICODONE) 5 MG tablet Take 1-2 tablets (5-10 mg) by mouth every 3 hours as needed for moderate to severe pain  Qty: 50 tablet, Refills: 0    Associated Diagnoses: S/P AVR      polyethylene glycol (MIRALAX/GLYCOLAX) packet Take 17 g by mouth 2 times daily  Qty: 7 packet, Refills: 0    Associated Diagnoses: S/P AVR      senna-docusate (SENOKOT-S/PERICOLACE) 8.6-50 MG tablet Take 2 tablets by mouth 2 times daily as needed for constipation  Qty: 30 tablet, Refills: 0    Associated Diagnoses: S/P AVR      warfarin ANTICOAGULANT (COUMADIN) 5 MG tablet Take 1 tablet (5 mg) by mouth daily  Qty: 30 tablet, Refills: 3    Associated Diagnoses: S/P AVR         CONTINUE these medications which have NOT CHANGED    Details   amLODIPine (NORVASC) 2.5 MG tablet Take 2 tablets (5 mg) by mouth daily  Qty: 180 tablet, Refills: 3    Associated Diagnoses: Hypertension goal BP (blood pressure) <  140/90      atorvastatin (LIPITOR) 40 MG tablet Take 1 tablet (40 mg) by mouth daily  Qty: 90 tablet, Refills: 3    Associated Diagnoses: Hyperlipidemia LDL goal <130         STOP taking these medications       carvedilol (COREG) 12.5 MG tablet Comments:   Reason for Stopping:                     Consultations:   Nutrition, Intensivist              Brief History of Illness:   Mr. Lemos is a very pleasant 49-year-old gentleman who I recently saw for severe aortic valve insufficiency with a known bicuspid valve with a recent echo demonstrating some LV dilatation.  He was taken to the operating room today for aortic valve replacement with a mechanical valve.           Hospital Course:   On 2/3/20   successfully underwent Aortic valve replacement with a 25 mm St. Rolf [place] mechanical valve, intraoperative transesophageal echocardiogram by Dr. Cindy Matthews.  Was extubated within 24 hours of surgery.   Had some transient hyperglycemia treated with an insulin gtt, transitioned to sliding scale insulin and has no need at discharge.  Had some fluid overload treated with diuretic medication. At discharge he is 5 below his pre-op weight and is not sent with any diuretics.   Heart rhythm remained stable. He progressed well with cardiac rehab. They are discharged to home on pod# 6 with the help of their family once his INR was stable. He knows that he will be on coumadin for life for his mechanical valve. His INR goal is 2-3.              Significant Results:   Lab Results   Component Value Date    WBC 7.7 02/08/2020     Lab Results   Component Value Date    RBC 4.33 02/08/2020     Lab Results   Component Value Date    HGB 14.1 02/08/2020     Lab Results   Component Value Date    HCT 40.6 02/08/2020     No components found for: MCT  Lab Results   Component Value Date    MCV 94 02/08/2020     Lab Results   Component Value Date    MCH 32.6 02/08/2020     Lab Results   Component Value Date    MCHC 34.7 02/08/2020     Lab  Results   Component Value Date    RDW 12.9 02/08/2020     Lab Results   Component Value Date     02/08/2020       Last Basic Metabolic Panel:  Lab Results   Component Value Date     02/06/2020      Lab Results   Component Value Date    POTASSIUM 4.1 02/06/2020     Lab Results   Component Value Date    CHLORIDE 105 02/06/2020     Lab Results   Component Value Date    BRIANA 8.7 02/06/2020     Lab Results   Component Value Date    CO2 29 02/06/2020     Lab Results   Component Value Date    BUN 10 02/06/2020     Lab Results   Component Value Date    CR 0.69 02/06/2020     Lab Results   Component Value Date     02/06/2020                  Pending Results:   None           Discharge Instructions and Follow-Up:   Discharge diet: Regular   Discharge activity: Daily weights: Call if weight gain 2-3 lbs over 24 hours or if greater than 5 lbs in 1 week.  No lifting more than 10 lbs for 8-12 weeks.  No driving for 1 month.  Call for pain medication refill.  Call for temperature greater than 101.0  Daily shower with antibacterial soap.   Discharge follow-up: *Take 5 mg coumadin tonight, have INR measured tomorrow. Your INR goal is 2-3.   *Follow up primary care provider in 7-10 days after discharge in order to review your medication, vital signs, obtain any necessary lab work your doctor may want, and to update them on your hospitalization and medical issues.   *Follow up with Moni/Dolores/Kylee Pino with Dr Matthews, heart surgeon, at [facility] at [facility] [address] on 2/24/20 1:30 PM. If any questions or concerns call [phone].  You will see us once at this visit and then if everything is going well you will not need to see us again.  You will follow long term with your cardiologist.   *Follow up with Dr Mederos, cardiologist, on 11 am in [place]. This is who you will follow with long term about your heart issues. [phone].   *Please schedule outpatient cardiac rehab within 5 days  of discharge from TCU, call [phone].   Outpatient therapy: Cardiac rehab   Home Care agency: None    Supplies and equipment: None   Lines and drains: None    Wound care: Wash incision daily with antibacterial soap   Other instructions: None

## 2020-02-09 NOTE — PLAN OF CARE
Pt refused telemetry at this time. Pt educated on telemetry. Pt stated he understood and politely declined.

## 2020-02-09 NOTE — PROGRESS NOTES
CV Surgery    S: Sitting up in chair, ready to leave, denies pain, tolerating diet, breathing fine    O: B/P: 134/79, T: 98.3, P: 93, R: 16  General: NAD  Heart: s1/s2, no m/r/g  Lungs: clear  Abd: s/nt/nd  Sternum: cdi  Extremities: no LE edema    Lab Results   Component Value Date    WBC 7.7 02/08/2020     Lab Results   Component Value Date    RBC 4.33 02/08/2020     Lab Results   Component Value Date    HGB 14.1 02/08/2020     Lab Results   Component Value Date    HCT 40.6 02/08/2020     No components found for: MCT  Lab Results   Component Value Date    MCV 94 02/08/2020     Lab Results   Component Value Date    MCH 32.6 02/08/2020     Lab Results   Component Value Date    MCHC 34.7 02/08/2020     Lab Results   Component Value Date    RDW 12.9 02/08/2020     Lab Results   Component Value Date     02/08/2020       Last Basic Metabolic Panel:  Lab Results   Component Value Date     02/06/2020      Lab Results   Component Value Date    POTASSIUM 4.1 02/06/2020     Lab Results   Component Value Date    CHLORIDE 105 02/06/2020     Lab Results   Component Value Date    BRIANA 8.7 02/06/2020     Lab Results   Component Value Date    CO2 29 02/06/2020     Lab Results   Component Value Date    BUN 10 02/06/2020     Lab Results   Component Value Date    CR 0.69 02/06/2020     Lab Results   Component Value Date     02/06/2020       A/P: POD#6 s/p aortic valve replacement with a 25 mm St. Rolf [place] mechanical valve, intraoperative transesophageal echocardiogram on 2/3/2020 by Dr. Cindy Cartagena PA-C  Pager [phone]

## 2020-02-09 NOTE — PLAN OF CARE
Alert and oriented x4. Vital signs stable on room air. Up independently. Tolerating regular diet. Lung sounds clear. Bowel sounds active, + flatus, BM today. Adequate urine output. Sternal incision LIT, ecchymotic. Pain managed with PRN tylenol. Denies nausea. Pt refused tele. Discharge teaching complete, questions answered, medications reviewed. Discharging to home via transportation from wife.

## 2020-02-09 NOTE — PLAN OF CARE
A&O. VSS on RA. Up ind. Tele SR. Tylenol for pain. Incision CDI. CMS intact. Showered this am. Continues on Hep gtt. Awaiting therapeutic INR.

## 2020-02-09 NOTE — PLAN OF CARE
PT: Attempted to see for CR, however pt declined. Reports plans to leave today.       Physical Therapy Discharge Summary    Reason for therapy discharge:    Discharged to home.    Progress towards therapy goal(s). See goals on Care Plan in [facility] electronic health record for goal details.  Goals met    Therapy recommendation(s):    Continued therapy is recommended.  Rationale/Recommendations:  Pt will benefit from outpatient cardiac rehab to further progress aerobic activity tolerance and for education to promote overall heart health. . **Pt not seen by discharging therapist on this date, note written based on previous treating therapist's notes and recommendations.

## 2020-02-09 NOTE — PLAN OF CARE
Alert and oriented x4. Vital signs stable on room air. Up independently. Tolerating regular diet. Lung sounds clear. Bowel sounds active, + flatus, BM today. Adequate urine output. Sternal incision LIT, ecchymotic. Pain managed with PRN tylenol. Denies nausea. Tele sinus rhythm.

## 2020-02-10 ENCOUNTER — TELEPHONE (OUTPATIENT)
Dept: FAMILY MEDICINE | Facility: CLINIC | Age: 50
End: 2020-02-10

## 2020-02-10 ENCOUNTER — TELEPHONE (OUTPATIENT)
Dept: ANTICOAGULATION | Facility: CLINIC | Age: 50
End: 2020-02-10

## 2020-02-10 ENCOUNTER — ANTICOAGULATION THERAPY VISIT (OUTPATIENT)
Dept: ANTICOAGULATION | Facility: CLINIC | Age: 50
End: 2020-02-10
Payer: COMMERCIAL

## 2020-02-10 DIAGNOSIS — Z95.2 S/P AVR (AORTIC VALVE REPLACEMENT): Primary | ICD-10-CM

## 2020-02-10 DIAGNOSIS — Z95.2 H/O MECHANICAL AORTIC VALVE REPLACEMENT: Primary | ICD-10-CM

## 2020-02-10 DIAGNOSIS — Z95.2 S/P AVR (AORTIC VALVE REPLACEMENT): ICD-10-CM

## 2020-02-10 PROBLEM — E87.70 FLUID OVERLOAD: Status: ACTIVE | Noted: 2020-02-10

## 2020-02-10 PROBLEM — Z79.01 ANTICOAGULATED ON COUMADIN: Status: ACTIVE | Noted: 2020-02-10

## 2020-02-10 LAB — INR POINT OF CARE: 2.1 (ref 0.9–1.1)

## 2020-02-10 PROCEDURE — 36416 COLLJ CAPILLARY BLOOD SPEC: CPT

## 2020-02-10 PROCEDURE — 85610 PROTHROMBIN TIME: CPT | Mod: QW

## 2020-02-10 PROCEDURE — 99207 ZZC NO CHARGE NURSE ONLY: CPT

## 2020-02-10 NOTE — TELEPHONE ENCOUNTER
"Hospital/TCU/ED for chronic condition Discharge Protocol    \"Hi, my name is Carole Benjamin RN, a registered nurse, and I am calling from [facility] (formerly [facility]  I am calling to follow up and see how things are going for you after your recent emergency visit/hospital/TCU stay.\"    Tell me how you are doing now that you are home?\" Doing well at home.      Discharge Instructions    \"Let's review your discharge instructions.  What is/are the follow-up recommendations?  Pt. Response: Follow up with PCP, Follow up with cardio, meds as ordered.     \"Has an appointment with your primary care provider been scheduled?\"   Yes. (confirm)    \"When you see the provider, I would recommend that you bring your medications with you.\"    Medications    \"Tell me what changed about your medicines when you discharged?\"    Changes to chronic meds?    2 or more - Epic MTM referral needed    \"What questions do you have about your medications?\"    None     New diagnoses of heart failure, COPD, diabetes, or MI?    No    Post Discharge Medication Reconciliation Status: Med rec completed..    Was MTM referral placed (*Make sure to put transitions as reason for referral)?   No - declined need.   Call Summary    \"What questions or concerns do you have about your recent visit and your follow-up care?\"     none    \"If you have questions or things don't continue to improve, we encourage you contact us through the main clinic number (give number).  Even if the clinic is not open, triage nurses are available 24/7 to help you.     We would like you to know that our clinic has extended hours (provide information).  We also have urgent care (provide details on closest location and hours/contact info)\"    \"Thank you for your time and take care!\"    Carole Benjamin RN   "

## 2020-02-10 NOTE — TELEPHONE ENCOUNTER
Patient called to schedule an appointment for a hospital follow-up or appeared on a report showing that they were recently discharged from the hospital.    Patient was admitted to :  [facility]  Discharged date: 2/09/20  Reason for hospital admission:  Aortic Valve Stenosis, Etiology Of Cardiac Valve Disease Unspecified, Aortic Insufficiency  Does patient have future appointment scheduled with provider? Yes   Date of future appointment:  multi      This information will be used to help the care team plan for the patients upcoming visit.  The triage RN may determine that a follow up call is necessary and reach out to the patient via the phone number listed in the chart.     Please route this message on routine priority to the Triage RN pool.

## 2020-02-10 NOTE — TELEPHONE ENCOUNTER
Pt has consult scheduled with [facility] today.   Please review, complete, and sign the INR referral that is pendeding  Cathleen Pacheco RN

## 2020-02-10 NOTE — PROGRESS NOTES
ANTICOAGULATION INITIAL CLINIC VISIT    Patient Name:  Toney Lemos  Date:  2/10/2020  Referred by: Dr. Lopez   Contact Type:  Face to Face    SUBJECTIVE:  Coumadin education was completed today.  Topics covered include:  -Introduction to coumadin  -Proper Administration  -INR Testing  -Sign/Symptoms of Bleeding  -Signs/Symptoms of Clot Formation or Stroke  -Dietary Intake of Vitamin K  -Drug Interactions  -Anticoagulation Identification (bracelet, necklace or wallet card)  -Future Surgery  -Effects of Alcohol, Tobacco, and Exercise on Coumadin    .    Patient Findings     Positives:   Hospital admission (2/3-. did not start Coumadin until Wed)            OBJECTIVE    INR Protime   Date Value Ref Range Status   02/10/2020 2.1 (A) 0.9 - 1.1 Final       ASSESSMENT / PLAN  INR assessment THER    Recheck INR In: 4 DAYS    INR Location Clinic      Anticoagulation Summary  As of 2/10/2020    INR goal:   2.0-3.0   TTR:   --   INR used for dosin.1 (2/10/2020)   Warfarin maintenance plan:   No maintenance plan   Full warfarin instructions:   2/10: 5 mg; : 5 mg; : 5 mg; : 5 mg; Otherwise No maintenance plan   Next INR check:   2020   Target end date:   Indefinite    Indications    S/P AVR (aortic valve replacement)-25 mm St. Rolf [place]  [Z95.2]             Anticoagulation Episode Summary     INR check location:       Preferred lab:       Send INR reminders to:   MAIA FISHER    Comments:         Anticoagulation Care Providers     Provider Role Specialty Phone number    Moni Cartagena PA-C Referring Physician Assistant [phone]            See the Encounter Report to view Anticoagulation Flowsheet and Dosing Calendar (Go to Encounters tab in chart review, and find the Anticoagulation Therapy Visit)    Dosage adjustment made based on physician directed care plan.    Cathleen Pacheco RN

## 2020-02-11 ENCOUNTER — HOSPITAL ENCOUNTER (OUTPATIENT)
Dept: CARDIAC REHAB | Facility: CLINIC | Age: 50
End: 2020-02-11
Attending: STUDENT IN AN ORGANIZED HEALTH CARE EDUCATION/TRAINING PROGRAM
Payer: COMMERCIAL

## 2020-02-11 DIAGNOSIS — Q23.1 CONGENITAL INSUFFICIENCY OF AORTIC VALVE: ICD-10-CM

## 2020-02-11 PROBLEM — Z95.2 H/O MECHANICAL AORTIC VALVE REPLACEMENT: Status: ACTIVE | Noted: 2020-02-11

## 2020-02-11 PROCEDURE — 40000116 ZZH STATISTIC OP CR VISIT: Performed by: REHABILITATION PRACTITIONER

## 2020-02-11 PROCEDURE — 93798 PHYS/QHP OP CAR RHAB W/ECG: CPT | Performed by: REHABILITATION PRACTITIONER

## 2020-02-11 PROCEDURE — 93797 PHYS/QHP OP CAR RHAB WO ECG: CPT | Mod: 59 | Performed by: REHABILITATION PRACTITIONER

## 2020-02-11 PROCEDURE — 40000575 ZZH STATISTIC OP CARDIAC VISIT #2: Performed by: REHABILITATION PRACTITIONER

## 2020-02-12 ENCOUNTER — TELEPHONE (OUTPATIENT)
Dept: OTHER | Facility: CLINIC | Age: 50
End: 2020-02-12

## 2020-02-13 ENCOUNTER — HOSPITAL ENCOUNTER (OUTPATIENT)
Dept: CARDIAC REHAB | Facility: CLINIC | Age: 50
End: 2020-02-13
Attending: STUDENT IN AN ORGANIZED HEALTH CARE EDUCATION/TRAINING PROGRAM
Payer: COMMERCIAL

## 2020-02-13 ENCOUNTER — TELEPHONE (OUTPATIENT)
Dept: OTHER | Facility: CLINIC | Age: 50
End: 2020-02-13

## 2020-02-13 ENCOUNTER — HOSPITAL ENCOUNTER (OUTPATIENT)
Dept: GENERAL RADIOLOGY | Facility: CLINIC | Age: 50
Discharge: HOME | End: 2020-02-13
Attending: SURGERY | Admitting: SURGERY
Payer: COMMERCIAL

## 2020-02-13 DIAGNOSIS — Z95.2 S/P AVR: ICD-10-CM

## 2020-02-13 DIAGNOSIS — Z95.2 S/P AVR: Primary | ICD-10-CM

## 2020-02-13 LAB
ERYTHROCYTE [DISTWIDTH] IN BLOOD BY AUTOMATED COUNT: 12.5 % (ref 10–15)
HCT VFR BLD AUTO: 38.8 % (ref 40–53)
HGB BLD-MCNC: 13.1 G/DL (ref 13.3–17.7)
MCH RBC QN AUTO: 31.9 PG (ref 26.5–33)
MCHC RBC AUTO-ENTMCNC: 33.8 G/DL (ref 31.5–36.5)
MCV RBC AUTO: 94 FL (ref 78–100)
PLATELET # BLD AUTO: 401 10E9/L (ref 150–450)
RBC # BLD AUTO: 4.11 10E12/L (ref 4.4–5.9)
WBC # BLD AUTO: 8.1 10E9/L (ref 4–11)

## 2020-02-13 PROCEDURE — 36415 COLL VENOUS BLD VENIPUNCTURE: CPT | Performed by: SURGERY

## 2020-02-13 PROCEDURE — 71046 X-RAY EXAM CHEST 2 VIEWS: CPT | Mod: TC

## 2020-02-13 PROCEDURE — 85027 COMPLETE CBC AUTOMATED: CPT | Performed by: SURGERY

## 2020-02-13 PROCEDURE — 40000116 ZZH STATISTIC OP CR VISIT: Performed by: REHABILITATION PRACTITIONER

## 2020-02-13 PROCEDURE — 93798 PHYS/QHP OP CAR RHAB W/ECG: CPT | Performed by: REHABILITATION PRACTITIONER

## 2020-02-13 NOTE — TELEPHONE ENCOUNTER
48 hour post op call    ACTIVITY  How are you doing with activity?I am doing well. Started rehab today.   Are you continuing to use your IS 3-4 times a day and do you have any shortness of breath. Yes but I have this cough that is getting worse not better. occassionally cough up clear mucous. Low grade temps. I am not short of breath.   Are you weighing yourself daily and has it been stable?.Stable    PAIN  How is your pain, what are you doing for your pain? Surprisingly of with all the coughing. I am just taking tylenol.     Are you still doing sternal precautions? Yes , encouraged splinting with coughing.  Do you hear any clicking when you are moving or taking a deep breath?  No.      INCISION: It looks good.     ASSISTANCE  Do you have someone at home to help you with your daily activities and transportation needs? My wife      MEDICATIONS  I would like to review your discharge medications and answer any questions you may have.   reviewed     Are you on a blood thinner/Coumadin  Yes    Who is managing your Coumadin dosing/INR? Primary care.     2.1 2/13  FOLLOW UP       Scheduled for cardiac rehab? I started today  Scheduled to see our PA or Surgeon? 2/24  Scheduled to see your cardiologist ? Dr Mederos 4/1  Scheduled to see your primary care physician? Next week  Do you have our contact information? Yes    [employer]      Were you happy with your care? Yes I was, the staff was great.  Could we have done anything better? The building is old and the temperatures fluctuate.

## 2020-02-14 ENCOUNTER — ANTICOAGULATION THERAPY VISIT (OUTPATIENT)
Dept: ANTICOAGULATION | Facility: CLINIC | Age: 50
End: 2020-02-14
Payer: COMMERCIAL

## 2020-02-14 DIAGNOSIS — Z95.2 S/P AVR (AORTIC VALVE REPLACEMENT): ICD-10-CM

## 2020-02-14 DIAGNOSIS — Z95.2 H/O MECHANICAL AORTIC VALVE REPLACEMENT: ICD-10-CM

## 2020-02-14 LAB — INR POINT OF CARE: 2.9 (ref 0.9–1.1)

## 2020-02-14 PROCEDURE — 99207 ZZC NO CHARGE NURSE ONLY: CPT

## 2020-02-14 PROCEDURE — 85610 PROTHROMBIN TIME: CPT | Mod: QW

## 2020-02-14 PROCEDURE — 36416 COLLJ CAPILLARY BLOOD SPEC: CPT

## 2020-02-14 NOTE — PROGRESS NOTES
ANTICOAGULATION FOLLOW-UP CLINIC VISIT    Patient Name:  Toney Lemos  Date:  2020  Contact Type:  Face to Face    SUBJECTIVE:  Patient Findings     Comments:   The patient was assessed for diet, medication, and activity level changes, missed or extra doses, bruising or bleeding, with no problem findings.  Cathleen Pacheco RN          Clinical Outcomes     Negatives:   Major bleeding event, Thromboembolic event, Anticoagulation-related hospital admission, Anticoagulation-related ED visit, Anticoagulation-related fatality    Comments:   The patient was assessed for diet, medication, and activity level changes, missed or extra doses, bruising or bleeding, with no problem findings.  Cathleen Pacheco RN             OBJECTIVE    INR Protime   Date Value Ref Range Status   2020 2.9 (A) 0.9 - 1.1 Final       ASSESSMENT / PLAN  INR assessment THER    Recheck INR In: 4 DAYS    INR Location Clinic      Anticoagulation Summary  As of 2020    INR goal:   2.0-3.0   TTR:   --   INR used for dosin.9 (2020)   Warfarin maintenance plan:   No maintenance plan   Full warfarin instructions:   : 5 mg; 2/15: 7.5 mg; 16: 5 mg; 17: 5 mg; Otherwise No maintenance plan   Next INR check:   2020   Target end date:   2021    Indications    S/P AVR (aortic valve replacement)-25 mm St. Rolf [place]  [Z95.2]  H/O mechanical aortic valve replacement [Z95.2]             Anticoagulation Episode Summary     INR check location:       Preferred lab:       Send INR reminders to:   MAIA FISHER    Comments:         Anticoagulation Care Providers     Provider Role Specialty Phone number    Moni Cartagena PA-C Referring Physician Assistant [phone]    Yan Lucas MD Referring Family Practice [phone]            See the Encounter Report to view Anticoagulation Flowsheet and Dosing Calendar (Go to Encounters tab in chart review, and find the Anticoagulation Therapy  Visit)    Dosage adjustment made based on physician directed care plan.    Cathleen Pacheco RN

## 2020-02-18 ENCOUNTER — ANTICOAGULATION THERAPY VISIT (OUTPATIENT)
Dept: ANTICOAGULATION | Facility: CLINIC | Age: 50
End: 2020-02-18
Payer: COMMERCIAL

## 2020-02-18 ENCOUNTER — HOSPITAL ENCOUNTER (OUTPATIENT)
Dept: CARDIAC REHAB | Facility: CLINIC | Age: 50
End: 2020-02-18
Attending: STUDENT IN AN ORGANIZED HEALTH CARE EDUCATION/TRAINING PROGRAM
Payer: COMMERCIAL

## 2020-02-18 DIAGNOSIS — Z95.2 S/P AVR (AORTIC VALVE REPLACEMENT): ICD-10-CM

## 2020-02-18 DIAGNOSIS — Z95.2 H/O MECHANICAL AORTIC VALVE REPLACEMENT: ICD-10-CM

## 2020-02-18 LAB — INR POINT OF CARE: 2.6 (ref 0.9–1.1)

## 2020-02-18 PROCEDURE — 93797 PHYS/QHP OP CAR RHAB WO ECG: CPT | Mod: 59

## 2020-02-18 PROCEDURE — 40000116 ZZH STATISTIC OP CR VISIT

## 2020-02-18 PROCEDURE — 36416 COLLJ CAPILLARY BLOOD SPEC: CPT

## 2020-02-18 PROCEDURE — 40000575 ZZH STATISTIC OP CARDIAC VISIT #2

## 2020-02-18 PROCEDURE — 99207 ZZC NO CHARGE NURSE ONLY: CPT

## 2020-02-18 PROCEDURE — 85610 PROTHROMBIN TIME: CPT | Mod: QW

## 2020-02-18 PROCEDURE — 93798 PHYS/QHP OP CAR RHAB W/ECG: CPT

## 2020-02-18 NOTE — PROGRESS NOTES
ANTICOAGULATION FOLLOW-UP CLINIC VISIT    Patient Name:  Toney Lemos  Date:  2020  Contact Type:  Face to Face    SUBJECTIVE:  Patient Findings     Comments:   The patient was assessed for diet, medication, and activity level changes, missed or extra doses, bruising or bleeding, with no problem findings.  aCthleen Pacheco RN          Clinical Outcomes     Negatives:   Major bleeding event, Thromboembolic event, Anticoagulation-related hospital admission, Anticoagulation-related ED visit, Anticoagulation-related fatality    Comments:   The patient was assessed for diet, medication, and activity level changes, missed or extra doses, bruising or bleeding, with no problem findings.  Cathleen Pacheco RN             OBJECTIVE    INR Protime   Date Value Ref Range Status   2020 2.6 (A) 0.9 - 1.1 Final       ASSESSMENT / PLAN  INR assessment THER    Recheck INR In: 1 WEEK    INR Location Clinic      Anticoagulation Summary  As of 2020    INR goal:   2.0-3.0   TTR:   --   INR used for dosin.6 (2020)   Warfarin maintenance plan:   7.5 mg (5 mg x 1.5) every Sat; 5 mg (5 mg x 1) all other days   Full warfarin instructions:   7.5 mg every Sat; 5 mg all other days   Weekly warfarin total:   37.5 mg   Plan last modified:   Cathleen Pacheco RN (2020)   Next INR check:   2020   Target end date:   2021    Indications    S/P AVR (aortic valve replacement)-25 mm St. Rolf [place]  [Z95.2]  H/O mechanical aortic valve replacement [Z95.2]             Anticoagulation Episode Summary     INR check location:       Preferred lab:       Send INR reminders to:   MAIA FISHER    Comments:         Anticoagulation Care Providers     Provider Role Specialty Phone number    Moni Cartagena PA-C Referring Physician Assistant [phone]    Yan Lucas MD Referring Family Practice [phone]            See the Encounter Report to view Anticoagulation Flowsheet and Dosing  Calendar (Go to Encounters tab in chart review, and find the Anticoagulation Therapy Visit)    Dosage adjustment made based on physician directed care plan.    Cathleen Pacheco RN

## 2020-02-20 ENCOUNTER — HOSPITAL ENCOUNTER (OUTPATIENT)
Dept: CARDIAC REHAB | Facility: CLINIC | Age: 50
End: 2020-02-20
Attending: STUDENT IN AN ORGANIZED HEALTH CARE EDUCATION/TRAINING PROGRAM
Payer: COMMERCIAL

## 2020-02-20 PROCEDURE — 93798 PHYS/QHP OP CAR RHAB W/ECG: CPT | Performed by: REHABILITATION PRACTITIONER

## 2020-02-20 PROCEDURE — 40000116 ZZH STATISTIC OP CR VISIT: Performed by: REHABILITATION PRACTITIONER

## 2020-02-24 ENCOUNTER — ANTICOAGULATION THERAPY VISIT (OUTPATIENT)
Dept: ANTICOAGULATION | Facility: CLINIC | Age: 50
End: 2020-02-24
Payer: COMMERCIAL

## 2020-02-24 ENCOUNTER — OFFICE VISIT (OUTPATIENT)
Dept: CARDIOLOGY | Facility: CLINIC | Age: 50
End: 2020-02-24
Payer: COMMERCIAL

## 2020-02-24 VITALS
BODY MASS INDEX: 25.33 KG/M2 | HEART RATE: 64 BPM | DIASTOLIC BLOOD PRESSURE: 66 MMHG | HEIGHT: 69 IN | SYSTOLIC BLOOD PRESSURE: 117 MMHG | WEIGHT: 171 LBS

## 2020-02-24 DIAGNOSIS — Z95.2 S/P AVR: ICD-10-CM

## 2020-02-24 DIAGNOSIS — I10 HYPERTENSION GOAL BP (BLOOD PRESSURE) < 140/90: ICD-10-CM

## 2020-02-24 DIAGNOSIS — Z95.2 S/P AVR (AORTIC VALVE REPLACEMENT): ICD-10-CM

## 2020-02-24 DIAGNOSIS — Z95.2 H/O MECHANICAL AORTIC VALVE REPLACEMENT: ICD-10-CM

## 2020-02-24 DIAGNOSIS — E78.5 HYPERLIPIDEMIA LDL GOAL <130: ICD-10-CM

## 2020-02-24 LAB — INR POINT OF CARE: 3 (ref 0.9–1.1)

## 2020-02-24 PROCEDURE — 36416 COLLJ CAPILLARY BLOOD SPEC: CPT

## 2020-02-24 PROCEDURE — 99207 ZZC NO CHARGE NURSE ONLY: CPT

## 2020-02-24 PROCEDURE — 85610 PROTHROMBIN TIME: CPT | Mod: QW

## 2020-02-24 RX ORDER — WARFARIN SODIUM 5 MG/1
5 TABLET ORAL DAILY
Qty: 90 TABLET | Refills: 3 | Status: SHIPPED | OUTPATIENT
Start: 2020-02-24 | End: 2020-12-30

## 2020-02-24 RX ORDER — LISINOPRIL 5 MG/1
5 TABLET ORAL DAILY
Qty: 90 TABLET | Refills: 3 | Status: SHIPPED | OUTPATIENT
Start: 2020-02-24 | End: 2020-12-30

## 2020-02-24 RX ORDER — METOPROLOL SUCCINATE 100 MG/1
100 TABLET, EXTENDED RELEASE ORAL 2 TIMES DAILY
Qty: 180 TABLET | Refills: 3 | Status: SHIPPED | OUTPATIENT
Start: 2020-02-24 | End: 2020-12-30

## 2020-02-24 RX ORDER — AMLODIPINE BESYLATE 5 MG/1
5 TABLET ORAL DAILY
Qty: 90 TABLET | Refills: 3 | Status: SHIPPED | OUTPATIENT
Start: 2020-02-24 | End: 2020-12-30

## 2020-02-24 RX ORDER — ATORVASTATIN CALCIUM 40 MG/1
40 TABLET, FILM COATED ORAL DAILY
Qty: 90 TABLET | Refills: 3 | Status: SHIPPED | OUTPATIENT
Start: 2020-02-24 | End: 2020-12-30

## 2020-02-24 ASSESSMENT — MIFFLIN-ST. JEOR: SCORE: 1634.44

## 2020-02-24 NOTE — PROGRESS NOTES
ANTICOAGULATION FOLLOW-UP CLINIC VISIT    Patient Name:  Toney Lemos  Date:  2/24/2020  Contact Type:  Face to Face    SUBJECTIVE:  Patient Findings     Comments:   The patient was assessed for diet, medication, and activity level changes, missed or extra doses, bruising or bleeding, with no problem findings.  Cathleen Pacheco RN          Clinical Outcomes     Negatives:   Major bleeding event, Thromboembolic event, Anticoagulation-related hospital admission, Anticoagulation-related ED visit, Anticoagulation-related fatality    Comments:   The patient was assessed for diet, medication, and activity level changes, missed or extra doses, bruising or bleeding, with no problem findings.  Cathleen Pacheco RN             OBJECTIVE    INR Protime   Date Value Ref Range Status   02/24/2020 3.0 (A) 0.9 - 1.1 Final       ASSESSMENT / PLAN  INR assessment THER    Recheck INR In: 8 DAYS    INR Location Clinic      Anticoagulation Summary  As of 2/24/2020    INR goal:   2.0-3.0   TTR:   100.0 % (4 d)   INR used for dosing:   3.0 (2/24/2020)   Warfarin maintenance plan:   5 mg (5 mg x 1) every day   Full warfarin instructions:   5 mg every day   Weekly warfarin total:   35 mg   Plan last modified:   Cathleen Pacheco RN (2/24/2020)   Next INR check:   3/3/2020   Target end date:   2/11/2021    Indications    S/P AVR (aortic valve replacement)-25 mm St. Rolf [place]  [Z95.2]  H/O mechanical aortic valve replacement [Z95.2]             Anticoagulation Episode Summary     INR check location:       Preferred lab:       Send INR reminders to:   MAIA FISHER    Comments:         Anticoagulation Care Providers     Provider Role Specialty Phone number    Moni Cartagena PA-C Referring Physician Assistant [phone]    Yan Lucas MD Referring Family Practice [phone]            See the Encounter Report to view Anticoagulation Flowsheet and Dosing Calendar (Go to Encounters tab in chart review, and find  the Anticoagulation Therapy Visit)    Dosage adjustment made based on physician directed care plan.    Cathleen Pacheco RN

## 2020-02-24 NOTE — PROGRESS NOTES
CV Surgery Post Op Follow Up Note  February 24, 2020     Assessment/Plan:   Patient is accompanied to clinic by his spouse.    Reason for Visit: Postoperative AVR follow-up.    HPI:  Toney Lemos is a 49 year old male with a PMH of HTN, HLD and bicuspid aortic valve with aortic valve insufficiency that has progressed to the severe range with a new finding of LV dilatation. He also endorses having very mild symptoms of more fatigue the past year. As such, he agreed to undergo surgery as outlined below.    On 2/3/20 Mr  successfully underwent aortic valve replacement with a 25 mm St. Rolf [place] mechanical valve, intraoperative transesophageal echocardiogram by Dr. Cindy Matthews.    # Bicuspid aortic valve and severe aortic valve insufficiency s/p AVR as outlined above.  Most recent echocardiogram demonstrated an LVEF of 52% and mild LV dilatation  # HTN  # HLD   - Metoprolol 100 mg BID  - Lisinopril 5 mg daily  - Amlodipine 5 mg daily  - Atorvastatin 40 mg daily  # Anticoagulation. No issues with staying therapeutic.  - ASA 81 mg daily  - Warfarin therapy    Since discharge, patient has been recovering well at home.    Pain / Sleep - Pain has been well managed and Preet is only occasionally taking tylenol. He is sleeping well.  Therapy - Has been participating in cardiac rehab and states this is going well. He is also walking on his treadmill at home for 45-60 minutes per day.  Breathing - Denies SOB, PND, orthopnea.   Fluid status - Preoperative weight 173 lbs; discharge weight ; weight today 171 lbs; no appreciable JVD or BLE edema. Weights have been stable at home.  Nutrition - Appetite and taste of food is improving. Is having regular bowel movements.  Heart Rhythm / VS - Today in clinic patient sounds to be in a regular rhythm with HR <100 bpm. Denies chest pain, palpitations, dizziness or syncopal symptoms. Vital signs are excellent. No med changes today.  Incisions - Healing well with no noted erythema or  drainage. Sternum is stable upon examination and patient denies any c/o of popping or clicking.  Follow up - PCP this Friday; Cardiology Dr. Mederos on April 1    Postoperative restrictions have been reviewed and all of the patient's questions were answered. Reinforced sternal precautions. Our contact information was given to the patient if he should have any further questions/concerns. No further follow up is needed with us.    Thank you for allowing us to participate in this gentlemant's care.    Subjective:    Per discharge summary:    Mr. Lemos is a very pleasant 49-year-old gentleman who I recently saw for severe aortic valve insufficiency with a known bicuspid valve with a recent echo demonstrating some LV dilatation.  He was taken to the operating room today for aortic valve replacement with a mechanical valve.     On 2/3/20   successfully underwent Aortic valve replacement with a 25 mm St. Rolf [place] mechanical valve, intraoperative transesophageal echocardiogram by Dr. Cindy Matthews.  Was extubated within 24 hours of surgery.   Had some transient hyperglycemia treated with an insulin gtt, transitioned to sliding scale insulin and has no need at discharge.  Had some fluid overload treated with diuretic medication. At discharge he is 5 below his pre-op weight and is not sent with any diuretics.   Heart rhythm remained stable. He progressed well with cardiac rehab. They are discharged to home on pod# 6 with the help of their family once his INR was stable. He knows that he will be on coumadin for life for his mechanical valve. His INR goal is 2-3.     Allergies:   Allergies   Allergen Reactions     No Known Drug Allergies      Seasonal Allergies        Medications:   Current Outpatient Medications:      acetaminophen (TYLENOL) 325 MG tablet, Take 2 tablets (650 mg) by mouth every 4 hours as needed for mild pain, Disp: 30 tablet, Rfl: 0     amLODIPine (NORVASC) 2.5 MG tablet, Take 2 tablets (5 mg) by mouth  "daily (Patient taking differently: Take 2.5 mg by mouth 2 times daily ), Disp: 180 tablet, Rfl: 3     aspirin (ASA) 81 MG EC tablet, Take 1 tablet (81 mg) by mouth daily, Disp: 30 tablet, Rfl: 0     atorvastatin (LIPITOR) 40 MG tablet, Take 1 tablet (40 mg) by mouth daily, Disp: 90 tablet, Rfl: 3     famotidine (PEPCID) 20 MG tablet, Take 1 tablet (20 mg) by mouth 2 times daily, Disp: 28 tablet, Rfl: 0     lisinopril (PRINIVIL/ZESTRIL) 5 MG tablet, Take 1 tablet (5 mg) by mouth daily, Disp: 30 tablet, Rfl: 3     methocarbamol (ROBAXIN) 750 MG tablet, Take 1 tablet (750 mg) by mouth 4 times daily, Disp: 60 tablet, Rfl: 0     metoprolol succinate ER (TOPROL-XL) 100 MG 24 hr tablet, Take 1 tablet (100 mg) by mouth 2 times daily, Disp: 60 tablet, Rfl: 3     oxyCODONE (ROXICODONE) 5 MG tablet, Take 1-2 tablets (5-10 mg) by mouth every 3 hours as needed for moderate to severe pain, Disp: 50 tablet, Rfl: 0     polyethylene glycol (MIRALAX/GLYCOLAX) packet, Take 17 g by mouth 2 times daily, Disp: 7 packet, Rfl: 0     senna-docusate (SENOKOT-S/PERICOLACE) 8.6-50 MG tablet, Take 2 tablets by mouth 2 times daily as needed for constipation, Disp: 30 tablet, Rfl: 0     warfarin ANTICOAGULANT (COUMADIN) 5 MG tablet, Take 1 tablet (5 mg) by mouth daily, Disp: 30 tablet, Rfl: 3    Objective:    Physical Exam:   Blood Pressure 117/66   Pulse 64   Height 1.75 m (5' 8.9\")   Weight 77.6 kg (171 lb)   Body Mass Index 25.33 kg/m      Constitutional:   Well nourished, well developed, resting comfortably   Neck:   No appreciable JVD  Cardiovascular: S1, S2, regular rate and rhythm without murmurs, rubs or gallops. Prominent valve click.  Pulmonary/Chest: Clear to auscultation bilaterally, with no wheezes or retractions. No respiratory distress.  Musculoskeletal:  No edema or clubbing   Skin: Skin is warm and dry. Sternal incision CDI  Neurological: Alert and oriented to person, place, and time. Nonfocal exam  Psychiatric:  Normal mood " and affect.    FACUNDO Guillaume, CCRN-CSC  Pager: [phone]

## 2020-02-24 NOTE — PATIENT INSTRUCTIONS
Your surgery was on February 3, 2020    Ok to start driving on 3/2 as long as you are no longer taking narcotic pain medications.    No lifting greater than 10 pounds until 3/30, then    No lifting greater than 20-30 pounds until 4/27    Do not soak your incisions until fully healed over with no scabbing; this includes hot tubs, baths, pools etc.    If you have questions or concerns, please call us:    Negar Schmidt  [phone]

## 2020-02-25 ENCOUNTER — HOSPITAL ENCOUNTER (OUTPATIENT)
Dept: CARDIAC REHAB | Facility: CLINIC | Age: 50
End: 2020-02-25
Attending: STUDENT IN AN ORGANIZED HEALTH CARE EDUCATION/TRAINING PROGRAM
Payer: COMMERCIAL

## 2020-02-25 PROCEDURE — 40000575 ZZH STATISTIC OP CARDIAC VISIT #2

## 2020-02-25 PROCEDURE — 40000116 ZZH STATISTIC OP CR VISIT

## 2020-02-25 PROCEDURE — 93797 PHYS/QHP OP CAR RHAB WO ECG: CPT | Mod: 59

## 2020-02-25 PROCEDURE — 93798 PHYS/QHP OP CAR RHAB W/ECG: CPT

## 2020-02-27 ENCOUNTER — HOSPITAL ENCOUNTER (OUTPATIENT)
Dept: CARDIAC REHAB | Facility: CLINIC | Age: 50
End: 2020-02-27
Attending: STUDENT IN AN ORGANIZED HEALTH CARE EDUCATION/TRAINING PROGRAM
Payer: COMMERCIAL

## 2020-02-27 PROCEDURE — 40000116 ZZH STATISTIC OP CR VISIT: Performed by: REHABILITATION PRACTITIONER

## 2020-02-27 PROCEDURE — 93798 PHYS/QHP OP CAR RHAB W/ECG: CPT | Performed by: REHABILITATION PRACTITIONER

## 2020-02-28 ENCOUNTER — OFFICE VISIT (OUTPATIENT)
Dept: FAMILY MEDICINE | Facility: CLINIC | Age: 50
End: 2020-02-28
Payer: COMMERCIAL

## 2020-02-28 VITALS
TEMPERATURE: 98 F | HEART RATE: 77 BPM | WEIGHT: 173 LBS | RESPIRATION RATE: 18 BRPM | DIASTOLIC BLOOD PRESSURE: 82 MMHG | BODY MASS INDEX: 25.62 KG/M2 | OXYGEN SATURATION: 99 % | SYSTOLIC BLOOD PRESSURE: 122 MMHG

## 2020-02-28 DIAGNOSIS — Z95.2 H/O MECHANICAL AORTIC VALVE REPLACEMENT: Primary | ICD-10-CM

## 2020-02-28 PROCEDURE — 99213 OFFICE O/P EST LOW 20 MIN: CPT | Performed by: FAMILY MEDICINE

## 2020-02-28 ASSESSMENT — PAIN SCALES - GENERAL: PAINLEVEL: MILD PAIN (2)

## 2020-02-28 NOTE — PROGRESS NOTES
Subjective         Hospital Follow-up Visit:    Hospital/Nursing Home/IP Rehab Facility: [facility]  Date of Admission: 2/3/20  Date of Discharge: 2/9/20  Reason(s) for Admission: AVR Congenital insufficiency             Problems taking medications regularly:  None       Medication changes since discharge: None       Problems adhering to non-medication therapy:  None    Summary of hospitalization:  [facility] discharge summary reviewed  Diagnostic Tests/Treatments reviewed.  Follow up needed: none  Other Healthcare Providers Involved in Patient s Care:         None  Update since discharge: improved.     Post Discharge Medication Reconciliation: discharge medications reconciled, continue medications without change.  Plan of care communicated with patient     Coding guidelines for this visit:  Type of Medical   Decision Making Face-to-Face Visit       within 7 Days of discharge Face-to-Face Visit        within 14 days of discharge   Moderate Complexity [id number]   High Complexity [id number]              Toney is a 49 year old male who comes to clinic     Review of Systems   ROS COMP: Constitutional, HEENT, cardiovascular, pulmonary, gi and gu systems are negative, except as otherwise noted.      Objective    /82   Pulse 77   Temp 98  F (36.7  C) (Temporal)   Resp 18   Wt 78.5 kg (173 lb)   SpO2 99%   BMI 25.62 kg/m       Wt Readings from Last 2 Encounters:   02/28/20 78.5 kg (173 lb)   02/24/20 77.6 kg (171 lb)       Physical Exam   GENERAL: healthy, alert and no distress  NECK: no adenopathy, no asymmetry, masses, or scars and thyroid normal to palpation  RESP: lungs clear to auscultation - no rales, rhonchi or wheezes  CV: regular rate and rhythm, normal S1 S2, no S3 or S4, no murmur, click or rub, no peripheral edema and peripheral pulses strong.  Midline sternal incision clean dry and intact with no drainage redness or warmth  ABDOMEN: soft, nontender, no hepatosplenomegaly,  no masses and bowel sounds normal  MS: no gross musculoskeletal defects noted, no edema    Diagnostic Test Results:  Labs reviewed in Epic        Assessment & Plan       ICD-10-CM    1. H/O mechanical aortic valve replacement Z95.2        Now 3 weeks out from aortic valve replacement.  Tolerating the new medications, anticoagulation, surgery very well.  Feeling good.  No urgent concerns.  I can see him back in 6 months for his annual, continue with cardiac care through specialists    Yan Lucas MD  [facility]

## 2020-03-03 ENCOUNTER — HOSPITAL ENCOUNTER (OUTPATIENT)
Dept: CARDIAC REHAB | Facility: CLINIC | Age: 50
End: 2020-03-03
Attending: STUDENT IN AN ORGANIZED HEALTH CARE EDUCATION/TRAINING PROGRAM
Payer: COMMERCIAL

## 2020-03-03 PROCEDURE — 93798 PHYS/QHP OP CAR RHAB W/ECG: CPT

## 2020-03-03 PROCEDURE — 93797 PHYS/QHP OP CAR RHAB WO ECG: CPT

## 2020-03-03 PROCEDURE — 40000575 ZZH STATISTIC OP CARDIAC VISIT #2

## 2020-03-03 PROCEDURE — 40000116 ZZH STATISTIC OP CR VISIT

## 2020-03-04 ENCOUNTER — ANTICOAGULATION THERAPY VISIT (OUTPATIENT)
Dept: ANTICOAGULATION | Facility: CLINIC | Age: 50
End: 2020-03-04
Payer: COMMERCIAL

## 2020-03-04 DIAGNOSIS — Z95.2 S/P AVR (AORTIC VALVE REPLACEMENT): ICD-10-CM

## 2020-03-04 DIAGNOSIS — Z95.2 H/O MECHANICAL AORTIC VALVE REPLACEMENT: ICD-10-CM

## 2020-03-04 LAB — INR POINT OF CARE: 1.9 (ref 0.9–1.1)

## 2020-03-04 PROCEDURE — 36416 COLLJ CAPILLARY BLOOD SPEC: CPT

## 2020-03-04 PROCEDURE — 99207 ZZC NO CHARGE NURSE ONLY: CPT

## 2020-03-04 PROCEDURE — 85610 PROTHROMBIN TIME: CPT | Mod: QW

## 2020-03-04 NOTE — PROGRESS NOTES
ANTICOAGULATION FOLLOW-UP CLINIC VISIT    Patient Name:  Toney Lemos  Date:  3/4/2020  Contact Type:  Face to Face    SUBJECTIVE:  Patient Findings     Comments:   The patient was assessed for diet, medication, and activity level changes, missed or extra doses, bruising or bleeding, with no problem findings.  Cathleen Pacheco RN          Clinical Outcomes     Negatives:   Major bleeding event, Thromboembolic event, Anticoagulation-related hospital admission, Anticoagulation-related ED visit, Anticoagulation-related fatality    Comments:   The patient was assessed for diet, medication, and activity level changes, missed or extra doses, bruising or bleeding, with no problem findings.  Cathleen Pacheco RN             OBJECTIVE    INR Protime   Date Value Ref Range Status   2020 1.9 (A) 0.9 - 1.1 Final       ASSESSMENT / PLAN  INR assessment SUB    Recheck INR In: 1 WEEK    INR Location Clinic      Anticoagulation Summary  As of 3/4/2020    INR goal:   2.0-3.0   TTR:   93.7 % (1.9 wk)   INR used for dosin.9! (3/4/2020)   Warfarin maintenance plan:   7.5 mg (5 mg x 1.5) every Wed; 5 mg (5 mg x 1) all other days   Full warfarin instructions:   7.5 mg every Wed; 5 mg all other days   Weekly warfarin total:   37.5 mg   Plan last modified:   Cathleen Pacheco RN (3/4/2020)   Next INR check:   3/10/2020   Target end date:   2021    Indications    S/P AVR (aortic valve replacement)-25 mm St. Rolf [place]  [Z95.2]  H/O mechanical aortic valve replacement [Z95.2]             Anticoagulation Episode Summary     INR check location:       Preferred lab:       Send INR reminders to:   MAIA FISHER    Comments:         Anticoagulation Care Providers     Provider Role Specialty Phone number    Moni Cartagena PA-C Referring Physician Assistant [phone]    Yan Lucas MD Referring [facility] [phone]            See the Encounter Report to view Anticoagulation Flowsheet and  Dosing Calendar (Go to Encounters tab in chart review, and find the Anticoagulation Therapy Visit)    Dosage adjustment made based on physician directed care plan.      Cathleen Pacheco RN

## 2020-03-05 ENCOUNTER — HOSPITAL ENCOUNTER (OUTPATIENT)
Dept: CARDIAC REHAB | Facility: CLINIC | Age: 50
End: 2020-03-05
Attending: STUDENT IN AN ORGANIZED HEALTH CARE EDUCATION/TRAINING PROGRAM
Payer: COMMERCIAL

## 2020-03-05 PROCEDURE — 40000116 ZZH STATISTIC OP CR VISIT: Performed by: REHABILITATION PRACTITIONER

## 2020-03-05 PROCEDURE — 93798 PHYS/QHP OP CAR RHAB W/ECG: CPT | Performed by: REHABILITATION PRACTITIONER

## 2020-03-06 ENCOUNTER — TELEPHONE (OUTPATIENT)
Dept: FAMILY MEDICINE | Facility: CLINIC | Age: 50
End: 2020-03-06

## 2020-03-06 NOTE — TELEPHONE ENCOUNTER
Reason for Call:  Other prescription    Detailed comments: Pt's wife called, states pt is now taking new daily medications and is wondering if it's ok to also take Claritin in the mix. Please call to advise. *C2C on file*    Phone Number Patient can be reached at: Home number on file [phone] (home)    Best Time: Anytime    Can we leave a detailed message on this number? YES    Call taken on 3/6/2020 at 10:41 AM by Kaylee Faulkner

## 2020-03-10 ENCOUNTER — ANTICOAGULATION THERAPY VISIT (OUTPATIENT)
Dept: ANTICOAGULATION | Facility: CLINIC | Age: 50
End: 2020-03-10
Payer: COMMERCIAL

## 2020-03-10 ENCOUNTER — HOSPITAL ENCOUNTER (OUTPATIENT)
Dept: CARDIAC REHAB | Facility: CLINIC | Age: 50
End: 2020-03-10
Attending: STUDENT IN AN ORGANIZED HEALTH CARE EDUCATION/TRAINING PROGRAM
Payer: COMMERCIAL

## 2020-03-10 DIAGNOSIS — Z95.2 H/O MECHANICAL AORTIC VALVE REPLACEMENT: ICD-10-CM

## 2020-03-10 DIAGNOSIS — Z95.2 S/P AVR (AORTIC VALVE REPLACEMENT): ICD-10-CM

## 2020-03-10 LAB — INR POINT OF CARE: 2.5 (ref 0.9–1.1)

## 2020-03-10 PROCEDURE — 99207 ZZC NO CHARGE NURSE ONLY: CPT

## 2020-03-10 PROCEDURE — 93797 PHYS/QHP OP CAR RHAB WO ECG: CPT

## 2020-03-10 PROCEDURE — 40000116 ZZH STATISTIC OP CR VISIT

## 2020-03-10 PROCEDURE — 36416 COLLJ CAPILLARY BLOOD SPEC: CPT

## 2020-03-10 PROCEDURE — 93798 PHYS/QHP OP CAR RHAB W/ECG: CPT

## 2020-03-10 PROCEDURE — 85610 PROTHROMBIN TIME: CPT | Mod: QW

## 2020-03-10 PROCEDURE — 40000575 ZZH STATISTIC OP CARDIAC VISIT #2

## 2020-03-10 NOTE — PROGRESS NOTES
ANTICOAGULATION FOLLOW-UP CLINIC VISIT    Patient Name:  Toney Lemos  Date:  3/10/2020  Contact Type:  Face to Face    SUBJECTIVE:  Patient Findings     Comments:   The patient was assessed for diet, medication, and activity level changes, missed or extra doses, bruising or bleeding, with no problem findings.  Cathleen Pacheco RN          Clinical Outcomes     Negatives:   Major bleeding event, Thromboembolic event, Anticoagulation-related hospital admission, Anticoagulation-related ED visit, Anticoagulation-related fatality    Comments:   The patient was assessed for diet, medication, and activity level changes, missed or extra doses, bruising or bleeding, with no problem findings.  Cathleen Pacheco RN             OBJECTIVE    INR Protime   Date Value Ref Range Status   03/10/2020 2.5 (A) 0.9 - 1.1 Final       ASSESSMENT / PLAN  INR assessment THER    Recheck INR In: 2 WEEKS    INR Location Clinic      Anticoagulation Summary  As of 3/10/2020    INR goal:   2.0-3.0   TTR:   90.4 % (2.7 wk)   INR used for dosin.5 (3/10/2020)   Warfarin maintenance plan:   7.5 mg (5 mg x 1.5) every Wed; 5 mg (5 mg x 1) all other days   Full warfarin instructions:   7.5 mg every Wed; 5 mg all other days   Weekly warfarin total:   37.5 mg   No change documented:   Cathleen Pacheco RN   Plan last modified:   Cathleen Pacheco RN (3/4/2020)   Next INR check:   3/24/2020   Target end date:   2021    Indications    S/P AVR (aortic valve replacement)-25 mm St. Rolf [place]  [Z95.2]  H/O mechanical aortic valve replacement [Z95.2]             Anticoagulation Episode Summary     INR check location:       Preferred lab:       Send INR reminders to:   MAIA FISHER    Comments:         Anticoagulation Care Providers     Provider Role Specialty Phone number    Moni Cartagena PA-C Referring Physician Assistant [phone]    Yan Lucas MD Referring [facility] [phone]            See the  Encounter Report to view Anticoagulation Flowsheet and Dosing Calendar (Go to Encounters tab in chart review, and find the Anticoagulation Therapy Visit)    Dosage adjustment made based on physician directed care plan.    Cathleen Pacheco RN

## 2020-03-12 ENCOUNTER — HOSPITAL ENCOUNTER (OUTPATIENT)
Dept: CARDIAC REHAB | Facility: CLINIC | Age: 50
End: 2020-03-12
Attending: STUDENT IN AN ORGANIZED HEALTH CARE EDUCATION/TRAINING PROGRAM
Payer: COMMERCIAL

## 2020-03-12 PROCEDURE — 93798 PHYS/QHP OP CAR RHAB W/ECG: CPT | Performed by: REHABILITATION PRACTITIONER

## 2020-03-12 PROCEDURE — 40000116 ZZH STATISTIC OP CR VISIT: Performed by: REHABILITATION PRACTITIONER

## 2020-03-17 ENCOUNTER — HOSPITAL ENCOUNTER (OUTPATIENT)
Dept: CARDIAC REHAB | Facility: CLINIC | Age: 50
End: 2020-03-17
Attending: STUDENT IN AN ORGANIZED HEALTH CARE EDUCATION/TRAINING PROGRAM
Payer: COMMERCIAL

## 2020-03-17 PROCEDURE — 40000575 ZZH STATISTIC OP CARDIAC VISIT #2: Performed by: REHABILITATION PRACTITIONER

## 2020-03-17 PROCEDURE — 93798 PHYS/QHP OP CAR RHAB W/ECG: CPT | Performed by: REHABILITATION PRACTITIONER

## 2020-03-17 PROCEDURE — 93797 PHYS/QHP OP CAR RHAB WO ECG: CPT | Performed by: REHABILITATION PRACTITIONER

## 2020-03-17 PROCEDURE — 40000116 ZZH STATISTIC OP CR VISIT: Performed by: REHABILITATION PRACTITIONER

## 2020-03-23 DIAGNOSIS — Z79.01 LONG TERM CURRENT USE OF ANTICOAGULANTS WITH INR GOAL OF 2.0-3.0: ICD-10-CM

## 2020-03-23 DIAGNOSIS — Z95.2 S/P AVR (AORTIC VALVE REPLACEMENT): Primary | ICD-10-CM

## 2020-03-23 DIAGNOSIS — Z95.2 H/O MECHANICAL AORTIC VALVE REPLACEMENT: ICD-10-CM

## 2020-03-24 DIAGNOSIS — Z79.01 LONG TERM CURRENT USE OF ANTICOAGULANTS WITH INR GOAL OF 2.0-3.0: ICD-10-CM

## 2020-03-24 DIAGNOSIS — Z95.2 H/O MECHANICAL AORTIC VALVE REPLACEMENT: ICD-10-CM

## 2020-03-24 DIAGNOSIS — Z95.2 S/P AVR (AORTIC VALVE REPLACEMENT): ICD-10-CM

## 2020-03-24 LAB — INR BLD: 2.5 (ref 0.86–1.14)

## 2020-03-24 PROCEDURE — 85610 PROTHROMBIN TIME: CPT | Mod: QW | Performed by: FAMILY MEDICINE

## 2020-03-24 PROCEDURE — 36416 COLLJ CAPILLARY BLOOD SPEC: CPT | Performed by: FAMILY MEDICINE

## 2020-03-25 ENCOUNTER — TELEPHONE (OUTPATIENT)
Dept: CARDIOLOGY | Facility: CLINIC | Age: 50
End: 2020-03-25

## 2020-03-25 NOTE — TELEPHONE ENCOUNTER
Office Visit:4/1/2020     COVID-19 Symptom Screening ~ Reviewed with Toney     Do you or does patient have one of the following new symptoms:      Fever or reported chills?  no     A new cough (started within the past 14 days)? no     Shortness of breath (started within the past 14 days)? no     Nausea, vomiting or diarrhea? no     Within the past 3 weeks, have you been exposed to someone with a known positive illness below?      COVID - 19 (known or suspected) no     Chicken pox?  no    Measles? no     Pertussis? no          Patient's appointment status: Appointment converted to phone visit        Patient was told to ....    ~ Take a weight in the am before appt and write it down.   ~ Take a BP/HR, 30 minutes before phone visit, and write it down.    ~ Get medications ready before phone visit.        Dillon TALAVERA  [facility]

## 2020-03-31 ENCOUNTER — MYC MEDICAL ADVICE (OUTPATIENT)
Dept: CARDIOLOGY | Facility: CLINIC | Age: 50
End: 2020-03-31

## 2020-04-01 ENCOUNTER — VIRTUAL VISIT (OUTPATIENT)
Dept: CARDIOLOGY | Facility: CLINIC | Age: 50
End: 2020-04-01
Payer: COMMERCIAL

## 2020-04-01 VITALS
BODY MASS INDEX: 25.68 KG/M2 | SYSTOLIC BLOOD PRESSURE: 125 MMHG | WEIGHT: 173.4 LBS | DIASTOLIC BLOOD PRESSURE: 82 MMHG | HEART RATE: 57 BPM

## 2020-04-01 DIAGNOSIS — Z95.2 S/P AVR: Primary | ICD-10-CM

## 2020-04-01 PROCEDURE — 99213 OFFICE O/P EST LOW 20 MIN: CPT | Mod: TEL | Performed by: INTERNAL MEDICINE

## 2020-04-01 RX ORDER — LORATADINE 10 MG/1
10 TABLET ORAL DAILY
COMMUNITY

## 2020-04-01 NOTE — PROGRESS NOTES
"Toney Lemos is a 49 year old male who is being evaluated via a billable telephone visit.      The patient has been notified of following:     \"This telephone visit will be conducted via a call between you and your physician/provider. We have found that certain health care needs can be provided without the need for a physical exam.  This service lets us provide the care you need with a short phone conversation.  If a prescription is necessary we can send it directly to your pharmacy.  If lab work is needed we can place an order for that and you can then stop by our lab to have the test done at a later time.    If during the course of the call the physician/provider feels a telephone visit is not appropriate, you will not be charged for this service.\"     Patient has given verbal consent for Telephone visit?  Yes    Toney Lemos complains of    Chief Complaint   Patient presents with     Follow Up     See blood pressure log attached to patient's [employer] message     All blood pressures on patient's log was taken at night before bed.     I have reviewed and updated the patient's Past Medical History, Social History, Family History and Medication List.    ALLERGIES  No known drug allergies and Seasonal allergies    Additional provider notes:    Mr Lemos is a very pleasant 49-year-old gentleman who recently about 2 months ago underwent mechanical aortic valve replacement for severe aortic regurgitation in the setting of bicuspid aortic valve and LV dilatation and some symptoms of fatigue.  Pre-AVR coronary angiogram showed normal coronary arteries.  It looks like he recovered well from the surgery.  Patient is here for a routine follow-up visit which has been converted into a phone visit due to ongoing coronavirus pandemic.  His wife also joined us on the phone.  Patient tells me that healthwise he feels well.  He has sent us a log of his blood pressure recordings and they all look quite well controlled last month.  " Due to the coronavirus pandemic cardiac rehab  has temporary close down but prior to that he was doing exercise on a regular basis.  No shortness of breath no particular chest discomfort with physical activity although he can feel some abnormal sensation at the site of his incision but according to patient they look well-healed.  No redness or discharge reported.  He can sometimes feel pounding sensation in his chest but the heart rate is not particularly fast.  He is also worried about joining work back which he is supposed to join later this month he works as a .  He is worried about catching coronavirus.  He is tolerating Coumadin and aspirin combination quite well without any bleeding issues.    Assessment and plan  A very pleasant 49-year-old gentleman status post mechanical aortic valve replacement done about 2 months ago in the setting of severe symptomatic aortic regurgitation with LV dilatation.  Historically it looks like patient is doing well and recovering well.  Several questions were answered regarding the weight restriction, exercise and when we will be ready to join work.  I did discuss with patient that usually we would like to have a post aVR complete echocardiogram but understandably patient is quite wary of having any test done at this time to avoid exposure to potential coronavirus infection.  I do recommend that hopefully we can see him back in about 6 weeks or so and we can perform an echocardiogram at that time.  Me and my office readily available for any help he may need regarding his employment as he is planning to join work back in about 4 to 5 weeks from now.    Phone call duration: 18 minutes    Evin Mederos MD

## 2020-04-02 NOTE — TELEPHONE ENCOUNTER
Spoke with patient. Will try to resend forms needing to be filled out and faxed by Dr. Mederos as per My chart message.

## 2020-04-03 NOTE — TELEPHONE ENCOUNTER
Spoke with wife Na and Dr. Mederos has filled out FMLA form to extend past 4- needs Echo and OV in 4-5 weeks. Krystal Javed RN on 4/3/2020 at 10:37 AM

## 2020-04-06 ENCOUNTER — TELEPHONE (OUTPATIENT)
Dept: CARDIOLOGY | Facility: CLINIC | Age: 50
End: 2020-04-06

## 2020-04-06 NOTE — TELEPHONE ENCOUNTER
Patient called in to inquire about his [facility] paperwork. I was to have been faxed to [facility] on 4/3. When I called [employer] they had not received it. I talked to Dillon in Cardiology and we decided that I will fax the paperwork to him at [phone] and have patient  there. I called patient and apologized for the situation and told him that I will fax it to cardiology and he can go to [facility] and ask for Dillon who will have the forms.

## 2020-04-09 NOTE — TELEPHONE ENCOUNTER
Resent FMLA form to patient's home address with corrections and clarification of extension of return to work date will be not be on 4-

## 2020-04-21 ENCOUNTER — ANTICOAGULATION THERAPY VISIT (OUTPATIENT)
Dept: ANTICOAGULATION | Facility: CLINIC | Age: 50
End: 2020-04-21
Payer: COMMERCIAL

## 2020-04-21 DIAGNOSIS — Z95.2 H/O MECHANICAL AORTIC VALVE REPLACEMENT: ICD-10-CM

## 2020-04-21 DIAGNOSIS — Z79.01 LONG TERM CURRENT USE OF ANTICOAGULANTS WITH INR GOAL OF 2.0-3.0: ICD-10-CM

## 2020-04-21 DIAGNOSIS — Z95.2 S/P AVR (AORTIC VALVE REPLACEMENT): ICD-10-CM

## 2020-04-21 LAB
CAPILLARY BLOOD COLLECTION: NORMAL
INR BLD: 3.1 (ref 0.86–1.14)

## 2020-04-21 PROCEDURE — 36416 COLLJ CAPILLARY BLOOD SPEC: CPT | Performed by: FAMILY MEDICINE

## 2020-04-21 PROCEDURE — 99207 ZZC NO CHARGE NURSE ONLY: CPT | Performed by: FAMILY MEDICINE

## 2020-04-21 PROCEDURE — 85610 PROTHROMBIN TIME: CPT | Mod: QW | Performed by: FAMILY MEDICINE

## 2020-04-21 NOTE — PROGRESS NOTES
ANTICOAGULATION FOLLOW-UP CLINIC VISIT    Patient Name:  Toney Lemos  Date:  4/21/2020  Contact Type:  Telephone    SUBJECTIVE:  Patient Findings     Comments:   Pt will eat some extra greens cirilo Pacheco RN            Clinical Outcomes     Comments:   Pt will eat some extra greens cirilo Pacheco RN               OBJECTIVE    INR Point of Care   Date Value Ref Range Status   04/21/2020 3.1 (H) 0.86 - 1.14 Final     Comment:     This test is intended for monitoring Coumadin therapy.  Results are not   accurate in patients with prolonged INR due to factor deficiency.         ASSESSMENT / PLAN  INR assessment THER    Recheck INR In: 4 WEEKS    INR Location Outside lab      Anticoagulation Summary  As of 4/21/2020    INR goal:   2.0-3.0   TTR:   89.4 % (2 mo)   INR used for dosing:   3.1! (4/21/2020)   Warfarin maintenance plan:   7.5 mg (5 mg x 1.5) every Wed; 5 mg (5 mg x 1) all other days   Full warfarin instructions:   7.5 mg every Wed; 5 mg all other days   Weekly warfarin total:   37.5 mg   No change documented:   Cathleen Pacheco RN   Plan last modified:   Cathleen Pacheco RN (3/4/2020)   Next INR check:   5/19/2020   Target end date:   2/11/2021    Indications    S/P AVR (aortic valve replacement)-25 mm St[place] Rolf [place]  [Z95.2]  H/O mechanical aortic valve replacement [Z95.2]             Anticoagulation Episode Summary     INR check location:       Preferred lab:       Send INR reminders to:   MAIA FISHER    Comments:         Anticoagulation Care Providers     Provider Role Specialty Phone number    Moni Cartagena PA-C Referring Physician Assistant [phone]    Yan Lucas MD Referring Family Practice [phone]            See the Encounter Report to view Anticoagulation Flowsheet and Dosing Calendar (Go to Encounters tab in chart review, and find the Anticoagulation Therapy Visit)    Dosage adjustment made based on physician directed care  plan.    Cathleen Pacheco RN

## 2020-05-18 ENCOUNTER — ANTICOAGULATION THERAPY VISIT (OUTPATIENT)
Dept: ANTICOAGULATION | Facility: CLINIC | Age: 50
End: 2020-05-18

## 2020-05-18 ENCOUNTER — HOSPITAL ENCOUNTER (OUTPATIENT)
Dept: CARDIOLOGY | Facility: CLINIC | Age: 50
Discharge: HOME | End: 2020-05-18
Attending: INTERNAL MEDICINE | Admitting: INTERNAL MEDICINE
Payer: COMMERCIAL

## 2020-05-18 DIAGNOSIS — Z95.2 S/P AVR (AORTIC VALVE REPLACEMENT): ICD-10-CM

## 2020-05-18 DIAGNOSIS — Z95.2 H/O MECHANICAL AORTIC VALVE REPLACEMENT: ICD-10-CM

## 2020-05-18 DIAGNOSIS — Z79.01 LONG TERM CURRENT USE OF ANTICOAGULANTS WITH INR GOAL OF 2.0-3.0: ICD-10-CM

## 2020-05-18 DIAGNOSIS — Z95.2 S/P AVR: ICD-10-CM

## 2020-05-18 LAB
CAPILLARY BLOOD COLLECTION: NORMAL
INR BLD: 2.1 (ref 0.86–1.14)

## 2020-05-18 PROCEDURE — 36416 COLLJ CAPILLARY BLOOD SPEC: CPT | Performed by: FAMILY MEDICINE

## 2020-05-18 PROCEDURE — 85610 PROTHROMBIN TIME: CPT | Mod: QW | Performed by: FAMILY MEDICINE

## 2020-05-18 PROCEDURE — 93306 TTE W/DOPPLER COMPLETE: CPT | Mod: 26 | Performed by: INTERNAL MEDICINE

## 2020-05-18 PROCEDURE — 93306 TTE W/DOPPLER COMPLETE: CPT

## 2020-05-18 PROCEDURE — 99207 ZZC NO CHARGE NURSE ONLY: CPT | Performed by: FAMILY MEDICINE

## 2020-05-18 PROCEDURE — 25500064 ZZH RX 255 OP 636: Performed by: INTERNAL MEDICINE

## 2020-05-18 RX ADMIN — HUMAN ALBUMIN MICROSPHERES AND PERFLUTREN 2 ML: 10; .22 INJECTION, SOLUTION INTRAVENOUS at 10:01

## 2020-05-18 NOTE — PROGRESS NOTES
ANTICOAGULATION FOLLOW-UP CLINIC VISIT    Patient Name:  Toney Lemos  Date:  2020  Contact Type:  Telephone    SUBJECTIVE:  Patient Findings     Comments:   The patient was assessed for diet, medication, and activity level changes, missed or extra doses, bruising or bleeding, with no problem findings..  Cathleen Pacheco RN          Clinical Outcomes     Negatives:   Major bleeding event, Thromboembolic event, Anticoagulation-related hospital admission, Anticoagulation-related ED visit, Anticoagulation-related fatality    Comments:   The patient was assessed for diet, medication, and activity level changes, missed or extra doses, bruising or bleeding, with no problem findings..  Cathleen Pacheco RN             OBJECTIVE    Recent labs: (last 7 days)     20  1037   INR 2.1*       ASSESSMENT / PLAN  INR assessment THER    Recheck INR In: 6 WEEKS    INR Location Outside lab      Anticoagulation Summary  As of 2020    INR goal:   2.0-3.0   TTR:   89.6 % (2.9 mo)   INR used for dosin.1 (2020)   Warfarin maintenance plan:   7.5 mg (5 mg x 1.5) every Wed; 5 mg (5 mg x 1) all other days   Full warfarin instructions:   7.5 mg every Wed; 5 mg all other days   Weekly warfarin total:   37.5 mg   No change documented:   Cathleen Pacheco RN   Plan last modified:   Cathleen Pacheco RN (3/4/2020)   Next INR check:   2020   Target end date:   2021    Indications    S/P AVR (aortic valve replacement)-25 mm [employer] Rolf [place]  [Z95.2]  H/O mechanical aortic valve replacement [Z95.2]             Anticoagulation Episode Summary     INR check location:       Preferred lab:       Send INR reminders to:   MAIA FISHER    Comments:         Anticoagulation Care Providers     Provider Role Specialty Phone number    Moni Cartagena PA-C Referring Physician Assistant [phone]    Yan Lucas MD Referring [facility] [phone]            See the Encounter Report to view  Anticoagulation Flowsheet and Dosing Calendar (Go to Encounters tab in chart review, and find the Anticoagulation Therapy Visit)    Dosage adjustment made based on physician directed care plan.      Cathleen Pacheco RN

## 2020-05-28 ENCOUNTER — VIRTUAL VISIT (OUTPATIENT)
Dept: CARDIOLOGY | Facility: CLINIC | Age: 50
End: 2020-05-28
Payer: COMMERCIAL

## 2020-05-28 VITALS
SYSTOLIC BLOOD PRESSURE: 128 MMHG | HEART RATE: 55 BPM | DIASTOLIC BLOOD PRESSURE: 80 MMHG | WEIGHT: 180 LBS | BODY MASS INDEX: 26.66 KG/M2

## 2020-05-28 DIAGNOSIS — Z95.2 S/P AVR: ICD-10-CM

## 2020-05-28 PROCEDURE — 99212 OFFICE O/P EST SF 10 MIN: CPT | Mod: TEL | Performed by: INTERNAL MEDICINE

## 2020-05-28 NOTE — LETTER
5/28/2020    Yan Lucas MD  [address] [place] [address]    RE: Toney Lemos       Dear Colleague,    I had the pleasure of seeing Toney Lemos in the [facility].      Mr Lemos is a very pleasant 50-year-old gentleman who recently about 4 months ago underwent mechanical aortic valve replacement for severe aortic regurgitation in the setting of bicuspid aortic valve and LV dilatation and some symptoms of fatigue.  Pre-AVR coronary angiogram showed normal coronary arteries.  He recovered well from the surgery.  Patient is here for a routine follow-up visit which has been converted into a phone visit due to ongoing coronavirus pandemic.  His wife also joined us on the phone.  He recently had an echocardiogram that showed normal functioning mechanical aortic valve with normal LV and RV systolic function, no pericardial effusion.  He is tolerating aspirin, Coumadin quite well.  He is exercising on his own using treadmill/elliptical and feels well doing that although sometimes noted or shortness of breath with exertion which is stable probably improving.  No chest discomfort dizziness presyncope or syncope.    Assessment and plan  A very pleasant 50-year-old gentleman status post mechanical aortic valve replacement, normal functioning valve on recent echocardiogram, on aspirin, Coumadin.  History wise it looks like he is doing quite well.  At this time recommend continuing aspirin, Coumadin, continue predental work-up antibiotic prophylaxis.  If he continues to feel stable cardiac status wise we can see him back in 6 months, sooner if he notes any change in clinical status especially exertional symptoms.    Phone call duration: 10 minutes      Thank you for allowing me to participate in the care of your patient.    Sincerely,     Evin Mederos MD     [facility]

## 2020-05-28 NOTE — PROGRESS NOTES
"  The patient has been notified of following:     \"This telephone visit will be conducted via a call between you and your physician/provider. We have found that certain health care needs can be provided without the need for a physical exam.  This service lets us provide the care you need with a short phone conversation.  If a prescription is necessary we can send it directly to your pharmacy.  If lab work is needed we can place an order for that and you can then stop by our lab to have the test done at a later time.    Telephone visits are billed at different rates depending on your insurance coverage. During this emergency period, for some insurers they may be billed the same as an in-person visit.  Please reach out to your insurance provider with any questions.    If during the course of the call the physician/provider feels a telephone visit is not appropriate, you will not be charged for this service.\"    Patient has given verbal consent for Telephone visit?  Yes    What phone number would you like to be contacted at? [phone]    How would you like to obtain your AVS? MyChart    Blood pressure: 128/80  Pulse: 55  Weight: 180 #    Mr Lemos is a very pleasant 50-year-old gentleman who recently about 4 months ago underwent mechanical aortic valve replacement for severe aortic regurgitation in the setting of bicuspid aortic valve and LV dilatation and some symptoms of fatigue.  Pre-AVR coronary angiogram showed normal coronary arteries.  He recovered well from the surgery.  Patient is here for a routine follow-up visit which has been converted into a phone visit due to ongoing coronavirus pandemic.  His wife also joined us on the phone.  He recently had an echocardiogram that showed normal functioning mechanical aortic valve with normal LV and RV systolic function, no pericardial effusion.  He is tolerating aspirin, Coumadin quite well.  He is exercising on his own using treadmill/elliptical and feels well " doing that although sometimes noted or shortness of breath with exertion which is stable probably improving.  No chest discomfort dizziness presyncope or syncope.    Assessment and plan  A very pleasant 50-year-old gentleman status post mechanical aortic valve replacement, normal functioning valve on recent echocardiogram, on aspirin, Coumadin.  History wise it looks like he is doing quite well.  At this time recommend continuing aspirin, Coumadin, continue predental work-up antibiotic prophylaxis.  If he continues to feel stable cardiac status wise we can see him back in 6 months, sooner if he notes any change in clinical status especially exertional symptoms.    Phone call duration: 10 minutes    Evin Mederos MD

## 2020-06-12 ENCOUNTER — TELEPHONE (OUTPATIENT)
Dept: CARDIOLOGY | Facility: CLINIC | Age: 50
End: 2020-06-12

## 2020-06-12 NOTE — TELEPHONE ENCOUNTER
Received forms from [employer] requesting status update on patient. I completed the form and had Dr. Salazar sign. I then emailed it to the patient and faxed the form to [employer] att: Jennifer Rubio PA-C at [phone].

## 2020-06-29 ENCOUNTER — VIRTUAL VISIT (OUTPATIENT)
Dept: FAMILY MEDICINE | Facility: CLINIC | Age: 50
End: 2020-06-29
Payer: COMMERCIAL

## 2020-06-29 ENCOUNTER — ANTICOAGULATION THERAPY VISIT (OUTPATIENT)
Dept: ANTICOAGULATION | Facility: CLINIC | Age: 50
End: 2020-06-29

## 2020-06-29 ENCOUNTER — MYC MEDICAL ADVICE (OUTPATIENT)
Dept: FAMILY MEDICINE | Facility: CLINIC | Age: 50
End: 2020-06-29

## 2020-06-29 VITALS — WEIGHT: 181 LBS | BODY MASS INDEX: 26.81 KG/M2

## 2020-06-29 DIAGNOSIS — Z95.2 H/O MECHANICAL AORTIC VALVE REPLACEMENT: ICD-10-CM

## 2020-06-29 DIAGNOSIS — Z95.2 S/P AVR (AORTIC VALVE REPLACEMENT): ICD-10-CM

## 2020-06-29 DIAGNOSIS — Z79.01 LONG TERM CURRENT USE OF ANTICOAGULANTS WITH INR GOAL OF 2.0-3.0: ICD-10-CM

## 2020-06-29 DIAGNOSIS — T14.8XXA HEMATOMA OF SKIN: Primary | ICD-10-CM

## 2020-06-29 LAB — INR BLD: 4 (ref 0.86–1.14)

## 2020-06-29 PROCEDURE — 99207 ZZC NO CHARGE NURSE ONLY: CPT | Performed by: FAMILY MEDICINE

## 2020-06-29 PROCEDURE — 36416 COLLJ CAPILLARY BLOOD SPEC: CPT | Performed by: FAMILY MEDICINE

## 2020-06-29 PROCEDURE — 85610 PROTHROMBIN TIME: CPT | Mod: QW | Performed by: FAMILY MEDICINE

## 2020-06-29 PROCEDURE — 99213 OFFICE O/P EST LOW 20 MIN: CPT | Mod: GT | Performed by: FAMILY MEDICINE

## 2020-06-29 ASSESSMENT — PAIN SCALES - GENERAL: PAINLEVEL: NO PAIN (0)

## 2020-06-29 NOTE — PROGRESS NOTES
ANTICOAGULATION FOLLOW-UP CLINIC VISIT    Patient Name:  Toney Lemos  Date:  2020  Contact Type:  Telephone    SUBJECTIVE:  Patient Findings     Positives:   Change in health (Pt smashed his leg last  (one week ago) with pontoon trailer. reports swelling and bruising, slightly better today, but . Appt advised. Likely why INR up)    Comments:   Already took dose today        Clinical Outcomes     Comments:   Already took dose today           OBJECTIVE    Recent labs: (last 7 days)     20  0814   INR 4.0*       ASSESSMENT / PLAN  INR assessment SUPRA    Recheck INR In: 1 WEEK    INR Location Outside lab      Anticoagulation Summary  As of 2020    INR goal:   2.0-3.0   TTR:   76.0 % (4.3 mo)   INR used for dosin.0! (2020)   Warfarin maintenance plan:   7.5 mg (5 mg x 1.5) every Wed; 5 mg (5 mg x 1) all other days   Full warfarin instructions:   : Hold; Otherwise 7.5 mg every Wed; 5 mg all other days   Weekly warfarin total:   37.5 mg   Plan last modified:   Cathleen Pacheco RN (3/4/2020)   Next INR check:   2020   Target end date:   2021    Indications    S/P AVR (aortic valve replacement)-25 mm St[employer] Rolf [place]  [Z95.2]  H/O mechanical aortic valve replacement [Z95.2]             Anticoagulation Episode Summary     INR check location:       Preferred lab:       Send INR reminders to:   MAIA FISHER    Comments:         Anticoagulation Care Providers     Provider Role Specialty Phone number    Moni Cartagena PA-C Referring Physician Assistant [phone]    Yan Lucas MD Referring Family Practice [phone]            See the Encounter Report to view Anticoagulation Flowsheet and Dosing Calendar (Go to Encounters tab in chart review, and find the Anticoagulation Therapy Visit)    Dosage adjustment made based on physician directed care plan.    Milana Hernández RN

## 2020-06-29 NOTE — PATIENT INSTRUCTIONS
Patient Education     Hematoma  A hematoma is a collection of blood trapped outside of a blood vessel. It is what we think of as a bruise or a contusion. It is usually seen under the skin as a black and blue spot on your arm or leg, or a bump on your head after an injury. It can be almost anywhere on or in your body. It can also occur in an internal organ where it can be more serious.  A hematoma is caused by an injury with damage to small blood vessels. This causes blood to leak into the tissues. Blood forms a pocket under the skin that swells and looks like a purplish patch. Hematomas sometimes form under the skin from bleeding during childbirth and can be particularly serious. Another serious form of hematoma forms after a fall on the head, called a subdural hematoma.  Gradually the blood in the hematoma is absorbed back into the body. The swelling and pain of the hematoma will go away. This takes from 1 to 4 weeks, depending on the size of the hematoma. The skin over the hematoma may turn bluish then brown and yellow as the blood is dissolved and absorbed. Usually, this only takes a couple of weeks but can last months.  Home care    Limit motion of the joints near the hematoma. If the hematoma is large and painful, avoid sports and other vigorous physical activity until the swelling and pain goes away.    Apply an ice pack (ice cubes in a plastic bag, or a frozen bag of peas, wrapped in a thin towel) over the injured area for 20 minutes every 1 to 2 hours the first day. Continue with ice packs 3 to 4 times a day for the next 2 days. Continue the use of ice packs for relief of pain and swelling as needed.    If you need anything for pain, you can take acetaminophen, unless you were given a different pain medicine to use. Talk with your healthcare provider before using this medicine if you have chronic liver or kidney disease. Also talk with your healthcare provider if you have had a stomach ulcer or digestive  tract bleeding, or are taking blood-thinner medicines.  Follow-up care  Follow up with your healthcare provider, or as advised. If X-rays or a CT scan were done, you will be notified if there is a change in the reading, especially if it affects treatment.  When to seek medical advice  Call your healthcare provider right away if any of the following occur:    Redness around the hematoma    Increase in pain or warmth in the hematoma    Increase in size of the hematoma    Fever of 100.4 F (38 C) or higher, or as directed by your healthcare provider    If the hematoma is on the arm or leg, watch for:  ? Increased swelling or pain in the extremity  ? Numbness or tingling or blue color of the hand or foot  Date Last Reviewed: 4/1/2018 2000-2019 [employer]. [address] [place] [address]. All rights reserved. This information is not intended as a substitute for professional medical care. Always follow your healthcare professional's instructions.           Patient Education     Hematoma  A hematoma is a collection of blood trapped outside of a blood vessel. It is what we think of as a bruise or a contusion. It is usually seen under the skin as a black and blue spot on your arm or leg, or a bump on your head after an injury. It can be almost anywhere on or in your body. It can also occur in an internal organ where it can be more serious.  A hematoma is caused by an injury with damage to small blood vessels. This causes blood to leak into the tissues. Blood forms a pocket under the skin that swells and looks like a purplish patch. Hematomas sometimes form under the skin from bleeding during childbirth and can be particularly serious. Another serious form of hematoma forms after a fall on the head, called a subdural hematoma.  Gradually the blood in the hematoma is absorbed back into the body. The swelling and pain of the hematoma will go away. This takes from 1 to 4 weeks, depending on the size of  the hematoma. The skin over the hematoma may turn bluish then brown and yellow as the blood is dissolved and absorbed. Usually, this only takes a couple of weeks but can last months.  Home care    Limit motion of the joints near the hematoma. If the hematoma is large and painful, avoid sports and other vigorous physical activity until the swelling and pain goes away.    Apply an ice pack (ice cubes in a plastic bag, or a frozen bag of peas, wrapped in a thin towel) over the injured area for 20 minutes every 1 to 2 hours the first day. Continue with ice packs 3 to 4 times a day for the next 2 days. Continue the use of ice packs for relief of pain and swelling as needed.    If you need anything for pain, you can take acetaminophen, unless you were given a different pain medicine to use. Talk with your healthcare provider before using this medicine if you have chronic liver or kidney disease. Also talk with your healthcare provider if you have had a stomach ulcer or digestive tract bleeding, or are taking blood-thinner medicines.  Follow-up care  Follow up with your healthcare provider, or as advised. If X-rays or a CT scan were done, you will be notified if there is a change in the reading, especially if it affects treatment.  When to seek medical advice  Call your healthcare provider right away if any of the following occur:    Redness around the hematoma    Increase in pain or warmth in the hematoma    Increase in size of the hematoma    Fever of 100.4 F (38 C) or higher, or as directed by your healthcare provider    If the hematoma is on the arm or leg, watch for:  ? Increased swelling or pain in the extremity  ? Numbness or tingling or blue color of the hand or foot  Date Last Reviewed: 4/1/2018 2000-2019 [employer]. [address] [place] [address]. All rights reserved. This information is not intended as a substitute for professional medical care. Always follow your healthcare  professional's instructions.

## 2020-06-29 NOTE — PROGRESS NOTES
"Toney Lemos is a 50 year old male who is being evaluated via a billable video visit.      The patient has been notified of following:     \"This video visit will be conducted via a call between you and your physician/provider. We have found that certain health care needs can be provided without the need for an in-person physical exam.  This service lets us provide the care you need with a video conversation.  If a prescription is necessary we can send it directly to your pharmacy.  If lab work is needed we can place an order for that and you can then stop by our lab to have the test done at a later time.    Video visits are billed at different rates depending on your insurance coverage.  Please reach out to your insurance provider with any questions.    If during the course of the call the physician/provider feels a video visit is not appropriate, you will not be charged for this service.\"    Patient has given verbal consent for Video visit? Yes  How would you like to obtain your AVS? [facility]  Patient would like the video invitation sent by: via [employer]  Will anyone else be joining your video visit? No      Subjective     Toney Lemos is a 50 year old male who presents today via video visit for the following health issues:    HPI  Bruising      Duration: 6/14/2020    Description (location/character/radiation): calf left leg bruise is painful to the touch    Intensity:  mild    Accompanying signs and symptoms: none    History (similar episodes/previous evaluation): on coumadin, INR 4 today    Precipitating or alleviating factors: None    Therapies tried and outcome: None          Video Start Time: 12:09 PM        Patient Active Problem List   Diagnosis     Congenital insufficiency of aortic valve     Backache     HYPERLIPIDEMIA LDL GOAL <130     Hypertension goal BP (blood pressure) < 140/90     Encounter for long-term current use of medication     FH: early coronary artery disease- bro 30s     Elevated fasting " glucose     Status post coronary angiogram     Aortic insufficiency     S/P AVR (aortic valve replacement)-25 mm St[employer] Rolf [place]      Anticoagulated on Coumadin     Fluid overload     H/O mechanical aortic valve replacement     Past Surgical History:   Procedure Laterality Date     CV CORONARY ANGIOGRAM N/A 1/9/2020    Procedure: Coronary Angiogram;  Surgeon: Faisal Telles MD;  Location:  [facility]     REPLACE VALVE AORTIC N/A 2/3/2020    Procedure: AORTIC VALVE REPLACEMENT WITH [facility] [facility] MECHANICAL HEART VALVE 25 MM; ON PUMP WITH TADEO;  Surgeon: Cindy Matthews MD;  Location:  OR       Social History     Tobacco Use     Smoking status: Never Smoker     Smokeless tobacco: Never Used   Substance Use Topics     Alcohol use: Yes     Alcohol/week: 0.0 standard drinks     Comment: beer or two wkly     Family History   Problem Relation Age of Onset     Heart Disease Maternal Grandmother      Lipids Maternal Grandmother      Hypertension Maternal Grandmother      Hypertension Paternal Grandfather      Heart Disease Paternal Grandfather         heart murmur     Prostate Cancer Paternal Grandfather      Cancer Paternal Grandfather         bone ca     Lipids Paternal Grandfather      Diabetes Maternal Grandfather         senior onset     Hypertension Maternal Grandfather      Cerebrovascular Disease Maternal Grandfather      Cancer Paternal Grandmother         rectal cancer     Lipids Paternal Grandmother      Pancreatic Cancer Cousin      Heart Disease Brother         CAD, age 38         Current Outpatient Medications   Medication Sig Dispense Refill     acetaminophen (TYLENOL) 325 MG tablet Take 2 tablets (650 mg) by mouth every 4 hours as needed for mild pain 30 tablet 0     amLODIPine (NORVASC) 5 MG tablet Take 1 tablet (5 mg) by mouth daily 90 tablet 3     aspirin (ASA) 81 MG EC tablet Take 1 tablet (81 mg) by mouth daily 90 tablet 3     atorvastatin (LIPITOR) 40 MG tablet  Take 1 tablet (40 mg) by mouth daily 90 tablet 3     lisinopril (ZESTRIL) 5 MG tablet Take 1 tablet (5 mg) by mouth daily 90 tablet 3     methocarbamol (ROBAXIN) 750 MG tablet Take 1 tablet (750 mg) by mouth 4 times daily 60 tablet 0     metoprolol succinate ER (TOPROL-XL) 100 MG 24 hr tablet Take 1 tablet (100 mg) by mouth 2 times daily 180 tablet 3     warfarin ANTICOAGULANT (COUMADIN) 5 MG tablet Take 1 tablet (5 mg) by mouth daily 90 tablet 3     loratadine (CLARITIN) 10 MG tablet Take 10 mg by mouth daily       Allergies   Allergen Reactions     No Known Drug Allergies      Seasonal Allergies      Recent Labs   Lab Test 02/06/20  0617 02/05/20  0650 02/04/20  0515 02/03/20  1145  01/09/20  0652 07/17/19  0822 06/22/18  0829 10/06/17  0939   A1C  --   --   --   --   --  5.3  --   --   --    LDL  --   --   --   --   --   --  87 88 83   HDL  --   --   --   --   --   --  65 58 62   TRIG  --   --   --   --   --   --  145 84 124   ALT  --   --  26 24  --  40  --   --   --    CR 0.69 0.71 0.76 0.70   < > 0.81 0.91 0.92  --    GFRESTIMATED >90 >90 >90 >90   < > >90 >90 88  --    GFRESTBLACK >90 >90 >90 >90   < > >90 >90 >90  --    POTASSIUM 4.1 4.1 3.6 4.3   < > 3.9 4.1 4.4  --     < > = values in this interval not displayed.      BP Readings from Last 3 Encounters:   05/28/20 128/80   04/01/20 125/82   02/28/20 122/82    Wt Readings from Last 3 Encounters:   06/29/20 82.1 kg (181 lb)   05/28/20 81.6 kg (180 lb)   04/01/20 78.7 kg (173 lb 6.4 oz)                    Reviewed and updated as needed this visit by Provider  Tobacco  Allergies  Meds  Problems  Med Hx  Surg Hx  Fam Hx         Review of Systems   CONSTITUTIONAL: NEGATIVE for fever, chills, change in weight  ENT/MOUTH: NEGATIVE for ear, mouth and throat problems  RESP: NEGATIVE for significant cough or SOB  CV: NEGATIVE for chest pain, palpitations or peripheral edema  MUSCULOSKELETAL: POSITIVE  for injury to 2 weeks ago with hematoma development 10 days  "ago, swelling around distal ankle and resolving ecchymosis now. No pain with compression of calf, now paresthesia or dysasthesia to foot.  NEURO: NEGATIVE for paresthesias and radicular pain  ROS otherwise negative      Objective             Physical Exam     GENERAL: Healthy, alert and no distress  EYES: Eyes grossly normal to inspection.  No discharge or erythema, or obvious scleral/conjunctival abnormalities.  RESP: No audible wheeze, cough, or visible cyanosis.  No visible retractions or increased work of breathing.    SKIN: ecchymoses - lower legs resolving.  NEURO: Cranial nerves grossly intact.  Mentation and speech appropriate for age.  PSYCH: Mentation appears normal, affect normal/bright, judgement and insight intact, normal speech and appearance well-groomed.      Diagnostic Test Results:  Labs reviewed in Epic  Orders Only on 06/29/2020   Component Date Value Ref Range Status     INR Point of Care 06/29/2020 4.0* 0.86 - 1.14 Final    Comment: This test is intended for monitoring Coumadin therapy.  Results are not   accurate in patients with prolonged INR due to factor deficiency.               Assessment & Plan     1. Hematoma of skin  Rest the affected painful area as much as possible.  Compression wrap of ankle.Apply ice or heat for 15-20 minutes intermittently as needed and especially after any offending activity. Daily stretching.  As pain recedes, begin normal activities slowly as tolerated.  Consider Physical Therapy if symptoms not better with symptomatic care.        BMI:   Estimated body mass index is 26.81 kg/m  as calculated from the following:    Height as of 2/24/20: 1.75 m (5' 8.9\").    Weight as of this encounter: 82.1 kg (181 lb).           There are no Patient Instructions on file for this visit.      Return in about 1 week (around 7/6/2020) for Lab Work.    Luis Felipe Montoya MD  [facility]      Video-Visit Details    Type of service:  Video Visit    Video End Time:12:19 " PM    Originating Location (pt. Location): Home    Distant Location (provider location):  [facility]     Platform used for Video Visit: Karl    Return in about 1 week (around 7/6/2020) for Lab Work.       Luis Felipe Montoya MD

## 2020-07-06 ENCOUNTER — ANTICOAGULATION THERAPY VISIT (OUTPATIENT)
Dept: ANTICOAGULATION | Facility: CLINIC | Age: 50
End: 2020-07-06
Payer: COMMERCIAL

## 2020-07-06 DIAGNOSIS — Z95.2 S/P AVR (AORTIC VALVE REPLACEMENT): ICD-10-CM

## 2020-07-06 DIAGNOSIS — Z95.2 H/O MECHANICAL AORTIC VALVE REPLACEMENT: ICD-10-CM

## 2020-07-06 DIAGNOSIS — Z79.01 LONG TERM CURRENT USE OF ANTICOAGULANTS WITH INR GOAL OF 2.0-3.0: ICD-10-CM

## 2020-07-06 LAB
CAPILLARY BLOOD COLLECTION: NORMAL
INR BLD: 2.3 (ref 0.86–1.14)

## 2020-07-06 PROCEDURE — 99207 ZZC NO CHARGE NURSE ONLY: CPT | Performed by: FAMILY MEDICINE

## 2020-07-06 PROCEDURE — 36416 COLLJ CAPILLARY BLOOD SPEC: CPT | Performed by: FAMILY MEDICINE

## 2020-07-06 PROCEDURE — 85610 PROTHROMBIN TIME: CPT | Mod: QW | Performed by: FAMILY MEDICINE

## 2020-07-06 NOTE — PROGRESS NOTES
ANTICOAGULATION FOLLOW-UP CLINIC VISIT    Patient Name:  Toney Lemos  Date:  2020  Contact Type:  Telephone    SUBJECTIVE:  Patient Findings     Positives:   Missed doses (held on Tu due to elevated INR )    Comments:   Leg injury is improving - less swelling and bruising.   Cathleen Pacheco RN          Clinical Outcomes     Comments:   Leg injury is improving - less swelling and bruising.   Cathleen Pacheco RN             OBJECTIVE    Recent labs: (last 7 days)     20  0757   INR 2.3*       ASSESSMENT / PLAN  INR assessment THER    Recheck INR In: 4 WEEKS    INR Location Outside lab      Anticoagulation Summary  As of 2020    INR goal:   2.0-3.0   TTR:   74.3 % (4.6 mo)   INR used for dosin.3 (2020)   Warfarin maintenance plan:   7.5 mg (5 mg x 1.5) every Wed; 5 mg (5 mg x 1) all other days   Full warfarin instructions:   7.5 mg every Wed; 5 mg all other days   Weekly warfarin total:   37.5 mg   No change documented:   Cathleen Pacheco RN   Plan last modified:   Cathleen Pacheco RN (3/4/2020)   Next INR check:   8/3/2020   Target end date:   2021    Indications    S/P AVR (aortic valve replacement)-25 mm St. Rolf [place]  [Z95.2]  H/O mechanical aortic valve replacement [Z95.2]             Anticoagulation Episode Summary     INR check location:       Preferred lab:       Send INR reminders to:   MAIA FISHER    Comments:         Anticoagulation Care Providers     Provider Role Specialty Phone number    Moni Cartagena PA-C Referring Physician Assistant [phone]    Yan Lucas MD Referring Family Practice [phone]            See the Encounter Report to view Anticoagulation Flowsheet and Dosing Calendar (Go to Encounters tab in chart review, and find the Anticoagulation Therapy Visit)    Dosage adjustment made based on physician directed care plan.      Cathleen Pacheco RN

## 2020-08-03 ENCOUNTER — ANTICOAGULATION THERAPY VISIT (OUTPATIENT)
Dept: ANTICOAGULATION | Facility: CLINIC | Age: 50
End: 2020-08-03
Payer: COMMERCIAL

## 2020-08-03 DIAGNOSIS — Z95.2 H/O MECHANICAL AORTIC VALVE REPLACEMENT: ICD-10-CM

## 2020-08-03 DIAGNOSIS — Z95.2 S/P AVR (AORTIC VALVE REPLACEMENT): ICD-10-CM

## 2020-08-03 DIAGNOSIS — Z79.01 LONG TERM CURRENT USE OF ANTICOAGULANTS WITH INR GOAL OF 2.0-3.0: ICD-10-CM

## 2020-08-03 LAB
CAPILLARY BLOOD COLLECTION: NORMAL
INR BLD: 4.1 (ref 0.86–1.14)

## 2020-08-03 PROCEDURE — 36416 COLLJ CAPILLARY BLOOD SPEC: CPT | Performed by: FAMILY MEDICINE

## 2020-08-03 PROCEDURE — 99207 ZZC NO CHARGE NURSE ONLY: CPT | Performed by: FAMILY MEDICINE

## 2020-08-03 PROCEDURE — 85610 PROTHROMBIN TIME: CPT | Mod: QW | Performed by: FAMILY MEDICINE

## 2020-08-03 NOTE — PROGRESS NOTES
ANTICOAGULATION FOLLOW-UP CLINIC VISIT    Patient Name:  Toney Lemos  Date:  8/3/2020  Contact Type:  Telephone    SUBJECTIVE:  Patient Findings     Positives:   Change in alcohol use (had some alcohol over the weekend )             OBJECTIVE    Recent labs: (last 7 days)     20  0753   INR 4.1*       ASSESSMENT / PLAN  INR assessment SUPRA    Recheck INR In: 10 DAYS    INR Location Outside lab      Anticoagulation Summary  As of 8/3/2020    INR goal:   2.0-3.0   TTR:   68.3 % (5.5 mo)   INR used for dosin.1! (8/3/2020)   Warfarin maintenance plan:   5 mg (5 mg x 1) every day   Full warfarin instructions:   8/3: Hold; Otherwise 5 mg every day   Weekly warfarin total:   35 mg   Plan last modified:   Cathleen Pacheco RN (8/3/2020)   Next INR check:   2020   Target end date:   2021    Indications    S/P AVR (aortic valve replacement)-25 mm St. Rolf [place]  [Z95.2]  H/O mechanical aortic valve replacement [Z95.2]             Anticoagulation Episode Summary     INR check location:       Preferred lab:       Send INR reminders to:   MAIA FISHER    Comments:         Anticoagulation Care Providers     Provider Role Specialty Phone number    Moni Cartagena PA-C Referring Physician Assistant [phone]    Yan Lucas MD Referring [facility] [phone]            See the Encounter Report to view Anticoagulation Flowsheet and Dosing Calendar (Go to Encounters tab in chart review, and find the Anticoagulation Therapy Visit)    Dosage adjustment made based on physician directed care plan.      Cathleen Pacheco RN

## 2020-08-13 ENCOUNTER — ANTICOAGULATION THERAPY VISIT (OUTPATIENT)
Dept: ANTICOAGULATION | Facility: CLINIC | Age: 50
End: 2020-08-13
Payer: COMMERCIAL

## 2020-08-13 DIAGNOSIS — Z95.2 H/O MECHANICAL AORTIC VALVE REPLACEMENT: ICD-10-CM

## 2020-08-13 DIAGNOSIS — Z95.2 S/P AVR (AORTIC VALVE REPLACEMENT): ICD-10-CM

## 2020-08-13 DIAGNOSIS — Z79.01 LONG TERM CURRENT USE OF ANTICOAGULANTS WITH INR GOAL OF 2.0-3.0: ICD-10-CM

## 2020-08-13 LAB
CAPILLARY BLOOD COLLECTION: NORMAL
INR BLD: 2 (ref 0.86–1.14)

## 2020-08-13 PROCEDURE — 99207 ZZC NO CHARGE NURSE ONLY: CPT | Performed by: FAMILY MEDICINE

## 2020-08-13 PROCEDURE — 36416 COLLJ CAPILLARY BLOOD SPEC: CPT | Performed by: FAMILY MEDICINE

## 2020-08-13 PROCEDURE — 85610 PROTHROMBIN TIME: CPT | Mod: QW | Performed by: FAMILY MEDICINE

## 2020-08-13 NOTE — PROGRESS NOTES
ANTICOAGULATION FOLLOW-UP CLINIC VISIT    Patient Name:  Toney Lemos  Date:  2020  Contact Type:  Telephone    SUBJECTIVE:  Patient Findings     Comments:   The patient was assessed for diet, medication, and activity level changes, missed or extra doses, bruising or bleeding, with no problem findings.          Clinical Outcomes     Comments:   The patient was assessed for diet, medication, and activity level changes, missed or extra doses, bruising or bleeding, with no problem findings.             OBJECTIVE    Recent labs: (last 7 days)     20  0807   INR 2.0*       ASSESSMENT / PLAN  INR assessment THER    Recheck INR In: 3 WEEKS    INR Location Outside lab      Anticoagulation Summary  As of 2020    INR goal:   2.0-3.0   TTR:   67.1 % (5.8 mo)   INR used for dosin.0 (2020)   Warfarin maintenance plan:   7.5 mg (5 mg x 1.5) every Thu; 5 mg (5 mg x 1) all other days   Full warfarin instructions:   7.5 mg every Thu; 5 mg all other days   Weekly warfarin total:   37.5 mg   Plan last modified:   Milana Hernández RN (2020)   Next INR check:   9/3/2020   Target end date:   2021    Indications    S/P AVR (aortic valve replacement)-25 mm St. Rolf [place]  [Z95.2]  H/O mechanical aortic valve replacement [Z95.2]             Anticoagulation Episode Summary     INR check location:       Preferred lab:       Send INR reminders to:   MAIA FISHER    Comments:         Anticoagulation Care Providers     Provider Role Specialty Phone number    Moni Cartagena PA-C Referring Physician Assistant [phone]    Yan Lucas MD Referring Family Practice [phone]            See the Encounter Report to view Anticoagulation Flowsheet and Dosing Calendar (Go to Encounters tab in chart review, and find the Anticoagulation Therapy Visit)    Dosage adjustment made based on physician directed care plan.    Milana Hernández RN

## 2020-08-31 ENCOUNTER — MYC MEDICAL ADVICE (OUTPATIENT)
Dept: FAMILY MEDICINE | Facility: CLINIC | Age: 50
End: 2020-08-31

## 2020-08-31 DIAGNOSIS — E78.5 HYPERLIPIDEMIA LDL GOAL <130: Primary | ICD-10-CM

## 2020-09-03 ENCOUNTER — ANTICOAGULATION THERAPY VISIT (OUTPATIENT)
Dept: ANTICOAGULATION | Facility: CLINIC | Age: 50
End: 2020-09-03
Payer: COMMERCIAL

## 2020-09-03 DIAGNOSIS — Z95.2 S/P AVR (AORTIC VALVE REPLACEMENT): ICD-10-CM

## 2020-09-03 DIAGNOSIS — Z95.2 H/O MECHANICAL AORTIC VALVE REPLACEMENT: ICD-10-CM

## 2020-09-03 DIAGNOSIS — Z79.01 LONG TERM CURRENT USE OF ANTICOAGULANTS WITH INR GOAL OF 2.0-3.0: ICD-10-CM

## 2020-09-03 DIAGNOSIS — E78.5 HYPERLIPIDEMIA LDL GOAL <130: ICD-10-CM

## 2020-09-03 LAB
CHOLEST SERPL-MCNC: 200 MG/DL
HDLC SERPL-MCNC: 47 MG/DL
INR PPP: 2.94 (ref 0.86–1.14)
LDLC SERPL CALC-MCNC: 100 MG/DL
NONHDLC SERPL-MCNC: 153 MG/DL
TRIGL SERPL-MCNC: 263 MG/DL

## 2020-09-03 PROCEDURE — 36415 COLL VENOUS BLD VENIPUNCTURE: CPT | Performed by: FAMILY MEDICINE

## 2020-09-03 PROCEDURE — 99207 ZZC NO CHARGE NURSE ONLY: CPT | Performed by: FAMILY MEDICINE

## 2020-09-03 PROCEDURE — 85610 PROTHROMBIN TIME: CPT | Performed by: FAMILY MEDICINE

## 2020-09-03 PROCEDURE — 80061 LIPID PANEL: CPT | Performed by: FAMILY MEDICINE

## 2020-09-03 NOTE — PROGRESS NOTES
ANTICOAGULATION FOLLOW-UP CLINIC VISIT    Patient Name:  Toney Lemos  Date:  9/3/2020  Contact Type:  Telephone    SUBJECTIVE:  Patient Findings     Comments:   The patient was assessed for diet, medication, and activity level changes, missed or extra doses, bruising or bleeding, with no problem findings.          Clinical Outcomes     Comments:   The patient was assessed for diet, medication, and activity level changes, missed or extra doses, bruising or bleeding, with no problem findings.             OBJECTIVE    Recent labs: (last 7 days)     20  0802   INR 2.94*       ASSESSMENT / PLAN  INR assessment THER    Recheck INR In: 4 WEEKS    INR Location Outside lab      Anticoagulation Summary  As of 9/3/2020    INR goal:   2.0-3.0   TTR:   70.6 % (6.5 mo)   INR used for dosin.94 (9/3/2020)   Warfarin maintenance plan:   7.5 mg (5 mg x 1.5) every Thu; 5 mg (5 mg x 1) all other days   Full warfarin instructions:   7.5 mg every Thu; 5 mg all other days   Weekly warfarin total:   37.5 mg   No change documented:   Milana Hernández RN   Plan last modified:   Milana Hernández RN (2020)   Next INR check:   10/1/2020   Target end date:   2021    Indications    S/P AVR (aortic valve replacement)-25 mm St. Rolf [place]  [Z95.2]  H/O mechanical aortic valve replacement [Z95.2]             Anticoagulation Episode Summary     INR check location:       Preferred lab:       Send INR reminders to:   MAIA FISHER    Comments:         Anticoagulation Care Providers     Provider Role Specialty Phone number    Moni Cartagena PA-C Referring Physician Assistant [phone]    Yan Lucas MD Referring Family Practice [phone]            See the Encounter Report to view Anticoagulation Flowsheet and Dosing Calendar (Go to Encounters tab in chart review, and find the Anticoagulation Therapy Visit)    Dosage adjustment made based on physician directed care plan.    Milana Hernández RN

## 2020-10-01 ENCOUNTER — ANTICOAGULATION THERAPY VISIT (OUTPATIENT)
Dept: ANTICOAGULATION | Facility: OTHER | Age: 50
End: 2020-10-01

## 2020-10-01 DIAGNOSIS — Z95.2 H/O MECHANICAL AORTIC VALVE REPLACEMENT: ICD-10-CM

## 2020-10-01 DIAGNOSIS — Z79.01 LONG TERM CURRENT USE OF ANTICOAGULANTS WITH INR GOAL OF 2.0-3.0: ICD-10-CM

## 2020-10-01 DIAGNOSIS — Z95.2 S/P AVR (AORTIC VALVE REPLACEMENT): ICD-10-CM

## 2020-10-01 LAB
CAPILLARY BLOOD COLLECTION: NORMAL
INR BLD: 3.1 (ref 0.86–1.14)

## 2020-10-01 PROCEDURE — 36416 COLLJ CAPILLARY BLOOD SPEC: CPT | Performed by: FAMILY MEDICINE

## 2020-10-01 PROCEDURE — 99207 PR NO CHARGE NURSE ONLY: CPT | Performed by: FAMILY MEDICINE

## 2020-10-01 PROCEDURE — 85610 PROTHROMBIN TIME: CPT | Performed by: FAMILY MEDICINE

## 2020-10-01 NOTE — PROGRESS NOTES
ANTICOAGULATION FOLLOW-UP CLINIC VISIT    Patient Name:  Toney Lemos  Date:  10/1/2020  Contact Type:  Telephone/ LM with dosing instructions    SUBJECTIVE:  Patient Findings     Comments:  I left a detailed voicemail with the orders below. I have also requested a call back if there have been any missed doses, concerns, illness, fever, or if there have been any changes in medications, activity level, or diet          Clinical Outcomes     Negatives:  Major bleeding event, Thromboembolic event, Anticoagulation-related hospital admission, Anticoagulation-related ED visit, Anticoagulation-related fatality    Comments:  I left a detailed voicemail with the orders below. I have also requested a call back if there have been any missed doses, concerns, illness, fever, or if there have been any changes in medications, activity level, or diet             OBJECTIVE    Recent labs: (last 7 days)     10/01/20  0805   INR 3.1*       ASSESSMENT / PLAN  INR assessment THER    Recheck INR In: 4 WEEKS    INR Location Outside lab      Anticoagulation Summary  As of 10/1/2020    INR goal:  2.0-3.0   TTR:  66.5 % (7.5 mo)   INR used for dosing:  3.1 (10/1/2020)   Warfarin maintenance plan:  7.5 mg (5 mg x 1.5) every Thu; 5 mg (5 mg x 1) all other days   Full warfarin instructions:  7.5 mg every Thu; 5 mg all other days   Weekly warfarin total:  37.5 mg   No change documented:  Damien Colón, RN   Plan last modified:  Milana Hernández, RN (8/13/2020)   Next INR check:  10/29/2020   Target end date:  2/11/2021    Indications    S/P AVR (aortic valve replacement)-25 mm St. Rolf [place]  [Z95.2]  H/O mechanical aortic valve replacement [Z95.2]             Anticoagulation Episode Summary     INR check location:      Preferred lab:      Send INR reminders to:  MAIA FISHER    Comments:        Anticoagulation Care Providers     Provider Role Specialty Phone number    Moni Cartagena PA-C Referring Physician Assistant [phone]     Yan Lucas MD Referring [facility] [phone]            See the Encounter Report to view Anticoagulation Flowsheet and Dosing Calendar (Go to Encounters tab in chart review, and find the Anticoagulation Therapy Visit)    Dosage adjustment made based on physician directed care plan.    Damien Colón RN

## 2020-10-29 ENCOUNTER — ANTICOAGULATION THERAPY VISIT (OUTPATIENT)
Dept: ANTICOAGULATION | Facility: CLINIC | Age: 50
End: 2020-10-29

## 2020-10-29 DIAGNOSIS — Z95.2 H/O MECHANICAL AORTIC VALVE REPLACEMENT: ICD-10-CM

## 2020-10-29 DIAGNOSIS — Z79.01 LONG TERM CURRENT USE OF ANTICOAGULANTS WITH INR GOAL OF 2.0-3.0: ICD-10-CM

## 2020-10-29 DIAGNOSIS — Z95.2 S/P AVR (AORTIC VALVE REPLACEMENT): ICD-10-CM

## 2020-10-29 LAB
CAPILLARY BLOOD COLLECTION: NORMAL
INR BLD: 3.6 (ref 0.86–1.14)

## 2020-10-29 PROCEDURE — 99207 PR NO CHARGE NURSE ONLY: CPT | Performed by: FAMILY MEDICINE

## 2020-10-29 PROCEDURE — 85610 PROTHROMBIN TIME: CPT | Performed by: FAMILY MEDICINE

## 2020-10-29 PROCEDURE — 36416 COLLJ CAPILLARY BLOOD SPEC: CPT | Performed by: FAMILY MEDICINE

## 2020-10-29 NOTE — PROGRESS NOTES
ANTICOAGULATION FOLLOW-UP CLINIC VISIT    Patient Name:  Toney Lemos  Date:  10/29/2020  Contact Type:  Telephone    SUBJECTIVE:  Patient Findings     Comments:  Left a detailed message on VM with this information, pt is advised to call back if pt has any questions, missed doses or concerns such as symptoms of bleeding, clotting, infection, change in diet or other.          Clinical Outcomes     Comments:  Left a detailed message on VM with this information, pt is advised to call back if pt has any questions, missed doses or concerns such as symptoms of bleeding, clotting, infection, change in diet or other.             OBJECTIVE    Recent labs: (last 7 days)     10/29/20  0827   INR 3.6*       ASSESSMENT / PLAN  INR assessment SUPRA    Recheck INR In: 2 WEEKS    INR Location Outside lab      Anticoagulation Summary  As of 10/29/2020    INR goal:  2.0-3.0   TTR:  59.1 % (8.4 mo)   INR used for dosing:  3.6 (10/29/2020)   Warfarin maintenance plan:  7.5 mg (5 mg x 1.5) every Thu; 5 mg (5 mg x 1) all other days   Full warfarin instructions:  10/29: 5 mg; Otherwise 7.5 mg every Thu; 5 mg all other days   Weekly warfarin total:  37.5 mg   Plan last modified:  Milana Hernández RN (8/13/2020)   Next INR check:  11/12/2020   Target end date:  2/11/2021    Indications    S/P AVR (aortic valve replacement)-25 mm St. Rolf [place]  [Z95.2]  H/O mechanical aortic valve replacement [Z95.2]             Anticoagulation Episode Summary     INR check location:      Preferred lab:      Send INR reminders to:  MAIA FISHER    Comments:        Anticoagulation Care Providers     Provider Role Specialty Phone number    Moni Cartagena PA-C Referring Cardiovascular & Thoracic Surgery [phone]    Yan Lucas MD Referring Family Medicine [phone]            See the Encounter Report to view Anticoagulation Flowsheet and Dosing Calendar (Go to Encounters tab in chart review, and find the Anticoagulation Therapy  Visit)    Dosage adjustment made based on physician directed care plan.    Milana Hernández RN

## 2020-11-09 ENCOUNTER — VIRTUAL VISIT (OUTPATIENT)
Dept: FAMILY MEDICINE | Facility: OTHER | Age: 50
End: 2020-11-09

## 2020-11-10 ENCOUNTER — MYC MEDICAL ADVICE (OUTPATIENT)
Dept: FAMILY MEDICINE | Facility: CLINIC | Age: 50
End: 2020-11-10

## 2020-11-10 NOTE — PROGRESS NOTES
"Date: 2020 19:43:55  Clinician: Walter Colón  Clinician NPI: [id number]  Patient: Toney Lemos  Patient : 1970  Patient Address: [address] [place] [id number]  Patient Phone: [phone]  Visit Protocol: URI  Patient Summary:  Toney is a 50 year old ( : 1970 ) male who initiated a [facility] Visit for COVID-19 (Coronavirus) evaluation and screening. When asked the question \"Please sign me up to receive news, health information and promotions from [facility].\", Toney responded \"No\".    Toney states his symptoms started 1-2 days ago.   His symptoms consist of rhinitis, facial pain or pressure, a sore throat, a cough, and nasal congestion.   Symptom details     Nasal secretions: The color of his mucus is clear.    Cough: Toney coughs a few times an hour and his cough is not more bothersome at night. Phlegm comes into his throat when he coughs. He believes his cough is caused by post-nasal drip. The color of the phlegm is clear.     Sore throat: Toney reports having mild throat pain (1-3 on a 10 point pain scale), does not have exudate on his tonsils, and can swallow liquids. He is not sure if the lymph nodes in his neck are enlarged. A rash has not appeared on the skin since the sore throat started.     Facial pain or pressure: The facial pain or pressure does not feel worse when bending or leaning forward.      Toney denies having vomiting, myalgias, chills, malaise, teeth pain, ageusia, diarrhea, ear pain, headache, wheezing, fever, nausea, and anosmia. He also denies having recent facial or sinus surgery in the past 60 days. He is not experiencing dyspnea.   Precipitating events  Toney is not sure if he has been exposed to someone with strep throat. He has not recently been exposed to someone with influenza. Toney has been in close contact with the following high risk individuals: adults 65 or older and people with asthma, heart disease or diabetes.   Pertinent COVID-19 (Coronavirus) " information  Toney does not work or volunteer as healthcare worker or a . In the past 14 days, Toney has not worked or volunteered at a healthcare facility or group living setting.   In the past 14 days, he also has not lived in a congregate living setting.   Toney has not had a close contact with a laboratory-confirmed COVID-19 patient within 14 days of symptom onset.    Since December 2019, Toney has not been tested for COVID-19 and has not had upper respiratory infection or influenza-like illness.   Pertinent medical history  Toney has taken an antibiotic medication in the past month. Antibiotic details as reported by the patient (free text): Don't recall. Took 4 pills 1/2 prior to dental appointment   Toney does not need a return to work/school note.   Weight: 180 lbs   Toney does not smoke or use smokeless tobacco.   Additional information as reported by the patient (free text): aortic valve replacement February 3, 2020   Weight: 180 lbs    MEDICATIONS: Aspirin Low Dose oral, warfarin oral, metoprolol succinate oral, amlodipine besylate (bulk), lisinopril oral, atorvastatin oral, ALLERGIES: NKDA  Clinician Response:  Dear Toney,   Your symptoms show that you may have coronavirus (COVID-19). This illness can cause fever, cough and trouble breathing. Many people get a mild case and get better on their own. Some people can get very sick.  What should I do?  We would like to test you for this virus.   1. Please call [phone] to schedule your visit. Explain that you were referred by [facility] to have a COVID-19 test. Be ready to share your [facility] visit ID number.  * If you need to schedule in [facility] please call [phone] or for [employer] employees please call [phone].  * If you need to schedule in the [place] area please call [phone]. [employer] employees call [phone].  The following will serve as your written order for this COVID Test, ordered by me, for the indication of  "suspected COVID [Z20.828]: The test will be ordered in [employer] our electronic health record, after you are scheduled. It will show as ordered and authorized by Jose Collins MD.  Order: COVID-19 (Coronavirus) PCR for SYMPTOMATIC testing from [facility].   2. When it's time for your COVID test:  Stay at least 6 feet away from others. (If someone will drive you to your test, stay in the backseat, as far away from the  as you can.)   Cover your mouth and nose with a mask, tissue or washcloth.  Go straight to the testing site. Don't make any stops on the way there or back.      3.Starting now: Stay home and away from others (self-isolate) until:   You've had no fever---and no medicine that reduces fever---for one full day (24 hours). And...   Your other symptoms have gotten better. For example, your cough or breathing has improved. And...   At least 10 days have passed since your symptoms started.       During this time, don't leave the house except for testing or medical care.   Stay in your own room, even for meals. Use your own bathroom if you can.   Stay away from others in your home. No hugging, kissing or shaking hands. No visitors.  Don't go to work, school or anywhere else.    Clean \"high touch\" surfaces often (doorknobs, counters, handles, etc.). Use a household cleaning spray or wipes. You'll find a full list of  on the EPA website: www.epa.gov/pesticide-registration/list-n-disinfectants-use-against-sars-cov-2.   Cover your mouth and nose with a mask, tissue or washcloth to avoid spreading germs.  Wash your hands and face often. Use soap and water.  Caregivers in these groups are at risk for severe illness due to COVID-19:  o People 65 years and older  o People who live in a nursing home or long-term care facility  o People with chronic disease (lung, heart, cancer, diabetes, kidney, liver, immunologic)  o People who have a weakened immune system, including those who:   Are in cancer treatment  Take " medicine that weakens the immune system, such as corticosteroids  Had a bone marrow or organ transplant  Have an immune deficiency  Have poorly controlled HIV or AIDS  Are obese (body mass index of 40 or higher)  Smoke regularly   o Caregivers should wear gloves while washing dishes, handling laundry and cleaning bedrooms and bathrooms.  o Use caution when washing and drying laundry: Don't shake dirty laundry, and use the warmest water setting that you can.  o For more tips, go to www.cdc.gov/coronavirus/2019-ncov/downloads/10Things.pdf.    4.Sign up for [employer]. We know it's scary to hear that you might have COVID-19. We want to track your symptoms to make sure you're okay over the next 2 weeks. Please look for an email from [employer]---this is a free, online program that we'll use to keep in touch. To sign up, follow the link in the email. Learn more at [email or web address]  How can I take care of myself?   Get lots of rest. Drink extra fluids (unless a doctor has told you not to).   Take Tylenol (acetaminophen) for fever or pain. If you have liver or kidney problems, ask your family doctor if it's okay to take Tylenol.   Adults can take either:    650 mg (two 325 mg pills) every 4 to 6 hours, or...   1,000 mg (two 500 mg pills) every 8 hours as needed.    Note: Don't take more than 3,000 mg in one day. Acetaminophen is found in many medicines (both prescribed and over-the-counter medicines). Read all labels to be sure you don't take too much.   For children, check the Tylenol bottle for the right dose. The dose is based on the child's age or weight.    If you have other health problems (like cancer, heart failure, an organ transplant or severe kidney disease): Call your specialty clinic if you don't feel better in the next 2 days.       Know when to call 911. Emergency warning signs include:    Trouble breathing or shortness of breath Pain or pressure in the chest that doesn't go away Feeling  confused like you haven't felt before, or not being able to wake up Bluish-colored lips or face.  Where can I get more information?   [facility] -- About COVID-19: [email or web address]   CDC -- What to Do If You're Sick: www.cdc.gov/coronavirus/2019-ncov/about/steps-when-sick.html   CDC -- Ending Home Isolation: www.cdc.gov/coronavirus/2019-ncov/hcp/disposition-in-home-patients.html   [facility] -- Caring for Someone: www.cdc.gov/coronavirus/2019-ncov/if-you-are-sick/care-for-someone.html   [facility] -- Interim Guidance for Hospital Discharge to Home: www.[facility].mn.us/diseases/coronavirus/hcp/hospdischarge.pdf   [facility] clinical trials (COVID-19 research studies): clinicalaffairs.[facility]-clinical-trials    Below are the COVID-19 hotlines at the [facility] ([facility]). Interpreters are available.    For health questions: Call 772-694-2263 or 1-918.123.1675 (7 a.m. to 7 p.m.) For questions about schools and childcare: Call 002-913-3092 or 1-638.980.1967 (7 a.m. to 7 p.m.)    Diagnosis: Contact with and (suspected) exposure to other viral communicable diseases  Diagnosis ICD: Z20.828

## 2020-11-13 ENCOUNTER — AMBULATORY - HEALTHEAST (OUTPATIENT)
Dept: FAMILY MEDICINE | Facility: CLINIC | Age: 50
End: 2020-11-13

## 2020-11-13 DIAGNOSIS — Z20.822 SUSPECTED COVID-19 VIRUS INFECTION: ICD-10-CM

## 2020-11-16 ENCOUNTER — HEALTH MAINTENANCE LETTER (OUTPATIENT)
Age: 50
End: 2020-11-16

## 2020-11-17 ENCOUNTER — ANTICOAGULATION THERAPY VISIT (OUTPATIENT)
Dept: ANTICOAGULATION | Facility: CLINIC | Age: 50
End: 2020-11-17

## 2020-11-17 DIAGNOSIS — Z95.2 S/P AVR (AORTIC VALVE REPLACEMENT): ICD-10-CM

## 2020-11-17 DIAGNOSIS — Z95.2 H/O MECHANICAL AORTIC VALVE REPLACEMENT: ICD-10-CM

## 2020-11-17 DIAGNOSIS — Z79.01 LONG TERM CURRENT USE OF ANTICOAGULANTS WITH INR GOAL OF 2.0-3.0: ICD-10-CM

## 2020-11-17 LAB
CAPILLARY BLOOD COLLECTION: NORMAL
INR BLD: 3.1 (ref 0.86–1.14)

## 2020-11-17 PROCEDURE — 36416 COLLJ CAPILLARY BLOOD SPEC: CPT | Performed by: FAMILY MEDICINE

## 2020-11-17 PROCEDURE — 85610 PROTHROMBIN TIME: CPT | Performed by: FAMILY MEDICINE

## 2020-11-17 PROCEDURE — 99207 PR NO CHARGE NURSE ONLY: CPT | Performed by: FAMILY MEDICINE

## 2020-11-17 NOTE — PROGRESS NOTES
ANTICOAGULATION FOLLOW-UP CLINIC VISIT    Patient Name:  Toney Lemos  Date:  11/17/2020  Contact Type:  Telephone    SUBJECTIVE:  Patient Findings     Comments:  The patient was assessed for diet, medication, and activity level changes, missed or extra doses, bruising or bleeding, with no problem findings.          Clinical Outcomes     Comments:  The patient was assessed for diet, medication, and activity level changes, missed or extra doses, bruising or bleeding, with no problem findings.             OBJECTIVE    Recent labs: (last 7 days)     11/17/20  0804   INR 3.1*       ASSESSMENT / PLAN  INR assessment SUPRA    Recheck INR In: 2 WEEKS    INR Location Outside lab      Anticoagulation Summary  As of 11/17/2020    INR goal:  2.0-3.0   TTR:  55.0 % (9 mo)   INR used for dosing:  3.1 (11/17/2020)   Warfarin maintenance plan:  7.5 mg (5 mg x 1.5) every Thu; 5 mg (5 mg x 1) all other days   Full warfarin instructions:  7.5 mg every Thu; 5 mg all other days   Weekly warfarin total:  37.5 mg   No change documented:  Milana Hernández RN   Plan last modified:  Milana Hernández RN (8/13/2020)   Next INR check:  12/2/2020   Target end date:  2/11/2021    Indications    S/P AVR (aortic valve replacement)-25 mm St[employer] Rolf [place]  [Z95.2]  H/O mechanical aortic valve replacement [Z95.2]             Anticoagulation Episode Summary     INR check location:      Preferred lab:      Send INR reminders to:  MAIA FISHER    Comments:        Anticoagulation Care Providers     Provider Role Specialty Phone number    Moni Cartagena PA-C Referring Cardiovascular & Thoracic Surgery [phone]    Yan Lucas MD Referring Family Medicine [phone]            See the Encounter Report to view Anticoagulation Flowsheet and Dosing Calendar (Go to Encounters tab in chart review, and find the Anticoagulation Therapy Visit)    Dosage adjustment made based on physician directed care plan.    Milana Hernández RN

## 2020-12-02 ENCOUNTER — HOSPITAL ENCOUNTER (OUTPATIENT)
Dept: GENERAL RADIOLOGY | Facility: CLINIC | Age: 50
Discharge: HOME | End: 2020-12-02
Attending: FAMILY MEDICINE | Admitting: FAMILY MEDICINE
Payer: COMMERCIAL

## 2020-12-02 ENCOUNTER — OFFICE VISIT (OUTPATIENT)
Dept: FAMILY MEDICINE | Facility: CLINIC | Age: 50
End: 2020-12-02
Payer: COMMERCIAL

## 2020-12-02 ENCOUNTER — ANTICOAGULATION THERAPY VISIT (OUTPATIENT)
Dept: ANTICOAGULATION | Facility: CLINIC | Age: 50
End: 2020-12-02

## 2020-12-02 VITALS
TEMPERATURE: 96.4 F | HEART RATE: 88 BPM | DIASTOLIC BLOOD PRESSURE: 80 MMHG | WEIGHT: 186 LBS | RESPIRATION RATE: 18 BRPM | SYSTOLIC BLOOD PRESSURE: 130 MMHG | OXYGEN SATURATION: 94 % | BODY MASS INDEX: 27.55 KG/M2

## 2020-12-02 DIAGNOSIS — Z95.2 S/P AVR (AORTIC VALVE REPLACEMENT): ICD-10-CM

## 2020-12-02 DIAGNOSIS — Z95.2 H/O MECHANICAL AORTIC VALVE REPLACEMENT: ICD-10-CM

## 2020-12-02 DIAGNOSIS — M25.512 ACUTE PAIN OF LEFT SHOULDER: Primary | ICD-10-CM

## 2020-12-02 DIAGNOSIS — I10 HYPERTENSION GOAL BP (BLOOD PRESSURE) < 140/90: ICD-10-CM

## 2020-12-02 DIAGNOSIS — Z79.01 LONG TERM CURRENT USE OF ANTICOAGULANTS WITH INR GOAL OF 2.0-3.0: ICD-10-CM

## 2020-12-02 DIAGNOSIS — M25.512 ACUTE PAIN OF LEFT SHOULDER: ICD-10-CM

## 2020-12-02 LAB
ANION GAP SERPL CALCULATED.3IONS-SCNC: 2 MMOL/L (ref 3–14)
BUN SERPL-MCNC: 17 MG/DL (ref 7–30)
CALCIUM SERPL-MCNC: 8.8 MG/DL (ref 8.5–10.1)
CHLORIDE SERPL-SCNC: 108 MMOL/L (ref 94–109)
CO2 SERPL-SCNC: 28 MMOL/L (ref 20–32)
CREAT SERPL-MCNC: 0.98 MG/DL (ref 0.66–1.25)
ERYTHROCYTE [DISTWIDTH] IN BLOOD BY AUTOMATED COUNT: 13.2 % (ref 10–15)
GFR SERPL CREATININE-BSD FRML MDRD: >90 ML/MIN/{1.73_M2}
GLUCOSE SERPL-MCNC: 106 MG/DL (ref 70–99)
HCT VFR BLD AUTO: 45 % (ref 40–53)
HGB BLD-MCNC: 15.6 G/DL (ref 13.3–17.7)
INR BLD: 2.9 (ref 0.86–1.14)
MCH RBC QN AUTO: 32.5 PG (ref 26.5–33)
MCHC RBC AUTO-ENTMCNC: 34.7 G/DL (ref 31.5–36.5)
MCV RBC AUTO: 94 FL (ref 78–100)
PLATELET # BLD AUTO: 192 10E9/L (ref 150–450)
POTASSIUM SERPL-SCNC: 4.2 MMOL/L (ref 3.4–5.3)
RBC # BLD AUTO: 4.8 10E12/L (ref 4.4–5.9)
SODIUM SERPL-SCNC: 138 MMOL/L (ref 133–144)
WBC # BLD AUTO: 6.5 10E9/L (ref 4–11)

## 2020-12-02 PROCEDURE — 73030 X-RAY EXAM OF SHOULDER: CPT | Mod: LT

## 2020-12-02 PROCEDURE — 99214 OFFICE O/P EST MOD 30 MIN: CPT | Mod: 25 | Performed by: FAMILY MEDICINE

## 2020-12-02 PROCEDURE — 80048 BASIC METABOLIC PNL TOTAL CA: CPT | Performed by: FAMILY MEDICINE

## 2020-12-02 PROCEDURE — 85027 COMPLETE CBC AUTOMATED: CPT | Performed by: FAMILY MEDICINE

## 2020-12-02 PROCEDURE — 36415 COLL VENOUS BLD VENIPUNCTURE: CPT | Performed by: FAMILY MEDICINE

## 2020-12-02 PROCEDURE — 85610 PROTHROMBIN TIME: CPT | Performed by: FAMILY MEDICINE

## 2020-12-02 PROCEDURE — 99207 PR NO CHARGE NURSE ONLY: CPT | Performed by: FAMILY MEDICINE

## 2020-12-02 PROCEDURE — 20610 DRAIN/INJ JOINT/BURSA W/O US: CPT | Mod: LT | Performed by: FAMILY MEDICINE

## 2020-12-02 RX ORDER — TRIAMCINOLONE ACETONIDE 40 MG/ML
40 INJECTION, SUSPENSION INTRA-ARTICULAR; INTRAMUSCULAR ONCE
Status: COMPLETED | OUTPATIENT
Start: 2020-12-02 | End: 2020-12-02

## 2020-12-02 RX ADMIN — TRIAMCINOLONE ACETONIDE 40 MG: 40 INJECTION, SUSPENSION INTRA-ARTICULAR; INTRAMUSCULAR at 11:07

## 2020-12-02 ASSESSMENT — PAIN SCALES - GENERAL: PAINLEVEL: MILD PAIN (3)

## 2020-12-02 NOTE — PROGRESS NOTES
Subjective     Musculoskeletal problem/pain  Onset/Duration: 1 year   Description  Location: shoulder  - left  Joint Swelling: no  Redness: no  Pain: YES  Warmth: no  Intensity:  moderate  Progression of Symptoms:  intermittent  Accompanying signs and symptoms:   Fevers: no  Numbness/tingling/weakness: no  History  Trauma to the area: no  Recent illness:  no  Previous similar problem: no  Previous evaluation:  no  Precipitating or alleviating factors:  Aggravating factors include: none  Therapies tried and outcome: stretching and Ibuprofen      Toney is a 50 year old male who comes to clinic   Daily left shoulder pain, worse in am, better with  Movement, bad again at end of day, achy dull pain in the front, better with keeping the arm above his head  Has some tingling in the both hands, first two fingers  Works as   No instability, hears clunking  Starting after dye study    Review of Systems         Objective    /80   Pulse 88   Temp 96.4  F (35.8  C) (Temporal)   Resp 18   Wt 84.4 kg (186 lb)   SpO2 94%   BMI 27.55 kg/m       Wt Readings from Last 2 Encounters:   12/02/20 84.4 kg (186 lb)   06/29/20 82.1 kg (181 lb)       Physical Exam   Well-appearing male no distress.  Right and left shoulders appear normal.  The left in particular has no tenderness.  No warmth or redness or swelling.  Mild impingement sign.  Good range of motion.  No instability noted.    Procedure-after informed consent I prepped the left shoulder with chlorhexidine and then injected 40 mg of Kenalog with 1 mL 1% lidocaine using a posterior approach and no touch technique.  Bandage applied.  Tolerated well        Assessment & Plan       ICD-10-CM    1. Acute pain of left shoulder  M25.512 XR Shoulder Left G/E 3 Views     triamcinolone (KENALOG-40) injection 40 mg     ADMIN 1st VACCINE   2. Hypertension goal BP (blood pressure) < 140/90  I10 CBC with platelets     Basic metabolic panel       Unclear etiology for his pain.   Therefore we will proceed with an x-ray to look for underlying arthritis.  Could be a cartilage issue and instability driven.  Trial of cortisone injection today both for therapeutic and diagnostic purposes.  We will see how he does with that.  Could consider referral next if not helpful to orthopedics or physical therapy  Also due for lab work for his chronic hypertension  Return if symptoms worsen or fail to improve.    Yan Lucas MD  [facility]

## 2020-12-02 NOTE — PROGRESS NOTES
ANTICOAGULATION FOLLOW-UP CLINIC VISIT    Patient Name:  Toney Lemos  Date:  2020  Contact Type:  Telephone    SUBJECTIVE:  Patient Findings     Comments:  I left a detailed voicemail with the orders reflected in flowsheet. I have also requested a call back if there have been any missed doses, concerns, illness, fever, or if there have been any changes in medications, activity level, or diet    Cathleen Pacheco RN          Clinical Outcomes     Comments:  I left a detailed voicemail with the orders reflected in flowsheet. I have also requested a call back if there have been any missed doses, concerns, illness, fever, or if there have been any changes in medications, activity level, or diet    Cathleen Pacheco RN             OBJECTIVE    Recent labs: (last 7 days)     20  0903   INR 2.9*       ASSESSMENT / PLAN  INR assessment THER    Recheck INR In: 4 WEEKS    INR Location Outside lab      Anticoagulation Summary  As of 2020    INR goal:  2.0-3.0   TTR:  54.7 % (9.5 mo)   INR used for dosin.9 (2020)   Warfarin maintenance plan:  7.5 mg (5 mg x 1.5) every Thu; 5 mg (5 mg x 1) all other days   Full warfarin instructions:  7.5 mg every Thu; 5 mg all other days   Weekly warfarin total:  37.5 mg   No change documented:  Cathleen Pacheco RN   Plan last modified:  Milana Hernández RN (2020)   Next INR check:  2020   Target end date:  2021    Indications    S/P AVR (aortic valve replacement)-25 mm [place] Rolf [place]  [Z95.2]  H/O mechanical aortic valve replacement [Z95.2]             Anticoagulation Episode Summary     INR check location:      Preferred lab:      Send INR reminders to:  MAIA FISHER    Comments:        Anticoagulation Care Providers     Provider Role Specialty Phone number    Moni Cartagena PA-C Referring Cardiovascular & Thoracic Surgery [phone]    Yan Lucas MD Referring Family Medicine [phone]            See the Encounter  Report to view Anticoagulation Flowsheet and Dosing Calendar (Go to Encounters tab in chart review, and find the Anticoagulation Therapy Visit)    Dosage adjustment made based on physician directed care plan.      Cathleen Pacheco RN

## 2020-12-02 NOTE — PROGRESS NOTES
Clinic Administered Medication Documentation      Injection Documentation     Injection was administered by provider (please see MAR for given by information). Please see MAR and medication order for additional information.     Site: Joint injection   Medication Used: Kenalog     Expiration Date:  6/2022

## 2020-12-22 ENCOUNTER — MYC MEDICAL ADVICE (OUTPATIENT)
Dept: FAMILY MEDICINE | Facility: CLINIC | Age: 50
End: 2020-12-22

## 2020-12-30 ENCOUNTER — ANTICOAGULATION THERAPY VISIT (OUTPATIENT)
Dept: ANTICOAGULATION | Facility: CLINIC | Age: 50
End: 2020-12-30

## 2020-12-30 ENCOUNTER — OFFICE VISIT (OUTPATIENT)
Dept: CARDIOLOGY | Facility: CLINIC | Age: 50
End: 2020-12-30
Payer: COMMERCIAL

## 2020-12-30 VITALS
DIASTOLIC BLOOD PRESSURE: 80 MMHG | OXYGEN SATURATION: 97 % | BODY MASS INDEX: 26.98 KG/M2 | HEIGHT: 69 IN | SYSTOLIC BLOOD PRESSURE: 122 MMHG | HEART RATE: 58 BPM | WEIGHT: 182.2 LBS

## 2020-12-30 DIAGNOSIS — Z95.2 S/P AVR (AORTIC VALVE REPLACEMENT): ICD-10-CM

## 2020-12-30 DIAGNOSIS — I10 HYPERTENSION GOAL BP (BLOOD PRESSURE) < 140/90: ICD-10-CM

## 2020-12-30 DIAGNOSIS — Z79.01 LONG TERM CURRENT USE OF ANTICOAGULANTS WITH INR GOAL OF 2.0-3.0: ICD-10-CM

## 2020-12-30 DIAGNOSIS — Z95.2 H/O MECHANICAL AORTIC VALVE REPLACEMENT: ICD-10-CM

## 2020-12-30 DIAGNOSIS — Z95.2 S/P AVR: ICD-10-CM

## 2020-12-30 DIAGNOSIS — E78.5 HYPERLIPIDEMIA LDL GOAL <130: ICD-10-CM

## 2020-12-30 LAB
CAPILLARY BLOOD COLLECTION: NORMAL
INR BLD: 3.4 (ref 0.86–1.14)

## 2020-12-30 PROCEDURE — 85610 PROTHROMBIN TIME: CPT | Performed by: FAMILY MEDICINE

## 2020-12-30 PROCEDURE — 99214 OFFICE O/P EST MOD 30 MIN: CPT | Performed by: NURSE PRACTITIONER

## 2020-12-30 PROCEDURE — 36416 COLLJ CAPILLARY BLOOD SPEC: CPT | Performed by: FAMILY MEDICINE

## 2020-12-30 RX ORDER — LISINOPRIL 5 MG/1
5 TABLET ORAL DAILY
Qty: 90 TABLET | Refills: 3 | Status: SHIPPED | OUTPATIENT
Start: 2020-12-30 | End: 2021-02-03

## 2020-12-30 RX ORDER — METOPROLOL SUCCINATE 100 MG/1
100 TABLET, EXTENDED RELEASE ORAL 2 TIMES DAILY
Qty: 180 TABLET | Refills: 3 | Status: SHIPPED | OUTPATIENT
Start: 2020-12-30 | End: 2020-12-30

## 2020-12-30 RX ORDER — METOPROLOL SUCCINATE 50 MG/1
100 TABLET, EXTENDED RELEASE ORAL 2 TIMES DAILY
Qty: 180 TABLET | Refills: 3 | Status: SHIPPED | OUTPATIENT
Start: 2020-12-30 | End: 2020-12-30

## 2020-12-30 RX ORDER — ATORVASTATIN CALCIUM 40 MG/1
40 TABLET, FILM COATED ORAL DAILY
Qty: 90 TABLET | Refills: 3 | Status: SHIPPED | OUTPATIENT
Start: 2020-12-30 | End: 2021-02-03

## 2020-12-30 RX ORDER — WARFARIN SODIUM 5 MG/1
5 TABLET ORAL DAILY
Qty: 90 TABLET | Refills: 3 | Status: SHIPPED | OUTPATIENT
Start: 2020-12-30 | End: 2021-02-03

## 2020-12-30 RX ORDER — METOPROLOL SUCCINATE 50 MG/1
50 TABLET, EXTENDED RELEASE ORAL 2 TIMES DAILY
Qty: 180 TABLET | Refills: 3 | Status: SHIPPED | OUTPATIENT
Start: 2020-12-30 | End: 2021-12-13

## 2020-12-30 RX ORDER — AMLODIPINE BESYLATE 2.5 MG/1
2.5 TABLET ORAL DAILY
Qty: 90 TABLET | Refills: 3 | Status: SHIPPED | OUTPATIENT
Start: 2020-12-30 | End: 2021-12-13

## 2020-12-30 RX ORDER — AMLODIPINE BESYLATE 5 MG/1
5 TABLET ORAL DAILY
Qty: 90 TABLET | Refills: 3 | Status: SHIPPED | OUTPATIENT
Start: 2020-12-30 | End: 2020-12-30

## 2020-12-30 RX ORDER — AMLODIPINE BESYLATE 2.5 MG/1
5 TABLET ORAL DAILY
Qty: 90 TABLET | Refills: 3 | Status: SHIPPED | OUTPATIENT
Start: 2020-12-30 | End: 2020-12-30

## 2020-12-30 ASSESSMENT — MIFFLIN-ST. JEOR: SCORE: 1680.24

## 2020-12-30 NOTE — PROGRESS NOTES
Cardiology Clinic Progress Note  Toney Lemos MRN# [id number]   YOB: 1970 Age: 50 year old     Primary cardiologist: Dr. Mederos    Reason for visit: 6-month follow-up     History of presenting illness:    Toney Lemos, a pleasant 50 year old patient who has a past medical history significant for severe aortic valve regurgitation in the setting of a bicuspid aortic valve with LV dilatation and he underwent an aortic valve replacement on 2/3/2020 with a 25 mm St. Rolf [place] mechanical mitral valve. His preoperative angiogram noted normal coronary arteries.    A postoperative echocardiogram showed normal functioning mechanical aortic valve with normal biventricular function.  He is now maintained on warfarin.  Today Toney reports that he is doing overall well.  Without any symptoms of shortness of breath or chest discomfort.  The sternal incision is healed well.  He does request to try to decrease some of his medications if possible.           Assessment and Plan:     ASSESSMENT:    1. Severe aortic valve regurgitation secondary to bicuspid aortic valve    Previous LV dilatation prior to valve replacement with resolution of chamber size postoperatively.    Status post mechanical aortic valve on 2/3/2020 by Dr. Matthews    Most recent echocardiogram noted normal functioning aortic valve with preserved biventricular systolic function    SBE prophylaxis for dental procedures    2. Hypertension    Well-controlled    Currently maintained on amlodipine 5 mg daily, Toprol- mg twice daily and lisinopril 5 mg daily    Patient requests to try to decrease amount of medications, but understands if his blood pressures not well controlled we will have to reassess medications and dosing    3. Hyperlipidemia    Most recent fasting lipids was from September 2020: Total cholesterol 200, HDL 47,  and triglycerides 263    Currently on atorvastatin 40 mg daily    PLAN:     1. Decrease amlodipine to 2.5 mg  "daily  2. Decrease metoprolol to 50 mg twice daily  3. Return to clinic in 1 month for hypertension recheck (virtual visit)  4. Follow-up with Dr. Mederos and repeat echocardiogram in 6 months            Review of Systems:     Review of Systems:  Skin:  Negative     Eyes:  Negative    ENT:  Negative    Respiratory:  Negative    Cardiovascular:  Negative for;lightheadedness;dizziness;chest pain;edema Positive for;palpitations  Gastroenterology: Negative    Genitourinary:  Negative    Musculoskeletal:  Negative    Neurologic:  Negative    Psychiatric:  Negative    Heme/Lymph/Imm:  Positive for allergies  Endocrine:  Negative              Physical Exam:     hysical Exam:  Vitals: /80 (BP Location: Right arm, Patient Position: Fowlers, Cuff Size: Adult Regular)   Pulse 58   Ht 1.75 m (5' 8.9\")   Wt 82.6 kg (182 lb 3.2 oz)   SpO2 97%   BMI 26.98 kg/m      Constitutional:  cooperative;well developed        Skin:  warm and dry to the touch        Head:  normocephalic        Eyes:  pupils equal and round        ENT:  no pallor or cyanosis        Neck:  JVP normal        Chest:  clear to auscultation        Cardiac: regular rhythm             Normal mechanical aortic valve sounds    Abdomen:  not assessed this visit        Vascular: pulses full and equal                                 Right radial site CDI without hematoma    Extremities and Back:  no deformities, clubbing, cyanosis, erythema observed        Neurological:  affect appropriate               Medications:     Current Outpatient Medications   Medication Sig Dispense Refill     amLODIPine (NORVASC) 2.5 MG tablet Take 2 tablets (5 mg) by mouth daily 90 tablet 3     aspirin (ASA) 81 MG EC tablet Take 1 tablet (81 mg) by mouth daily 90 tablet 3     atorvastatin (LIPITOR) 40 MG tablet Take 1 tablet (40 mg) by mouth daily 90 tablet 3     lisinopril (ZESTRIL) 5 MG tablet Take 1 tablet (5 mg) by mouth daily 90 tablet 3     loratadine (CLARITIN) 10 MG tablet Take " 10 mg by mouth daily       metoprolol succinate ER (TOPROL-XL) 50 MG 24 hr tablet Take 2 tablets (100 mg) by mouth 2 times daily 180 tablet 3     warfarin ANTICOAGULANT (COUMADIN) 5 MG tablet Take 1 tablet (5 mg) by mouth daily 90 tablet 3     acetaminophen (TYLENOL) 325 MG tablet Take 2 tablets (650 mg) by mouth every 4 hours as needed for mild pain (Patient not taking: Reported on 12/30/2020) 30 tablet 0       Family History   Problem Relation Age of Onset     Heart Disease Maternal Grandmother      Lipids Maternal Grandmother      Hypertension Maternal Grandmother      Hypertension Paternal Grandfather      Heart Disease Paternal Grandfather         heart murmur     Prostate Cancer Paternal Grandfather      Cancer Paternal Grandfather         bone ca     Lipids Paternal Grandfather      Diabetes Maternal Grandfather         senior onset     Hypertension Maternal Grandfather      Cerebrovascular Disease Maternal Grandfather      Cancer Paternal Grandmother         rectal cancer     Lipids Paternal Grandmother      Pancreatic Cancer Cousin      Heart Disease Brother         CAD, age 38       Social History     Socioeconomic History     Marital status:      Spouse name: Not on file     Number of children: Not on file     Years of education: Not on file     Highest education level: Not on file   Occupational History     Not on file   Social Needs     Financial resource strain: Not on file     Food insecurity     Worry: Not on file     Inability: Not on file     Transportation needs     Medical: Not on file     Non-medical: Not on file   Tobacco Use     Smoking status: Never Smoker     Smokeless tobacco: Never Used   Substance and Sexual Activity     Alcohol use: Yes     Alcohol/week: 0.0 standard drinks     Comment: beer or two wkly     Drug use: No     Sexual activity: Yes     Partners: Female     Comment: partner is on bcp   Lifestyle     Physical activity     Days per week: Not on file     Minutes per  session: Not on file     Stress: Not on file   Relationships     Social connections     Talks on phone: Not on file     Gets together: Not on file     Attends Scientologist service: Not on file     Active member of club or organization: Not on file     Attends meetings of clubs or organizations: Not on file     Relationship status: Not on file     Intimate partner violence     Fear of current or ex partner: Not on file     Emotionally abused: Not on file     Physically abused: Not on file     Forced sexual activity: Not on file   Other Topics Concern      Service No     Blood Transfusions No     Caffeine Concern No     Occupational Exposure No     Hobby Hazards No     Sleep Concern Yes     Stress Concern No     Weight Concern No     Special Diet Yes     Back Care Yes     Exercise Yes     Bike Helmet Yes     Seat Belt Yes     Self-Exams Yes     Parent/sibling w/ CABG, MI or angioplasty before 65F 55M? Yes     Comment: brother had heart attack &  at age 38yrs   Social History Narrative     Not on file            Past Medical History:     Past Medical History:   Diagnosis Date     Aortic valve disorders      Hypertension               Past Surgical History:     Past Surgical History:   Procedure Laterality Date     CV CORONARY ANGIOGRAM N/A 2020    Procedure: Coronary Angiogram;  Surgeon: Faisal Telles MD;  Location:  HEART CARDIAC CATH LAB     REPLACE VALVE AORTIC N/A 2/3/2020    Procedure: AORTIC VALVE REPLACEMENT WITH [facility] [facility] MECHANICAL HEART VALVE 25 MM; ON PUMP WITH TADEO;  Surgeon: Cindy Matthews MD;  Location:  OR              Allergies:   No known drug allergies and Seasonal allergies       Data:   All laboratory data reviewed:    Recent Labs   Lab Test 20  0802 19  0822 18  0829   * 87 88   HDL 47 65 58   NHDL 153* 116 105   CHOL 200* 181 163   TRIG 263* 145 84       Lab Results   Component Value Date    WBC 6.5 2020    RBC 4.80  12/02/2020    HGB 15.6 12/02/2020    HCT 45.0 12/02/2020    MCV 94 12/02/2020    MCH 32.5 12/02/2020    MCHC 34.7 12/02/2020    RDW 13.2 12/02/2020     12/02/2020       Lab Results   Component Value Date     12/02/2020    POTASSIUM 4.2 12/02/2020    CHLORIDE 108 12/02/2020    CO2 28 12/02/2020    ANIONGAP 2 (L) 12/02/2020     (H) 12/02/2020    BUN 17 12/02/2020    CR 0.98 12/02/2020    GFRESTIMATED >90 12/02/2020    GFRESTBLACK >90 12/02/2020    BRIANA 8.8 12/02/2020      Lab Results   Component Value Date    AST 36 02/04/2020    ALT 26 02/04/2020       Lab Results   Component Value Date    A1C 5.3 01/09/2020       Lab Results   Component Value Date    INR 3.4 (H) 12/30/2020    INR 2.9 (H) 12/02/2020    INR 2.94 (H) 09/03/2020    INR 2.5 (A) 03/10/2020    INR 1.9 (A) 03/04/2020    INR 2.20 (H) 02/09/2020         MACRINA DICKSON [facility]  Pager: [phone]  RN phone: [phone]

## 2020-12-30 NOTE — PATIENT INSTRUCTIONS
TODAY'S RECOMMENDATIONS    1. Decrease amlodipine to 2.5 mg daily  2. Decrease Metoprolol XL to 50 mg twice a day  3. Monitor home BP 1-2 hours after medications with BP goal <130/80.  4. Follow up via video visit in 1 month  5. Follow up with Dr. Mederos in 6 months with repeat echocardiogram    If you have questions or concerns please call clinic at [phone].    Please call [phone] for scheduling.      It was a pleasure seeing you today!

## 2020-12-30 NOTE — LETTER
12/30/2020    Yan Lucas MD  [address] [place] [address]    RE: Toney Lemos       Dear Colleague,    I had the pleasure of seeing Toney Lemos in the [facility].    Cardiology Clinic Progress Note  Toney Lemos MRN# [id number]   YOB: 1970 Age: 50 year old     Primary cardiologist: Dr. Mederos    Reason for visit: 6-month follow-up     History of presenting illness:    Toney Lemos, a pleasant 50 year old patient who has a past medical history significant for severe aortic valve regurgitation in the setting of a bicuspid aortic valve with LV dilatation and he underwent an aortic valve replacement on 2/3/2020 with a 25 mm St. Rolf [place] mechanical mitral valve. His preoperative angiogram noted normal coronary arteries.    A postoperative echocardiogram showed normal functioning mechanical aortic valve with normal biventricular function.  He is now maintained on warfarin.  Today Toney reports that he is doing overall well.  Without any symptoms of shortness of breath or chest discomfort.  The sternal incision is healed well.  He does request to try to decrease some of his medications if possible.           Assessment and Plan:     ASSESSMENT:    1. Severe aortic valve regurgitation secondary to bicuspid aortic valve    Previous LV dilatation prior to valve replacement with resolution of chamber size postoperatively.    Status post mechanical aortic valve on 2/3/2020 by Dr. Matthews    Most recent echocardiogram noted normal functioning aortic valve with preserved biventricular systolic function    SBE prophylaxis for dental procedures    2. Hypertension    Well-controlled    Currently maintained on amlodipine 5 mg daily, Toprol- mg twice daily and lisinopril 5 mg daily    Patient requests to try to decrease amount of medications, but understands if his blood pressures not well controlled we will have to reassess medications and  "dosing    3. Hyperlipidemia    Most recent fasting lipids was from September 2020: Total cholesterol 200, HDL 47,  and triglycerides 263    Currently on atorvastatin 40 mg daily    PLAN:     1. Decrease amlodipine to 2.5 mg daily  2. Decrease metoprolol to 50 mg twice daily  3. Return to clinic in 1 month for hypertension recheck (virtual visit)  4. Follow-up with Dr. Mederos and repeat echocardiogram in 6 months            Review of Systems:     Review of Systems:  Skin:  Negative     Eyes:  Negative    ENT:  Negative    Respiratory:  Negative    Cardiovascular:  Negative for;lightheadedness;dizziness;chest pain;edema Positive for;palpitations  Gastroenterology: Negative    Genitourinary:  Negative    Musculoskeletal:  Negative    Neurologic:  Negative    Psychiatric:  Negative    Heme/Lymph/Imm:  Positive for allergies  Endocrine:  Negative              Physical Exam:     hysical Exam:  Vitals: /80 (BP Location: Right arm, Patient Position: Fowlers, Cuff Size: Adult Regular)   Pulse 58   Ht 1.75 m (5' 8.9\")   Wt 82.6 kg (182 lb 3.2 oz)   SpO2 97%   BMI 26.98 kg/m      Constitutional:  cooperative;well developed        Skin:  warm and dry to the touch        Head:  normocephalic        Eyes:  pupils equal and round        ENT:  no pallor or cyanosis        Neck:  JVP normal        Chest:  clear to auscultation        Cardiac: regular rhythm             Normal mechanical aortic valve sounds    Abdomen:  not assessed this visit        Vascular: pulses full and equal                                 Right radial site CDI without hematoma    Extremities and Back:  no deformities, clubbing, cyanosis, erythema observed        Neurological:  affect appropriate               Medications:     Current Outpatient Medications   Medication Sig Dispense Refill     amLODIPine (NORVASC) 2.5 MG tablet Take 2 tablets (5 mg) by mouth daily 90 tablet 3     aspirin (ASA) 81 MG EC tablet Take 1 tablet (81 mg) by mouth " daily 90 tablet 3     atorvastatin (LIPITOR) 40 MG tablet Take 1 tablet (40 mg) by mouth daily 90 tablet 3     lisinopril (ZESTRIL) 5 MG tablet Take 1 tablet (5 mg) by mouth daily 90 tablet 3     loratadine (CLARITIN) 10 MG tablet Take 10 mg by mouth daily       metoprolol succinate ER (TOPROL-XL) 50 MG 24 hr tablet Take 2 tablets (100 mg) by mouth 2 times daily 180 tablet 3     warfarin ANTICOAGULANT (COUMADIN) 5 MG tablet Take 1 tablet (5 mg) by mouth daily 90 tablet 3     acetaminophen (TYLENOL) 325 MG tablet Take 2 tablets (650 mg) by mouth every 4 hours as needed for mild pain (Patient not taking: Reported on 12/30/2020) 30 tablet 0       Family History   Problem Relation Age of Onset     Heart Disease Maternal Grandmother      Lipids Maternal Grandmother      Hypertension Maternal Grandmother      Hypertension Paternal Grandfather      Heart Disease Paternal Grandfather         heart murmur     Prostate Cancer Paternal Grandfather      Cancer Paternal Grandfather         bone ca     Lipids Paternal Grandfather      Diabetes Maternal Grandfather         senior onset     Hypertension Maternal Grandfather      Cerebrovascular Disease Maternal Grandfather      Cancer Paternal Grandmother         rectal cancer     Lipids Paternal Grandmother      Pancreatic Cancer Cousin      Heart Disease Brother         CAD, age 38       Social History     Socioeconomic History     Marital status:      Spouse name: Not on file     Number of children: Not on file     Years of education: Not on file     Highest education level: Not on file   Occupational History     Not on file   Social Needs     Financial resource strain: Not on file     Food insecurity     Worry: Not on file     Inability: Not on file     Transportation needs     Medical: Not on file     Non-medical: Not on file   Tobacco Use     Smoking status: Never Smoker     Smokeless tobacco: Never Used   Substance and Sexual Activity     Alcohol use: Yes      Alcohol/week: 0.0 standard drinks     Comment: beer or two wkly     Drug use: No     Sexual activity: Yes     Partners: Female     Comment: partner is on bcp   Lifestyle     Physical activity     Days per week: Not on file     Minutes per session: Not on file     Stress: Not on file   Relationships     Social connections     Talks on phone: Not on file     Gets together: Not on file     Attends Pentecostal service: Not on file     Active member of club or organization: Not on file     Attends meetings of clubs or organizations: Not on file     Relationship status: Not on file     Intimate partner violence     Fear of current or ex partner: Not on file     Emotionally abused: Not on file     Physically abused: Not on file     Forced sexual activity: Not on file   Other Topics Concern      Service No     Blood Transfusions No     Caffeine Concern No     Occupational Exposure No     Hobby Hazards No     Sleep Concern Yes     Stress Concern No     Weight Concern No     Special Diet Yes     Back Care Yes     Exercise Yes     Bike Helmet Yes     Seat Belt Yes     Self-Exams Yes     Parent/sibling w/ CABG, MI or angioplasty before 65F 55M? Yes     Comment: brother had heart attack &  at age 38yrs   Social History Narrative     Not on file            Past Medical History:     Past Medical History:   Diagnosis Date     Aortic valve disorders      Hypertension               Past Surgical History:     Past Surgical History:   Procedure Laterality Date     CV CORONARY ANGIOGRAM N/A 2020    Procedure: Coronary Angiogram;  Surgeon: Faisal Telles MD;  Location:  HEART CARDIAC CATH LAB     REPLACE VALVE AORTIC N/A 2/3/2020    Procedure: AORTIC VALVE REPLACEMENT WITH [facility] [place] MECHANICAL HEART VALVE 25 MM; ON PUMP WITH TADEO;  Surgeon: Cindy Matthews MD;  Location:  OR              Allergies:   No known drug allergies and Seasonal allergies       Data:   All laboratory data  reviewed:    Recent Labs   Lab Test 09/03/20  0802 07/17/19  0822 06/22/18  0829   * 87 88   HDL 47 65 58   NHDL 153* 116 105   CHOL 200* 181 163   TRIG 263* 145 84       Lab Results   Component Value Date    WBC 6.5 12/02/2020    RBC 4.80 12/02/2020    HGB 15.6 12/02/2020    HCT 45.0 12/02/2020    MCV 94 12/02/2020    MCH 32.5 12/02/2020    MCHC 34.7 12/02/2020    RDW 13.2 12/02/2020     12/02/2020       Lab Results   Component Value Date     12/02/2020    POTASSIUM 4.2 12/02/2020    CHLORIDE 108 12/02/2020    CO2 28 12/02/2020    ANIONGAP 2 (L) 12/02/2020     (H) 12/02/2020    BUN 17 12/02/2020    CR 0.98 12/02/2020    GFRESTIMATED >90 12/02/2020    GFRESTBLACK >90 12/02/2020    BRIANA 8.8 12/02/2020      Lab Results   Component Value Date    AST 36 02/04/2020    ALT 26 02/04/2020       Lab Results   Component Value Date    A1C 5.3 01/09/2020       Lab Results   Component Value Date    INR 3.4 (H) 12/30/2020    INR 2.9 (H) 12/02/2020    INR 2.94 (H) 09/03/2020    INR 2.5 (A) 03/10/2020    INR 1.9 (A) 03/04/2020    INR 2.20 (H) 02/09/2020         MACRINA DICKSON CNP  [facility] [[email or web address]  Pager: [phone]  RN phone: [phone]      Thank you for allowing me to participate in the care of your patient.    Sincerely,     MACRINA DICKSON CNP     [facility]

## 2020-12-30 NOTE — PROGRESS NOTES
Anticoagulation Management    Unable to reach pt today.    Today's INR result of 3.4 is supratherapeutic (goal INR of 2.0-3.0).  Result received from: Clinic Lab    Follow up required to confirm warfarin dose taken and assess for changes    LM I have attempted to contact this patient by phone, left message to return call at [phone] or [phone].  Will try again later.      Anticoagulation clinic to follow up    Milana Hernández RN

## 2020-12-30 NOTE — PROGRESS NOTES
ANTICOAGULATION MANAGEMENT     Patient Name:  Toney Lemos  Date:  12/30/2020    ASSESSMENT /SUBJECTIVE:    Today's INR result of 3.4 is supratherapeutic. Goal INR of 2.0-3.0      Warfarin dose taken: Warfarin taken as instructed    Diet: No new diet changes affecting INR    Medication changes/ interactions: No new medications/supplements affecting INR    Previous INR: Therapeutic     S/S of bleeding or thromboembolism: No    New injury or illness: No    Upcoming surgery, procedure or cardioversion: No    Additional findings: None      PLAN:    Telephone call with Toney regarding INR result and instructed:     Warfarin Dosing Instructions: 2.5 mg tonight  then continue your current warfarin dose of 7.5 mg Thurs and 5 mg ROW    Instructed patient to follow up no later than: 2 weeks  Lab visit scheduled    Education provided: None required      Pt verbalizes understanding and agrees to warfarin dosing plan.    Instructed to call the Anticoagulation Clinic for any changes, questions or concerns. (#[phone])        Cathleen Pacheco RN      OBJECTIVE:  Recent labs: (last 7 days)     12/30/20  1027   INR 3.4*         INR assessment SUPRA    Recheck INR In: 2 WEEKS    INR Location Outside lab      Anticoagulation Summary  As of 12/30/2020    INR goal:  2.0-3.0   TTR:  51.6 % (10.5 mo)   INR used for dosing:  3.4 (12/30/2020)   Warfarin maintenance plan:  7.5 mg (5 mg x 1.5) every Thu; 5 mg (5 mg x 1) all other days   Full warfarin instructions:  12/30: 2.5 mg; Otherwise 7.5 mg every Thu; 5 mg all other days   Weekly warfarin total:  37.5 mg   Plan last modified:  Milana Hernández, RN (8/13/2020)   Next INR check:  1/13/2021   Target end date:  2/11/2021    Indications    S/P AVR (aortic valve replacement)-25 mm St. Rolf [place]  [Z95.2]  H/O mechanical aortic valve replacement [Z95.2]             Anticoagulation Episode Summary     INR check location:      Preferred lab:      Send INR reminders to:  MAIA FISHER     Comments:        Anticoagulation Care Providers     Provider Role Specialty Phone number    Moni Cartagena PA-C Referring Cardiovascular & Thoracic Surgery [phone]    Yan Lucas MD Referring Family Medicine [phone]

## 2021-01-13 ENCOUNTER — ANTICOAGULATION THERAPY VISIT (OUTPATIENT)
Dept: ANTICOAGULATION | Facility: CLINIC | Age: 51
End: 2021-01-13

## 2021-01-13 DIAGNOSIS — Z95.2 S/P AVR (AORTIC VALVE REPLACEMENT): ICD-10-CM

## 2021-01-13 DIAGNOSIS — Z95.2 H/O MECHANICAL AORTIC VALVE REPLACEMENT: ICD-10-CM

## 2021-01-13 DIAGNOSIS — Z79.01 LONG TERM CURRENT USE OF ANTICOAGULANTS WITH INR GOAL OF 2.0-3.0: ICD-10-CM

## 2021-01-13 LAB
CAPILLARY BLOOD COLLECTION: NORMAL
INR BLD: 1.9 (ref 0.86–1.14)

## 2021-01-13 PROCEDURE — 85610 PROTHROMBIN TIME: CPT | Performed by: FAMILY MEDICINE

## 2021-01-13 PROCEDURE — 36416 COLLJ CAPILLARY BLOOD SPEC: CPT | Performed by: FAMILY MEDICINE

## 2021-01-13 NOTE — PROGRESS NOTES
ANTICOAGULATION MANAGEMENT     Patient Name:  Toney Lemos  Date:  2021    ASSESSMENT /SUBJECTIVE:    Today's INR result of 1.9 is subtherapeutic. Goal INR of 2.0-3.0      Warfarin dose taken: LM    Diet: LM    Medication changes/ interactions: LM    Previous INR: Supratherapeutic     S/S of bleeding or thromboembolism: No    New injury or illness: No    Upcoming surgery, procedure or cardioversion: No    Additional findings:  Left a detailed message on  with this information, pt is advised to call back if pt has any questions, missed doses or concerns such as symptoms of bleeding, clotting, infection, change in diet or other.        PLAN:    Detailed message left for Toney regarding INR result and instructed:     Warfarin Dosing Instructions: Continue your current warfarin dose 7.5 mg TH and 5 mg all other days    Instructed patient to follow up no later than: 2 weeks  Left detailed message with recommended recheck date    Education provided: None required    Instructed to call the Anticoagulation Clinic for any changes, questions or concerns. (#[phone])        Milana Hernández RN      OBJECTIVE:  Recent labs: (last 7 days)     21  0804   INR 1.9*         INR assessment SUB    Recheck INR In: 2 WEEKS    INR Location Outside lab      Anticoagulation Summary  As of 2021    INR goal:  2.0-3.0   TTR:  52.2 % (10.9 mo)   INR used for dosin.9 (2021)   Warfarin maintenance plan:  7.5 mg (5 mg x 1.5) every Thu; 5 mg (5 mg x 1) all other days   Full warfarin instructions:  7.5 mg every Thu; 5 mg all other days   Weekly warfarin total:  37.5 mg   No change documented:  Milana Hernández, RN   Plan last modified:  Milana Hernández, RN (2020)   Next INR check:  2021   Target end date:  2021    Indications    S/P AVR (aortic valve replacement)-25 mm St. Rolf [place]  [Z95.2]  H/O mechanical aortic valve replacement [Z95.2]             Anticoagulation Episode Summary     INR check  location:      Preferred lab:      Send INR reminders to:  MAIA FISHER    Comments:        Anticoagulation Care Providers     Provider Role Specialty Phone number    Moni Cartagena PA-C Referring Cardiovascular & Thoracic Surgery [phone]    Yan Lucas MD Referring Family Medicine [phone]

## 2021-02-01 ENCOUNTER — MYC MEDICAL ADVICE (OUTPATIENT)
Dept: CARDIOLOGY | Facility: CLINIC | Age: 51
End: 2021-02-01

## 2021-02-03 ENCOUNTER — ANTICOAGULATION THERAPY VISIT (OUTPATIENT)
Dept: ANTICOAGULATION | Facility: CLINIC | Age: 51
End: 2021-02-03

## 2021-02-03 ENCOUNTER — VIRTUAL VISIT (OUTPATIENT)
Dept: CARDIOLOGY | Facility: CLINIC | Age: 51
End: 2021-02-03
Payer: COMMERCIAL

## 2021-02-03 DIAGNOSIS — Z95.2 S/P AVR (AORTIC VALVE REPLACEMENT): ICD-10-CM

## 2021-02-03 DIAGNOSIS — Z95.2 S/P AVR: ICD-10-CM

## 2021-02-03 DIAGNOSIS — E78.5 HYPERLIPIDEMIA LDL GOAL <130: ICD-10-CM

## 2021-02-03 DIAGNOSIS — Z95.2 H/O MECHANICAL AORTIC VALVE REPLACEMENT: ICD-10-CM

## 2021-02-03 DIAGNOSIS — R53.83 FATIGUE, UNSPECIFIED TYPE: ICD-10-CM

## 2021-02-03 DIAGNOSIS — Z79.01 LONG TERM CURRENT USE OF ANTICOAGULANTS WITH INR GOAL OF 2.0-3.0: ICD-10-CM

## 2021-02-03 DIAGNOSIS — I10 ESSENTIAL HYPERTENSION: Primary | ICD-10-CM

## 2021-02-03 LAB — INR BLD: 2.9 (ref 0.86–1.14)

## 2021-02-03 PROCEDURE — 85610 PROTHROMBIN TIME: CPT | Performed by: FAMILY MEDICINE

## 2021-02-03 PROCEDURE — 36416 COLLJ CAPILLARY BLOOD SPEC: CPT | Performed by: FAMILY MEDICINE

## 2021-02-03 PROCEDURE — 99214 OFFICE O/P EST MOD 30 MIN: CPT | Mod: GT | Performed by: NURSE PRACTITIONER

## 2021-02-03 RX ORDER — ATORVASTATIN CALCIUM 40 MG/1
40 TABLET, FILM COATED ORAL DAILY
Qty: 90 TABLET | Refills: 3 | Status: SHIPPED | OUTPATIENT
Start: 2021-02-03 | End: 2022-01-13

## 2021-02-03 RX ORDER — WARFARIN SODIUM 5 MG/1
5 TABLET ORAL DAILY
Qty: 90 TABLET | Refills: 3 | Status: SHIPPED | OUTPATIENT
Start: 2021-02-03 | End: 2022-01-13

## 2021-02-03 RX ORDER — LISINOPRIL 5 MG/1
5 TABLET ORAL DAILY
Qty: 90 TABLET | Refills: 3 | Status: SHIPPED | OUTPATIENT
Start: 2021-02-03 | End: 2022-01-13

## 2021-02-03 NOTE — PROGRESS NOTES
"The patient has been notified of following:     \"This video visit will be conducted via a call between you and your physician/provider. We have found that certain health care needs can be provided without the need for an in-person physical exam.  This service lets us provide the care you need with a video conversation.  If a prescription is necessary we can send it directly to your pharmacy.  If lab work is needed we can place an order for that and you can then stop by our lab to have the test done at a later time.    Video visits are billed at different rates depending on your insurance coverage.  Please reach out to your insurance provider with any questions.    If during the course of the call the physician/provider feels a video visit is not appropriate, you will not be charged for this service.\"    Patient has given verbal consent for Video visit? Yes    How would you like to obtain your AVS? [employer]    Patient would like the video invitation sent by: Text to cell phone: [phone]     KIANNA video visit through [email or web address]     Will anyone else be joining your video visit? No      Blood pressure: 2/1/21 125/86  Pulse: 67  Weight: 181 #      Video-Visit Details    Type of service:  Video Visit    Video Start Time: 9:10 am  Video End Time: 9:22 AM    Originating Location (pt. Location): Home    Distant Location (provider location):  [facility]     Platform used for Video Visit: [facility]         Cardiology Clinic Progress Note  Toney Lemos MRN# [id number]   YOB: 1970 Age: 50 year old     Primary cardiologist: Dr. Mederos    Reason for visit: Hypertension recheck    History of presenting illness:    Toney Lemos, a pleasant 50 year old patient who has a past medical history significant for severe aortic valve regurgitation in the setting of a bicuspid aortic valve with LV dilatation and he underwent an aortic valve replacement on " 2/3/2020 with a 25 mm [employer] [place] mechanical mitral valve. His preoperative angiogram noted normal coronary arteries.     A postoperative echocardiogram showed normal functioning mechanical aortic valve with normal biventricular function.  He is now maintained on warfarin.  Today Toney reports that he is doing overall well.  Without any symptoms of shortness of breath or chest discomfort.  The sternal incision is healed well.  He does request to try to decrease some of his medications if possible.    At the last visit Preet requested to decrease the amount of medications he was taking daily. We decreased amlodipine to 2.5 mg daily and metoprolol XL to 50 mg twice daily.  His home blood pressure readings have been ranging between 120-1 30 over 80s after the medication changes.  He feels overall well without any shortness of breath or chest discomfort.  He is treated for sleep apnea with a mouth appliance and his wife states that it has completely corrected his snoring and apneic episodes overnight.  She does report that since his valve replacement 1 year ago he has been more fatigued and will fall asleep while sitting on the couch.  He recently had a CBC with his primary care provider in December that was all within normal limits.           Assessment and Plan:     ASSESSMENT:    1. Severe aortic valve regurgitation secondary to bicuspid aortic valve    Previous LV dilatation prior to valve replacement with resolution of chamber size postoperatively.    Status post mechanical aortic valve on 2/3/2020 by Dr. Matthews    Most recent echocardiogram noted normal functioning aortic valve with preserved biventricular systolic function    SBE prophylaxis for dental procedures     2. Hypertension    Remains well controlled with decrease of amlodipine to 2.5 daily and metoprolol XL to 50 mg twice daily    3. Fatigue history of sleep apnea treated with dental appliance    Wife reports that he has been more fatigued since his  aortic valve replacement 1 year ago    Snoring and apneic episodes overnight have resolved with dental appliance    Recent CBC from his primary care provider was within normal limits    There was no recent thyroid function studies completed    4. Hyperlipidemia    Most recent fasting lipids was from September 2020: Total cholesterol 200, HDL 47,  and triglycerides 263    Currently on atorvastatin 40 mg daily    PLAN:     1. Continue present medical therapy as blood pressure remains well controlled with decreased dosing of amlodipine and metoprolol  2. Considered a return visit to the sleep clinic to assess dental appliance  3. Thyroid function studies for ongoing fatigue  4. Return to clinic in approximately 6 months with repeat echocardiogram and follow-up with Dr. Mederos            Review of Systems:     Negative unless noted in HPI          Physical Exam:     General Appearance:  No distress, normal body habitus, upright.    ENT/Mouth:  Membranes moist, no nasal discharge or bleeding gums. Normal head shape, no evidence of injury or laceration.    EYES:  No scleral icterus, normal conjunctivae    Neck:  No evidence of thyromegaly. Supple    Chest/Lungs:  No audible wheezing equal chest wall expansion. Non labored breathing. No cough.    Cardiovascular:  No evidence of elevated jugular venous pressure. No evidence of pitting edema bilaterally    Abdomen:  No evidence of abdominal distention. No observed jaundice.    Extremities:  No cyanosis or clubbing noted.    Skin:  No xanthelasma, normal skin collar. No evidence of facial lacerations.    Neurologic:  Normal arm motion bilateral, no tremors. No evidence of focal defect.    Psychiatric:  Alert and oriented x3, calm         Medications:     Current Outpatient Medications   Medication Sig Dispense Refill     amLODIPine (NORVASC) 2.5 MG tablet Take 1 tablet (2.5 mg) by mouth daily 90 tablet 3     aspirin (ASA) 81 MG EC tablet Take 1 tablet (81 mg) by mouth  daily 90 tablet 3     atorvastatin (LIPITOR) 40 MG tablet Take 1 tablet (40 mg) by mouth daily 90 tablet 3     lisinopril (ZESTRIL) 5 MG tablet Take 1 tablet (5 mg) by mouth daily 90 tablet 3     loratadine (CLARITIN) 10 MG tablet Take 10 mg by mouth daily       metoprolol succinate ER (TOPROL-XL) 50 MG 24 hr tablet Take 1 tablet (50 mg) by mouth 2 times daily 180 tablet 3     warfarin ANTICOAGULANT (COUMADIN) 5 MG tablet Take 1 tablet (5 mg) by mouth daily 90 tablet 3     acetaminophen (TYLENOL) 325 MG tablet Take 2 tablets (650 mg) by mouth every 4 hours as needed for mild pain (Patient not taking: Reported on 12/30/2020) 30 tablet 0       Family History   Problem Relation Age of Onset     Heart Disease Maternal Grandmother      Lipids Maternal Grandmother      Hypertension Maternal Grandmother      Hypertension Paternal Grandfather      Heart Disease Paternal Grandfather         heart murmur     Prostate Cancer Paternal Grandfather      Cancer Paternal Grandfather         bone ca     Lipids Paternal Grandfather      Diabetes Maternal Grandfather         senior onset     Hypertension Maternal Grandfather      Cerebrovascular Disease Maternal Grandfather      Cancer Paternal Grandmother         rectal cancer     Lipids Paternal Grandmother      Pancreatic Cancer Cousin      Heart Disease Brother         CAD, age 38       Social History     Socioeconomic History     Marital status:      Spouse name: Not on file     Number of children: Not on file     Years of education: Not on file     Highest education level: Not on file   Occupational History     Not on file   Social Needs     Financial resource strain: Not on file     Food insecurity     Worry: Not on file     Inability: Not on file     Transportation needs     Medical: Not on file     Non-medical: Not on file   Tobacco Use     Smoking status: Never Smoker     Smokeless tobacco: Never Used   Substance and Sexual Activity     Alcohol use: Yes      Alcohol/week: 0.0 standard drinks     Comment: beer or two wkly     Drug use: No     Sexual activity: Yes     Partners: Female     Comment: partner is on bcp   Lifestyle     Physical activity     Days per week: Not on file     Minutes per session: Not on file     Stress: Not on file   Relationships     Social connections     Talks on phone: Not on file     Gets together: Not on file     Attends Yazdanism service: Not on file     Active member of club or organization: Not on file     Attends meetings of clubs or organizations: Not on file     Relationship status: Not on file     Intimate partner violence     Fear of current or ex partner: Not on file     Emotionally abused: Not on file     Physically abused: Not on file     Forced sexual activity: Not on file   Other Topics Concern      Service No     Blood Transfusions No     Caffeine Concern No     Occupational Exposure No     Hobby Hazards No     Sleep Concern Yes     Stress Concern No     Weight Concern No     Special Diet Yes     Back Care Yes     Exercise Yes     Bike Helmet Yes     Seat Belt Yes     Self-Exams Yes     Parent/sibling w/ CABG, MI or angioplasty before 65F 55M? Yes     Comment: brother had heart attack &  at age 38yrs   Social History Narrative     Not on file            Past Medical History:     Past Medical History:   Diagnosis Date     Aortic valve disorders      Hypertension               Past Surgical History:     Past Surgical History:   Procedure Laterality Date     CV CORONARY ANGIOGRAM N/A 2020    Procedure: Coronary Angiogram;  Surgeon: Faisal Telles MD;  Location:  [facility]     REPLACE VALVE AORTIC N/A 2/3/2020    Procedure: AORTIC VALVE REPLACEMENT WITH [facility] [place] MECHANICAL HEART VALVE 25 MM; ON PUMP WITH TADEO;  Surgeon: Cindy Matthews MD;  Location:  OR              Allergies:   No known drug allergies and Seasonal allergies       Data:   All laboratory data  reviewed:    Recent Labs   Lab Test 09/03/20  0802 07/17/19  0822 06/22/18  0829   * 87 88   HDL 47 65 58   NHDL 153* 116 105   CHOL 200* 181 163   TRIG 263* 145 84       Lab Results   Component Value Date    WBC 6.5 12/02/2020    RBC 4.80 12/02/2020    HGB 15.6 12/02/2020    HCT 45.0 12/02/2020    MCV 94 12/02/2020    MCH 32.5 12/02/2020    MCHC 34.7 12/02/2020    RDW 13.2 12/02/2020     12/02/2020       Lab Results   Component Value Date     12/02/2020    POTASSIUM 4.2 12/02/2020    CHLORIDE 108 12/02/2020    CO2 28 12/02/2020    ANIONGAP 2 (L) 12/02/2020     (H) 12/02/2020    BUN 17 12/02/2020    CR 0.98 12/02/2020    GFRESTIMATED >90 12/02/2020    GFRESTBLACK >90 12/02/2020    BRIANA 8.8 12/02/2020      Lab Results   Component Value Date    AST 36 02/04/2020    ALT 26 02/04/2020       Lab Results   Component Value Date    A1C 5.3 01/09/2020       Lab Results   Component Value Date    INR 2.9 (H) 02/03/2021    INR 1.9 (H) 01/13/2021    INR 2.94 (H) 09/03/2020    INR 2.5 (A) 03/10/2020    INR 1.9 (A) 03/04/2020    INR 2.20 (H) 02/09/2020       MACRINA DICKSON CNP  [facility]  Pager: [phone]  RN phone: [phone]

## 2021-02-03 NOTE — PROGRESS NOTES
ANTICOAGULATION MANAGEMENT     Patient Name:  Toney Lemos  Date:  2/3/2021    ASSESSMENT /SUBJECTIVE:    Today's INR result of 2.9 is therapeutic. Goal INR of 2.0-3.0      Warfarin dose taken: Warfarin taken as instructed    Diet: No new diet changes affecting INR    Medication changes/ interactions: No new medications/supplements affecting INR    Previous INR: Subtherapeutic     S/S of bleeding or thromboembolism: No    New injury or illness: No    Upcoming surgery, procedure or cardioversion: No    Additional findings: None      PLAN:    Telephone call with Toney regarding INR result and instructed:     Warfarin Dosing Instructions: Continue your current warfarin dose 7.5 mg Thurs and 5 mg all other days    Instructed patient to follow up no later than: 3 weeks  Patient offered & declined to schedule next visit    Education provided: None required      Preet verbalizes understanding and agrees to warfarin dosing plan.    Instructed to call the Anticoagulation Clinic for any changes, questions or concerns. (#[phone])        Damien Colón RN      OBJECTIVE:  Recent labs: (last 7 days)     21  0828   INR 2.9*         No question data found.  Anticoagulation Summary  As of 2/3/2021    INR goal:  2.0-3.0   TTR:  54.5 % (11.6 mo)   INR used for dosin.9 (2/3/2021)   Warfarin maintenance plan:  7.5 mg (5 mg x 1.5) every Thu; 5 mg (5 mg x 1) all other days   Full warfarin instructions:  7.5 mg every Thu; 5 mg all other days   Weekly warfarin total:  37.5 mg   No change documented:  Damien Colón RN   Plan last modified:  Milana Hernández RN (2020)   Next INR check:  2021   Target end date:  2021    Indications    S/P AVR (aortic valve replacement)-25 mm St[employer] Rolf [place]  [Z95.2]  H/O mechanical aortic valve replacement [Z95.2]             Anticoagulation Episode Summary     INR check location:      Preferred lab:      Send INR reminders to:  MAIA FISHER    Comments:         Anticoagulation Care Providers     Provider Role Specialty Phone number    Moni Cartagena PA-C Referring Cardiovascular & Thoracic Surgery [phone]    Yan Lucas MD Referring Family Medicine [phone]

## 2021-02-03 NOTE — LETTER
"2/3/2021    Yan Lucas MD  [address] [place] [address]    RE: Toney Lemos       Dear Colleague,    I had the pleasure of seeing Toney Lemos in the [facility].    The patient has been notified of following:     \"This video visit will be conducted via a call between you and your physician/provider. We have found that certain health care needs can be provided without the need for an in-person physical exam.  This service lets us provide the care you need with a video conversation.  If a prescription is necessary we can send it directly to your pharmacy.  If lab work is needed we can place an order for that and you can then stop by our lab to have the test done at a later time.    Video visits are billed at different rates depending on your insurance coverage.  Please reach out to your insurance provider with any questions.    If during the course of the call the physician/provider feels a video visit is not appropriate, you will not be charged for this service.\"    Patient has given verbal consent for Video visit? Yes    How would you like to obtain your AVS? Revo Round    Patient would like the video invitation sent by: Text to cell phone: [phone]     KIANNA video visit through [employer]     Will anyone else be joining your video visit? No      Blood pressure: 2/1/21 125/86  Pulse: 67  Weight: 181 #      Video-Visit Details    Type of service:  Video Visit    Video Start Time: 9:10 am  Video End Time: 9:22 AM    Originating Location (pt. Location): Home    Distant Location (provider location):  [facility]     Platform used for Video Visit: MACRINA [facility]       Cardiology Clinic Progress Note  Toney Danielargelia MRN# [id number]   YOB: 1970 Age: 50 year old     Primary cardiologist: Dr. Mederos    Reason for visit: Hypertension recheck    History of presenting illness:    Toney Lemos, bc pleasant 50 " year old patient who has a past medical history significant for severe aortic valve regurgitation in the setting of a bicuspid aortic valve with LV dilatation and he underwent an aortic valve replacement on 2/3/2020 with a 25 mm St. Rolf [place] mechanical mitral valve. His preoperative angiogram noted normal coronary arteries.     A postoperative echocardiogram showed normal functioning mechanical aortic valve with normal biventricular function.  He is now maintained on warfarin.  Today Toney reports that he is doing overall well.  Without any symptoms of shortness of breath or chest discomfort.  The sternal incision is healed well.  He does request to try to decrease some of his medications if possible.    At the last visit Preet requested to decrease the amount of medications he was taking daily. We decreased amlodipine to 2.5 mg daily and metoprolol XL to 50 mg twice daily.  His home blood pressure readings have been ranging between 120-1 30 over 80s after the medication changes.  He feels overall well without any shortness of breath or chest discomfort.  He is treated for sleep apnea with a mouth appliance and his wife states that it has completely corrected his snoring and apneic episodes overnight.  She does report that since his valve replacement 1 year ago he has been more fatigued and will fall asleep while sitting on the couch.  He recently had a CBC with his primary care provider in December that was all within normal limits.           Assessment and Plan:     ASSESSMENT:    1. Severe aortic valve regurgitation secondary to bicuspid aortic valve    Previous LV dilatation prior to valve replacement with resolution of chamber size postoperatively.    Status post mechanical aortic valve on 2/3/2020 by Dr. Matthews    Most recent echocardiogram noted normal functioning aortic valve with preserved biventricular systolic function    SBE prophylaxis for dental procedures     2. Hypertension    Remains well  controlled with decrease of amlodipine to 2.5 daily and metoprolol XL to 50 mg twice daily    3. Fatigue history of sleep apnea treated with dental appliance    Wife reports that he has been more fatigued since his aortic valve replacement 1 year ago    Snoring and apneic episodes overnight have resolved with dental appliance    Recent CBC from his primary care provider was within normal limits    There was no recent thyroid function studies completed    4. Hyperlipidemia    Most recent fasting lipids was from September 2020: Total cholesterol 200, HDL 47,  and triglycerides 263    Currently on atorvastatin 40 mg daily    PLAN:     1. Continue present medical therapy as blood pressure remains well controlled with decreased dosing of amlodipine and metoprolol  2. Considered a return visit to the sleep clinic to assess dental appliance  3. Thyroid function studies for ongoing fatigue  4. Return to clinic in approximately 6 months with repeat echocardiogram and follow-up with Dr. Mederos            Review of Systems:     Negative unless noted in HPI          Physical Exam:     General Appearance:  No distress, normal body habitus, upright.    ENT/Mouth:  Membranes moist, no nasal discharge or bleeding gums. Normal head shape, no evidence of injury or laceration.    EYES:  No scleral icterus, normal conjunctivae    Neck:  No evidence of thyromegaly. Supple    Chest/Lungs:  No audible wheezing equal chest wall expansion. Non labored breathing. No cough.    Cardiovascular:  No evidence of elevated jugular venous pressure. No evidence of pitting edema bilaterally    Abdomen:  No evidence of abdominal distention. No observed jaundice.    Extremities:  No cyanosis or clubbing noted.    Skin:  No xanthelasma, normal skin collar. No evidence of facial lacerations.    Neurologic:  Normal arm motion bilateral, no tremors. No evidence of focal defect.    Psychiatric:  Alert and oriented x3, calm         Medications:      Current Outpatient Medications   Medication Sig Dispense Refill     amLODIPine (NORVASC) 2.5 MG tablet Take 1 tablet (2.5 mg) by mouth daily 90 tablet 3     aspirin (ASA) 81 MG EC tablet Take 1 tablet (81 mg) by mouth daily 90 tablet 3     atorvastatin (LIPITOR) 40 MG tablet Take 1 tablet (40 mg) by mouth daily 90 tablet 3     lisinopril (ZESTRIL) 5 MG tablet Take 1 tablet (5 mg) by mouth daily 90 tablet 3     loratadine (CLARITIN) 10 MG tablet Take 10 mg by mouth daily       metoprolol succinate ER (TOPROL-XL) 50 MG 24 hr tablet Take 1 tablet (50 mg) by mouth 2 times daily 180 tablet 3     warfarin ANTICOAGULANT (COUMADIN) 5 MG tablet Take 1 tablet (5 mg) by mouth daily 90 tablet 3     acetaminophen (TYLENOL) 325 MG tablet Take 2 tablets (650 mg) by mouth every 4 hours as needed for mild pain (Patient not taking: Reported on 12/30/2020) 30 tablet 0       Family History   Problem Relation Age of Onset     Heart Disease Maternal Grandmother      Lipids Maternal Grandmother      Hypertension Maternal Grandmother      Hypertension Paternal Grandfather      Heart Disease Paternal Grandfather         heart murmur     Prostate Cancer Paternal Grandfather      Cancer Paternal Grandfather         bone ca     Lipids Paternal Grandfather      Diabetes Maternal Grandfather         senior onset     Hypertension Maternal Grandfather      Cerebrovascular Disease Maternal Grandfather      Cancer Paternal Grandmother         rectal cancer     Lipids Paternal Grandmother      Pancreatic Cancer Cousin      Heart Disease Brother         CAD, age 38       Social History     Socioeconomic History     Marital status:      Spouse name: Not on file     Number of children: Not on file     Years of education: Not on file     Highest education level: Not on file   Occupational History     Not on file   Social Needs     Financial resource strain: Not on file     Food insecurity     Worry: Not on file     Inability: Not on file      Transportation needs     Medical: Not on file     Non-medical: Not on file   Tobacco Use     Smoking status: Never Smoker     Smokeless tobacco: Never Used   Substance and Sexual Activity     Alcohol use: Yes     Alcohol/week: 0.0 standard drinks     Comment: beer or two wkly     Drug use: No     Sexual activity: Yes     Partners: Female     Comment: partner is on bcp   Lifestyle     Physical activity     Days per week: Not on file     Minutes per session: Not on file     Stress: Not on file   Relationships     Social connections     Talks on phone: Not on file     Gets together: Not on file     Attends Spiritism service: Not on file     Active member of club or organization: Not on file     Attends meetings of clubs or organizations: Not on file     Relationship status: Not on file     Intimate partner violence     Fear of current or ex partner: Not on file     Emotionally abused: Not on file     Physically abused: Not on file     Forced sexual activity: Not on file   Other Topics Concern      Service No     Blood Transfusions No     Caffeine Concern No     Occupational Exposure No     Hobby Hazards No     Sleep Concern Yes     Stress Concern No     Weight Concern No     Special Diet Yes     Back Care Yes     Exercise Yes     Bike Helmet Yes     Seat Belt Yes     Self-Exams Yes     Parent/sibling w/ CABG, MI or angioplasty before 65F 55M? Yes     Comment: brother had heart attack &  at age 38yrs   Social History Narrative     Not on file            Past Medical History:     Past Medical History:   Diagnosis Date     Aortic valve disorders      Hypertension               Past Surgical History:     Past Surgical History:   Procedure Laterality Date     CV CORONARY ANGIOGRAM N/A 2020    Procedure: Coronary Angiogram;  Surgeon: Faisal Telles MD;  Location: [facility] CARDIAC CATH LAB     REPLACE VALVE AORTIC N/A 2/3/2020    Procedure: AORTIC VALVE REPLACEMENT WITH [facility] MECHANICAL  HEART VALVE 25 MM; ON PUMP WITH TADEO;  Surgeon: Cindy Matthews MD;  Location:  OR              Allergies:   No known drug allergies and Seasonal allergies       Data:   All laboratory data reviewed:    Recent Labs   Lab Test 09/03/20  0802 07/17/19  0822 06/22/18  0829   * 87 88   HDL 47 65 58   NHDL 153* 116 105   CHOL 200* 181 163   TRIG 263* 145 84       Lab Results   Component Value Date    WBC 6.5 12/02/2020    RBC 4.80 12/02/2020    HGB 15.6 12/02/2020    HCT 45.0 12/02/2020    MCV 94 12/02/2020    MCH 32.5 12/02/2020    MCHC 34.7 12/02/2020    RDW 13.2 12/02/2020     12/02/2020       Lab Results   Component Value Date     12/02/2020    POTASSIUM 4.2 12/02/2020    CHLORIDE 108 12/02/2020    CO2 28 12/02/2020    ANIONGAP 2 (L) 12/02/2020     (H) 12/02/2020    BUN 17 12/02/2020    CR 0.98 12/02/2020    GFRESTIMATED >90 12/02/2020    GFRESTBLACK >90 12/02/2020    BRIANA 8.8 12/02/2020      Lab Results   Component Value Date    AST 36 02/04/2020    ALT 26 02/04/2020       Lab Results   Component Value Date    A1C 5.3 01/09/2020       Lab Results   Component Value Date    INR 2.9 (H) 02/03/2021    INR 1.9 (H) 01/13/2021    INR 2.94 (H) 09/03/2020    INR 2.5 (A) 03/10/2020    INR 1.9 (A) 03/04/2020    INR 2.20 (H) 02/09/2020       MACRINA DICKSON CNP  [facility]  Pager: [phone]  RN phone: [phone]      Thank you for allowing me to participate in the care of your patient.    Sincerely,     MACRINA DICKSON CNP     [facility]

## 2021-02-25 ENCOUNTER — ANTICOAGULATION THERAPY VISIT (OUTPATIENT)
Dept: ANTICOAGULATION | Facility: CLINIC | Age: 51
End: 2021-02-25

## 2021-02-25 ENCOUNTER — TELEPHONE (OUTPATIENT)
Dept: ANTICOAGULATION | Facility: CLINIC | Age: 51
End: 2021-02-25

## 2021-02-25 DIAGNOSIS — Z79.01 LONG TERM CURRENT USE OF ANTICOAGULANTS WITH INR GOAL OF 2.0-3.0: ICD-10-CM

## 2021-02-25 DIAGNOSIS — E78.5 HYPERLIPIDEMIA LDL GOAL <130: ICD-10-CM

## 2021-02-25 DIAGNOSIS — Z95.2 S/P AVR: ICD-10-CM

## 2021-02-25 DIAGNOSIS — Z95.2 H/O MECHANICAL AORTIC VALVE REPLACEMENT: ICD-10-CM

## 2021-02-25 DIAGNOSIS — Z95.2 S/P AVR (AORTIC VALVE REPLACEMENT): ICD-10-CM

## 2021-02-25 DIAGNOSIS — I10 ESSENTIAL HYPERTENSION: ICD-10-CM

## 2021-02-25 DIAGNOSIS — Z95.2 H/O MECHANICAL AORTIC VALVE REPLACEMENT: Primary | ICD-10-CM

## 2021-02-25 DIAGNOSIS — R53.83 FATIGUE, UNSPECIFIED TYPE: ICD-10-CM

## 2021-02-25 LAB
INR BLD: 2.3 (ref 0.86–1.14)
TSH SERPL DL<=0.005 MIU/L-ACNC: 1.84 MU/L (ref 0.4–4)

## 2021-02-25 PROCEDURE — 84443 ASSAY THYROID STIM HORMONE: CPT | Performed by: FAMILY MEDICINE

## 2021-02-25 PROCEDURE — 85610 PROTHROMBIN TIME: CPT | Performed by: FAMILY MEDICINE

## 2021-02-25 PROCEDURE — 36416 COLLJ CAPILLARY BLOOD SPEC: CPT | Performed by: FAMILY MEDICINE

## 2021-02-25 NOTE — PROGRESS NOTES
ANTICOAGULATION MANAGEMENT     Patient Name:  Toney Lemos  Date:  2021    ASSESSMENT /SUBJECTIVE:    Today's INR result of 2.3 is therapeutic. Goal INR of 2.0-3.0      Warfarin dose taken: Warfarin taken as instructed    Diet: No new diet changes affecting INR    Medication changes/ interactions: No new medications/supplements affecting INR    Previous INR: Therapeutic     S/S of bleeding or thromboembolism: No    New injury or illness: No    Upcoming surgery, procedure or cardioversion: No    Additional findings: None      PLAN:    Telephone call with Toney regarding INR result and instructed:     Warfarin Dosing Instructions: Continue your current warfarin dose 7.5 mg TH and 5 mg all other days    Instructed patient to follow up no later than: 4 weeks  Patient offered & declined to schedule next visit    Education provided: None required      Pt verbalizes understanding and agrees to warfarin dosing plan.    Instructed to call the Anticoagulation Clinic for any changes, questions or concerns. (#[phone])        Milana Hernández RN      OBJECTIVE:  Recent labs: (last 7 days)     21  0803   INR 2.3*         INR assessment THER    Recheck INR In: 4 WEEKS    INR Location Outside lab      Anticoagulation Summary  As of 2021    INR goal:  2.0-3.0   TTR:  56.5 % (1 y)   INR used for dosin.3 (2021)   Warfarin maintenance plan:  7.5 mg (5 mg x 1.5) every Thu; 5 mg (5 mg x 1) all other days   Full warfarin instructions:  7.5 mg every Thu; 5 mg all other days   Weekly warfarin total:  37.5 mg   No change documented:  Milana Hernández RN   Plan last modified:  Milana Hernández RN (2020)   Next INR check:  3/25/2021   Target end date:  2021    Indications    S/P AVR (aortic valve replacement)-25 mm St. Rolf [place]  [Z95.2]  H/O mechanical aortic valve replacement [Z95.2]             Anticoagulation Episode Summary     INR check location:      Preferred lab:      Send INR reminders to:   [facility]    Comments:        Anticoagulation Care Providers     Provider Role Specialty Phone number    Moni Cartagena PA-C Referring Cardiovascular & Thoracic Surgery [phone]    Yan Lucas MD Referring Family Medicine [phone]

## 2021-02-25 NOTE — TELEPHONE ENCOUNTER
ANTICOAGULATION MANAGEMENT      Toney Lemos due for annual renewal of referral to anticoagulation monitoring. Order pended for your review and signature.      ANTICOAGULATION SUMMARY      Warfarin indication(s)     Heart Valve Replacement    Heart valve present?  Mechanical  AVR-Bileaflet       Current goal range   INR: 2.0-3.0     Goal appropriate for indication? Yes, INR 2-3 appropriate for hx of DVT, PE, hypercoagulable state, Afib, LVAD, or bileaflet AVR without risk factors     Current duration of therapy Indefinite/long term therapy   Time in Therapeutic Range (TTR)  (Goal > 60%) 56.5 %       Office visit with referring provider's group within last year yes on 12/2/20       Milana Hernández RN

## 2021-03-16 ENCOUNTER — MYC MEDICAL ADVICE (OUTPATIENT)
Dept: CARDIOLOGY | Facility: CLINIC | Age: 51
End: 2021-03-16

## 2021-04-02 ENCOUNTER — ANTICOAGULATION THERAPY VISIT (OUTPATIENT)
Dept: ANTICOAGULATION | Facility: CLINIC | Age: 51
End: 2021-04-02

## 2021-04-02 DIAGNOSIS — Z95.2 S/P AVR (AORTIC VALVE REPLACEMENT): ICD-10-CM

## 2021-04-02 DIAGNOSIS — Z95.2 H/O MECHANICAL AORTIC VALVE REPLACEMENT: ICD-10-CM

## 2021-04-02 DIAGNOSIS — Z79.01 LONG TERM CURRENT USE OF ANTICOAGULANTS WITH INR GOAL OF 2.0-3.0: ICD-10-CM

## 2021-04-02 LAB
CAPILLARY BLOOD COLLECTION: NORMAL
INR BLD: 2.4 (ref 0.86–1.14)

## 2021-04-02 PROCEDURE — 36416 COLLJ CAPILLARY BLOOD SPEC: CPT | Performed by: FAMILY MEDICINE

## 2021-04-02 PROCEDURE — 85610 PROTHROMBIN TIME: CPT | Performed by: FAMILY MEDICINE

## 2021-04-02 NOTE — PROGRESS NOTES
ANTICOAGULATION MANAGEMENT     Patient Name:  Toney Lemos  Date:  2021    ASSESSMENT /SUBJECTIVE:    Today's INR result of 2.4 is therapeutic. Goal INR of 2.0-3.0      Warfarin dose taken: Warfarin taken as instructed    Diet: No new diet changes affecting INR    Medication changes/ interactions: No new medications/supplements affecting INR    Previous INR: Therapeutic     S/S of bleeding or thromboembolism: Pt reports bruising on hands/arms- is a     New injury or illness: No    Upcoming surgery, procedure or cardioversion: No    Additional findings: None      PLAN:    Telephone call with Toney regarding INR result and instructed:     Warfarin Dosing Instructions: Continue your current warfarin dose 7.5 mg TH and 5 mg all other days    Instructed patient to follow up no later than: 5 weeks  Patient offered & declined to schedule next visit    Education provided: None required      Pt verbalizes understanding and agrees to warfarin dosing plan.    Instructed to call the Anticoagulation Clinic for any changes, questions or concerns. (#[phone])        Milana Hernández RN      OBJECTIVE:  Recent labs: (last 7 days)     21  0936   INR 2.4*         INR assessment THER    Recheck INR In: 5 WEEKS    INR Location Outside lab      Anticoagulation Summary  As of 2021    INR goal:  2.0-3.0   TTR:  57.0 % (1 y)   INR used for dosin.4 (2021)   Warfarin maintenance plan:  7.5 mg (5 mg x 1.5) every Thu; 5 mg (5 mg x 1) all other days   Full warfarin instructions:  7.5 mg every Thu; 5 mg all other days   Weekly warfarin total:  37.5 mg   No change documented:  Milana Hernández, RN   Plan last modified:  Milana Hernández RN (2020)   Next INR check:  2021   Target end date:  2021    Indications    S/P AVR (aortic valve replacement)-25 mm St. Rolf [place]  [Z95.2]  H/O mechanical aortic valve replacement [Z95.2]  Long term current use of anticoagulants with INR goal of 2.0-3.0  [Z79.01]             Anticoagulation Episode Summary     INR check location:      Preferred lab:      Send INR reminders to:  MAIA FISHER    Comments:        Anticoagulation Care Providers     Provider Role Specialty Phone number    Moni Cartagena PA-C Referring Cardiovascular & Thoracic Surgery [phone]    LanceYan burgess MD Referring Family Medicine [phone]

## 2021-04-19 ENCOUNTER — IMMUNIZATION (OUTPATIENT)
Dept: FAMILY MEDICINE | Facility: CLINIC | Age: 51
End: 2021-04-19
Payer: COMMERCIAL

## 2021-04-19 PROCEDURE — 91301 PR COVID VAC MODERNA 100 MCG/0.5 ML IM: CPT

## 2021-04-19 PROCEDURE — 0011A PR COVID VAC MODERNA 100 MCG/0.5 ML IM: CPT

## 2021-05-03 ENCOUNTER — ANTICOAGULATION THERAPY VISIT (OUTPATIENT)
Dept: ANTICOAGULATION | Facility: CLINIC | Age: 51
End: 2021-05-03

## 2021-05-03 DIAGNOSIS — Z79.01 LONG TERM CURRENT USE OF ANTICOAGULANTS WITH INR GOAL OF 2.0-3.0: ICD-10-CM

## 2021-05-03 DIAGNOSIS — Z95.2 S/P AVR (AORTIC VALVE REPLACEMENT): ICD-10-CM

## 2021-05-03 DIAGNOSIS — Z95.2 H/O MECHANICAL AORTIC VALVE REPLACEMENT: ICD-10-CM

## 2021-05-03 LAB
CAPILLARY BLOOD COLLECTION: NORMAL
INR BLD: 2.5 (ref 0.86–1.14)

## 2021-05-03 PROCEDURE — 85610 PROTHROMBIN TIME: CPT | Performed by: FAMILY MEDICINE

## 2021-05-03 PROCEDURE — 36416 COLLJ CAPILLARY BLOOD SPEC: CPT | Performed by: FAMILY MEDICINE

## 2021-05-03 NOTE — PROGRESS NOTES
ANTICOAGULATION MANAGEMENT     Patient Name:  Toney Lemos  Date:  5/3/2021    ASSESSMENT /SUBJECTIVE:    Today's INR result of 2.5 is therapeutic. Goal INR of 2.0-3.0      Warfarin dose taken: Warfarin taken as instructed    Diet: No new diet changes affecting INR    Medication changes/ interactions: No new medications/supplements affecting INR    Previous INR: Therapeutic     S/S of bleeding or thromboembolism: No    New injury or illness: No    Upcoming surgery, procedure or cardioversion: No    Additional findings: None      PLAN:    Telephone call with Toney regarding INR result and instructed:     Warfarin Dosing Instructions: Continue your current warfarin dose 7.5 mg TH and 5 mg all other days    Instructed patient to follow up no later than: 6 weeks  Patient offered & declined to schedule next visit    Education provided: None required      Pt verbalizes understanding and agrees to warfarin dosing plan.    Instructed to call the Anticoagulation Clinic for any changes, questions or concerns. (#[phone])        Milana Hernández RN      OBJECTIVE:  Recent labs: (last 7 days)     21  0832   INR 2.5*         INR assessment THER    Recheck INR In: 6 WEEKS    INR Location Outside lab      Anticoagulation Summary  As of 5/3/2021    INR goal:  2.0-3.0   TTR:  59.0 % (1 y)   INR used for dosin.5 (5/3/2021)   Warfarin maintenance plan:  7.5 mg (5 mg x 1.5) every Thu; 5 mg (5 mg x 1) all other days   Full warfarin instructions:  7.5 mg every Thu; 5 mg all other days   Weekly warfarin total:  37.5 mg   No change documented:  Milana Hernández RN   Plan last modified:  Milana Hernández RN (2020)   Next INR check:  2021   Target end date:  2021    Indications    S/P AVR (aortic valve replacement)-25 mm St. Rolf [place]  [Z95.2]  H/O mechanical aortic valve replacement [Z95.2]  Long term current use of anticoagulants with INR goal of 2.0-3.0 [Z79.01]             Anticoagulation Episode Summary      INR check location:      Preferred lab:      Send INR reminders to:  MAIA FISHER    Comments:        Anticoagulation Care Providers     Provider Role Specialty Phone number    Moni Cartagena PA-C Referring Cardiovascular & Thoracic Surgery [phone]    Yan Lucas MD Referring Family Medicine [phone]

## 2021-05-17 ENCOUNTER — IMMUNIZATION (OUTPATIENT)
Dept: FAMILY MEDICINE | Facility: CLINIC | Age: 51
End: 2021-05-17
Attending: FAMILY MEDICINE
Payer: COMMERCIAL

## 2021-05-17 PROCEDURE — 91301 PR COVID VAC MODERNA 100 MCG/0.5 ML IM: CPT

## 2021-05-17 PROCEDURE — 0012A PR COVID VAC MODERNA 100 MCG/0.5 ML IM: CPT

## 2021-06-20 ENCOUNTER — ANTICOAGULATION THERAPY VISIT (OUTPATIENT)
Dept: ANTICOAGULATION | Facility: CLINIC | Age: 51
End: 2021-06-20

## 2021-06-20 DIAGNOSIS — Z79.01 LONG TERM CURRENT USE OF ANTICOAGULANTS WITH INR GOAL OF 2.0-3.0: ICD-10-CM

## 2021-06-20 DIAGNOSIS — Z95.2 S/P AVR (AORTIC VALVE REPLACEMENT): ICD-10-CM

## 2021-06-20 DIAGNOSIS — Z95.2 H/O MECHANICAL AORTIC VALVE REPLACEMENT: ICD-10-CM

## 2021-06-20 LAB
CAPILLARY BLOOD COLLECTION: NORMAL
INR BLD: 2.8 (ref 0.86–1.14)

## 2021-06-20 PROCEDURE — 36416 COLLJ CAPILLARY BLOOD SPEC: CPT | Performed by: FAMILY MEDICINE

## 2021-06-20 PROCEDURE — 85610 PROTHROMBIN TIME: CPT | Performed by: FAMILY MEDICINE

## 2021-06-21 NOTE — PROGRESS NOTES
ANTICOAGULATION MANAGEMENT     Patient Name:  Toney Lemos  Date:  2021    ASSESSMENT /SUBJECTIVE:    Today's INR result of 2.8 is therapeutic. Goal INR of 2.0-3.0      Warfarin dose taken: LM    Diet: LM    Medication changes/ interactions: LM    Previous INR: Therapeutic     S/S of bleeding or thromboembolism: No    New injury or illness: No    Upcoming surgery, procedure or cardioversion: No    Additional findings: None      PLAN:    Warfarin Dosing Instructions: Continue your current warfarin dose 7.5 mg TH and 5 mg all other days    Instructed patient to follow up no later than: 6 weeks  LM    Education provided: Please call back if any changes to your diet, medications or how you've been taking warfarin    Detailed voice message left for Toney with dosing instructions and follow up date.     Instructed to call the Anticoagulation Clinic for any changes, questions or concerns. (#[phone])        Milnaa Hernández RN      OBJECTIVE:  Recent labs: (last 7 days)     21  1228   INR 2.8*         No question data found.  Anticoagulation Summary  As of 2021    INR goal:  2.0-3.0   TTR:  62.7 % (1 y)   INR used for dosin.8 (2021)   Warfarin maintenance plan:  7.5 mg (5 mg x 1.5) every Thu; 5 mg (5 mg x 1) all other days   Full warfarin instructions:  7.5 mg every Thu; 5 mg all other days   Weekly warfarin total:  37.5 mg   No change documented:  Milana Hernández, RN   Plan last modified:  Milana Hernández RN (2020)   Next INR check:     Target end date:  2021    Indications    S/P AVR (aortic valve replacement)-25 mm St. Rolf [place]  [Z95.2]  H/O mechanical aortic valve replacement [Z95.2]  Long term current use of anticoagulants with INR goal of 2.0-3.0 [Z79.01]             Anticoagulation Episode Summary     INR check location:      Preferred lab:      Send INR reminders to:  MAIA FISHER    Comments:        Anticoagulation Care Providers     Provider Role Specialty Phone  number    Moni Cartagena PA-C Referring Cardiovascular & Thoracic Surgery [phone]    [facility] Referring [facility] Medicine [phone]

## 2021-07-14 ENCOUNTER — MYC MEDICAL ADVICE (OUTPATIENT)
Dept: FAMILY MEDICINE | Facility: CLINIC | Age: 51
End: 2021-07-14

## 2021-07-14 DIAGNOSIS — Z95.2 H/O MECHANICAL AORTIC VALVE REPLACEMENT: Primary | ICD-10-CM

## 2021-07-14 RX ORDER — AMOXICILLIN 500 MG/1
CAPSULE ORAL
Qty: 2 CAPSULE | Refills: 3 | Status: SHIPPED | OUTPATIENT
Start: 2021-07-14 | End: 2022-02-22

## 2021-08-03 ENCOUNTER — ANTICOAGULATION THERAPY VISIT (OUTPATIENT)
Dept: ANTICOAGULATION | Facility: CLINIC | Age: 51
End: 2021-08-03

## 2021-08-03 ENCOUNTER — LAB (OUTPATIENT)
Dept: LAB | Facility: CLINIC | Age: 51
End: 2021-08-03
Payer: COMMERCIAL

## 2021-08-03 DIAGNOSIS — Z95.2 S/P AVR (AORTIC VALVE REPLACEMENT): Primary | ICD-10-CM

## 2021-08-03 DIAGNOSIS — Z79.01 LONG TERM CURRENT USE OF ANTICOAGULANTS WITH INR GOAL OF 2.0-3.0: ICD-10-CM

## 2021-08-03 DIAGNOSIS — Z95.2 H/O MECHANICAL AORTIC VALVE REPLACEMENT: ICD-10-CM

## 2021-08-03 DIAGNOSIS — I10 HYPERTENSION GOAL BP (BLOOD PRESSURE) < 140/90: ICD-10-CM

## 2021-08-03 DIAGNOSIS — Z95.2 S/P AVR: ICD-10-CM

## 2021-08-03 DIAGNOSIS — E78.5 HYPERLIPIDEMIA LDL GOAL <130: ICD-10-CM

## 2021-08-03 DIAGNOSIS — Z95.2 S/P AVR (AORTIC VALVE REPLACEMENT): ICD-10-CM

## 2021-08-03 LAB
CHOLEST SERPL-MCNC: 174 MG/DL
FASTING STATUS PATIENT QL REPORTED: YES
HDLC SERPL-MCNC: 49 MG/DL
INR BLD: 2.5 (ref 0.9–1.1)
LDLC SERPL CALC-MCNC: 92 MG/DL
NONHDLC SERPL-MCNC: 125 MG/DL
TRIGL SERPL-MCNC: 163 MG/DL

## 2021-08-03 PROCEDURE — 36415 COLL VENOUS BLD VENIPUNCTURE: CPT

## 2021-08-03 PROCEDURE — 85610 PROTHROMBIN TIME: CPT

## 2021-08-03 PROCEDURE — 80061 LIPID PANEL: CPT | Performed by: NURSE PRACTITIONER

## 2021-08-03 NOTE — PROGRESS NOTES
ANTICOAGULATION MANAGEMENT     Toney Lemos 51 year old male is on warfarin with therapeutic INR result. (Goal INR 2.0-3.0)    Recent labs: (last 7 days)     08/03/21  0818   INR 2.5*       ASSESSMENT     Source(s): Chart Review       Warfarin doses taken: LM    Diet: LM    New illness, injury, or hospitalization: No    Medication/supplement changes: None noted    Signs or symptoms of bleeding or clotting: No    Previous INR: Therapeutic last 2(+) visits    Additional findings: LM     PLAN     Recommended plan for no diet, medication or health factor changes affecting INR     Dosing Instructions: Continue your current warfarin dose with next INR in 6 weeks       Summary  As of 8/3/2021    Full warfarin instructions:  7.5 mg every Thu; 5 mg all other days   Next INR check:  9/14/2021             Detailed voice message left for Toney with dosing instructions and follow up date.   Sent [email or web address] message with dosing and follow up instructions    Advised patient in message to return in 6 weeks    Education provided: Please call back if any changes to your diet, medications or how you've been taking warfarin    Plan made per [facility] anticoagulation protocol    Val Patel, RN  Anticoagulation Clinic  8/3/2021    _______________________________________________________________________     Anticoagulation Episode Summary     Current INR goal:  2.0-3.0   TTR:  70.8 % (1 y)   Target end date:  2/11/2021   Send INR reminders to:  MAIA FISHER    Indications    S/P AVR (aortic valve replacement)-25 mm St. Rolf [place]  [Z95.2]  H/O mechanical aortic valve replacement [Z95.2]  Long term current use of anticoagulants with INR goal of 2.0-3.0 [Z79.01]           Comments:           Anticoagulation Care Providers     Provider Role Specialty Phone number    Moni Cartagena PA-C Referring Cardiovascular & Thoracic Surgery [phone]    Yan Lucas MD Referring [facility] [phone]

## 2021-08-20 ASSESSMENT — ENCOUNTER SYMPTOMS
DYSURIA: 0
PALPITATIONS: 0
SORE THROAT: 0
FEVER: 0
HEARTBURN: 0
HEMATOCHEZIA: 0
PARESTHESIAS: 0
ABDOMINAL PAIN: 0
DIARRHEA: 0
FREQUENCY: 0
MYALGIAS: 0
DIZZINESS: 0
ARTHRALGIAS: 0
HEMATURIA: 0
SHORTNESS OF BREATH: 0
CONSTIPATION: 0
WEAKNESS: 0
JOINT SWELLING: 0
CHILLS: 0
HEADACHES: 0
NERVOUS/ANXIOUS: 0
EYE PAIN: 0
COUGH: 0
NAUSEA: 0

## 2021-08-26 ENCOUNTER — OFFICE VISIT (OUTPATIENT)
Dept: FAMILY MEDICINE | Facility: CLINIC | Age: 51
End: 2021-08-26
Payer: COMMERCIAL

## 2021-08-26 ENCOUNTER — HOSPITAL ENCOUNTER (OUTPATIENT)
Dept: CARDIOLOGY | Facility: CLINIC | Age: 51
Discharge: HOME | End: 2021-08-26
Attending: NURSE PRACTITIONER | Admitting: NURSE PRACTITIONER
Payer: COMMERCIAL

## 2021-08-26 VITALS
WEIGHT: 182 LBS | HEART RATE: 79 BPM | BODY MASS INDEX: 26.96 KG/M2 | OXYGEN SATURATION: 97 % | SYSTOLIC BLOOD PRESSURE: 128 MMHG | HEIGHT: 69 IN | DIASTOLIC BLOOD PRESSURE: 78 MMHG | TEMPERATURE: 96.5 F | RESPIRATION RATE: 18 BRPM

## 2021-08-26 DIAGNOSIS — D22.9 SUSPICIOUS NEVUS: ICD-10-CM

## 2021-08-26 DIAGNOSIS — E78.5 HYPERLIPIDEMIA LDL GOAL <130: ICD-10-CM

## 2021-08-26 DIAGNOSIS — Z00.00 ROUTINE GENERAL MEDICAL EXAMINATION AT A HEALTH CARE FACILITY: Primary | ICD-10-CM

## 2021-08-26 DIAGNOSIS — Z95.2 S/P AVR: ICD-10-CM

## 2021-08-26 DIAGNOSIS — I10 HYPERTENSION GOAL BP (BLOOD PRESSURE) < 140/90: ICD-10-CM

## 2021-08-26 LAB
ANION GAP SERPL CALCULATED.3IONS-SCNC: 2 MMOL/L (ref 3–14)
BUN SERPL-MCNC: 11 MG/DL (ref 7–30)
CALCIUM SERPL-MCNC: 8.4 MG/DL (ref 8.5–10.1)
CHLORIDE BLD-SCNC: 109 MMOL/L (ref 94–109)
CHOLEST SERPL-MCNC: 177 MG/DL
CO2 SERPL-SCNC: 28 MMOL/L (ref 20–32)
CREAT SERPL-MCNC: 0.95 MG/DL (ref 0.66–1.25)
FASTING STATUS PATIENT QL REPORTED: YES
GFR SERPL CREATININE-BSD FRML MDRD: >90 ML/MIN/1.73M2
GLUCOSE BLD-MCNC: 112 MG/DL (ref 70–99)
HDLC SERPL-MCNC: 47 MG/DL
LDLC SERPL CALC-MCNC: 98 MG/DL
LVEF ECHO: NORMAL
NONHDLC SERPL-MCNC: 130 MG/DL
POTASSIUM BLD-SCNC: 4.2 MMOL/L (ref 3.4–5.3)
SODIUM SERPL-SCNC: 139 MMOL/L (ref 133–144)
TRIGL SERPL-MCNC: 159 MG/DL

## 2021-08-26 PROCEDURE — 36415 COLL VENOUS BLD VENIPUNCTURE: CPT | Performed by: FAMILY MEDICINE

## 2021-08-26 PROCEDURE — 80061 LIPID PANEL: CPT | Performed by: FAMILY MEDICINE

## 2021-08-26 PROCEDURE — 93306 TTE W/DOPPLER COMPLETE: CPT | Performed by: INTERNAL MEDICINE

## 2021-08-26 PROCEDURE — 80048 BASIC METABOLIC PNL TOTAL CA: CPT | Performed by: FAMILY MEDICINE

## 2021-08-26 PROCEDURE — 86803 HEPATITIS C AB TEST: CPT | Performed by: FAMILY MEDICINE

## 2021-08-26 PROCEDURE — 88305 TISSUE EXAM BY PATHOLOGIST: CPT | Performed by: PATHOLOGY

## 2021-08-26 PROCEDURE — 255N000002 HC RX 255 OP 636: Performed by: INTERNAL MEDICINE

## 2021-08-26 PROCEDURE — 99396 PREV VISIT EST AGE 40-64: CPT | Performed by: FAMILY MEDICINE

## 2021-08-26 PROCEDURE — 999N000208 ECHOCARDIOGRAM COMPLETE

## 2021-08-26 RX ADMIN — HUMAN ALBUMIN MICROSPHERES AND PERFLUTREN 4 ML: 10; .22 INJECTION, SOLUTION INTRAVENOUS at 13:21

## 2021-08-26 ASSESSMENT — ENCOUNTER SYMPTOMS
ARTHRALGIAS: 0
DIZZINESS: 0
DYSURIA: 0
HEARTBURN: 0
FEVER: 0
PARESTHESIAS: 0
CONSTIPATION: 0
CHILLS: 0
FREQUENCY: 0
HEMATURIA: 0
PALPITATIONS: 0
COUGH: 0
HEMATOCHEZIA: 0
NAUSEA: 0
SHORTNESS OF BREATH: 0
HEADACHES: 0
NERVOUS/ANXIOUS: 0
DIARRHEA: 0
MYALGIAS: 0
EYE PAIN: 0
JOINT SWELLING: 0
SORE THROAT: 0
ABDOMINAL PAIN: 0
WEAKNESS: 0

## 2021-08-26 ASSESSMENT — PAIN SCALES - GENERAL: PAINLEVEL: NO PAIN (0)

## 2021-08-26 ASSESSMENT — MIFFLIN-ST. JEOR: SCORE: 1671.16

## 2021-08-26 NOTE — PROGRESS NOTES
SUBJECTIVE:   CC: Toney Lemos is an 51 year old male who presents for preventative health visit.       Patient has been advised of split billing requirements and indicates understanding: Yes  Healthy Habits:     Getting at least 3 servings of Calcium per day:  NO    Bi-annual eye exam:  NO    Dental care twice a year:  Yes    Sleep apnea or symptoms of sleep apnea:  Sleep apnea    Diet:  Regular (no restrictions)    Frequency of exercise:  None    Taking medications regularly:  Yes    Medication side effects:  None    PHQ-2 Total Score: 0    Additional concerns today:  Yes    2 mos of a hand growth  Many years of fatigue, daytime somnolence, oral appliance fixed the janie, per wife            Today's PHQ-2 Score:   PHQ-2 ( 1999 Pfizer) 8/20/2021   Q1: Little interest or pleasure in doing things 0   Q2: Feeling down, depressed or hopeless 0   PHQ-2 Score 0   Q1: Little interest or pleasure in doing things Not at all   Q2: Feeling down, depressed or hopeless Not at all   PHQ-2 Score 0       Abuse: Current or Past(Physical, Sexual or Emotional)- No  Do you feel safe in your environment? Yes    Have you ever done Advance Care Planning? (For example, a Health Directive, POLST, or a discussion with a medical provider or your loved ones about your wishes): No, advance care planning information given to patient to review.  Patient plans to discuss their wishes with loved ones or provider.      Social History     Tobacco Use     Smoking status: Never Smoker     Smokeless tobacco: Never Used   Substance Use Topics     Alcohol use: Yes     Alcohol/week: 0.0 standard drinks     Comment: beer or two wkly     If you drink alcohol do you typically have >3 drinks per day or >7 drinks per week? No    No flowsheet data found.    Last PSA:   PSA   Date Value Ref Range Status   02/10/2016 0.19 0 - 4 ug/L Final       Reviewed orders with patient. Reviewed health maintenance and updated orders accordingly -       Reviewed and updated as  "needed this visit by clinical staff  Tobacco  Allergies               Reviewed and updated as needed this visit by Provider                    Review of Systems   Constitutional: Negative for chills and fever.   HENT: Negative for congestion, ear pain, hearing loss and sore throat.    Eyes: Negative for pain and visual disturbance.   Respiratory: Negative for cough and shortness of breath.    Cardiovascular: Negative for chest pain, palpitations and peripheral edema.   Gastrointestinal: Negative for abdominal pain, constipation, diarrhea, heartburn, hematochezia and nausea.   Genitourinary: Negative for discharge, dysuria, frequency, genital sores, hematuria, impotence and urgency.   Musculoskeletal: Negative for arthralgias, joint swelling and myalgias.   Skin: Negative for rash.   Neurological: Negative for dizziness, weakness, headaches and paresthesias.   Psychiatric/Behavioral: Negative for mood changes. The patient is not nervous/anxious.          OBJECTIVE:   /78   Pulse 79   Temp (!) 96.5  F (35.8  C) (Temporal)   Resp 18   Ht 1.745 m (5' 8.7\")   Wt 82.6 kg (182 lb)   SpO2 97%   BMI 27.11 kg/m      Physical Exam  GENERAL: healthy, alert and no distress  NECK: no adenopathy, no asymmetry, masses, or scars and thyroid normal to palpation  RESP: lungs clear to auscultation - no rales, rhonchi or wheezes  CV: regular rate and rhythm, normal S1 S2, no S3 or S4, no murmur, click or rub, no peripheral edema and peripheral pulses strong  ABDOMEN: soft, nontender, no hepatosplenomegaly, no masses and bowel sounds normal  MS: no gross musculoskeletal defects noted, no edema  Skin-there is a well-circumscribed heaped up pearly with central ulceration lesion on the anterior aspect of his right hand    Procedure-after informed consent I prepped the chlorhexidine of the right hand anteriorly and then used a small amount of lidocaine with epinephrine.  With good anesthesia then used a shave biopsy.  Sent for " "pathology.  Tolerated.  Bandage applied.      Diagnostic Test Results:  Labs reviewed in Epic    ASSESSMENT/PLAN:       ICD-10-CM    1. Routine general medical examination at a health care facility  Z00.00 GLUCOSE     Basic metabolic panel  (Ca, Cl, CO2, Creat, Gluc, K, Na, BUN)     Hepatitis C Screen Reflex to HCV RNA Quant and Genotype     Lipid panel reflex to direct LDL Fasting     Basic metabolic panel  (Ca, Cl, CO2, Creat, Gluc, K, Na, BUN)     Hepatitis C Screen Reflex to HCV RNA Quant and Genotype     Lipid panel reflex to direct LDL Fasting     CANCELED: GLUCOSE   2. Hypertension goal BP (blood pressure) < 140/90  I10 Basic metabolic panel  (Ca, Cl, CO2, Creat, Gluc, K, Na, BUN)     Basic metabolic panel  (Ca, Cl, CO2, Creat, Gluc, K, Na, BUN)   3. Suspicious nevus  D22.9 Dermatological path order and indications     Procedure as above  Annual topics reviewed  Hypertension controlled, due for labs  Further recommendations to follow     patient has been advised of split billing requirements and indicates understanding: Yes  COUNSELING:       Estimated body mass index is 27.11 kg/m  as calculated from the following:    Height as of this encounter: 1.745 m (5' 8.7\").    Weight as of this encounter: 82.6 kg (182 lb).         He reports that he has never smoked. He has never used smokeless tobacco.      Counseling Resources:  ATP IV Guidelines  Pooled Cohorts Equation Calculator  FRAX Risk Assessment  ICSI Preventive Guidelines  Dietary Guidelines for Americans, 2010  USDA's MyPlate  ASA Prophylaxis  Lung CA Screening    Yan Lucas MD  [facility]  "

## 2021-08-27 LAB — HCV AB SERPL QL IA: NONREACTIVE

## 2021-08-31 LAB
PATH REPORT.COMMENTS IMP SPEC: NORMAL
PATH REPORT.COMMENTS IMP SPEC: NORMAL
PATH REPORT.FINAL DX SPEC: NORMAL
PATH REPORT.GROSS SPEC: NORMAL
PATH REPORT.MICROSCOPIC SPEC OTHER STN: NORMAL
PATH REPORT.RELEVANT HX SPEC: NORMAL

## 2021-09-02 ENCOUNTER — MYC MEDICAL ADVICE (OUTPATIENT)
Dept: FAMILY MEDICINE | Facility: CLINIC | Age: 51
End: 2021-09-02

## 2021-09-09 ENCOUNTER — MYC MEDICAL ADVICE (OUTPATIENT)
Dept: FAMILY MEDICINE | Facility: CLINIC | Age: 51
End: 2021-09-09

## 2021-09-14 ENCOUNTER — TELEPHONE (OUTPATIENT)
Dept: ANTICOAGULATION | Facility: CLINIC | Age: 51
End: 2021-09-14

## 2021-09-14 NOTE — TELEPHONE ENCOUNTER
ANTICOAGULATION     Toney Lemos is overdue for INR check.      Left message for patient to call and schedule lab appointment as soon as possible. If returning call, please schedule.     Damien Colón RN

## 2021-09-18 ENCOUNTER — ANTICOAGULATION THERAPY VISIT (OUTPATIENT)
Dept: ANTICOAGULATION | Facility: CLINIC | Age: 51
End: 2021-09-18

## 2021-09-18 ENCOUNTER — LAB (OUTPATIENT)
Dept: LAB | Facility: CLINIC | Age: 51
End: 2021-09-18
Payer: COMMERCIAL

## 2021-09-18 ENCOUNTER — HEALTH MAINTENANCE LETTER (OUTPATIENT)
Age: 51
End: 2021-09-18

## 2021-09-18 DIAGNOSIS — Z95.2 H/O MECHANICAL AORTIC VALVE REPLACEMENT: ICD-10-CM

## 2021-09-18 DIAGNOSIS — Z95.2 S/P AVR (AORTIC VALVE REPLACEMENT): ICD-10-CM

## 2021-09-18 DIAGNOSIS — Z79.01 LONG TERM CURRENT USE OF ANTICOAGULANTS WITH INR GOAL OF 2.0-3.0: ICD-10-CM

## 2021-09-18 DIAGNOSIS — Z95.2 S/P AVR (AORTIC VALVE REPLACEMENT): Primary | ICD-10-CM

## 2021-09-18 LAB — INR BLD: 2.9 (ref 0.9–1.1)

## 2021-09-18 PROCEDURE — 36416 COLLJ CAPILLARY BLOOD SPEC: CPT

## 2021-09-18 PROCEDURE — 85610 PROTHROMBIN TIME: CPT

## 2021-09-20 NOTE — PROGRESS NOTES
ANTICOAGULATION MANAGEMENT     Toney Lemos 51 year old male is on warfarin with therapeutic INR result. (Goal INR 2.0-3.0)    Recent labs: (last 7 days)     09/18/21  0946   INR 2.9*       ASSESSMENT     Source(s): Chart Review       Warfarin doses taken: Reviewed in chart    Diet: No new diet changes identified    New illness, injury, or hospitalization: No    Medication/supplement changes: None noted    Signs or symptoms of bleeding or clotting: No    Previous INR: Therapeutic last 2(+) visits    Additional findings: None     PLAN     Recommended plan for no diet, medication or health factor changes affecting INR     Dosing Instructions: Continue your current warfarin dose with next INR in 6 weeks       Summary  As of 9/18/2021    Full warfarin instructions:  7.5 mg every Thu; 5 mg all other days   Next INR check:  11/1/2021             Detailed voice message left for Toney with dosing instructions and follow up date.     Contact [phone]  to schedule and with any changes, questions or concerns.     Education provided: Please call back if any changes to your diet, medications or how you've been taking warfarin    Plan made per [facility] anticoagulation protocol    Milana Hernández, RN  Anticoagulation Clinic  9/20/2021    _______________________________________________________________________     Anticoagulation Episode Summary     Current INR goal:  2.0-3.0   TTR:  73.8 % (1 y)   Target end date:  2/11/2021   Send INR reminders to:  MAIA FISHER    Indications    S/P AVR (aortic valve replacement)-25 mm St. Rolf [place]  [Z95.2]  H/O mechanical aortic valve replacement [Z95.2]  Long term current use of anticoagulants with INR goal of 2.0-3.0 [Z79.01]           Comments:           Anticoagulation Care Providers     Provider Role Specialty Phone number    Moni Cartagena PA-C Referring Cardiovascular & Thoracic Surgery [phone]    Yan Lucas MD Referring [facility] [phone]

## 2021-09-22 ENCOUNTER — LAB (OUTPATIENT)
Dept: URGENT CARE | Facility: URGENT CARE | Age: 51
End: 2021-09-22
Attending: EMERGENCY MEDICINE
Payer: COMMERCIAL

## 2021-09-22 ENCOUNTER — E-VISIT (OUTPATIENT)
Dept: URGENT CARE | Facility: CLINIC | Age: 51
End: 2021-09-22
Payer: COMMERCIAL

## 2021-09-22 ENCOUNTER — MYC MEDICAL ADVICE (OUTPATIENT)
Dept: FAMILY MEDICINE | Facility: CLINIC | Age: 51
End: 2021-09-22

## 2021-09-22 DIAGNOSIS — Z20.822 SUSPECTED COVID-19 VIRUS INFECTION: ICD-10-CM

## 2021-09-22 DIAGNOSIS — J02.9 SORE THROAT: ICD-10-CM

## 2021-09-22 LAB
DEPRECATED S PYO AG THROAT QL EIA: NEGATIVE
GROUP A STREP BY PCR: NOT DETECTED

## 2021-09-22 PROCEDURE — U0003 INFECTIOUS AGENT DETECTION BY NUCLEIC ACID (DNA OR RNA); SEVERE ACUTE RESPIRATORY SYNDROME CORONAVIRUS 2 (SARS-COV-2) (CORONAVIRUS DISEASE [COVID-19]), AMPLIFIED PROBE TECHNIQUE, MAKING USE OF HIGH THROUGHPUT TECHNOLOGIES AS DESCRIBED BY CMS-2020-01-R: HCPCS

## 2021-09-22 PROCEDURE — 87651 STREP A DNA AMP PROBE: CPT

## 2021-09-22 PROCEDURE — U0005 INFEC AGEN DETEC AMPLI PROBE: HCPCS

## 2021-09-22 PROCEDURE — 99421 OL DIG E/M SVC 5-10 MIN: CPT | Performed by: EMERGENCY MEDICINE

## 2021-09-22 NOTE — PATIENT INSTRUCTIONS
Dear Toney Lemos,    Your symptoms show that you may have coronavirus (COVID-19). This illness can cause fever, cough and trouble breathing. Many people get a mild case and get better on their own. Some people can get very sick.    Because you also reported sore throat I would like to also test you for Strep Throat to determine if we need to treat you for that as well.    What should I do?  We would like to test you for Covid-19 virus and Strep Throat. I have placed orders for these tests.   To schedule: go to your Modern Feed home page and scroll down to the section that says  You have an appointment that needs to be scheduled  and click the large green button that says  Schedule Now  and follow the steps to find the next available openings. It is important that when you are asked what the reason for your appointment is that you mention you need BOTH Covid and Strep tests.    If you are unable to complete these Modern Feed scheduling steps, please call [phone] to schedule your testing.     Return to work/school/ guidance:   Please let your workplace manager and staffing office know when your quarantine ends     We can t give you an exact date as it depends on the above. You can calculate this on your own or work with your manager/staffing office to calculate this. (For example if you were exposed on 10/4, you would have to quarantine for 14 full days. That would be through 10/18. You could return on 10/19.)      If you receive a positive COVID-19 test result, follow the guidance of the those who are giving you the results. Usually the return to work is 10 (or in some cases 20 days from symptom onset.) If you work at [facility], you must also be cleared by Employee Occupational Health and Safety to return to work.        If you receive a negative COVID-19 test result and did not have a high risk exposure to someone with a known positive COVID-19 test, you can return to work once you're free of fever  for 24 hours without fever-reducing medication and your symptoms are improving or resolved.      If you receive a negative COVID-19 test and If you had a high risk exposure to someone who has tested positive for COVID-19 then you can return to work 14 days after your last contact with the positive individual    Note: If you have ongoing exposure to the covid positive person, this quarantine period may be more than 14 days. (For example, if you are continued to be exposed to your child who tested positive and cannot isolate from them, then the quarantine of 7-14 days can't start until your child is no longer contagious. This is typically 10 days from onset of the child's symptoms. So the total duration may be 17-24 days in this case.)    Sign up for [employer].   We know it's scary to hear that you might have COVID-19. We want to track your symptoms to make sure you're okay over the next 2 weeks. Please look for an email from [employer]--this is a free, online program that we'll use to keep in touch. To sign up, follow the link in the email you will receive. Learn more at [email or web address]    How can I take care of myself?    Get lots of rest. Drink extra fluids (unless a doctor has told you not to)    Take Tylenol (acetaminophen) or ibuprofen for fever or pain. If you have liver or kidney problems, ask your family doctor if it's okay to take Tylenol o ibuprofen    If you have other health problems (like cancer, heart failure, an organ transplant or severe kidney disease): Call your specialty clinic if you don't feel better in the next 2 days.    Know when to call 911. Emergency warning signs include:  o Trouble breathing or shortness of breath  o Pain or pressure in the chest that doesn't go away  o Feeling confused like you haven't felt before, or not being able to wake up  o Bluish-colored lips or face    Where can I get more information?  [facility] - About COVID-19:    [email or web address]    CDC - What to Do If You're Sick:   www.cdc.gov/coronavirus/2019-ncov/about/steps-when-sick.html    September 22, 2021  RE:  Toney Lemos                                                                                                                  [address] [place] [address]      To whom it may concern:    I evaluated Toney Lemos on September 22, 2021. Toney Lemos should be excused from work/school.     They should let their workplace manager and staffing office know when their quarantine ends.    We can not give an exact date as it depends on the information below. They can calculate this on their own or work with their manager/staffing office to calculate this. (For example if they were exposed on 10/04, they would have to quarantine for 14 full days. That would be through 10/18. They could return on 10/19.)    Quarantine Guidelines:      If patient receives a positive COVID-19 test result, they should follow the guidance of those who are giving the results. Usually the return to work is 10 (or in some cases 20 days from symptom onset.) If they work at [facility], they must be cleared by Employee Occupational Health and Safety to return to work.        If patient receives a negative COVID-19 test result and did not have a high risk exposure to someone with a known positive COVID-19 test, they can return to work once they're free of fever for 24 hours without fever-reducing medication and their symptoms are improving or resolved.      If patient receives a negative COVID-19 test and if they had a high risk exposure to someone who has tested positive for COVID-19 then they can return to work 14 days after their last contact with the positive individual    Note: If there is ongoing exposure to the covid positive person, this quarantine period may be longer than 14 days. (For example, if they are continually exposed to their child, who tested  positive and cannot isolate from them, then the quarantine of 7-14 days can't start until their child is no longer contagious. This is typically 10 days from onset to the child's symptoms. So the total duration may be 17-24 days in this case.)     Sincerely,  Nain Quinn MD

## 2021-09-22 NOTE — CONFIDENTIAL NOTE
MY CHART message sent to pt about calling [facility] [phone], The hotline os specialists that deal with COVID questions.concerns 24/7.  LEDA Faith

## 2021-09-23 LAB — SARS-COV-2 RNA RESP QL NAA+PROBE: NEGATIVE

## 2021-10-01 ENCOUNTER — OFFICE VISIT (OUTPATIENT)
Dept: FAMILY MEDICINE | Facility: CLINIC | Age: 51
End: 2021-10-01
Payer: COMMERCIAL

## 2021-10-01 ENCOUNTER — MYC MEDICAL ADVICE (OUTPATIENT)
Dept: FAMILY MEDICINE | Facility: CLINIC | Age: 51
End: 2021-10-01

## 2021-10-01 VITALS
SYSTOLIC BLOOD PRESSURE: 130 MMHG | HEART RATE: 60 BPM | TEMPERATURE: 96.1 F | WEIGHT: 185 LBS | DIASTOLIC BLOOD PRESSURE: 80 MMHG | BODY MASS INDEX: 27.56 KG/M2 | RESPIRATION RATE: 10 BRPM | OXYGEN SATURATION: 97 %

## 2021-10-01 DIAGNOSIS — D04.61 SQUAMOUS CELL CARCINOMA IN SITU (SCCIS) OF DORSUM OF RIGHT HAND: ICD-10-CM

## 2021-10-01 PROCEDURE — 88305 TISSUE EXAM BY PATHOLOGIST: CPT | Performed by: DERMATOLOGY

## 2021-10-01 PROCEDURE — 11622 EXC S/N/H/F/G MAL+MRG 1.1-2: CPT | Performed by: FAMILY MEDICINE

## 2021-10-01 NOTE — PROGRESS NOTES
"    Assessment & Plan       ICD-10-CM    1. 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  Tolerated well.  We will follow up with him with pathology results to make sure it was adequately excised.  Wound instructions given.  Sutures to be removed in 10 days       BMI:   Estimated body mass index is 27.56 kg/m  as calculated from the following:    Height as of 8/26/21: 1.745 m (5' 8.7\").    Weight as of this encounter: 83.9 kg (185 lb).       No follow-ups on file.    Yan Lucas MD  [facility]    Roverto Oviedo is a 51 year old who presents for the following health issues     HPI     Returns to have a squamous cell carcinoma removed off his left hand.  Was previously biopsied.      Review of Systems         Objective    /80   Pulse 60   Temp (!) 96.1  F (35.6  C)   Resp 10   Wt 83.9 kg (185 lb)   SpO2 97%   BMI 27.56 kg/m    Body mass index is 27.56 kg/m .  Physical Exam       After informed consent, I prepped the dorsum of the left hand with chlorhexidine.  Then used a small amount of lidocaine with epinephrine to provide adequate anesthesia.  Then made an elliptical incision measuring 1.5 cm long, by 1 cm wide.  I remove the lesion in its entirety.  Was closed with 5-0 Ethilon suture.  Minimal blood loss.  Tolerated well.  Bandage applied.  Wound directions given        "

## 2021-10-04 ENCOUNTER — TELEPHONE (OUTPATIENT)
Dept: FAMILY MEDICINE | Facility: CLINIC | Age: 51
End: 2021-10-04

## 2021-10-04 NOTE — TELEPHONE ENCOUNTER
Tamia calling from the surgical Pathology lab and is requesting clarification on the skin sample that they received. Order states right hand, but notes for the procedure state left. Please return her call with the clarifications please at [phone]. She is aware that provider is not in office today. Rosy Dumont LPN

## 2021-10-07 LAB
PATH REPORT.COMMENTS IMP SPEC: ABNORMAL
PATH REPORT.COMMENTS IMP SPEC: ABNORMAL
PATH REPORT.COMMENTS IMP SPEC: YES
PATH REPORT.FINAL DX SPEC: ABNORMAL
PATH REPORT.GROSS SPEC: ABNORMAL
PATH REPORT.MICROSCOPIC SPEC OTHER STN: ABNORMAL
PATH REPORT.RELEVANT HX SPEC: ABNORMAL

## 2021-10-08 ENCOUNTER — CARE COORDINATION (OUTPATIENT)
Dept: CARDIOLOGY | Facility: CLINIC | Age: 51
End: 2021-10-08

## 2021-10-18 PROBLEM — Z98.890 STATUS POST CORONARY ANGIOGRAM: Status: RESOLVED | Noted: 2020-01-09 | Resolved: 2021-10-18

## 2021-10-18 PROBLEM — I35.1 AORTIC INSUFFICIENCY: Status: RESOLVED | Noted: 2020-02-03 | Resolved: 2021-10-18

## 2021-10-18 PROBLEM — Z79.01 ANTICOAGULATED ON COUMADIN: Status: RESOLVED | Noted: 2020-02-10 | Resolved: 2021-10-18

## 2021-10-18 PROBLEM — E87.70 FLUID OVERLOAD: Status: RESOLVED | Noted: 2020-02-10 | Resolved: 2021-10-18

## 2021-10-18 PROBLEM — R73.01 ELEVATED FASTING GLUCOSE: Status: RESOLVED | Noted: 2018-07-13 | Resolved: 2021-10-18

## 2021-10-18 PROBLEM — C44.629 SCC (SQUAMOUS CELL CARCINOMA), ARM, LEFT: Status: ACTIVE | Noted: 2021-10-18

## 2021-10-21 ENCOUNTER — ANTICOAGULATION THERAPY VISIT (OUTPATIENT)
Dept: ANTICOAGULATION | Facility: CLINIC | Age: 51
End: 2021-10-21

## 2021-10-21 ENCOUNTER — LAB (OUTPATIENT)
Dept: LAB | Facility: CLINIC | Age: 51
End: 2021-10-21
Payer: COMMERCIAL

## 2021-10-21 DIAGNOSIS — Z95.2 S/P AVR (AORTIC VALVE REPLACEMENT): ICD-10-CM

## 2021-10-21 DIAGNOSIS — Z79.01 LONG TERM CURRENT USE OF ANTICOAGULANTS WITH INR GOAL OF 2.0-3.0: ICD-10-CM

## 2021-10-21 DIAGNOSIS — Z95.2 S/P AVR (AORTIC VALVE REPLACEMENT): Primary | ICD-10-CM

## 2021-10-21 DIAGNOSIS — Z95.2 H/O MECHANICAL AORTIC VALVE REPLACEMENT: ICD-10-CM

## 2021-10-21 LAB — INR BLD: 2.8 (ref 0.9–1.1)

## 2021-10-21 PROCEDURE — 36416 COLLJ CAPILLARY BLOOD SPEC: CPT

## 2021-10-21 PROCEDURE — 85610 PROTHROMBIN TIME: CPT

## 2021-10-21 NOTE — PROGRESS NOTES
ANTICOAGULATION MANAGEMENT     Toney Lemos 51 year old male is on warfarin with therapeutic INR result. (Goal INR 2.0-3.0)    Recent labs: (last 7 days)     10/21/21  0844   INR 2.8*       ASSESSMENT     Source(s): Chart Review       Warfarin doses taken: Reviewed in chart    Diet: No new diet changes identified    New illness, injury, or hospitalization: No    Medication/supplement changes: None noted    Signs or symptoms of bleeding or clotting: No    Previous INR: Therapeutic last 2(+) visits    Additional findings: None     PLAN     Recommended plan for no diet, medication or health factor changes affecting INR     Dosing Instructions: Continue your current warfarin dose with next INR in 6 weeks       Summary  As of 10/21/2021    Full warfarin instructions:  7.5 mg every Thu; 5 mg all other days   Next INR check:  12/2/2021             Detailed voice message left for Toney with dosing instructions and follow up date.     Contact [phone]  to schedule and with any changes, questions or concerns.     Education provided: Please call back if any changes to your diet, medications or how you've been taking warfarin    Plan made per [facility] anticoagulation protocol    Milana Hernández, LEDA  Anticoagulation Clinic  10/21/2021    _______________________________________________________________________     Anticoagulation Episode Summary     Current INR goal:  2.0-3.0   TTR:  82.5 % (1 y)   Target end date:  2/11/2021   Send INR reminders to:  MAIA FISHER    Indications    S/P AVR (aortic valve replacement)-25 mm St. Rolf [place]  [Z95.2]  H/O mechanical aortic valve replacement [Z95.2]  Long term current use of anticoagulants with INR goal of 2.0-3.0 [Z79.01]           Comments:           Anticoagulation Care Providers     Provider Role Specialty Phone number    Moni Cartagena PA-C Referring Cardiovascular & Thoracic Surgery [phone]    Yan Lucas MD Referring [facility] [phone]

## 2021-11-13 ENCOUNTER — HEALTH MAINTENANCE LETTER (OUTPATIENT)
Age: 51
End: 2021-11-13

## 2021-12-02 ENCOUNTER — MYC MEDICAL ADVICE (OUTPATIENT)
Dept: NURSING | Facility: CLINIC | Age: 51
End: 2021-12-02
Payer: COMMERCIAL

## 2021-12-09 ENCOUNTER — TELEPHONE (OUTPATIENT)
Dept: ANTICOAGULATION | Facility: CLINIC | Age: 51
End: 2021-12-09
Payer: COMMERCIAL

## 2021-12-13 DIAGNOSIS — Z95.2 S/P AVR: ICD-10-CM

## 2021-12-13 DIAGNOSIS — I10 HYPERTENSION GOAL BP (BLOOD PRESSURE) < 140/90: ICD-10-CM

## 2021-12-13 RX ORDER — AMLODIPINE BESYLATE 2.5 MG/1
2.5 TABLET ORAL DAILY
Qty: 90 TABLET | Refills: 0 | Status: SHIPPED | OUTPATIENT
Start: 2021-12-13 | End: 2022-01-13

## 2021-12-13 RX ORDER — METOPROLOL SUCCINATE 50 MG/1
50 TABLET, EXTENDED RELEASE ORAL 2 TIMES DAILY
Qty: 180 TABLET | Refills: 0 | Status: SHIPPED | OUTPATIENT
Start: 2021-12-13 | End: 2022-01-13

## 2021-12-17 ENCOUNTER — LAB (OUTPATIENT)
Dept: LAB | Facility: CLINIC | Age: 51
End: 2021-12-17
Payer: COMMERCIAL

## 2021-12-17 ENCOUNTER — ANTICOAGULATION THERAPY VISIT (OUTPATIENT)
Dept: ANTICOAGULATION | Facility: CLINIC | Age: 51
End: 2021-12-17

## 2021-12-17 DIAGNOSIS — Z79.01 LONG TERM CURRENT USE OF ANTICOAGULANTS WITH INR GOAL OF 2.0-3.0: ICD-10-CM

## 2021-12-17 DIAGNOSIS — Z95.2 H/O MECHANICAL AORTIC VALVE REPLACEMENT: ICD-10-CM

## 2021-12-17 DIAGNOSIS — Z95.2 S/P AVR (AORTIC VALVE REPLACEMENT): Primary | ICD-10-CM

## 2021-12-17 DIAGNOSIS — Z95.2 S/P AVR (AORTIC VALVE REPLACEMENT): ICD-10-CM

## 2021-12-17 LAB — INR BLD: 2.1 (ref 0.9–1.1)

## 2021-12-17 PROCEDURE — 85610 PROTHROMBIN TIME: CPT

## 2021-12-17 PROCEDURE — 36416 COLLJ CAPILLARY BLOOD SPEC: CPT

## 2021-12-17 NOTE — PROGRESS NOTES
ANTICOAGULATION MANAGEMENT     Toney Lemos 51 year old male is on warfarin with therapeutic INR result. (Goal INR 2.0-3.0)    Recent labs: (last 7 days)     12/17/21  1529   INR 2.1*       ASSESSMENT     Source(s): Chart Review I left a detailed voicemail with the orders below. I have also requested a call back if there have been any missed doses, concerns, illness, fever, or if there have been any changes in medications, activity level, or diet        Warfarin doses taken: Warfarin taken as instructed    Diet: No new diet changes identified    New illness, injury, or hospitalization: No    Medication/supplement changes: None noted    Signs or symptoms of bleeding or clotting: No    Previous INR: Therapeutic last 2(+) visits    Additional findings: None     PLAN     Recommended plan for no diet, medication or health factor changes affecting INR     Dosing Instructions: Continue your current warfarin dose with next INR in 6 weeks       Summary  As of 12/17/2021    Full warfarin instructions:  7.5 mg every Thu; 5 mg all other days   Next INR check:  1/28/2022             Detailed voice message left for Toney with dosing instructions and follow up date.     Contact [phone]  to schedule and with any changes, questions or concerns.     Education provided: Please call back if any changes to your diet, medications or how you've been taking warfarin    Plan made per [facility] anticoagulation protocol    Damien Colón RN  Anticoagulation Clinic  12/17/2021    _______________________________________________________________________     Anticoagulation Episode Summary     Current INR goal:  2.0-3.0   TTR:  94.5 % (1 y)   Target end date:  2/11/2021   Send INR reminders to:  MAIA FISHER    Indications    S/P AVR (aortic valve replacement)-25 mm St. Rolf [place]  [Z95.2]  H/O mechanical aortic valve replacement [Z95.2]  Long term current use of anticoagulants with INR goal of 2.0-3.0 [Z79.01]           Comments:            Anticoagulation Care Providers     Provider Role Specialty Phone number    Moni Cartagena PA-C Referring Cardiovascular & Thoracic Surgery [phone]    Yan Lucas MD Referring Family Medicine [phone]

## 2022-01-08 ENCOUNTER — HEALTH MAINTENANCE LETTER (OUTPATIENT)
Age: 52
End: 2022-01-08

## 2022-01-13 ENCOUNTER — MYC MEDICAL ADVICE (OUTPATIENT)
Dept: CARDIOLOGY | Facility: CLINIC | Age: 52
End: 2022-01-13
Payer: COMMERCIAL

## 2022-01-13 DIAGNOSIS — E78.5 HYPERLIPIDEMIA LDL GOAL <130: ICD-10-CM

## 2022-01-13 DIAGNOSIS — Z95.2 S/P AVR: ICD-10-CM

## 2022-01-13 DIAGNOSIS — I10 HYPERTENSION GOAL BP (BLOOD PRESSURE) < 140/90: ICD-10-CM

## 2022-01-13 RX ORDER — ATORVASTATIN CALCIUM 40 MG/1
40 TABLET, FILM COATED ORAL DAILY
Qty: 90 TABLET | Refills: 0 | Status: SHIPPED | OUTPATIENT
Start: 2022-01-13 | End: 2022-02-14

## 2022-01-13 RX ORDER — METOPROLOL SUCCINATE 50 MG/1
50 TABLET, EXTENDED RELEASE ORAL 2 TIMES DAILY
Qty: 180 TABLET | Refills: 0 | Status: SHIPPED | OUTPATIENT
Start: 2022-01-13 | End: 2022-03-08

## 2022-01-13 RX ORDER — LISINOPRIL 5 MG/1
5 TABLET ORAL DAILY
Qty: 90 TABLET | Refills: 0 | Status: SHIPPED | OUTPATIENT
Start: 2022-01-13 | End: 2022-02-14

## 2022-01-13 RX ORDER — WARFARIN SODIUM 5 MG/1
TABLET ORAL
Qty: 90 TABLET | Refills: 0 | Status: SHIPPED | OUTPATIENT
Start: 2022-01-13 | End: 2022-02-16

## 2022-01-13 RX ORDER — AMLODIPINE BESYLATE 2.5 MG/1
2.5 TABLET ORAL DAILY
Qty: 90 TABLET | Refills: 0 | Status: SHIPPED | OUTPATIENT
Start: 2022-01-13 | End: 2022-03-08

## 2022-01-19 ENCOUNTER — VIRTUAL VISIT (OUTPATIENT)
Dept: CARDIOLOGY | Facility: CLINIC | Age: 52
End: 2022-01-19
Payer: COMMERCIAL

## 2022-01-19 ENCOUNTER — ANTICOAGULATION THERAPY VISIT (OUTPATIENT)
Dept: ANTICOAGULATION | Facility: CLINIC | Age: 52
End: 2022-01-19

## 2022-01-19 ENCOUNTER — LAB (OUTPATIENT)
Dept: LAB | Facility: CLINIC | Age: 52
End: 2022-01-19
Payer: COMMERCIAL

## 2022-01-19 VITALS — HEIGHT: 69 IN | BODY MASS INDEX: 27.56 KG/M2

## 2022-01-19 DIAGNOSIS — Z79.01 LONG TERM CURRENT USE OF ANTICOAGULANTS WITH INR GOAL OF 2.0-3.0: ICD-10-CM

## 2022-01-19 DIAGNOSIS — Z95.2 S/P AVR (AORTIC VALVE REPLACEMENT): Primary | ICD-10-CM

## 2022-01-19 DIAGNOSIS — Z95.2 S/P AVR (AORTIC VALVE REPLACEMENT): ICD-10-CM

## 2022-01-19 DIAGNOSIS — Z95.2 S/P AVR: ICD-10-CM

## 2022-01-19 DIAGNOSIS — Z95.2 H/O MECHANICAL AORTIC VALVE REPLACEMENT: ICD-10-CM

## 2022-01-19 LAB — INR BLD: 2.2 (ref 0.9–1.1)

## 2022-01-19 PROCEDURE — 99213 OFFICE O/P EST LOW 20 MIN: CPT | Mod: 95 | Performed by: INTERNAL MEDICINE

## 2022-01-19 PROCEDURE — 85610 PROTHROMBIN TIME: CPT

## 2022-01-19 PROCEDURE — 36416 COLLJ CAPILLARY BLOOD SPEC: CPT

## 2022-01-19 NOTE — PROGRESS NOTES
ANTICOAGULATION MANAGEMENT     Toney Lemos 51 year old male is on warfarin with therapeutic INR result. (Goal INR 2.0-3.0)    Recent labs: (last 7 days)     01/19/22  0818   INR 2.2*       ASSESSMENT     Source(s): Chart Review I left a detailed voicemail with the orders below. I have also requested a call back if there have been any missed doses, concerns, illness, fever, or if there have been any changes in medications, activity level, or diet        Warfarin doses taken: Warfarin taken as instructed    Diet: No new diet changes identified    New illness, injury, or hospitalization: No    Medication/supplement changes: None noted    Signs or symptoms of bleeding or clotting: No    Previous INR: Therapeutic last 2(+) visits    Additional findings: None     PLAN     Recommended plan for no diet, medication or health factor changes affecting INR     Dosing Instructions: Continue your current warfarin dose with next INR in 6 weeks       Summary  As of 1/19/2022    Full warfarin instructions:  7.5 mg every Thu; 5 mg all other days   Next INR check:  3/2/2022             Detailed voice message left for Toney with dosing instructions and follow up date.     Contact [phone]  to schedule and with any changes, questions or concerns.     Education provided: Please call back if any changes to your diet, medications or how you've been taking warfarin    Plan made per [facility] anticoagulation protocol    Damien Colón RN  Anticoagulation Clinic  1/19/2022    _______________________________________________________________________     Anticoagulation Episode Summary     Current INR goal:  2.0-3.0   TTR:  100.0 % (1 y)   Target end date:  2/11/2021   Send INR reminders to:  MAIA FISHER    Indications    S/P AVR (aortic valve replacement)-25 mm St. Rolf [place]  [Z95.2]  H/O mechanical aortic valve replacement [Z95.2]  Long term current use of anticoagulants with INR goal of 2.0-3.0 [Z79.01]           Comments:            Anticoagulation Care Providers     Provider Role Specialty Phone number    Moni Cartagena PA-C Referring Cardiovascular & Thoracic Surgery [phone]    Yan Lucas MD Referring Family Medicine [phone]

## 2022-01-19 NOTE — LETTER
1/19/2022    Yan Lucas MD  [address] [place] [address]    RE: Toney Perezbc       Dear Colleague,     I had the pleasure of seeing Toney Lemos in the [facility].  Mr Lemos is a very pleasant 51-year-old gentleman with history of bicuspid aortic valve severe aortic regurgitation for which he underwent 27 mm Saint Rolf [place] mechanical aortic valve replacement in February 2020 with 3 AVR coronary angiogram showing normal coronary arteries, other comorbid's of hypertension, dyslipidemia.  Today's visit is a routine follow-up visit which has been converted into a virtual video visit.  Patient tells me overall healthwise he feels well.  He is fairly active at work and denies any exertional symptoms like chest discomfort shortness of breath dizziness presyncope or syncope.  His blood pressure is overall well controlled on current medications of amlodipine lisinopril and metoprolol.  He is tolerating combination of Coumadin and baby aspirin quite well without any bleeding issues.  He had an echocardiogram in August 2021 that showed normal LV function and normal functioning mechanical aortic valve with normal gradients across the valve.  Ascending aorta aortic arch and descending aorta are normal size.    On examination  General patient appears comfortable  Neck visible portion no lumps noted  Respiratory system respiratory rate normal, no audible wheezing  Neurological alert, oriented    Assessment plan  A very pleasant 51-year-old gentleman with history of bicuspid aortic valve with severe aortic regurgitation status post mechanical aortic valve replacement about 2 years ago in 2020 with pre-AVR coronary gram showing normal coronary arteries.  Overall cardiac status wise by history he is doing well without any symptoms concerning for angina or congestive heart failure.  He is tolerating combination of aspirin and Coumadin quite well without any bleeding issues.  Echocardiogram in  August 2021 showed normal functioning mechanical aortic valve.  At this time if he continues to feel stable cardiac status wise we can see him back in 1 year.  We also talk about the rational of combination of aspirin and Coumadin and also importance of predental work-up antibiotic prophylaxis.      Evin Mederos MD   [facility]      cc:   Sandra Mejia, APRN CNP  [address]  [place] [address]

## 2022-01-19 NOTE — PROGRESS NOTES
"The patient has been notified of following:     \"This video visit will be conducted via a call between you and your physician/provider. We have found that certain health care needs can be provided without the need for an in-person physical exam.  This service lets us provide the care you need with a video conversation.  If a prescription is necessary we can send it directly to your pharmacy.  If lab work is needed we can place an order for that and you can then stop by our lab to have the test done at a later time.    Video visits are billed at different rates depending on your insurance coverage.  Please reach out to your insurance provider with any questions.    If during the course of the call the physician/provider feels a video visit is not appropriate, you will not be charged for this service.\"    Patient has given verbal consent for Video visit? Yes    How would you like to obtain your AVS? Yandel    Patient would like the video invitation sent by:   [phone]      Will anyone else be joining your video visit? No      Blood pressure: has not monitored lately and is at job site right now  Pulse: NA  Weight: 180 #      Video-Visit Details    Type of service:  Video Visit    Video call duration 12 minutes  Originating Location (pt. Location): Home    Distant Location (provider location):  [facility]     Platform used for Video Visit: Doximalicia     Mr Lemos is a very pleasant 51-year-old gentleman with history of bicuspid aortic valve severe aortic regurgitation for which he underwent 27 mm Saint Rolf [place] mechanical aortic valve replacement in February 2020 with 3 AVR coronary angiogram showing normal coronary arteries, other comorbid's of hypertension, dyslipidemia.  Today's visit is a routine follow-up visit which has been converted into a virtual video visit.  Patient tells me overall healthwise he feels well.  He is fairly active at work and denies any " exertional symptoms like chest discomfort shortness of breath dizziness presyncope or syncope.  His blood pressure is overall well controlled on current medications of amlodipine lisinopril and metoprolol.  He is tolerating combination of Coumadin and baby aspirin quite well without any bleeding issues.  He had an echocardiogram in August 2021 that showed normal LV function and normal functioning mechanical aortic valve with normal gradients across the valve.  Ascending aorta aortic arch and descending aorta are normal size.    On examination  General patient appears comfortable  Neck visible portion no lumps noted  Respiratory system respiratory rate normal, no audible wheezing  Neurological alert, oriented    Assessment plan  A very pleasant 51-year-old gentleman with history of bicuspid aortic valve with severe aortic regurgitation status post mechanical aortic valve replacement about 2 years ago in 2020 with pre-AVR coronary gram showing normal coronary arteries.  Overall cardiac status wise by history he is doing well without any symptoms concerning for angina or congestive heart failure.  He is tolerating combination of aspirin and Coumadin quite well without any bleeding issues.  Echocardiogram in August 2021 showed normal functioning mechanical aortic valve.  At this time if he continues to feel stable cardiac status wise we can see him back in 1 year.  We also talk about the rational of combination of aspirin and Coumadin and also importance of predental work-up antibiotic prophylaxis.    Evin Mederos MD

## 2022-02-14 DIAGNOSIS — E78.5 HYPERLIPIDEMIA LDL GOAL <130: ICD-10-CM

## 2022-02-14 DIAGNOSIS — Z95.2 S/P AVR: ICD-10-CM

## 2022-02-14 RX ORDER — LISINOPRIL 5 MG/1
5 TABLET ORAL DAILY
Qty: 90 TABLET | Refills: 3 | Status: SHIPPED | OUTPATIENT
Start: 2022-02-14 | End: 2023-04-11

## 2022-02-14 RX ORDER — ATORVASTATIN CALCIUM 40 MG/1
40 TABLET, FILM COATED ORAL DAILY
Qty: 90 TABLET | Refills: 3 | Status: SHIPPED | OUTPATIENT
Start: 2022-02-14 | End: 2023-04-11

## 2022-02-15 NOTE — PROGRESS NOTES
CAGE Assessment Entered On:  7/21/2021 1:41 PM CDT    Performed On:  7/21/2021 1:41 PM CDT by Diana Ayala CMA               Assessment   Have you ever felt you should cut down on your drinking :   No   Have people annoyed you by criticizing your drinking :   No   Have you ever felt bad or guilty about your drinking :   No   Have you ever taken a drink first thing in the morning to steady your nerves or get rid of a hangover (Eye-opener) :   No   CAGE Score :   0    Diana Ayala CMA - 7/21/2021 1:41 PM CDT   Pt's pathology report sent to Dr. Mederos for unclear reasons. Looks like Dr. Lucas is overseeing this issue.     Was unable to route to Dr. Lucas as it said the user is not in the system.     I called Preet to discuss. No answer. Left detailed VM letting him know that pathology results are back and that I recommend calling Dr. Lucas's office to review. I did not tell him the results in the VM.     I left cardiology # for callback if he has any questions.     KECIA ElliottN, RN, PHN, HNB-BC   10/8/2021 at 10:21 AM

## 2022-02-16 ENCOUNTER — TELEPHONE (OUTPATIENT)
Dept: FAMILY MEDICINE | Facility: CLINIC | Age: 52
End: 2022-02-16
Payer: COMMERCIAL

## 2022-02-16 DIAGNOSIS — Z95.2 S/P AVR: ICD-10-CM

## 2022-02-16 RX ORDER — WARFARIN SODIUM 5 MG/1
TABLET ORAL
Qty: 90 TABLET | Refills: 0 | Status: SHIPPED | OUTPATIENT
Start: 2022-02-16 | End: 2022-04-04

## 2022-02-16 NOTE — TELEPHONE ENCOUNTER
Fax received from [facility] for a refill of warfarin.    KECIA PettyN, RN  Anticoagulation Clinic

## 2022-02-22 ENCOUNTER — MYC MEDICAL ADVICE (OUTPATIENT)
Dept: CARDIOLOGY | Facility: CLINIC | Age: 52
End: 2022-02-22
Payer: COMMERCIAL

## 2022-02-22 ENCOUNTER — CARE COORDINATION (OUTPATIENT)
Dept: CARDIOLOGY | Facility: CLINIC | Age: 52
End: 2022-02-22
Payer: COMMERCIAL

## 2022-02-22 DIAGNOSIS — E78.5 HYPERLIPIDEMIA LDL GOAL <130: ICD-10-CM

## 2022-02-22 DIAGNOSIS — Z95.2 H/O MECHANICAL AORTIC VALVE REPLACEMENT: ICD-10-CM

## 2022-02-22 DIAGNOSIS — Z95.2 S/P AVR: ICD-10-CM

## 2022-02-22 RX ORDER — AMOXICILLIN 500 MG/1
CAPSULE ORAL
Qty: 4 CAPSULE | Refills: 1 | COMMUNITY
Start: 2022-02-22 | End: 2022-02-24

## 2022-02-22 RX ORDER — AMOXICILLIN 500 MG/1
CAPSULE ORAL
Qty: 2 CAPSULE | Refills: 3 | Status: SHIPPED | OUTPATIENT
Start: 2022-02-22 | End: 2022-02-22

## 2022-02-22 RX ORDER — AMOXICILLIN 500 MG/1
500 CAPSULE ORAL ONCE
Status: DISCONTINUED | OUTPATIENT
Start: 2022-02-22 | End: 2022-02-22 | Stop reason: CLARIF

## 2022-02-22 NOTE — PROGRESS NOTES
Received MyChart request from patient, he is having dental procedure and requested Amoxi Rx be sent to [facility] in [place].  This has been completed.  Zecterhart response sent to Preet.

## 2022-02-22 NOTE — PROGRESS NOTES
1410 Received call from Dr. Mederos.  Amoxicllin should be 2Gm, and not 1Gm as previously written.  New order entered for this 2Gm dose.  Prescription sent to [facility] in [place].  Call to patient, had to M on Preet's phone.

## 2022-02-24 RX ORDER — AMOXICILLIN 500 MG/1
CAPSULE ORAL
Qty: 4 CAPSULE | Refills: 1 | Status: SHIPPED | OUTPATIENT
Start: 2022-02-24 | End: 2023-03-24

## 2022-03-01 ENCOUNTER — TELEPHONE (OUTPATIENT)
Dept: ANTICOAGULATION | Facility: CLINIC | Age: 52
End: 2022-03-01
Payer: COMMERCIAL

## 2022-03-01 DIAGNOSIS — Z95.2 S/P AVR (AORTIC VALVE REPLACEMENT): Primary | ICD-10-CM

## 2022-03-01 DIAGNOSIS — Z95.2 H/O MECHANICAL AORTIC VALVE REPLACEMENT: ICD-10-CM

## 2022-03-01 NOTE — TELEPHONE ENCOUNTER
Dr. Lucas,   Pt's INR referral is up for renewal.   Per the last INR referral, the target end date according to that referral was  2/11/2021.   Please advise. Would you like pt to stop Coumadin? Or continue indefinitely?    Cathleen Pacheco, LEDA            ANTICOAGULATION MANAGEMENT      Toney LEA Canelo due for annual renewal of referral to anticoagulation monitoring. Order pended for your review and signature.      ANTICOAGULATION SUMMARY      Warfarin indication(s)     Heart Valve Replacement    Heart valve present?  Mechanical  AVR-Bileaflet       Current goal range   INR: 2.0-3.0     Goal appropriate for indication? Yes, INR 2-3 appropriate for hx of DVT, PE, hypercoagulable state, Afib, LVAD, or bileaflet AVR without risk factors     Current duration of therapy lifetime - indefinitely    Time in Therapeutic Range (TTR)  (Goal > 60%) 100%       Office visit with referring provider's group within last year yes on 10/01/21       Cathleen Pacheco RN

## 2022-03-03 ENCOUNTER — TELEPHONE (OUTPATIENT)
Dept: ANTICOAGULATION | Facility: CLINIC | Age: 52
End: 2022-03-03
Payer: COMMERCIAL

## 2022-03-03 NOTE — TELEPHONE ENCOUNTER
ANTICOAGULATION     Toney Lemos is overdue for INR check.      Left message for patient to call and schedule lab appointment as soon as possible. If returning call, please schedule.     Milana Hernández RN

## 2022-03-04 DIAGNOSIS — Z95.2 H/O MECHANICAL AORTIC VALVE REPLACEMENT: ICD-10-CM

## 2022-03-04 DIAGNOSIS — Z95.2 S/P AVR (AORTIC VALVE REPLACEMENT): Primary | ICD-10-CM

## 2022-03-04 DIAGNOSIS — Z79.01 LONG TERM CURRENT USE OF ANTICOAGULANTS WITH INR GOAL OF 2.0-3.0: ICD-10-CM

## 2022-03-08 DIAGNOSIS — Z95.2 S/P AVR: ICD-10-CM

## 2022-03-08 DIAGNOSIS — I10 HYPERTENSION GOAL BP (BLOOD PRESSURE) < 140/90: ICD-10-CM

## 2022-03-08 RX ORDER — AMLODIPINE BESYLATE 2.5 MG/1
2.5 TABLET ORAL DAILY
Qty: 90 TABLET | Refills: 3 | Status: SHIPPED | OUTPATIENT
Start: 2022-03-08 | End: 2023-03-06

## 2022-03-08 RX ORDER — METOPROLOL SUCCINATE 50 MG/1
50 TABLET, EXTENDED RELEASE ORAL 2 TIMES DAILY
Qty: 180 TABLET | Refills: 3 | Status: SHIPPED | OUTPATIENT
Start: 2022-03-08 | End: 2023-03-06

## 2022-03-10 ENCOUNTER — TELEPHONE (OUTPATIENT)
Dept: ANTICOAGULATION | Facility: CLINIC | Age: 52
End: 2022-03-10
Payer: COMMERCIAL

## 2022-03-17 ENCOUNTER — ANTICOAGULATION THERAPY VISIT (OUTPATIENT)
Dept: ANTICOAGULATION | Facility: CLINIC | Age: 52
End: 2022-03-17

## 2022-03-17 ENCOUNTER — LAB (OUTPATIENT)
Dept: LAB | Facility: CLINIC | Age: 52
End: 2022-03-17
Payer: COMMERCIAL

## 2022-03-17 DIAGNOSIS — Z79.01 LONG TERM CURRENT USE OF ANTICOAGULANTS WITH INR GOAL OF 2.0-3.0: ICD-10-CM

## 2022-03-17 DIAGNOSIS — Z95.2 H/O MECHANICAL AORTIC VALVE REPLACEMENT: ICD-10-CM

## 2022-03-17 DIAGNOSIS — Z95.2 S/P AVR (AORTIC VALVE REPLACEMENT): ICD-10-CM

## 2022-03-17 DIAGNOSIS — Z95.2 S/P AVR (AORTIC VALVE REPLACEMENT): Primary | ICD-10-CM

## 2022-03-17 LAB — INR BLD: 3.9 (ref 0.9–1.1)

## 2022-03-17 PROCEDURE — 36416 COLLJ CAPILLARY BLOOD SPEC: CPT

## 2022-03-17 PROCEDURE — 85610 PROTHROMBIN TIME: CPT

## 2022-03-17 NOTE — PROGRESS NOTES
ANTICOAGULATION MANAGEMENT     Toney Lemos 51 year old male is on warfarin with supratherapeutic INR result. (Goal INR 2.0-3.0)    Recent labs: (last 7 days)     03/17/22  0747   INR 3.9*       ASSESSMENT       Source(s): Chart Review and Patient/Caregiver Call       Warfarin doses taken: Warfarin taken as instructed    Diet: No new diet changes identified    New illness, injury, or hospitalization: No    Medication/supplement changes: None noted    Signs or symptoms of bleeding or clotting: No    Previous INR: Therapeutic last 2(+) visits    Additional findings: None       PLAN     Recommended plan for no diet, medication or health factor changes affecting INR     Dosing Instructions: Hold dose then continue your current warfarin dose with next INR in 2 weeks       Summary  As of 3/17/2022    Full warfarin instructions:  3/17: Hold; Otherwise 7.5 mg every Thu; 5 mg all other days   Next INR check:  3/31/2022             Telephone call with Toney who verbalizes understanding and agrees to plan and who agrees to plan and repeated back plan correctly    Lab visit scheduled    Education provided: Please call back if any changes to your diet, medications or how you've been taking warfarin, Goal range and significance of current result and Importance of therapeutic range    Plan made per [facility] anticoagulation protocol    Damien Colón RN  Anticoagulation Clinic  3/17/2022    _______________________________________________________________________     Anticoagulation Episode Summary     Current INR goal:  2.0-3.0   TTR:  91.7 % (1 y)   Target end date:  Indefinite   Send INR reminders to:  MAIA FISHER    Indications    S/P AVR (aortic valve replacement)-25 mm St. Rolf [place]  [Z95.2]  H/O mechanical aortic valve replacement [Z95.2]  Long term current use of anticoagulants with INR goal of 2.0-3.0 [Z79.01]           Comments:           Anticoagulation Care Providers     Provider Role Specialty Phone number     Moni Cartagena PA-C Referring Cardiovascular & Thoracic Surgery [phone]    Yan Lucas MD Referring [facility] Medicine [phone]

## 2022-03-31 ENCOUNTER — TELEPHONE (OUTPATIENT)
Dept: ANTICOAGULATION | Facility: CLINIC | Age: 52
End: 2022-03-31

## 2022-03-31 ENCOUNTER — ANTICOAGULATION THERAPY VISIT (OUTPATIENT)
Dept: ANTICOAGULATION | Facility: CLINIC | Age: 52
End: 2022-03-31

## 2022-03-31 ENCOUNTER — LAB (OUTPATIENT)
Dept: LAB | Facility: CLINIC | Age: 52
End: 2022-03-31
Payer: COMMERCIAL

## 2022-03-31 DIAGNOSIS — Z79.01 LONG TERM CURRENT USE OF ANTICOAGULANTS WITH INR GOAL OF 2.0-3.0: ICD-10-CM

## 2022-03-31 DIAGNOSIS — Z95.2 S/P AVR (AORTIC VALVE REPLACEMENT): Primary | ICD-10-CM

## 2022-03-31 DIAGNOSIS — Z95.2 H/O MECHANICAL AORTIC VALVE REPLACEMENT: ICD-10-CM

## 2022-03-31 DIAGNOSIS — Z95.2 S/P AVR (AORTIC VALVE REPLACEMENT): ICD-10-CM

## 2022-03-31 LAB — INR BLD: 2.6 (ref 0.9–1.1)

## 2022-03-31 PROCEDURE — 36416 COLLJ CAPILLARY BLOOD SPEC: CPT

## 2022-03-31 PROCEDURE — 85610 PROTHROMBIN TIME: CPT

## 2022-03-31 NOTE — PROGRESS NOTES
ANTICOAGULATION MANAGEMENT     Toney Lemos 51 year old male is on warfarin with therapeutic INR result. (Goal INR 2.0-3.0)    Recent labs: (last 7 days)     03/31/22  0752   INR 2.6*       ASSESSMENT       Source(s): Chart Review and Patient/Caregiver Call       Warfarin doses taken: Warfarin taken as instructed    Diet: No new diet changes identified    New illness, injury, or hospitalization: No    Medication/supplement changes: None noted    Signs or symptoms of bleeding or clotting: No    Previous INR: Supratherapeutic    Additional findings: see TE regarding home monitor request        PLAN     Recommended plan for no diet, medication or health factor changes affecting INR     Dosing Instructions: continue your current warfarin dose with next INR in 4 weeks       Summary  As of 3/31/2022    Full warfarin instructions:  7.5 mg every Thu; 5 mg all other days   Next INR check:  4/28/2022             Telephone call with Preet who verbalizes understanding and agrees to plan    Lab visit scheduled    Education provided: Please call back if any changes to your diet, medications or how you've been taking warfarin    Plan made per [facility] anticoagulation protocol    Cathleen Pacheco, RN  Anticoagulation Clinic  3/31/2022    _______________________________________________________________________     Anticoagulation Episode Summary     Current INR goal:  2.0-3.0   TTR:  89.1 % (1 y)   Target end date:  Indefinite   Send INR reminders to:  MAIA FISHER    Indications    S/P AVR (aortic valve replacement)-25 mm St. Rolf [place]  [Z95.2]  H/O mechanical aortic valve replacement [Z95.2]  Long term current use of anticoagulants with INR goal of 2.0-3.0 [Z79.01]           Comments:           Anticoagulation Care Providers     Provider Role Specialty Phone number    Moni Cartagena PA-C Referring Cardiovascular & Thoracic Surgery [phone]    Yan Lucas MD Referring [facility] Medicine [phone]

## 2022-03-31 NOTE — TELEPHONE ENCOUNTER
Anticoagulation Management    Discussed INR home monitoring program with Toney Lemos reviewing:      Elibigility requirements: >= 3 months of anticoagulation therapy, indication for chronic anticoagulation and order from provider    Required testing frequency (q1-2 weeks)    Home meters, testing supplies, meter training, and reporting of INR results done through an outside company. Patient would be contacted by home monitoring company to review insurance coverage with home monitoring company prior to enrolling.    [facility] would continue to receive and manage INR results.    Home monitoring application may take several weeks and must continue to follow up with recommended INR monitoring in clinic until receives monitor and training completed.     Home monitoring terms reviewed with patient      Patient agrees to frequency of testing as directed by referring provider ( weekly or biweekly) Yes    Testing to be performed during business hours of [facility] Yes    Patient agrees they have the skill (or a designated caregiver) necessary to perform the self test Yes    Patient agrees to report all INR results to INR home monitoring company Yes    Patient agrees to have additional INR test in clinic if a home result is critical Yes    Patient agrees to schedule an INR test at a [facility] clinic yearly for technique observation and quality check of INR results with their home meter Yes    Patient agrees to use a [facility] approved service provider and device for home monitoring Yes    [facility]    Referring provider: Dr Yan Lucas    Referring providers Clinic Fax number [phone]    Toney Lemos is interested home INR monitoring and requests order be submitted.

## 2022-04-01 ENCOUNTER — MYC MEDICAL ADVICE (OUTPATIENT)
Dept: FAMILY MEDICINE | Facility: CLINIC | Age: 52
End: 2022-04-01
Payer: COMMERCIAL

## 2022-04-01 DIAGNOSIS — Z95.2 S/P AVR: ICD-10-CM

## 2022-04-04 RX ORDER — WARFARIN SODIUM 5 MG/1
TABLET ORAL
Qty: 90 TABLET | Refills: 0 | Status: SHIPPED | OUTPATIENT
Start: 2022-04-04 | End: 2022-06-23

## 2022-04-28 ENCOUNTER — ANTICOAGULATION THERAPY VISIT (OUTPATIENT)
Dept: ANTICOAGULATION | Facility: CLINIC | Age: 52
End: 2022-04-28

## 2022-04-28 ENCOUNTER — LAB (OUTPATIENT)
Dept: LAB | Facility: CLINIC | Age: 52
End: 2022-04-28
Payer: COMMERCIAL

## 2022-04-28 DIAGNOSIS — Z95.2 S/P AVR (AORTIC VALVE REPLACEMENT): ICD-10-CM

## 2022-04-28 DIAGNOSIS — Z95.2 H/O MECHANICAL AORTIC VALVE REPLACEMENT: ICD-10-CM

## 2022-04-28 DIAGNOSIS — Z95.2 S/P AVR (AORTIC VALVE REPLACEMENT): Primary | ICD-10-CM

## 2022-04-28 DIAGNOSIS — Z79.01 LONG TERM CURRENT USE OF ANTICOAGULANTS WITH INR GOAL OF 2.0-3.0: ICD-10-CM

## 2022-04-28 LAB — INR BLD: 1.9 (ref 0.9–1.1)

## 2022-04-28 PROCEDURE — 85610 PROTHROMBIN TIME: CPT

## 2022-04-28 PROCEDURE — 36416 COLLJ CAPILLARY BLOOD SPEC: CPT

## 2022-04-28 NOTE — PROGRESS NOTES
ANTICOAGULATION MANAGEMENT     Toney Lemos 52 year old male is on warfarin with subtherapeutic INR result. (Goal INR 2.0-3.0)    Recent labs: (last 7 days)     04/28/22  0745   INR 1.9*       ASSESSMENT       Source(s): Chart Review and Patient/Caregiver Call       Warfarin doses taken: Warfarin taken as instructed    Diet: Increased greens/vitamin K in diet; plans to resume previous intake    New illness, injury, or hospitalization: No    Medication/supplement changes: None noted    Signs or symptoms of bleeding or clotting: No    Previous INR: Therapeutic last visit; previously outside of goal range    Additional findings: None       PLAN     Recommended plan for temporary change(s) affecting INR     Dosing Instructions: continue your current warfarin dose with next INR in 2 weeks       Summary  As of 4/28/2022    Full warfarin instructions:  7.5 mg every Thu; 5 mg all other days   Next INR check:  5/12/2022             Telephone call with Preet who verbalizes understanding and agrees to plan    Patient offered & declined to schedule next visit    Education provided: Please call back if any changes to your diet, medications or how you've been taking warfarin, Goal range and significance of current result and Monitoring for clotting signs and symptoms    Plan made per [facility] anticoagulation protocol    Cathleen Pacheco, RN  Anticoagulation Clinic  4/28/2022    _______________________________________________________________________     Anticoagulation Episode Summary     Current INR goal:  2.0-3.0   TTR:  88.0 % (1 y)   Target end date:  Indefinite   Send INR reminders to:  MAIA FISHER    Indications    S/P AVR (aortic valve replacement)-25 mm St. Rolf [place]  [Z95.2]  H/O mechanical aortic valve replacement [Z95.2]  Long term current use of anticoagulants with INR goal of 2.0-3.0 [Z79.01]           Comments:           Anticoagulation Care Providers     Provider Role Specialty Phone number    Moni Cartagena  [facility]EL[facility] [place] Referring Cardiovascular & Thoracic Surgery [phone]    LanceYan burgess MD Referring [facility] [phone]

## 2022-05-19 ENCOUNTER — TELEPHONE (OUTPATIENT)
Dept: ANTICOAGULATION | Facility: CLINIC | Age: 52
End: 2022-05-19
Payer: COMMERCIAL

## 2022-05-26 ENCOUNTER — TELEPHONE (OUTPATIENT)
Dept: ANTICOAGULATION | Facility: CLINIC | Age: 52
End: 2022-05-26
Payer: COMMERCIAL

## 2022-06-03 ENCOUNTER — MYC MEDICAL ADVICE (OUTPATIENT)
Dept: FAMILY MEDICINE | Facility: CLINIC | Age: 52
End: 2022-06-03
Payer: COMMERCIAL

## 2022-06-07 NOTE — TELEPHONE ENCOUNTER
Patient reports tick bite is getting worse. Encouraged him to go to urgent care. Please view pictures.     Jason Kuhn RN

## 2022-06-08 ENCOUNTER — OFFICE VISIT (OUTPATIENT)
Dept: FAMILY MEDICINE | Facility: CLINIC | Age: 52
End: 2022-06-08

## 2022-06-08 ENCOUNTER — LAB (OUTPATIENT)
Dept: LAB | Facility: CLINIC | Age: 52
End: 2022-06-08
Payer: COMMERCIAL

## 2022-06-08 ENCOUNTER — ANTICOAGULATION THERAPY VISIT (OUTPATIENT)
Dept: ANTICOAGULATION | Facility: CLINIC | Age: 52
End: 2022-06-08

## 2022-06-08 VITALS
SYSTOLIC BLOOD PRESSURE: 112 MMHG | WEIGHT: 193.5 LBS | TEMPERATURE: 97.6 F | RESPIRATION RATE: 16 BRPM | HEART RATE: 82 BPM | DIASTOLIC BLOOD PRESSURE: 70 MMHG | BODY MASS INDEX: 28.83 KG/M2 | OXYGEN SATURATION: 97 %

## 2022-06-08 DIAGNOSIS — Z95.2 S/P AVR (AORTIC VALVE REPLACEMENT): ICD-10-CM

## 2022-06-08 DIAGNOSIS — Z95.2 H/O MECHANICAL AORTIC VALVE REPLACEMENT: ICD-10-CM
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
## 2022-06-08 DIAGNOSIS — Z79.01 LONG TERM CURRENT USE OF ANTICOAGULANTS WITH INR GOAL OF 2.0-3.0: ICD-10-CM

## 2022-06-08 DIAGNOSIS — S00.86XA TICK BITE OF OTHER PART OF HEAD, INITIAL ENCOUNTER: Primary | ICD-10-CM

## 2022-06-08 DIAGNOSIS — Z95.2 S/P AVR (AORTIC VALVE REPLACEMENT): Primary | ICD-10-CM

## 2022-06-08 LAB — INR BLD: 2.3 (ref 0.9–1.1)

## 2022-06-08 PROCEDURE — 99213 OFFICE O/P EST LOW 20 MIN: CPT | Performed by: FAMILY MEDICINE

## 2022-06-08 PROCEDURE — 85610 PROTHROMBIN TIME: CPT

## 2022-06-08 PROCEDURE — 36416 COLLJ CAPILLARY BLOOD SPEC: CPT

## 2022-06-08 NOTE — PROGRESS NOTES
Assessment & Plan       ICD-10-CM    1. Tick bite of other part of head, initial encounter  S00.86XA amoxicillin-clavulanate (AUGMENTIN) 875-125 MG tablet    W57.XXXA         Infected tick bite.  Continue with warm compresses.  If worsening start antibiotics      No follow-ups on file.    Yan Lucas MD  [facility] PHILLIP Ziegler is a 52 year old male who presents to clinic today for the following health issues     HPI       Tick bite   Bite on May 25th or May 26th  Area of bite on chin is red and swollen, warm to touch  Mild pain going up the jaw line  Itchy.       Review of Systems         Objective    /70 (BP Location: Right arm, Patient Position: Sitting, Cuff Size: Adult Regular)   Pulse 82   Temp 97.6  F (36.4  C) (Temporal)   Resp 16   Wt 87.8 kg (193 lb 8 oz)   SpO2 97%   BMI 28.83 kg/m       Physical Exam   On the underside of his right jawline there is a 1 cm area of induration and erythema.  No fluctuance.  No purulent drainage.

## 2022-06-08 NOTE — PROGRESS NOTES
ANTICOAGULATION MANAGEMENT     Toney Lemos 52 year old male is on warfarin with therapeutic INR result. (Goal INR 2.0-3.0)    Recent labs: (last 7 days)     06/08/22  1404   INR 2.3*       ASSESSMENT       Source(s): Chart Review    Previous INR was Subtherapeutic    Medication, diet, health changes since last INR Augmentin x7 days (6/8-6/15) -can increase INR           PLAN     Recommended plan for temporary change(s) affecting INR     Dosing Instructions: continue your current warfarin dose with next INR in 5 days       Summary  As of 6/8/2022    Full warfarin instructions:  7.5 mg every Thu; 5 mg all other days   Next INR check:  6/13/2022             Detailed voice message left for Preet with dosing instructions and follow up date.     Contact [phone]  to schedule and with any changes, questions or concerns.     Education provided: Please call back if any changes to your diet, medications or how you've been taking warfarin, Goal range and significance of current result, Importance of following up at instructed interval, Potential interaction between warfarin and Augmentin  and Monitoring for bleeding signs and symptoms    Plan made per [facility] anticoagulation protocol    Cathleen Pacheco, RN  Anticoagulation Clinic  6/8/2022    _______________________________________________________________________     Anticoagulation Episode Summary     Current INR goal:  2.0-3.0   TTR:  85.2 % (1 y)   Target end date:  Indefinite   Send INR reminders to:  MAIA FISHER    Indications    S/P AVR (aortic valve replacement)-25 mm St. Rolf [place]  [Z95.2]  H/O mechanical aortic valve replacement [Z95.2]  Long term current use of anticoagulants with INR goal of 2.0-3.0 [Z79.01]           Comments:           Anticoagulation Care Providers     Provider Role Specialty Phone number    Moni Cartagena PA-C Referring Cardiovascular & Thoracic Surgery [phone]    Yan Lucas MD Referring Family Medicine  [phone]

## 2022-06-14 ENCOUNTER — TELEPHONE (OUTPATIENT)
Dept: ANTICOAGULATION | Facility: CLINIC | Age: 52
End: 2022-06-14
Payer: COMMERCIAL

## 2022-06-17 ENCOUNTER — TELEPHONE (OUTPATIENT)
Dept: FAMILY MEDICINE | Facility: CLINIC | Age: 52
End: 2022-06-17
Payer: COMMERCIAL

## 2022-06-17 NOTE — TELEPHONE ENCOUNTER
Reason for Call:  Other call back    Detailed comments: Patient was told to take a medication and to get a INR on 6/6/22.     Patient has not taken the medication so they have also not completed the INR. They have questions for a nurse regarding when they should complete a INR going forward.     Phone Number Patient can be reached at: Home number on file [phone] (home) or Cell number on file:    Telephone Information:   Mobile [phone]       Best Time: N/a    Can we leave a detailed message on this number? YES    Call taken on 6/17/2022 at 12:35 PM by Lolis Scott

## 2022-06-17 NOTE — TELEPHONE ENCOUNTER
Spoke with Preet and he did not start the Augmentin as discussed at his last INR appt. We have set up his next INR check for Tuesday 7/5/22. Patient verbalized understanding and agrees with plan of care. Pt had no further questions or concerns at this time.     Damien Colón, RN, BSN  [facility] Anticoagulation Team

## 2022-06-23 DIAGNOSIS — Z95.2 S/P AVR: ICD-10-CM

## 2022-06-23 RX ORDER — WARFARIN SODIUM 5 MG/1
TABLET ORAL
Qty: 16 TABLET | Refills: 0 | Status: SHIPPED | OUTPATIENT
Start: 2022-06-23 | End: 2022-07-08

## 2022-06-23 NOTE — TELEPHONE ENCOUNTER
ANTICOAGULATION MANAGEMENT:  Medication Refill    Anticoagulation Summary  As of 6/8/2022    Warfarin maintenance plan:  7.5 mg (5 mg x 1.5) every Thu; 5 mg (5 mg x 1) all other days   Next INR check:  6/13/2022   Target end date:  Indefinite    Indications    S/P AVR (aortic valve replacement)-25 mm St. Rolf [place]  [Z95.2]  H/O mechanical aortic valve replacement [Z95.2]  Long term current use of anticoagulants with INR goal of 2.0-3.0 [Z79.01]             Anticoagulation Care Providers     Provider Role Specialty Phone number    Moni Cartagena PA-C Referring Cardiovascular & Thoracic Surgery [phone]    [facility] Referring Family Medicine [phone]          Visit with referring provider/group within last year: Yes    [facility] referral signed within last year: Yes    Toney meets all criteria for refill (current [facility] referral, office visit with referring provider/group in last year, lab monitoring up to date or not exceeding 2 weeks overdue). Rx instructions and quantity match patient's current dosing plan. 14 day supply with 0 refills granted per [facility] protocol as pt is overdue for an INR    Damien Colón RN  Anticoagulation Clinic

## 2022-06-29 ENCOUNTER — MYC MEDICAL ADVICE (OUTPATIENT)
Dept: FAMILY MEDICINE | Facility: CLINIC | Age: 52
End: 2022-06-29

## 2022-06-29 ENCOUNTER — OFFICE VISIT (OUTPATIENT)
Dept: FAMILY MEDICINE | Facility: CLINIC | Age: 52
End: 2022-06-29
Payer: COMMERCIAL

## 2022-06-29 VITALS
WEIGHT: 188 LBS | DIASTOLIC BLOOD PRESSURE: 80 MMHG | RESPIRATION RATE: 16 BRPM | BODY MASS INDEX: 28.01 KG/M2 | SYSTOLIC BLOOD PRESSURE: 126 MMHG | TEMPERATURE: 97.8 F | HEART RATE: 88 BPM

## 2022-06-29 DIAGNOSIS — M54.16 LUMBAR RADICULOPATHY: ICD-10-CM

## 2022-06-29 DIAGNOSIS — Z12.11 SCREEN FOR COLON CANCER: Primary | ICD-10-CM

## 2022-06-29 PROCEDURE — 99214 OFFICE O/P EST MOD 30 MIN: CPT | Performed by: OBSTETRICS & GYNECOLOGY

## 2022-06-29 RX ORDER — OXYCODONE AND ACETAMINOPHEN 5; 325 MG/1; MG/1
1 TABLET ORAL EVERY 6 HOURS PRN
Qty: 12 TABLET | Refills: 0 | Status: SHIPPED | OUTPATIENT
Start: 2022-06-29 | End: 2022-07-02

## 2022-06-29 ASSESSMENT — PAIN SCALES - GENERAL: PAINLEVEL: EXTREME PAIN (9)

## 2022-06-29 NOTE — LETTER
[address] [address]  Phone: [phone]  Fax: [phone]    June 30, 2022        Toney Lemos  [address] [facility] [address]          To whom it may concern:    RE: Toney Lemos    He was seen in the clinic for an evaluation on June 29, 2022.  I have advised him to take off Thursday and Friday, June 30 and July 1.  He may return to work as of July 5, 2022.    Please contact me for questions or concerns.      Sincerely,        Armando Mckeon MD

## 2022-06-29 NOTE — PROGRESS NOTES
ASSESSMENT/PLAN:                                                        1.  Left-sided radiculopathy with lumbosacral back pain.  Is been going on for 6 to 8 weeks.  He has been seeing chiropractor and it is not helping recently.  I would suggest he get MRI scan but I need to investigate whether or not his aortic valve replacement is compatible with the MRI scanner.  I will have to do some research on that.  See Rx for Percocet.  He should use it very sparingly.  He should rely on Tylenol mostly for his pain management and remember that there is Tylenol in the Percocet as well.    2.  I do not believe that I can give him steroids because he is on an anticoagulant and prednisone can increase the risk of thrombosis.    3.  I told him I would call him back and schedule the MRI if I find out that his valve replacement is compatible.                                                                      SUBJECTIVE:                                                      He has a history of low back pain.  Over the past 2 months he has also had some left-sided leg pain and has been seeing the chiropractor frequently for adjustments.  He describes the pain as lower lumbar area bilaterally but especially on the left and it radiates around the left hip and into the left thigh.  Sometimes gets pain all the way into the left lower leg.  No numbness in the leg.  The leg does feel heavy sometimes.  No loss of motor function.  No change in bowel or bladder control etc.        Patient Active Problem List   Diagnosis     Hypertension goal BP (blood pressure) < 140/90     Encounter for long-term current use of medication     FH: early coronary artery disease- bro 30s     S/P AVR (aortic valve replacement)-25 mm [facility] [place]      H/O mechanical aortic valve replacement     Long term current use of anticoagulants with INR goal of 2.0-3.0     SCC (squamous cell carcinoma), arm, left- hand, + deep margin, watching     Past Surgical  History:   Procedure Laterality Date     CV CORONARY ANGIOGRAM N/A 1/9/2020    Procedure: Coronary Angiogram;  Surgeon: Faisal Telles MD;  Location:  [facility]     REPLACE VALVE AORTIC N/A 2/3/2020    Procedure: AORTIC VALVE REPLACEMENT WITH [facility] [employer] MECHANICAL HEART VALVE 25 MM; ON PUMP WITH TADEO;  Surgeon: Cindy Matthews MD;  Location:  OR       Social History     Tobacco Use     Smoking status: Never Smoker     Smokeless tobacco: Never Used   Substance Use Topics     Alcohol use: Yes     Alcohol/week: 0.0 standard drinks     Comment: beer or two wkly     Family History   Problem Relation Age of Onset     Heart Disease Maternal Grandmother      Lipids Maternal Grandmother      Hypertension Maternal Grandmother      Hypertension Paternal Grandfather      Heart Disease Paternal Grandfather         heart murmur     Prostate Cancer Paternal Grandfather      Cancer Paternal Grandfather         bone ca     Lipids Paternal Grandfather      Diabetes Maternal Grandfather         senior onset     Hypertension Maternal Grandfather      Cerebrovascular Disease Maternal Grandfather      Cancer Paternal Grandmother         rectal cancer     Lipids Paternal Grandmother      Pancreatic Cancer Cousin      Heart Disease Brother         CAD, age 38         Current Outpatient Medications   Medication Sig Dispense Refill     acetaminophen (TYLENOL) 325 MG tablet Take 2 tablets (650 mg) by mouth every 4 hours as needed for mild pain 30 tablet 0     amLODIPine (NORVASC) 2.5 MG tablet Take 1 tablet (2.5 mg) by mouth daily 90 tablet 3     aspirin (ASA) 81 MG EC tablet Take 1 tablet (81 mg) by mouth daily 90 tablet 3     atorvastatin (LIPITOR) 40 MG tablet Take 1 tablet (40 mg) by mouth daily 90 tablet 3     lisinopril (ZESTRIL) 5 MG tablet Take 1 tablet (5 mg) by mouth daily 90 tablet 3     loratadine (CLARITIN) 10 MG tablet Take 10 mg by mouth daily       metoprolol succinate ER (TOPROL-XL)  50 MG 24 hr tablet Take 1 tablet (50 mg) by mouth 2 times daily 180 tablet 3     oxyCODONE-acetaminophen (PERCOCET) 5-325 MG tablet Take 1 tablet by mouth every 6 hours as needed for pain 12 tablet 0     warfarin ANTICOAGULANT (COUMADIN) 5 MG tablet TAKE 7.5 MG DAILY ON THURSDAYS ONLY. TAKE 5 MG DAILY ALL OTHER DAYS OR AS DIRECTED BY THE COUMADIN CLINIC. 16 tablet 0     amoxicillin (AMOXIL) 500 MG capsule Take 4 capsules (2 gms) 60 minutes prior to dental work. (Patient not taking: Reported on 6/29/2022) 4 capsule 1       ROS:  A 12 point systems review is negative except for what is listed above in the Subjective history.        Wt Readings from Last 3 Encounters:   06/29/22 85.3 kg (188 lb)   06/08/22 87.8 kg (193 lb 8 oz)   10/01/21 83.9 kg (185 lb)                     BP Readings from Last 6 Encounters:   06/29/22 126/80   06/08/22 112/70   10/01/21 130/80   08/26/21 128/78   12/30/20 122/80   12/02/20 130/80          OBJECTIVE:                                                    Vital signs: /80 (Cuff Size: Adult Regular)   Pulse 88   Temp 97.8  F (36.6  C) (Temporal)   Resp 16   Wt 85.3 kg (188 lb)   BMI 28.01 kg/m        Exam of the lower back reveals no tenderness to palpation in the midline spine.  He has some tenderness in the lateral lumbar areas along the musculature and especially in the left hip radiating down the left lateral thigh.  Deep tendon reflex on the right leg is 2+/4+ in the knee and ankle.  DTR on the left knee and ankle is barely noticeable possibly 1+/4+.  Sensation is normal in both legs.  Straight leg raising is normal bilaterally to about 70 degrees.    I note that he has had a CT scan of the chest and abdomen with contrast which showed that there is no stenosis aneurysm or dissection of his thoracic aorta.  This is important to note because he has had an out aortic valve replacement.  However it is also important to point out that his pain is not in the chest or upper thorax  but in the lower lumbar area and radiating on the left side very consistent with radiculopathy.        A total of 30 minutes were spent in today's visit including the time spent with  the patient in addition to the time spent just prior to the visit reviewing the chart  and then charting afterwards on this patient today.      Armando Mckeon MD  [facility]       The above information was dictating using Dragon voice software and as a result there may be some irregularities that were not detected in my review of this note.

## 2022-07-05 ENCOUNTER — LAB (OUTPATIENT)
Dept: LAB | Facility: CLINIC | Age: 52
End: 2022-07-05
Payer: COMMERCIAL

## 2022-07-05 ENCOUNTER — ANTICOAGULATION THERAPY VISIT (OUTPATIENT)
Dept: ANTICOAGULATION | Facility: CLINIC | Age: 52
End: 2022-07-05

## 2022-07-05 DIAGNOSIS — Z95.2 S/P AVR (AORTIC VALVE REPLACEMENT): ICD-10-CM

## 2022-07-05 DIAGNOSIS — Z95.2 S/P AVR (AORTIC VALVE REPLACEMENT): Primary | ICD-10-CM

## 2022-07-05 DIAGNOSIS — Z79.01 LONG TERM CURRENT USE OF ANTICOAGULANTS WITH INR GOAL OF 2.0-3.0: ICD-10-CM

## 2022-07-05 DIAGNOSIS — Z95.2 H/O MECHANICAL AORTIC VALVE REPLACEMENT: ICD-10-CM

## 2022-07-05 LAB — INR BLD: 3.7 (ref 0.9–1.1)

## 2022-07-05 PROCEDURE — 85610 PROTHROMBIN TIME: CPT

## 2022-07-05 PROCEDURE — 36416 COLLJ CAPILLARY BLOOD SPEC: CPT

## 2022-07-05 NOTE — PROGRESS NOTES
"ANTICOAGULATION MANAGEMENT     Toney Lemos 52 year old male is on warfarin with supratherapeutic INR result. (Goal INR 2.0-3.0)    Recent labs: (last 7 days)     07/05/22  1110   INR 3.7*       ASSESSMENT       Source(s): Chart Review and Patient/Caregiver Call       Warfarin doses taken: Warfarin taken as instructed    Diet: No new diet changes identified    New illness, injury, or hospitalization: Yes: Pt threw out his back and has been in pain and taking tylenol \"around the clock\", but did cut back the past 2 days. Has MRI Thursday.     Medication/supplement changes: Tylenol    Signs or symptoms of bleeding or clotting: No    Previous INR: Therapeutic last visit; previously outside of goal range    Additional findings: None       PLAN     Recommended plan for temporary change(s) affecting INR     Dosing Instructions: hold dose then continue your current warfarin dose with next INR in 2 weeks       Summary  As of 7/5/2022    Full warfarin instructions:  7/5: Hold; Otherwise 7.5 mg every Thu; 5 mg all other days   Next INR check:  7/19/2022             Telephone call with Preet who verbalizes understanding and agrees to plan and he will call if anything changes after his MRI such as added meds, worsening pain, tylenol use    Lab visit scheduled    Education provided: Please call back if any changes to your diet, medications or how you've been taking warfarin, Monitoring for bleeding signs and symptoms, Importance of notifying clinic for changes in medications; a sooner lab recheck maybe needed. and Importance of notifying clinic of upcoming surgeries and procedures 2 weeks in advance    Plan made per [facility] anticoagulation protocol    Milana Hernández, RN  Anticoagulation Clinic  7/5/2022    _______________________________________________________________________     Anticoagulation Episode Summary     Current INR goal:  2.0-3.0   TTR:  81.5 % (1 y)   Target end date:  Indefinite   Send INR reminders to:  [facility]" [place]    Indications    S/P AVR (aortic valve replacement)-25 mm [facility] Rolf [place]  [Z95.2]  H/O mechanical aortic valve replacement [Z95.2]  Long term current use of anticoagulants with INR goal of 2.0-3.0 [Z79.01]           Comments:           Anticoagulation Care Providers     Provider Role Specialty Phone number    Moni Cartagena PA-C Referring Cardiovascular & Thoracic Surgery [phone]    LanceYan burgess MD Referring [facility] [phone]

## 2022-07-07 ENCOUNTER — HOSPITAL ENCOUNTER (OUTPATIENT)
Dept: MRI IMAGING | Facility: CLINIC | Age: 52
Discharge: HOME | End: 2022-07-07
Attending: OBSTETRICS & GYNECOLOGY | Admitting: OBSTETRICS & GYNECOLOGY
Payer: COMMERCIAL

## 2022-07-07 DIAGNOSIS — M54.16 LUMBAR RADICULOPATHY: ICD-10-CM

## 2022-07-07 PROCEDURE — 72148 MRI LUMBAR SPINE W/O DYE: CPT

## 2022-07-08 ENCOUNTER — MYC REFILL (OUTPATIENT)
Dept: FAMILY MEDICINE | Facility: CLINIC | Age: 52
End: 2022-07-08

## 2022-07-08 DIAGNOSIS — Z95.2 S/P AVR: ICD-10-CM

## 2022-07-11 DIAGNOSIS — M54.41 ACUTE RIGHT-SIDED LOW BACK PAIN WITH RIGHT-SIDED SCIATICA: Primary | ICD-10-CM

## 2022-07-11 RX ORDER — WARFARIN SODIUM 5 MG/1
TABLET ORAL
Qty: 35 TABLET | Refills: 1 | Status: SHIPPED | OUTPATIENT
Start: 2022-07-11 | End: 2022-09-12

## 2022-07-11 NOTE — TELEPHONE ENCOUNTER
ANTICOAGULATION MANAGEMENT:  Medication Refill    Anticoagulation Summary  As of 7/5/2022    Warfarin maintenance plan:  7.5 mg (5 mg x 1.5) every Thu; 5 mg (5 mg x 1) all other days   Next INR check:  7/19/2022   Target end date:  Indefinite    Indications    S/P AVR (aortic valve replacement)-25 mm St[facility] Rolf [place]  [Z95.2]  H/O mechanical aortic valve replacement [Z95.2]  Long term current use of anticoagulants with INR goal of 2.0-3.0 [Z79.01]             Anticoagulation Care Providers     Provider Role Specialty Phone number    Moni Cartagena PA-C Referring Cardiovascular & Thoracic Surgery [phone]    [facility] Referring Family Medicine [phone]          Visit with referring provider/group within last year: Yes    [facility] referral signed within last year: Yes    Toney meets all criteria for refill (current [facility] referral, office visit with referring provider/group in last year, lab monitoring up to date or not exceeding 2 weeks overdue). Rx instructions and quantity supplied updated to match patient's current dosing plan. 30 day supply with 1 refills granted per [facility] protocol     Milana Hernández RN  Anticoagulation Clinic

## 2022-07-19 ENCOUNTER — ANTICOAGULATION THERAPY VISIT (OUTPATIENT)
Dept: ANTICOAGULATION | Facility: CLINIC | Age: 52
End: 2022-07-19

## 2022-07-19 ENCOUNTER — LAB (OUTPATIENT)
Dept: LAB | Facility: CLINIC | Age: 52
End: 2022-07-19
Payer: COMMERCIAL

## 2022-07-19 DIAGNOSIS — Z95.2 H/O MECHANICAL AORTIC VALVE REPLACEMENT: ICD-10-CM

## 2022-07-19 DIAGNOSIS — Z79.01 LONG TERM CURRENT USE OF ANTICOAGULANTS WITH INR GOAL OF 2.0-3.0: ICD-10-CM

## 2022-07-19 DIAGNOSIS — Z95.2 S/P AVR (AORTIC VALVE REPLACEMENT): Primary | ICD-10-CM

## 2022-07-19 DIAGNOSIS — Z95.2 S/P AVR (AORTIC VALVE REPLACEMENT): ICD-10-CM

## 2022-07-19 LAB — INR BLD: 3.4 (ref 0.9–1.1)

## 2022-07-19 PROCEDURE — 36416 COLLJ CAPILLARY BLOOD SPEC: CPT

## 2022-07-19 PROCEDURE — 85610 PROTHROMBIN TIME: CPT

## 2022-07-19 NOTE — PROGRESS NOTES
ANTICOAGULATION MANAGEMENT     Toney Lemos 52 year old male is on warfarin with supratherapeutic INR result. (Goal INR 2.0-3.0)    Recent labs: (last 7 days)     07/19/22  0754   INR 3.4*       ASSESSMENT       Source(s): Chart Review    Previous INR was Supratherapeutic    Medication, diet, health changes since last INR chart reviewed; none identified           PLAN     Recommended plan for no diet, medication or health factor changes affecting INR     Dosing Instructions: hold dose then decrease your warfarin dose (6.7% change) with next INR in 8 days       Summary  As of 7/19/2022    Full warfarin instructions:  7/19: 2.5 mg; Otherwise 5 mg every day   Next INR check:  7/27/2022             Detailed voice message left for Preet with dosing instructions and follow up date.     Contact [phone]  to schedule and with any changes, questions or concerns.     Education provided: Goal range and significance of current result, Monitoring for bleeding signs and symptoms and Contact [phone]  with any changes, questions or concerns.     Plan made per [facility] anticoagulation protocol    Cathleen Pacheco, RN  Anticoagulation Clinic  7/19/2022    _______________________________________________________________________     Anticoagulation Episode Summary     Current INR goal:  2.0-3.0   TTR:  77.7 % (1 y)   Target end date:  Indefinite   Send INR reminders to:  [facility]    Indications    S/P AVR (aortic valve replacement)-25 mm St. Rolf [place]  [Z95.2]  H/O mechanical aortic valve replacement [Z95.2]  Long term current use of anticoagulants with INR goal of 2.0-3.0 [Z79.01]           Comments:           Anticoagulation Care Providers     Provider Role Specialty Phone number    Moni Cartagena PA-C Referring Cardiovascular & Thoracic Surgery [phone]    Yan Lucas MD Referring [facility] [phone]

## 2022-07-28 ENCOUNTER — OFFICE VISIT (OUTPATIENT)
Dept: NEUROSURGERY | Facility: CLINIC | Age: 52
End: 2022-07-28
Attending: OBSTETRICS & GYNECOLOGY
Payer: COMMERCIAL

## 2022-07-28 ENCOUNTER — LAB (OUTPATIENT)
Dept: LAB | Facility: CLINIC | Age: 52
End: 2022-07-28
Payer: COMMERCIAL

## 2022-07-28 ENCOUNTER — ANTICOAGULATION THERAPY VISIT (OUTPATIENT)
Dept: ANTICOAGULATION | Facility: CLINIC | Age: 52
End: 2022-07-28

## 2022-07-28 VITALS — DIASTOLIC BLOOD PRESSURE: 80 MMHG | WEIGHT: 187.6 LBS | BODY MASS INDEX: 27.95 KG/M2 | SYSTOLIC BLOOD PRESSURE: 134 MMHG

## 2022-07-28 DIAGNOSIS — Z95.2 S/P AVR (AORTIC VALVE REPLACEMENT): Primary | ICD-10-CM

## 2022-07-28 DIAGNOSIS — Z79.01 LONG TERM CURRENT USE OF ANTICOAGULANTS WITH INR GOAL OF 2.0-3.0: ICD-10-CM

## 2022-07-28 DIAGNOSIS — Z95.2 H/O MECHANICAL AORTIC VALVE REPLACEMENT: ICD-10-CM

## 2022-07-28 DIAGNOSIS — M54.16 LUMBAR RADICULOPATHY: ICD-10-CM

## 2022-07-28 DIAGNOSIS — Z95.2 S/P AVR (AORTIC VALVE REPLACEMENT): ICD-10-CM

## 2022-07-28 LAB — INR BLD: 1.8 (ref 0.9–1.1)

## 2022-07-28 PROCEDURE — 85610 PROTHROMBIN TIME: CPT

## 2022-07-28 PROCEDURE — 99243 OFF/OP CNSLTJ NEW/EST LOW 30: CPT | Performed by: NEUROLOGICAL SURGERY

## 2022-07-28 PROCEDURE — 36415 COLL VENOUS BLD VENIPUNCTURE: CPT

## 2022-07-28 ASSESSMENT — PAIN SCALES - GENERAL: PAINLEVEL: MODERATE PAIN (5)

## 2022-07-28 NOTE — PROGRESS NOTES
ANTICOAGULATION MANAGEMENT     Toney Lemos 52 year old male is on warfarin with subtherapeutic INR result. (Goal INR 2.0-3.0)    Recent labs: (last 7 days)     07/28/22  1452   INR 1.8*       ASSESSMENT       Source(s): Chart Review and Patient/Caregiver Call       Warfarin doses taken: Warfarin taken as instructed    Diet: No new diet changes identified    New illness, injury, or hospitalization: No    Medication/supplement changes: Pt reports that he had been taking tylenol consistently for his back, but stopped about a week ago    Signs or symptoms of bleeding or clotting: No    Previous INR: Supratherapeutic    Additional findings: None       PLAN     Recommended plan for ongoing change(s) affecting INR     Dosing Instructions: Increase your warfarin dose (7.1% change) with next INR in 2 weeks       Summary  As of 7/28/2022    Full warfarin instructions:  7.5 mg every Thu; 5 mg all other days   Next INR check:  8/11/2022             Telephone call with Preet who verbalizes understanding and agrees to plan    Lab visit scheduled    Education provided: None required    Plan made per [facility] anticoagulation protocol    Milana Hernández RN  Anticoagulation Clinic  7/28/2022    _______________________________________________________________________     Anticoagulation Episode Summary     Current INR goal:  2.0-3.0   TTR:  76.8 % (1 y)   Target end date:  Indefinite   Send INR reminders to:  [facility]    Indications    S/P AVR (aortic valve replacement)-25 mm St[facility] Rolf [place]  [Z95.2]  H/O mechanical aortic valve replacement [Z95.2]  Long term current use of anticoagulants with INR goal of 2.0-3.0 [Z79.01]           Comments:           Anticoagulation Care Providers     Provider Role Specialty Phone number    Moni Cartagena PA-C Referring Cardiovascular & Thoracic Surgery [phone]    Yan Lucas MD Referring [facility] Medicine [phone]

## 2022-07-28 NOTE — PROGRESS NOTES
I was asked by Dr. Mckeon to see this patient in consultation    52 year old male with lumbar spondylosis and left leg pain.  3 months of throbbing left-sided back pain, with radiation to the lateral thigh, shin and ankle.  Worst at night, and when walking.  Notes some associated numbness, and episodes of leg weakness.  MR lumbar, personally reviewed, with extensive degeneration and spondylotic change from L3-S1, with marked left L3-4 lateral recess stenosis and disc bulge.  Has been doing chiropractic care without improvement.    Past Medical History:   Diagnosis Date     Aortic valve disorders      Hypertension      Past Surgical History:   Procedure Laterality Date     CV CORONARY ANGIOGRAM N/A 1/9/2020    Procedure: Coronary Angiogram;  Surgeon: Faisal Telles MD;  Location:  [facility]     REPLACE VALVE AORTIC N/A 2/3/2020    Procedure: AORTIC VALVE REPLACEMENT WITH [facility] [employer] MECHANICAL HEART VALVE 25 MM; ON PUMP WITH TADEO;  Surgeon: Cindy Matthews MD;  Location:  OR     Social History     Socioeconomic History     Marital status:      Spouse name: Not on file     Number of children: Not on file     Years of education: Not on file     Highest education level: Not on file   Occupational History     Not on file   Tobacco Use     Smoking status: Never Smoker     Smokeless tobacco: Never Used   Vaping Use     Vaping Use: Never used   Substance and Sexual Activity     Alcohol use: Yes     Alcohol/week: 0.0 standard drinks     Comment: beer or two wkly     Drug use: No     Sexual activity: Yes     Partners: Female     Comment: partner is on bcp   Other Topics Concern      Service No     Blood Transfusions No     Caffeine Concern No     Occupational Exposure No     Hobby Hazards No     Sleep Concern Yes     Stress Concern No     Weight Concern No     Special Diet Yes     Back Care Yes     Exercise Yes     Bike Helmet Yes     Seat Belt Yes     Self-Exams  Yes     Parent/sibling w/ CABG, MI or angioplasty before 65F 55M? Yes     Comment: brother had heart attack &  at age 38yrs   Social History Narrative     Not on file     Social Determinants of Health     Financial Resource Strain: Not on file   Food Insecurity: Not on file   Transportation Needs: Not on file   Physical Activity: Not on file   Stress: Not on file   Social Connections: Not on file   Intimate Partner Violence: Not on file   Housing Stability: Not on file     Family History   Problem Relation Age of Onset     Heart Disease Maternal Grandmother      Lipids Maternal Grandmother      Hypertension Maternal Grandmother      Hypertension Paternal Grandfather      Heart Disease Paternal Grandfather         heart murmur     Prostate Cancer Paternal Grandfather      Cancer Paternal Grandfather         bone ca     Lipids Paternal Grandfather      Diabetes Maternal Grandfather         senior onset     Hypertension Maternal Grandfather      Cerebrovascular Disease Maternal Grandfather      Cancer Paternal Grandmother         rectal cancer     Lipids Paternal Grandmother      Pancreatic Cancer Cousin      Heart Disease Brother         CAD, age 38        ROS: 10 point ROS neg other than the symptoms noted above in the HPI.    Physical Exam  /80   Wt 85.1 kg (187 lb 9.6 oz)   BMI 27.95 kg/m    HEENT:  Normocephalic, atraumatic.  PERRLA.  EOM s intact.  Visual fields full to gross exam  Neck:  Supple, non-tender, without lymphadenopathy.  Heart:  No peripheral edema  Lungs:  No SOB  Abdomen:  Non-distended.   Skin:  Warm and dry.  Extremities:  No edema, cyanosis or clubbing.  Psychiatric:  No apparent distress  Musculoskeletal:  Normal bulk and tone    NEUROLOGICAL EXAMINATION:     Mental status:  Alert and Oriented x 3, speech is fluent.  Cranial nerves:  II-XII intact.   Motor:    Shoulder Abduction:  Right:  5/5   Left:  5/5  Biceps:                      Right:  5/5   Left:  5/5  Triceps:                      Right:  5/5   Left:  5/5  Wrist Extensors:       Right:  5/5   Left:  5/5  Wrist Flexors:           Right:  5/5   Left:  5/5  interosseus :            Right:  5/5   Left:  5/5  Hip Flexion:                Right: 5/5  Left:  5/5  Quadriceps:             Right:  5/5  Left:  5/5  Hamstrings:             Right:  5/5  Left:  5/5  Gastroc Soleus:        Right:  5/5  Left:  5/5  Tib/Ant:                      Right:  5/5  Left:  5/5  EHL:                     Right:  5/5  Left:  5/5  Sensation:  Intact  Reflexes:  Negative Babinski.  Negative Clonus.  Negative Boland's.  Coordination:  Smooth finger to nose testing.   Negative pronator drift.  Smooth tandem walking.    A/P:  52 year old male with lumbar spondylosis and left leg pain    I had a discussion with the patient, reviewing the history, symptoms, and imaging  Will order course of PT and left L3-4 KANNAN  If no improvement, he will return to discuss surgical options

## 2022-07-28 NOTE — LETTER
"    7/28/2022         RE: Toney Lemos  [address] [facility] [address]        Dear Colleague,    Thank you for referring your patient, Toney Lemos, to the [facility]UROSURG[facility] [place]. Please see a copy of my visit note below.    Toney Lemos is a 52 year old male who presents for:  Chief Complaint   Patient presents with     Neurologic Problem     Acute Left LBP        Initial Vitals:  /80   Wt 187 lb 9.6 oz (85.1 kg)   BMI 27.95 kg/m   Estimated body mass index is 27.95 kg/m  as calculated from the following:    Height as of 1/19/22: 5' 8.7\" (1.745 m).    Weight as of this encounter: 187 lb 9.6 oz (85.1 kg).. Body surface area is 2.03 meters squared. BP completed using cuff size: regular  Moderate Pain (5)    Nursing Comments:     Rufina Inman      I was asked by Dr. Mckeon to see this patient in consultation    52 year old male with lumbar spondylosis and left leg pain.  3 months of throbbing left-sided back pain, with radiation to the lateral thigh, shin and ankle.  Worst at night, and when walking.  Notes some associated numbness, and episodes of leg weakness.  MR lumbar, personally reviewed, with extensive degeneration and spondylotic change from L3-S1, with marked left L3-4 lateral recess stenosis and disc bulge.  Has been doing chiropractic care without improvement.    Past Medical History:   Diagnosis Date     Aortic valve disorders      Hypertension      Past Surgical History:   Procedure Laterality Date     CV CORONARY ANGIOGRAM N/A 1/9/2020    Procedure: Coronary Angiogram;  Surgeon: Faisal Telles MD;  Location:  HEART CARDIAC CATH LAB     REPLACE VALVE AORTIC N/A 2/3/2020    Procedure: AORTIC VALVE REPLACEMENT WITH [facility] MECHANICAL HEART VALVE 25 MM; ON PUMP WITH TADEO;  Surgeon: Cindy Matthews MD;  Location:  OR     Social History     Socioeconomic History     Marital status:      Spouse name: Not on file "     Number of children: Not on file     Years of education: Not on file     Highest education level: Not on file   Occupational History     Not on file   Tobacco Use     Smoking status: Never Smoker     Smokeless tobacco: Never Used   Vaping Use     Vaping Use: Never used   Substance and Sexual Activity     Alcohol use: Yes     Alcohol/week: 0.0 standard drinks     Comment: beer or two wkly     Drug use: No     Sexual activity: Yes     Partners: Female     Comment: partner is on bcp   Other Topics Concern      Service No     Blood Transfusions No     Caffeine Concern No     Occupational Exposure No     Hobby Hazards No     Sleep Concern Yes     Stress Concern No     Weight Concern No     Special Diet Yes     Back Care Yes     Exercise Yes     Bike Helmet Yes     Seat Belt Yes     Self-Exams Yes     Parent/sibling w/ CABG, MI or angioplasty before 65F 55M? Yes     Comment: brother had heart attack &  at age 38yrs   Social History Narrative     Not on file     Social Determinants of Health     Financial Resource Strain: Not on file   Food Insecurity: Not on file   Transportation Needs: Not on file   Physical Activity: Not on file   Stress: Not on file   Social Connections: Not on file   Intimate Partner Violence: Not on file   Housing Stability: Not on file     Family History   Problem Relation Age of Onset     Heart Disease Maternal Grandmother      Lipids Maternal Grandmother      Hypertension Maternal Grandmother      Hypertension Paternal Grandfather      Heart Disease Paternal Grandfather         heart murmur     Prostate Cancer Paternal Grandfather      Cancer Paternal Grandfather         bone ca     Lipids Paternal Grandfather      Diabetes Maternal Grandfather         senior onset     Hypertension Maternal Grandfather      Cerebrovascular Disease Maternal Grandfather      Cancer Paternal Grandmother         rectal cancer     Lipids Paternal Grandmother      Pancreatic Cancer Cousin      Heart  Disease Brother         CAD, age 38        ROS: 10 point ROS neg other than the symptoms noted above in the HPI.    Physical Exam  /80   Wt 85.1 kg (187 lb 9.6 oz)   BMI 27.95 kg/m    HEENT:  Normocephalic, atraumatic.  PERRLA.  EOM s intact.  Visual fields full to gross exam  Neck:  Supple, non-tender, without lymphadenopathy.  Heart:  No peripheral edema  Lungs:  No SOB  Abdomen:  Non-distended.   Skin:  Warm and dry.  Extremities:  No edema, cyanosis or clubbing.  Psychiatric:  No apparent distress  Musculoskeletal:  Normal bulk and tone    NEUROLOGICAL EXAMINATION:     Mental status:  Alert and Oriented x 3, speech is fluent.  Cranial nerves:  II-XII intact.   Motor:    Shoulder Abduction:  Right:  5/5   Left:  5/5  Biceps:                      Right:  5/5   Left:  5/5  Triceps:                     Right:  5/5   Left:  5/5  Wrist Extensors:       Right:  5/5   Left:  5/5  Wrist Flexors:           Right:  5/5   Left:  5/5  interosseus :            Right:  5/5   Left:  5/5  Hip Flexion:                Right: 5/5  Left:  5/5  Quadriceps:             Right:  5/5  Left:  5/5  Hamstrings:             Right:  5/5  Left:  5/5  Gastroc Soleus:        Right:  5/5  Left:  5/5  Tib/Ant:                      Right:  5/5  Left:  5/5  EHL:                     Right:  5/5  Left:  5/5  Sensation:  Intact  Reflexes:  Negative Babinski.  Negative Clonus.  Negative Boland's.  Coordination:  Smooth finger to nose testing.   Negative pronator drift.  Smooth tandem walking.    A/P:  52 year old male with lumbar spondylosis and left leg pain    I had a discussion with the patient, reviewing the history, symptoms, and imaging  Will order course of PT and left L3-4 KANNAN  If no improvement, he will return to discuss surgical options         Again, thank you for allowing me to participate in the care of your patient.        Sincerely,        Ayden Salazar MD

## 2022-07-28 NOTE — PATIENT INSTRUCTIONS
Ordered a Left L3-4 transforaminal Epidural Steroid Injection at [facility]. [facility] will call you within 48 hours to schedule this. If you don't here from them, please schedule by calling Operating Room scheduling team s phone number: [phone], option 2. If symptoms continue 2 weeks after injections, call our clinic and talk to a nurse.    Orders for [facility]. They will call you to schedule within a few days. They will call you to schedule within a few days. If you have not heard from them, please call [phone]. Please call our clinic if symptoms persist after your course of physical therapy (approximately 4 weeks).     [facility] -[place]  [facility] - [address] [address]  Telephone:  [phone]       Fax:  [phone]

## 2022-07-28 NOTE — PROGRESS NOTES
"Toney Lemos is a 52 year old male who presents for:  Chief Complaint   Patient presents with     Neurologic Problem     Acute Left LBP        Initial Vitals:  /80   Wt 187 lb 9.6 oz (85.1 kg)   BMI 27.95 kg/m   Estimated body mass index is 27.95 kg/m  as calculated from the following:    Height as of 1/19/22: 5' 8.7\" (1.745 m).    Weight as of this encounter: 187 lb 9.6 oz (85.1 kg).. Body surface area is 2.03 meters squared. BP completed using cuff size: regular  Moderate Pain (5)    Nursing Comments:     Rufina Inman    "

## 2022-08-01 ENCOUNTER — TELEPHONE (OUTPATIENT)
Dept: PALLIATIVE MEDICINE | Facility: CLINIC | Age: 52
End: 2022-08-01

## 2022-08-01 NOTE — TELEPHONE ENCOUNTER
Pt scheduled for KANNAN   Date:8/25/22  Time: 130pm  Dr. Dotson    Instructed pt to have H&P and  for procedure.  Patient informed of COVID testing process.

## 2022-08-09 ENCOUNTER — HOSPITAL ENCOUNTER (OUTPATIENT)
Dept: PHYSICAL THERAPY | Facility: CLINIC | Age: 52
Setting detail: THERAPIES SERIES
Discharge: HOME | End: 2022-08-09
Attending: NEUROLOGICAL SURGERY
Payer: COMMERCIAL

## 2022-08-09 DIAGNOSIS — M54.16 LUMBAR RADICULOPATHY: ICD-10-CM

## 2022-08-09 PROCEDURE — 97110 THERAPEUTIC EXERCISES: CPT | Mod: GP | Performed by: PHYSICAL THERAPIST

## 2022-08-09 PROCEDURE — 97161 PT EVAL LOW COMPLEX 20 MIN: CPT | Mod: GP | Performed by: PHYSICAL THERAPIST

## 2022-08-09 NOTE — PROGRESS NOTES
08/09/22 0729   General Information   Type of Visit Initial OP Ortho PT Evaluation   Start of Care Date 08/09/22   Referring Physician Dr lovett   Patient/Family Goals Statement back andleft leg   Orders Evaluate and Treat   Date of Order 07/28/22   Certification Required? No   Medical Diagnosis lumbar radiculopathy M54.16   Surgical/Medical history reviewed Yes   Precautions/Limitations no known precautions/limitations   Body Part(s)   Body Part(s) Lumbar Spine/SI   Presentation and Etiology   Pertinent history of current problem (include personal factors and/or comorbidities that impact the POC) patient reports that about 3 months started to have back pain and then about 3 weeks later had left leg pain , currently the left ankle is numb initially it was the whole leg , back pain has mostly resolved . left leg pain is worse depending on what he is doing at work . climbing up and down alot makes the knee hurt and the leg feel weak . denies incottenence PMH none reported , AVR replaced   Impairments A. Pain;H. Impaired gait   Functional Limitations perform activities of daily living;perform required work activities;perform desired leisure / sports activities   Symptom Location back and left leg   Onset date of current episode/exacerbation 05/09/22   Chronicity Chronic   Pain rating (0-10 point scale) Best (/10);Worst (/10)   Best (/10) back 2/10 left leg 2/10   Worst (/10) back 3/10 left leg 5/10   Pain quality A. Sharp;B. Dull;C. Aching   Frequency of pain/symptoms A. Constant   Pain/symptoms exacerbated by   (up and down steps stairs , driving)   Pain/symptoms eased by   (stretching)   Progression of symptoms since onset: Improved   Prior Level of Function   Functional Level Prior Comment laying on ground rotate legs side to side , SKC , prone arching , heelcord stretch, neck stretches , walking at work   Current Level of Function   Current Community Support Family/friend caregiver   Patient role/employment  history A. Employed   Fall Risk Screen   Fall screen completed by PT   Have you fallen 2 or more times in the past year? No   Have you fallen and had an injury in the past year? No   Is patient a fall risk? No   Abuse Screen (yes response referral indicated)   Feels Unsafe at Home or Work/School no   Feels Threatened by Someone no   Does Anyone Try to Keep You From Having Contact with Others or Doing Things Outside Your Home? no   Physical Signs of Abuse Present no   Lumbar Spine/SI Objective Findings   Hamstring Flexibility B 32   Flexion ROM full no change in pain   Extension ROM full no change in pain   Right Side Bending ROM full no change in pain   Left Side Bending ROM pain in back   Lumbar/Hip/Knee/Foot Strength Comments able to stand on toes and heels but single leg mini squatt left leg slightly weaker   SLR neg   Sensation Testing numb left ankle   Patellar Tendon Reflexes  left slightly dimished compared to right but improved since doctor appt   Achilles Tendon Reflexes B equal   Planned Therapy Interventions   Planned Therapy Interventions ADL retraining;ROM;strengthening;stretching   Planned Modality Interventions   Planned Modality Interventions Comments modalities as needed   Clinical Impression   Criteria for Skilled Therapeutic Interventions Met yes, treatment indicated   PT Diagnosis low back pain and left leg pain   Functional limitations due to impairments [facility] JOHN 22   Clinical Presentation Stable/Uncomplicated   Clinical Presentation Rationale patient seen due to chronic low back and left leg pain that is improving , presents with slight weakness and tightness in left LE , Rx is skilled PT instruction in exercises and HEP   Clinical Decision Making (Complexity) Low complexity   Predicted Duration of Therapy Intervention (days/wks) every other week x 1-3 months   Risk & Benefits of therapy have been explained Yes   Patient, Family & other staff in agreement with plan of care Yes   Education  Assessment   Preferred Learning Style Listening;Reading;Demonstration   Barriers to Learning No barriers   ORTHO GOALS   PT Ortho Eval Goals 1;2;3   Ortho Goal 1   Goal Identifier 1   Goal Description instruction in HEP and compliant with it 5 of 7 days to improve quality of life   Target Date 11/09/22   Ortho Goal 2   Goal Identifier 2   Goal Description patient to have resolved left leg pain and overall decrease in pain currently 2-5/10 goal is 0-2/10   Target Date 11/09/22   Ortho Goal 3   Goal Identifier 3   Goal Description patient to have improved tolerance to activity currently JOHN 22 goal is less than 20   Target Date 11/09/22   Total Evaluation Time   PT Eval, Low Complexity Minutes (68113) 15

## 2022-08-11 ENCOUNTER — TELEPHONE (OUTPATIENT)
Dept: ANTICOAGULATION | Facility: CLINIC | Age: 52
End: 2022-08-11

## 2022-08-11 ENCOUNTER — LAB (OUTPATIENT)
Dept: LAB | Facility: CLINIC | Age: 52
End: 2022-08-11
Payer: COMMERCIAL

## 2022-08-11 ENCOUNTER — OFFICE VISIT (OUTPATIENT)
Dept: FAMILY MEDICINE | Facility: CLINIC | Age: 52
End: 2022-08-11

## 2022-08-11 ENCOUNTER — ANTICOAGULATION THERAPY VISIT (OUTPATIENT)
Dept: ANTICOAGULATION | Facility: CLINIC | Age: 52
End: 2022-08-11

## 2022-08-11 VITALS
WEIGHT: 186.5 LBS | RESPIRATION RATE: 16 BRPM | SYSTOLIC BLOOD PRESSURE: 122 MMHG | DIASTOLIC BLOOD PRESSURE: 72 MMHG | TEMPERATURE: 97.5 F | OXYGEN SATURATION: 97 % | BODY MASS INDEX: 27.78 KG/M2 | HEART RATE: 68 BPM

## 2022-08-11 DIAGNOSIS — Z13.220 SCREENING FOR HYPERLIPIDEMIA: ICD-10-CM

## 2022-08-11 DIAGNOSIS — I10 HYPERTENSION GOAL BP (BLOOD PRESSURE) < 140/90: ICD-10-CM

## 2022-08-11 DIAGNOSIS — Z79.01 LONG TERM CURRENT USE OF ANTICOAGULANTS WITH INR GOAL OF 2.0-3.0: ICD-10-CM

## 2022-08-11 DIAGNOSIS — Z00.00 ANNUAL PHYSICAL EXAM: Primary | ICD-10-CM

## 2022-08-11 DIAGNOSIS — Z95.2 S/P AVR (AORTIC VALVE REPLACEMENT): Primary | ICD-10-CM

## 2022-08-11 DIAGNOSIS — Z95.2 H/O MECHANICAL AORTIC VALVE REPLACEMENT: ICD-10-CM

## 2022-08-11 DIAGNOSIS — Z12.11 SCREEN FOR COLON CANCER: ICD-10-CM

## 2022-08-11 DIAGNOSIS — Z95.2 S/P AVR (AORTIC VALVE REPLACEMENT): ICD-10-CM

## 2022-08-11 DIAGNOSIS — Z01.818 PREOP GENERAL PHYSICAL EXAM: ICD-10-CM

## 2022-08-11 LAB
ANION GAP SERPL CALCULATED.3IONS-SCNC: 5 MMOL/L (ref 3–14)
BUN SERPL-MCNC: 16 MG/DL (ref 7–30)
CALCIUM SERPL-MCNC: 8.7 MG/DL (ref 8.5–10.1)
CHLORIDE BLD-SCNC: 109 MMOL/L (ref 94–109)
CHOLEST SERPL-MCNC: 186 MG/DL
CO2 SERPL-SCNC: 27 MMOL/L (ref 20–32)
CREAT SERPL-MCNC: 0.91 MG/DL (ref 0.66–1.25)
FASTING STATUS PATIENT QL REPORTED: YES
GFR SERPL CREATININE-BSD FRML MDRD: >90 ML/MIN/1.73M2
GLUCOSE BLD-MCNC: 101 MG/DL (ref 70–99)
HDLC SERPL-MCNC: 46 MG/DL
INR BLD: 1.9 (ref 0.9–1.1)
LDLC SERPL CALC-MCNC: 107 MG/DL
NONHDLC SERPL-MCNC: 140 MG/DL
POTASSIUM BLD-SCNC: 4 MMOL/L (ref 3.4–5.3)
SODIUM SERPL-SCNC: 141 MMOL/L (ref 133–144)
TRIGL SERPL-MCNC: 165 MG/DL

## 2022-08-11 PROCEDURE — 99214 OFFICE O/P EST MOD 30 MIN: CPT | Mod: 25 | Performed by: FAMILY MEDICINE

## 2022-08-11 PROCEDURE — 99396 PREV VISIT EST AGE 40-64: CPT | Performed by: FAMILY MEDICINE

## 2022-08-11 PROCEDURE — 36415 COLL VENOUS BLD VENIPUNCTURE: CPT

## 2022-08-11 PROCEDURE — 80048 BASIC METABOLIC PNL TOTAL CA: CPT

## 2022-08-11 PROCEDURE — 85610 PROTHROMBIN TIME: CPT

## 2022-08-11 PROCEDURE — 80061 LIPID PANEL: CPT

## 2022-08-11 ASSESSMENT — ENCOUNTER SYMPTOMS
FEVER: 0
NAUSEA: 0
SHORTNESS OF BREATH: 0
COUGH: 0
JOINT SWELLING: 0
HEMATOCHEZIA: 0
CHILLS: 0
WEAKNESS: 1
ARTHRALGIAS: 1
HEADACHES: 0
DIARRHEA: 0
FREQUENCY: 0
DIZZINESS: 0
NERVOUS/ANXIOUS: 0
HEARTBURN: 0
DYSURIA: 0
CONSTIPATION: 0
EYE PAIN: 0
PARESTHESIAS: 1
PALPITATIONS: 0
HEMATURIA: 0
MYALGIAS: 1
ABDOMINAL PAIN: 0
SORE THROAT: 0

## 2022-08-11 ASSESSMENT — PAIN SCALES - GENERAL: PAINLEVEL: MILD PAIN (2)

## 2022-08-11 NOTE — PROGRESS NOTES
[address] [address]  Phone: [phone]  Fax: [phone]  Primary Provider: Yan Lucas        PREOPERATIVE EVALUATION:  Today's date: 8/11/2022    Toney Lemos is a 52 year old male who presents for a preoperative evaluation.    Surgical Information:  Surgery/Procedure: Injection, Epidural, Transforaminal approach Lumbar 3-4  Surgery Location: [facility]  Surgeon: Dr. Walter Dotson  Surgery Date: 0825/2022  Time of Surgery: 12:00 pm   Where patient plans to recover: At home with family  Fax number for surgical facility: Note does not need to be faxed, will be available electronically in Epic.    Type of Anesthesia Anticipated: Choice    Assessment & Plan     The proposed surgical procedure is considered LOW risk.      ICD-10-CM    1. Preop general physical exam  Z01.818    2. Screen for colon cancer  Z12.11 Fecal colorectal cancer screen FIT - Future (S+30)     Colonscopy Screening  Referral   3. Screening for hyperlipidemia  Z13.220 Lipid panel reflex to direct LDL Non-fasting   4. Hypertension goal BP (blood pressure) < 140/90  I10 BASIC METABOLIC PANEL          RECOMMENDATION:  APPROVAL GIVEN to proceed with proposed procedure, without further diagnostic evaluation.    Will get clarification about his Coumadin from the preop nurses                  Subjective     HPI related to upcoming procedure:   Upcoming spinal injection for low back pain      Preop Questions 8/11/2022   1. Have you ever had a heart attack or stroke? No   2. Have you ever had surgery on your heart or blood vessels, such as a stent placement, a coronary artery bypass, or surgery on an artery in your head, neck, heart, or legs? YES - Mechanical valve    3. Do you have chest pain with activity? No   4. Do you have a history of  heart failure? No   5. Do you currently have a cold, bronchitis or symptoms of other infection? No   6.  Do you have a cough, shortness of breath, or wheezing? No   7. Do you or anyone in your family have previous history of blood clots? No   8. Do you or does anyone in your family have a serious bleeding problem such as prolonged bleeding following surgeries or cuts? YES - Grandfather   9. Have you ever had problems with anemia or been told to take iron pills? No   10. Have you had any abnormal blood loss such as black, tarry or bloody stools? No   11. Have you ever had a blood transfusion? No   12. Are you willing to have a blood transfusion if it is medically needed before, during, or after your surgery? Yes   13. Have you or any of your relatives ever had problems with anesthesia? YES - Aunt- took longer for the anesthesia to wear off, loss of coordination    14. Do you have sleep apnea, excessive snoring or daytime drowsiness? YES - Mouth guard to help with sleep apnea    14a. Do you have a CPAP machine? No   15. Do you have any artifical heart valves or other implanted medical devices like a pacemaker, defibrillator, or continuous glucose monitor? No   16. Do you have artificial joints? No   17. Are you allergic to latex? No     Health Care Directive:  Patient does not have a Health Care Directive or Living Will:     Preoperative Review of :            Review of Systems   Constitutional: Negative for chills and fever.   HENT: Positive for ear pain. Negative for congestion, hearing loss and sore throat.    Eyes: Negative for pain and visual disturbance.   Respiratory: Negative for cough and shortness of breath.    Cardiovascular: Negative for chest pain and palpitations.   Gastrointestinal: Negative for abdominal pain, constipation, diarrhea and nausea.   Genitourinary: Negative for dysuria, frequency, genital sores, hematuria, penile discharge and urgency.   Musculoskeletal: Positive for arthralgias and myalgias. Negative for joint swelling.   Skin: Negative for rash.   Neurological: Positive for weakness.  Negative for dizziness and headaches.   Psychiatric/Behavioral: The patient is not nervous/anxious.          Patient Active Problem List    Diagnosis Date Noted     SCC (squamous cell carcinoma), arm, left- hand, + deep margin, watching 10/18/2021     Priority: Medium     Long term current use of anticoagulants with INR goal of 2.0-3.0 02/25/2021     Priority: Medium     H/O mechanical aortic valve replacement 02/11/2020     Priority: Medium     S/P AVR (aortic valve replacement)-25 mm [facility] [place]  02/10/2020     Priority: Medium     FH: early coronary artery disease- bro 30s 05/24/2017     Priority: Medium     Encounter for long-term current use of medication 03/21/2013     Priority: Medium     Problem list name updated by automated process. Provider to review       Hypertension goal BP (blood pressure) < 140/90 12/26/2011     Priority: Medium      Past Medical History:   Diagnosis Date     Aortic valve disorders      Hypertension      Past Surgical History:   Procedure Laterality Date     CV CORONARY ANGIOGRAM N/A 1/9/2020    Procedure: Coronary Angiogram;  Surgeon: Faisal Telles MD;  Location:  HEART CARDIAC CATH LAB     REPLACE VALVE AORTIC N/A 2/3/2020    Procedure: AORTIC VALVE REPLACEMENT WITH [facility] MECHANICAL HEART VALVE 25 MM; ON PUMP WITH TADEO;  Surgeon: Cindy Matthews MD;  Location:  OR     Current Outpatient Medications   Medication Sig Dispense Refill     acetaminophen (TYLENOL) 325 MG tablet Take 2 tablets (650 mg) by mouth every 4 hours as needed for mild pain 30 tablet 0     amLODIPine (NORVASC) 2.5 MG tablet Take 1 tablet (2.5 mg) by mouth daily 90 tablet 3     aspirin (ASA) 81 MG EC tablet Take 1 tablet (81 mg) by mouth daily 90 tablet 3     atorvastatin (LIPITOR) 40 MG tablet Take 1 tablet (40 mg) by mouth daily 90 tablet 3     lisinopril (ZESTRIL) 5 MG tablet Take 1 tablet (5 mg) by mouth daily 90 tablet 3     loratadine (CLARITIN) 10 MG tablet Take 10 mg  by mouth daily       metoprolol succinate ER (TOPROL-XL) 50 MG 24 hr tablet Take 1 tablet (50 mg) by mouth 2 times daily 180 tablet 3     warfarin ANTICOAGULANT (COUMADIN) 5 MG tablet TAKE 7.5 MG DAILY ON THURSDAYS ONLY. TAKE 5 MG DAILY ALL OTHER DAYS OR AS DIRECTED BY THE COUMADIN CLINIC. 35 tablet 1     amoxicillin (AMOXIL) 500 MG capsule Take 4 capsules (2 gms) 60 minutes prior to dental work. (Patient not taking: No sig reported) 4 capsule 1       Allergies   Allergen Reactions     No Known Drug Allergies      Seasonal Allergies         Social History     Tobacco Use     Smoking status: Never Smoker     Smokeless tobacco: Never Used   Substance Use Topics     Alcohol use: Yes     Alcohol/week: 0.0 standard drinks     Comment: beer or two wkly       History   Drug Use No         Objective     /72 (BP Location: Right arm, Patient Position: Sitting, Cuff Size: Adult Regular)   Pulse 68   Temp 97.5  F (36.4  C) (Temporal)   Resp 16   Wt 84.6 kg (186 lb 8 oz)   SpO2 97%   BMI 27.78 kg/m      Physical Exam  GENERAL APPEARANCE: healthy, alert and no distress  HENT: ear canals and TM's normal and nose and mouth without ulcers or lesions  RESP: lungs clear to auscultation - no rales, rhonchi or wheezes  CV: regular rate and rhythm, normal S1 S2, no S3 or S4 and no murmur, click or rub   ABDOMEN: soft, nontender, no HSM or masses and bowel sounds normal  NEURO: Normal strength and tone, sensory exam grossly normal, mentation intact and speech normal    Recent Labs   Lab Test 07/28/22  1452 07/19/22  0754 09/18/21  0946 08/26/21  1205 12/02/20  0903 12/02/20  0902   HGB  --   --   --   --   --  15.6   PLT  --   --   --   --   --  192   INR 1.8* 3.4*   < >  --    < >  --    NA  --   --   --  139  --  138   POTASSIUM  --   --   --  4.2  --  4.2   CR  --   --   --  0.95  --  0.98    < > = values in this interval not displayed.        Diagnostics:         Revised Cardiac Risk Index (RCRI):  The patient has the  following serious cardiovascular risks for perioperative complications:   - No serious cardiac risks = 0 points     RCRI Interpretation: 0 points: Class I (very low risk - 0.4% complication rate)           Signed Electronically by: Yan Lucas MD  Copy of this evaluation report is provided to requesting physician.      Answers for HPI/ROS submitted by the patient on 8/11/2022  Frequency of exercise:: 2-3 days/week  Getting at least 3 servings of Calcium per day:: Yes  Diet:: Low salt, Gluten-free/reduced  Taking medications regularly:: Yes  Medication side effects:: None  Bi-annual eye exam:: NO  Dental care twice a year:: Yes  Sleep apnea or symptoms of sleep apnea:: Sleep apnea  Blood in stool: No  heartburn: No  peripheral edema: No  mood changes: No  Skin sensation changes: Yes  impotence: No  Additional concerns today:: No  Duration of exercise:: Less than 15 minutes

## 2022-08-11 NOTE — TELEPHONE ENCOUNTER
Pt is scheduled for back injection on 8/25, they have requested that he hold his warfarin for five days prior. Does pt require bridging with lovenox? Please review.       Ilya Fowler RN  P [facility]  Pt has injections with Yeison[facility] on 8/25. Dr Dotson requests 5 days without coumandin. Please advise pt accordingly.     Thanks, Ilya

## 2022-08-11 NOTE — PROGRESS NOTES
ANTICOAGULATION MANAGEMENT     Toney Lemos 52 year old male is on warfarin with subtherapeutic INR result. (Goal INR 2.0-3.0)    Recent labs: (last 7 days)     08/11/22  0816   INR 1.9*       ASSESSMENT       Source(s): Chart Review and Patient/Caregiver Call       Warfarin doses taken: Warfarin taken as instructed    Diet: No new diet changes identified    New illness, injury, or hospitalization: No    Medication/supplement changes: None noted    Signs or symptoms of bleeding or clotting: No    Previous INR: Subtherapeutic    Additional findings: Upcoming surgery/procedure Steroid injection on 8/25 - has a call out to provider to see how long he needs to hold       PLAN     Recommended plan for no diet, medication or health factor changes affecting INR     Dosing Instructions: Increase your warfarin dose (6.7% change) with next INR in 2 weeks       Summary  As of 8/11/2022    Full warfarin instructions:  7.5 mg every Mon, Thu; 5 mg all other days   Next INR check:  8/25/2022             Telephone call with Preet who verbalizes understanding and agrees to plan and who agrees to plan and repeated back plan correctly    waiting on procedure instructions    Education provided: Please call back if any changes to your diet, medications or how you've been taking warfarin, Goal range and significance of current result and Importance of therapeutic range    Plan made per [facility] anticoagulation protocol    Damien Colón RN  Anticoagulation Clinic  8/11/2022    _______________________________________________________________________     Anticoagulation Episode Summary     Current INR goal:  2.0-3.0   TTR:  73.0 % (1 y)   Target end date:  Indefinite   Send INR reminders to:  MAIA FISHER    Indications    S/P AVR (aortic valve replacement)-25 mm [facility] [place]  [Z95.2]  H/O mechanical aortic valve replacement [Z95.2]  Long term current use of anticoagulants with INR goal of 2.0-3.0 [Z79.01]           Comments:            Anticoagulation Care Providers     Provider Role Specialty Phone number    Moni Cartagena PA-C Referring Cardiovascular & Thoracic Surgery [phone]    Yan Lucas MD Referring Family Medicine [phone]

## 2022-08-11 NOTE — PROGRESS NOTES
SUBJECTIVE:   CC: Toney Lemos is an 52 year old male who presents for preventative health visit.       Patient has been advised of split billing requirements and indicates understanding: Yes  Healthy Habits:     Getting at least 3 servings of Calcium per day:  Yes    Bi-annual eye exam:  NO    Dental care twice a year:  Yes    Sleep apnea or symptoms of sleep apnea:  Sleep apnea    Diet:  Low salt and Gluten-free/reduced    Frequency of exercise:  2-3 days/week    Duration of exercise:  Less than 15 minutes    Taking medications regularly:  Yes    Medication side effects:  None    PHQ-2 Total Score: 0    Additional concerns today:  No              Today's PHQ-2 Score:   PHQ-2 ( 1999 Pfizer) 8/11/2022   Q1: Little interest or pleasure in doing things 0   Q2: Feeling down, depressed or hopeless 0   PHQ-2 Score 0   PHQ-2 Total Score (12-17 Years)- Positive if 3 or more points; Administer PHQ-A if positive -   Q1: Little interest or pleasure in doing things Not at all   Q2: Feeling down, depressed or hopeless Not at all   PHQ-2 Score 0       Abuse: Current or Past(Physical, Sexual or Emotional)- No  Do you feel safe in your environment? Yes        Social History     Tobacco Use     Smoking status: Never Smoker     Smokeless tobacco: Never Used   Substance Use Topics     Alcohol use: Yes     Alcohol/week: 0.0 standard drinks     Comment: beer or two wkly     If you drink alcohol do you typically have >3 drinks per day or >7 drinks per week? No    Alcohol Use 8/11/2022   Prescreen: >3 drinks/day or >7 drinks/week? No   Prescreen: >3 drinks/day or >7 drinks/week? -   No flowsheet data found.    Last PSA:   PSA   Date Value Ref Range Status   02/10/2016 0.19 0 - 4 ug/L Final       Reviewed orders with patient. Reviewed health maintenance and updated orders accordingly -       Reviewed and updated as needed this visit by clinical staff   Tobacco  Allergies  Meds   Med Hx  Surg Hx  Fam Hx  Soc Hx          Reviewed  and updated as needed this visit by Provider                       Review of Systems   Constitutional: Negative for chills and fever.   HENT: Positive for ear pain. Negative for congestion, hearing loss and sore throat.    Eyes: Negative for pain and visual disturbance.   Respiratory: Negative for cough and shortness of breath.    Cardiovascular: Negative for chest pain, palpitations and peripheral edema.   Gastrointestinal: Negative for abdominal pain, constipation, diarrhea, heartburn, hematochezia and nausea.   Genitourinary: Negative for dysuria, frequency, genital sores, hematuria, impotence, penile discharge and urgency.   Musculoskeletal: Positive for arthralgias and myalgias. Negative for joint swelling.   Skin: Negative for rash.   Neurological: Positive for weakness and paresthesias. Negative for dizziness and headaches.   Psychiatric/Behavioral: Negative for mood changes. The patient is not nervous/anxious.          OBJECTIVE:   /72 (BP Location: Right arm, Patient Position: Sitting, Cuff Size: Adult Regular)   Pulse 68   Temp 97.5  F (36.4  C) (Temporal)   Resp 16   Wt 84.6 kg (186 lb 8 oz)   SpO2 97%   BMI 27.78 kg/m      Physical Exam  GENERAL: healthy, alert and no distress  NECK: no adenopathy, no asymmetry, masses, or scars and thyroid normal to palpation  RESP: lungs clear to auscultation - no rales, rhonchi or wheezes  CV: regular rate and rhythm, normal S1 S2, no S3 or S4, no murmur, click or rub, no peripheral edema and peripheral pulses strong  ABDOMEN: soft, nontender, no hepatosplenomegaly, no masses and bowel sounds normal  MS: no gross musculoskeletal defects noted, no edema    Diagnostic Test Results:  Labs reviewed in Epic    ASSESSMENT/PLAN:       ICD-10-CM    1. Annual physical exam  Z00.00    2. Screen for colon cancer  Z12.11 Fecal colorectal cancer screen FIT - Future (S+30)     Colonscopy Screening  Referral   3. Screening for hyperlipidemia  Z13.220 Lipid panel  "reflex to direct LDL Non-fasting   4. Preop general physical exam  Z01.818    5. Hypertension goal BP (blood pressure) < 140/90  I10 BASIC METABOLIC PANEL      See other note for preop  Annual topics reviewed  Hypertension controlled, due for BMP, follow-up in 6 months  Cleared for surgery      COUNSELING:   Reviewed preventive health counseling, as reflected in patient instructions    Estimated body mass index is 27.78 kg/m  as calculated from the following:    Height as of 1/19/22: 1.745 m (5' 8.7\").    Weight as of this encounter: 84.6 kg (186 lb 8 oz).         He reports that he has never smoked. He has never used smokeless tobacco.      Counseling Resources:  ATP IV Guidelines  Pooled Cohorts Equation Calculator  FRAX Risk Assessment  ICSI Preventive Guidelines  Dietary Guidelines for Americans, 2010  USDA's MyPlate  ASA Prophylaxis  Lung CA Screening    Yan Lucas MD  [facility]  "

## 2022-08-11 NOTE — PATIENT INSTRUCTIONS
Preparing for Your Surgery  Getting started  A nurse will call you to review your health history and instructions. They will give you an arrival time based on your scheduled surgery time. Please be ready to share:    Your doctor's clinic name and phone number    Your medical, surgical and anesthesia history    A list of allergies and sensitivities    A list of medicines, including herbal treatments and over-the-counter drugs    Whether the patient has a legal guardian (ask how to send us the papers in advance)  Please tell us if you're pregnant--or if there's any chance you might be pregnant. Some surgeries may injure a fetus (unborn baby), so they require a pregnancy test. Surgeries that are safe for a fetus don't always need a test, and you can choose whether to have one.   If you have a child who's having surgery, please ask for a copy of Preparing for Your Child's Surgery.    Preparing for surgery    Within 30 days of surgery: Have a pre-op exam (sometimes called an H&P, or History and Physical). This can be done at a clinic or pre-operative center.  ? If you're having a , you may not need this exam. Talk to your care team.    At your pre-op exam, talk to your care team about all medicines you take. If you need to stop any medicines before surgery, ask when to start taking them again.  ? We do this for your safety. Many medicines can make you bleed too much during surgery. Some change how well surgery (anesthesia) drugs work.    Call your insurance company to let them know you're having surgery. (If you don't have insurance, call [phone].)    Call your clinic if there's any change in your health. This includes signs of a cold or flu (sore throat, runny nose, cough, rash, fever). It also includes a scrape or scratch near the surgery site.    If you have questions on the day of surgery, call your hospital or surgery center.  COVID testing  You may need to be tested for COVID-19 before having  surgery. If so, we will give you instructions.  Eating and drinking guidelines  For your safety: Unless your surgeon tells you otherwise, follow the guidelines below.    Eat and drink as usual until 8 hours before surgery. After that, no food or milk.    Drink clear liquids until 2 hours before surgery. These are liquids you can see through, like water, Gatorade and Propel Water. You may also have black coffee and tea (no cream or milk).    Nothing by mouth within 2 hours of surgery. This includes gum, candy and breath mints.    If you drink alcohol: Stop drinking it the night before surgery.    If your care team tells you to take medicine on the morning of surgery, it's okay to take it with a sip of water.  Preventing infection    Shower or bathe the night before and morning of your surgery. Follow the instructions your clinic gave you. (If no instructions, use regular soap.)    Don't shave or clip hair near your surgery site. We'll remove the hair if needed.    Don't smoke or vape the morning of surgery. You may chew nicotine gum up to 2 hours before surgery. A nicotine patch is okay.  ? Note: Some surgeries require you to completely quit smoking and nicotine. Check with your surgeon.    Your care team will make every effort to keep you safe from infection. We will:  ? Clean our hands often with soap and water (or an alcohol-based hand rub).  ? Clean the skin at your surgery site with a special soap that kills germs.  ? Give you a special gown to keep you warm. (Cold raises the risk of infection.)  ? Wear special hair covers, masks, gowns and gloves during surgery.  ? Give antibiotic medicine, if prescribed. Not all surgeries need antibiotics.  What to bring on the day of surgery    Photo ID and insurance card    Copy of your health care directive, if you have one    Glasses and hearing aides (bring cases)  ? You can't wear contacts during surgery    Inhaler and eye drops, if you use them (tell us about these when  you arrive)    CPAP machine or breathing device, if you use them    A few personal items, if spending the night    If you have . . .  ? A pacemaker, ICD (cardiac defibrillator) or other implant: Bring the ID card.  ? An implanted stimulator: Bring the remote control.  ? A legal guardian: Bring a copy of the certified (court-stamped) guardianship papers.  Please remove any jewelry, including body piercings. Leave jewelry and other valuables at home.  If you're going home the day of surgery    You must have a responsible adult drive you home. They should stay with you overnight as well.    If you don't have someone to stay with you, and you aren't safe to go home alone, we may keep you overnight. Insurance often won't pay for this.  After surgery  If it's hard to control your pain or you need more pain medicine, please call your surgeon's office.  Questions?   If you have any questions for your care team, list them here: _________________________________________________________________________________________________________________________________________________________________________ ____________________________________ ____________________________________ ____________________________________  For informational purposes only. Not to replace the advice of your health care provider. Copyright   2003, 2019 [facility]. All rights reserved. Clinically reviewed by Citlaly Noe MD. [employer] [id number] - REV 07/21.

## 2022-08-11 NOTE — H&P (VIEW-ONLY)
SUBJECTIVE:   CC: Toney Lemos is an 52 year old male who presents for preventative health visit.       Patient has been advised of split billing requirements and indicates understanding: Yes  Healthy Habits:     Getting at least 3 servings of Calcium per day:  Yes    Bi-annual eye exam:  NO    Dental care twice a year:  Yes    Sleep apnea or symptoms of sleep apnea:  Sleep apnea    Diet:  Low salt and Gluten-free/reduced    Frequency of exercise:  2-3 days/week    Duration of exercise:  Less than 15 minutes    Taking medications regularly:  Yes    Medication side effects:  None    PHQ-2 Total Score: 0    Additional concerns today:  No              Today's PHQ-2 Score:   PHQ-2 ( 1999 Pfizer) 8/11/2022   Q1: Little interest or pleasure in doing things 0   Q2: Feeling down, depressed or hopeless 0   PHQ-2 Score 0   PHQ-2 Total Score (12-17 Years)- Positive if 3 or more points; Administer PHQ-A if positive -   Q1: Little interest or pleasure in doing things Not at all   Q2: Feeling down, depressed or hopeless Not at all   PHQ-2 Score 0       Abuse: Current or Past(Physical, Sexual or Emotional)- No  Do you feel safe in your environment? Yes        Social History     Tobacco Use     Smoking status: Never Smoker     Smokeless tobacco: Never Used   Substance Use Topics     Alcohol use: Yes     Alcohol/week: 0.0 standard drinks     Comment: beer or two wkly     If you drink alcohol do you typically have >3 drinks per day or >7 drinks per week? No    Alcohol Use 8/11/2022   Prescreen: >3 drinks/day or >7 drinks/week? No   Prescreen: >3 drinks/day or >7 drinks/week? -   No flowsheet data found.    Last PSA:   PSA   Date Value Ref Range Status   02/10/2016 0.19 0 - 4 ug/L Final       Reviewed orders with patient. Reviewed health maintenance and updated orders accordingly -       Reviewed and updated as needed this visit by clinical staff   Tobacco  Allergies  Meds   Med Hx  Surg Hx  Fam Hx  Soc Hx          Reviewed  and updated as needed this visit by Provider                       Review of Systems   Constitutional: Negative for chills and fever.   HENT: Positive for ear pain. Negative for congestion, hearing loss and sore throat.    Eyes: Negative for pain and visual disturbance.   Respiratory: Negative for cough and shortness of breath.    Cardiovascular: Negative for chest pain, palpitations and peripheral edema.   Gastrointestinal: Negative for abdominal pain, constipation, diarrhea, heartburn, hematochezia and nausea.   Genitourinary: Negative for dysuria, frequency, genital sores, hematuria, impotence, penile discharge and urgency.   Musculoskeletal: Positive for arthralgias and myalgias. Negative for joint swelling.   Skin: Negative for rash.   Neurological: Positive for weakness and paresthesias. Negative for dizziness and headaches.   Psychiatric/Behavioral: Negative for mood changes. The patient is not nervous/anxious.          OBJECTIVE:   /72 (BP Location: Right arm, Patient Position: Sitting, Cuff Size: Adult Regular)   Pulse 68   Temp 97.5  F (36.4  C) (Temporal)   Resp 16   Wt 84.6 kg (186 lb 8 oz)   SpO2 97%   BMI 27.78 kg/m      Physical Exam  GENERAL: healthy, alert and no distress  NECK: no adenopathy, no asymmetry, masses, or scars and thyroid normal to palpation  RESP: lungs clear to auscultation - no rales, rhonchi or wheezes  CV: regular rate and rhythm, normal S1 S2, no S3 or S4, no murmur, click or rub, no peripheral edema and peripheral pulses strong  ABDOMEN: soft, nontender, no hepatosplenomegaly, no masses and bowel sounds normal  MS: no gross musculoskeletal defects noted, no edema    Diagnostic Test Results:  Labs reviewed in Epic    ASSESSMENT/PLAN:       ICD-10-CM    1. Annual physical exam  Z00.00    2. Screen for colon cancer  Z12.11 Fecal colorectal cancer screen FIT - Future (S+30)     Colonscopy Screening  Referral   3. Screening for hyperlipidemia  Z13.220 Lipid panel  "reflex to direct LDL Non-fasting   4. Preop general physical exam  Z01.818    5. Hypertension goal BP (blood pressure) < 140/90  I10 BASIC METABOLIC PANEL      See other note for preop  Annual topics reviewed  Hypertension controlled, due for BMP, follow-up in 6 months  Cleared for surgery      COUNSELING:   Reviewed preventive health counseling, as reflected in patient instructions    Estimated body mass index is 27.78 kg/m  as calculated from the following:    Height as of 1/19/22: 1.745 m (5' 8.7\").    Weight as of this encounter: 84.6 kg (186 lb 8 oz).         He reports that he has never smoked. He has never used smokeless tobacco.      Counseling Resources:  ATP IV Guidelines  Pooled Cohorts Equation Calculator  FRAX Risk Assessment  ICSI Preventive Guidelines  Dietary Guidelines for Americans, 2010  USDA's MyPlate  ASA Prophylaxis  Lung CA Screening    Yan Lucas MD  [facility]  "

## 2022-08-12 PROCEDURE — 82274 ASSAY TEST FOR BLOOD FECAL: CPT

## 2022-08-12 NOTE — TELEPHONE ENCOUNTER
"SHARYN-PROCEDURAL ANTICOAGULATION  MANAGEMENT    ASSESSMENT     Warfarin interruption plan for KANNAN on 2022    Indication for Anticoagulation: Mechanical AVR      placed 2020      Sharyn-Procedure Risk stratification for thromboembolism: low (2012 Chest guidelines)    AVR: 2020 AHA/[facility] Management of Valvular Heart disease guidelines recommend no bridge in bileaflet mechanical AVR patients with NO other risk factors for thrombosis (Afib, previous thromboembolism, hypercoagulable condition, LV systolic dysfunction, older generation valves, or > 1 valve)     RECOMMENDATION       Pre-Procedure:  o Hold warfarin for 5 days, until after procedure startin2022   o No Bridge      Post-Procedure:  o Resume warfarin dose if okay with provider doing procedure on night of procedure, 2022 PM: 12.5mg  o Recheck INR ~ 7 days after resuming warfarin       Plan routed to referring provider for approval  ?   Annamaria Bush [facility]    SUBJECTIVE/OBJECTIVE     Toney TATE Anabc, a 52 year old male    Goal INR Range: 2.0-3.0     Patient bridged in past: No    Wt Readings from Last 3 Encounters:   22 84.6 kg (186 lb 8 oz)   22 85.1 kg (187 lb 9.6 oz)   22 85.3 kg (188 lb)      Ideal body weight: 70 kg (154 lb 5.5 oz)  Adjusted ideal body weight: 75.8 kg (167 lb 3.3 oz)     Estimated body mass index is 27.78 kg/m  as calculated from the following:    Height as of 22: 1.745 m (5' 8.7\").    Weight as of an earlier encounter on 22: 84.6 kg (186 lb 8 oz).    Lab Results   Component Value Date    INR 1.9 (H) 2022    INR 1.8 (H) 2022    INR 3.4 (H) 2022     Lab Results   Component Value Date    HGB 15.6 2020    HCT 45.0 2020     2020     Lab Results   Component Value Date    CR 0.91 2022    CR 0.95 2021    CR 0.98 2020     Estimated Creatinine Clearance: 103 mL/min (based on SCr of 0.91 mg/dL).  "

## 2022-08-13 LAB — HEMOCCULT STL QL IA: NEGATIVE

## 2022-08-15 NOTE — TELEPHONE ENCOUNTER
VM left informing pt of the plan below. I have asked him to call [facility] back if he has any questions or concerns. Damien Colón RN, BSN  [facility] Anticoagulation Team

## 2022-08-24 ENCOUNTER — ANESTHESIA EVENT (OUTPATIENT)
Dept: SURGERY | Facility: CLINIC | Age: 52
End: 2022-08-24
Payer: COMMERCIAL

## 2022-08-24 ENCOUNTER — HOSPITAL ENCOUNTER (OUTPATIENT)
Dept: PHYSICAL THERAPY | Facility: CLINIC | Age: 52
Setting detail: THERAPIES SERIES
Discharge: HOME | End: 2022-08-24
Attending: NEUROLOGICAL SURGERY
Payer: COMMERCIAL

## 2022-08-24 PROCEDURE — 97110 THERAPEUTIC EXERCISES: CPT | Mod: GP | Performed by: PHYSICAL THERAPIST

## 2022-08-24 NOTE — ANESTHESIA PREPROCEDURE EVALUATION
Anesthesia Pre-Procedure Evaluation    Patient: Toney Lemos   MRN: [id number] : 1970        Procedure : Procedure(s):  INJECTION, EPIDURAL, TRANSFORAMINAL APPROACH LUMBAR 3-4          Past Medical History:   Diagnosis Date     Aortic valve disorders      Hypertension       Past Surgical History:   Procedure Laterality Date     CV CORONARY ANGIOGRAM N/A 2020    Procedure: Coronary Angiogram;  Surgeon: Faisal Telles MD;  Location:  HEART CARDIAC CATH LAB     REPLACE VALVE AORTIC N/A 2/3/2020    Procedure: AORTIC VALVE REPLACEMENT WITH [facility] MECHANICAL HEART VALVE 25 MM; ON PUMP WITH TADEO;  Surgeon: Cindy Matthews MD;  Location:  OR      Allergies   Allergen Reactions     No Known Drug Allergies      Seasonal Allergies       Social History     Tobacco Use     Smoking status: Never Smoker     Smokeless tobacco: Never Used   Substance Use Topics     Alcohol use: Yes     Alcohol/week: 0.0 standard drinks     Comment: beer or two wkly      Wt Readings from Last 1 Encounters:   22 84.6 kg (186 lb 8 oz)        Anesthesia Evaluation   Pt has had prior anesthetic. Type: General and MAC.        ROS/MED HX  ENT/Pulmonary:  - neg pulmonary ROS     Neurologic:  - neg neurologic ROS     Cardiovascular: Comment: H/O aortic valve replacement [facility]    (+) hypertension-----Taking blood thinners Previous cardiac testing   Echo: Date: 2021 Results:  1. The left ventricle is normal in structure, function and size.  2. The right ventricle is normal in structure, function and size.  3. s/p mechanical AVR (27mm [facility] [place], implanted 2020). No AI. The mean  AoV pressure gradient is 5.5 mmHg. Leaflets not well seen.  Compared to echo on 2020, no changes.  ______________________________________________________________________________  Left Ventricle  The left ventricle is normal in size. There is normal left ventricular wall  thickness. Left ventricular systolic  function is normal. The visual ejection  fraction is 55-60%. Left ventricular diastolic function is normal. Septal  motion is consistent with post-operative state.     Right Ventricle  The right ventricle is normal in structure, function and size.     Atria  Normal left atrial size. Right atrial size is normal.     Mitral Valve  The mitral valve is normal in structure and function.     Tricuspid Valve  The tricuspid valve is normal in structure and function. The right ventricular  systolic pressure is approximated at 22.8 mmHg plus the right atrial pressure.     Aortic Valve  There is a mechanical aortic valve. s/p mechanical AVR (27mm [facility] [employer] [place],  implanted 2/2020). No AI. The peak AoV pressure gradient is 11.0 mmHg. The  mean AoV pressure gradient is 5.5 mmHg. Leaflets not well seen.     Pulmonic Valve  The pulmonic valve is not well visualized.     Vessels  Normal ascending, transverse (arch), and descending aorta. The inferior vena  cava was normal in size with preserved respiratory variability.     Pericardium  There is no pericardial effusion.     Rhythm  Sinus rhythm was noted.  Stress Test: Date: Results:    ECG Reviewed: Date: 8/26/2021 Results:  SR  Cath: Date: Results:      METS/Exercise Tolerance:     Hematologic:  - neg hematologic  ROS     Musculoskeletal: Comment: Left sided low back pain      GI/Hepatic:  - neg GI/hepatic ROS     Renal/Genitourinary:  - neg Renal ROS     Endo:  - neg endo ROS     Psychiatric/Substance Use:       Infectious Disease:  - neg infectious disease ROS     Malignancy: Comment: SCC (squamous cell carcinoma), arm, left- hand, + deep margin  (+) Malignancy, History of Skin.    Other:  - neg other ROS          Physical Exam    Airway  airway exam normal      Mallampati: II   TM distance: > 3 FB   Neck ROM: full   Mouth opening: > 3 cm    Respiratory Devices and Support         Dental  no notable dental history         Cardiovascular   cardiovascular exam normal       Rhythm  and rate: regular and normal     Pulmonary   pulmonary exam normal        breath sounds clear to auscultation           OUTSIDE LABS:  CBC:   Lab Results   Component Value Date    WBC 6.5 12/02/2020    WBC 8.1 02/13/2020    HGB 15.6 12/02/2020    HGB 13.1 (L) 02/13/2020    HCT 45.0 12/02/2020    HCT 38.8 (L) 02/13/2020     12/02/2020     02/13/2020     BMP:   Lab Results   Component Value Date     08/11/2022     08/26/2021    POTASSIUM 4.0 08/11/2022    POTASSIUM 4.2 08/26/2021    CHLORIDE 109 08/11/2022    CHLORIDE 109 08/26/2021    CO2 27 08/11/2022    CO2 28 08/26/2021    BUN 16 08/11/2022    BUN 11 08/26/2021    CR 0.91 08/11/2022    CR 0.95 08/26/2021     (H) 08/11/2022     (H) 08/26/2021     COAGS:   Lab Results   Component Value Date    PTT 61 (H) 02/03/2020    INR 1.9 (H) 08/11/2022    FIBR 181 (L) 02/03/2020     POC:   Lab Results   Component Value Date     (H) 02/08/2020     HEPATIC:   Lab Results   Component Value Date    ALBUMIN 3.6 02/04/2020    PROTTOTAL 6.1 (L) 02/04/2020    ALT 26 02/04/2020    AST 36 02/04/2020    ALKPHOS 50 02/04/2020    BILITOTAL 1.3 02/04/2020     OTHER:   Lab Results   Component Value Date    PH 7.32 (L) 02/03/2020    LACT 2.1 (H) 02/03/2020    A1C 5.3 01/09/2020    BRIANA 8.7 08/11/2022    PHOS 2.9 02/04/2020    MAG 2.1 02/04/2020    TSH 1.84 02/25/2021    SED 6 10/06/2004       Anesthesia Plan    ASA Status:  3   NPO Status:  NPO Appropriate    Anesthesia Type: MAC.     - Reason for MAC: straight local not clinically adequate   Induction: Intravenous, Propofol.   Maintenance: TIVA.        Consents    Anesthesia Plan(s) and associated risks, benefits, and realistic alternatives discussed. Questions answered and patient/representative(s) expressed understanding.    - Discussed:     - Discussed with:  Patient      - Extended Intubation/Ventilatory Support Discussed: No.      - Patient is DNR/DNI Status: No    Use of blood products  discussed: No .     Postoperative Care    Pain management: Oral pain medications.        Comments:    Other Comments: The risks and benefits of anesthesia, and the alternatives where applicable, have been discussed with the patient, and they wish to proceed.            MACRINA Adams CRNA

## 2022-08-25 ENCOUNTER — HOSPITAL ENCOUNTER (OUTPATIENT)
Dept: GENERAL RADIOLOGY | Facility: CLINIC | Age: 52
Discharge: HOME | End: 2022-08-25
Attending: ANESTHESIOLOGY | Admitting: ANESTHESIOLOGY
Payer: COMMERCIAL

## 2022-08-25 ENCOUNTER — HOSPITAL ENCOUNTER (OUTPATIENT)
Facility: CLINIC | Age: 52
Discharge: HOME | End: 2022-08-25
Attending: ANESTHESIOLOGY | Admitting: ANESTHESIOLOGY
Payer: COMMERCIAL

## 2022-08-25 ENCOUNTER — TELEPHONE (OUTPATIENT)
Dept: ANTICOAGULATION | Facility: CLINIC | Age: 52
End: 2022-08-25

## 2022-08-25 ENCOUNTER — ANESTHESIA (OUTPATIENT)
Dept: SURGERY | Facility: CLINIC | Age: 52
End: 2022-08-25
Payer: COMMERCIAL

## 2022-08-25 VITALS
SYSTOLIC BLOOD PRESSURE: 121 MMHG | OXYGEN SATURATION: 96 % | HEART RATE: 61 BPM | RESPIRATION RATE: 16 BRPM | TEMPERATURE: 97.6 F | HEIGHT: 68 IN | DIASTOLIC BLOOD PRESSURE: 77 MMHG | WEIGHT: 186 LBS | BODY MASS INDEX: 28.19 KG/M2

## 2022-08-25 DIAGNOSIS — M54.16 LUMBAR RADICULOPATHY: ICD-10-CM

## 2022-08-25 PROCEDURE — 250N000011 HC RX IP 250 OP 636: Performed by: ANESTHESIOLOGY

## 2022-08-25 PROCEDURE — 370N000017 HC ANESTHESIA TECHNICAL FEE, PER MIN: Performed by: ANESTHESIOLOGY

## 2022-08-25 PROCEDURE — 64483 NJX AA&/STRD TFRM EPI L/S 1: CPT | Performed by: ANESTHESIOLOGY

## 2022-08-25 PROCEDURE — 64484 NJX AA&/STRD TFRM EPI L/S EA: CPT | Performed by: ANESTHESIOLOGY

## 2022-08-25 PROCEDURE — 64484 NJX AA&/STRD TFRM EPI L/S EA: CPT | Mod: LT | Performed by: ANESTHESIOLOGY

## 2022-08-25 PROCEDURE — 250N000011 HC RX IP 250 OP 636: Performed by: NURSE ANESTHETIST, CERTIFIED REGISTERED

## 2022-08-25 PROCEDURE — 999N000179 XR SURGERY CARM FLUORO LESS THAN 5 MIN W STILLS: Mod: TC

## 2022-08-25 PROCEDURE — 250N000009 HC RX 250: Performed by: NURSE ANESTHETIST, CERTIFIED REGISTERED

## 2022-08-25 PROCEDURE — 64483 NJX AA&/STRD TFRM EPI L/S 1: CPT | Mod: LT | Performed by: ANESTHESIOLOGY

## 2022-08-25 RX ORDER — IOPAMIDOL 612 MG/ML
INJECTION, SOLUTION INTRATHECAL PRN
Status: DISCONTINUED | OUTPATIENT
Start: 2022-08-25 | End: 2022-08-25 | Stop reason: HOSPADM

## 2022-08-25 RX ORDER — LIDOCAINE HYDROCHLORIDE 20 MG/ML
INJECTION, SOLUTION INFILTRATION; PERINEURAL PRN
Status: DISCONTINUED | OUTPATIENT
Start: 2022-08-25 | End: 2022-08-25

## 2022-08-25 RX ORDER — TRIAMCINOLONE ACETONIDE 40 MG/ML
INJECTION, SUSPENSION INTRA-ARTICULAR; INTRAMUSCULAR PRN
Status: DISCONTINUED | OUTPATIENT
Start: 2022-08-25 | End: 2022-08-25 | Stop reason: HOSPADM

## 2022-08-25 RX ORDER — PROPOFOL 10 MG/ML
INJECTION, EMULSION INTRAVENOUS PRN
Status: DISCONTINUED | OUTPATIENT
Start: 2022-08-25 | End: 2022-08-25

## 2022-08-25 RX ORDER — LIDOCAINE 40 MG/G
CREAM TOPICAL
Status: DISCONTINUED | OUTPATIENT
Start: 2022-08-25 | End: 2022-08-25 | Stop reason: HOSPADM

## 2022-08-25 RX ADMIN — PROPOFOL 50 MG: 10 INJECTION, EMULSION INTRAVENOUS at 13:31

## 2022-08-25 RX ADMIN — LIDOCAINE HYDROCHLORIDE 60 MG: 20 INJECTION, SOLUTION INFILTRATION; PERINEURAL at 13:31

## 2022-08-25 RX ADMIN — PROPOFOL 30 MG: 10 INJECTION, EMULSION INTRAVENOUS at 12:49

## 2022-08-25 ASSESSMENT — ACTIVITIES OF DAILY LIVING (ADL): ADLS_ACUITY_SCORE: 35

## 2022-08-25 NOTE — ANESTHESIA POSTPROCEDURE EVALUATION
Patient: Toney Lemos    Procedure: Procedure(s):  Left lumbar 3-4 and left lumbar 4-5 transforaminal epidural injections       Anesthesia Type:  MAC    Note:  Disposition: Outpatient   Postop Pain Control: Uneventful            Sign Out: Well controlled pain   PONV: No   Neuro/Psych: Uneventful            Sign Out: Acceptable/Baseline neuro status   Airway/Respiratory: Uneventful            Sign Out: Acceptable/Baseline resp. status   CV/Hemodynamics: Uneventful            Sign Out: Acceptable CV status   Other NRE: NONE   DID A NON-ROUTINE EVENT OCCUR? No    Event details/Postop Comments:  Pt was happy with anesthesia care.  No complications.  I will follow up with the pt if needed.           Last vitals:  Vitals Value Taken Time   /73 08/25/22 1345   Temp     Pulse 64 08/25/22 1345   Resp     SpO2 97 % 08/25/22 1346   Vitals shown include unvalidated device data.    Electronically Signed By: MACRINA Adams CRNA  August 25, 2022  1:48 PM

## 2022-08-25 NOTE — ANESTHESIA CARE TRANSFER NOTE
Patient: Toney Lemos    Procedure: Procedure(s):  Left lumbar 3-4 and left lumbar 4-5 transforaminal epidural injections       Diagnosis: Lumbar radiculopathy [M54.16]  Diagnosis Additional Information: No value filed.    Anesthesia Type:   MAC     Note:    Oropharynx: oropharynx clear of all foreign objects and spontaneously breathing  Level of Consciousness: drowsy  Oxygen Supplementation: room air    Independent Airway: airway patency satisfactory and stable  Dentition: dentition unchanged  Vital Signs Stable: post-procedure vital signs reviewed and stable  Report to RN Given: handoff report given  Patient transferred to: Phase II    Handoff Report: Identifed the Patient, Identified the Reponsible Provider, Reviewed the pertinent medical history, Discussed the surgical course, Reviewed Intra-OP anesthesia mangement and issues during anesthesia, Set expectations for post-procedure period and Allowed opportunity for questions and acknowledgement of understanding      Vitals:  Vitals Value Taken Time   BP     Temp     Pulse     Resp     SpO2 96 % 08/25/22 1344   Vitals shown include unvalidated device data.    Electronically Signed By: MACRINA Adams CRNA  August 25, 2022  1:45 PM

## 2022-08-25 NOTE — DISCHARGE INSTRUCTIONS

## 2022-08-25 NOTE — INTERVAL H&P NOTE
"I have reviewed the surgical (or preoperative) H&P that is linked to this encounter, and examined the patient. There are no significant changes    Clinical Conditions Present on Arrival:  Clinically Significant Risk Factors Present on Admission                 # Coagulation Defect: home medication list includes an anticoagulant medication   # Overweight: Estimated body mass index is 28.28 kg/m  as calculated from the following:    Height as of this encounter: 1.727 m (5' 8\").    Weight as of this encounter: 84.4 kg (186 lb).       "

## 2022-08-25 NOTE — OP NOTE
PRIMARY PROBLEM: Low back pain and left leg pain    PROCEDURE: Left L3-4 and L4-5  Transforaminal Epidural Steroid Injections with fluoroscopic guidance and contrast.     PROCEDURE DETAILS: After written informed consent was obtained from the patient, the patient was escorted to the procedure room.  The patient was placed in the prone position.  A  time out  was conducted to verify patient identity, procedure to be performed, side, site, allergies and any special requirements.  The skin over the thoracolumbar region was prepped and draped in normal sterile fashion with ChloraPrep. Fluoroscopy was used to identify the neural foramen in AP view and the skin was anesthetized with 2 mL of 1% lidocaine with bicarbonate buffer.  2 separate 22-gauge Quincke needles were advanced into his left L3-4 and L4-5 foramens and walked into the posterior aspect of the foramens in the lateral fluoroscopic image.  Omnipaque contrast was injected in the AP image showing proper spread into his epidural space covering the L3-4 disc segment in the left L4 nerve root.  There is no evidence of any intravascular, intrathecal, intraneural injection.  I then injected 3 cc of solution containing 20 mg triamcinolone with 2 and half cc of preservative-free normal saline into each foramen.  There is good washout into his epidural space.  The patient was monitored with blood pressure and pulse oximetry machines with the assistance of an RN throughout the procedure.  The patient was alert and responsive to questions throughout the procedure.   The patient tolerated the procedure well and was observed in the post-procedural area.  The patient was dismissed without apparent complications.     BLOOD LOSS: < 5 cc    DIAGNOSIS:  1.  Left L4 radiculopathy secondary to a left L3-4 disc herniation    PLAN:  1. Performed left L3-4 and L4-5  transforaminal epidural steroid injections.  2. The patient was instructed to follow-up per Dr. Dotson's instructions.   If is not beneficial I recommend surgical consultation for possible decompression.    Walter Dotson MD  Diplomate of the American Board of Anesthesiology, Pain Medicine

## 2022-08-25 NOTE — TELEPHONE ENCOUNTER
ANTICOAGULATION  MANAGEMENT: Discharge Review    Toney Perezbc chart reviewed for anticoagulation continuity of care    Outpatient surgery/procedure on 8/25 for back injection.    Discharge disposition: Home    Results:    No results for input(s): INR, [id number], F2, ALMWH, AAUFH in the last 168 hours.  Anticoagulation inpatient management:     not applicable     Anticoagulation discharge instructions:     Warfarin dosing: home regimen continued   Bridging: No   INR goal change: No      Medication changes affecting anticoagulation: No    Additional factors affecting anticoagulation: No     PLAN     No adjustment to anticoagulation plan needed    Patient not contacted    No adjustment to Anticoagulation Calendar was required    Milana Hernández RN

## 2022-09-10 DIAGNOSIS — Z95.2 S/P AVR: ICD-10-CM

## 2022-09-12 ENCOUNTER — HOSPITAL ENCOUNTER (OUTPATIENT)
Dept: PHYSICAL THERAPY | Facility: CLINIC | Age: 52
Setting detail: THERAPIES SERIES
Discharge: HOME | End: 2022-09-12
Attending: NEUROLOGICAL SURGERY
Payer: COMMERCIAL

## 2022-09-12 ENCOUNTER — ANTICOAGULATION THERAPY VISIT (OUTPATIENT)
Dept: ANTICOAGULATION | Facility: CLINIC | Age: 52
End: 2022-09-12

## 2022-09-12 ENCOUNTER — LAB (OUTPATIENT)
Dept: LAB | Facility: CLINIC | Age: 52
End: 2022-09-12
Payer: COMMERCIAL

## 2022-09-12 DIAGNOSIS — Z79.01 LONG TERM CURRENT USE OF ANTICOAGULANTS WITH INR GOAL OF 2.0-3.0: ICD-10-CM

## 2022-09-12 DIAGNOSIS — Z95.2 H/O MECHANICAL AORTIC VALVE REPLACEMENT: ICD-10-CM

## 2022-09-12 DIAGNOSIS — Z95.2 S/P AVR (AORTIC VALVE REPLACEMENT): ICD-10-CM

## 2022-09-12 DIAGNOSIS — Z95.2 S/P AVR (AORTIC VALVE REPLACEMENT): Primary | ICD-10-CM

## 2022-09-12 LAB — INR BLD: 2.3 (ref 0.9–1.1)

## 2022-09-12 PROCEDURE — 97110 THERAPEUTIC EXERCISES: CPT | Mod: GP | Performed by: PHYSICAL THERAPIST

## 2022-09-12 PROCEDURE — 85610 PROTHROMBIN TIME: CPT

## 2022-09-12 PROCEDURE — 36416 COLLJ CAPILLARY BLOOD SPEC: CPT

## 2022-09-12 RX ORDER — WARFARIN SODIUM 5 MG/1
TABLET ORAL
Qty: 35 TABLET | Refills: 2 | Status: SHIPPED | OUTPATIENT
Start: 2022-09-12 | End: 2022-09-28

## 2022-09-12 NOTE — TELEPHONE ENCOUNTER
ANTICOAGULATION MANAGEMENT:  Medication Refill    Anticoagulation Summary  As of 9/12/2022    Warfarin maintenance plan:  7.5 mg (5 mg x 1.5) every Mon, Thu; 5 mg (5 mg x 1) all other days   Next INR check:  10/3/2022   Target end date:  Indefinite    Indications    S/P AVR (aortic valve replacement)-25 mm St. Rolf [place]  [Z95.2]  H/O mechanical aortic valve replacement [Z95.2]  Long term current use of anticoagulants with INR goal of 2.0-3.0 [Z79.01]             Anticoagulation Care Providers     Provider Role Specialty Phone number    Moni Cartagena PA-C Referring Cardiovascular & Thoracic Surgery [phone]    LanceYan burgess MD Referring Family Medicine [phone]          Visit with referring provider/group within last year: Yes    [facility] referral signed within last year: Yes    Toney meets all criteria for refill (current [facility] referral, office visit with referring provider/group in last year, lab monitoring up to date or not exceeding 2 weeks overdue). Rx instructions and quantity supplied updated to match patient's current dosing plan. 30 day supply with 2 refills granted per [facility] protocol     Damien Colón RN  Anticoagulation Clinic

## 2022-09-12 NOTE — PROGRESS NOTES
ANTICOAGULATION MANAGEMENT     Toney Lemos 52 year old male is on warfarin with therapeutic INR result. (Goal INR 2.0-3.0)    Recent labs: (last 7 days)     09/12/22  0805   INR 2.3*       ASSESSMENT       Source(s): Chart Review and Patient/Caregiver Call       Warfarin doses taken: Warfarin taken as instructed    Diet: No new diet changes identified    New illness, injury, or hospitalization: No    Medication/supplement changes: None noted    Signs or symptoms of bleeding or clotting: No    Previous INR: Subtherapeutic    Additional findings: None       PLAN     Recommended plan for no diet, medication or health factor changes affecting INR     Dosing Instructions: Continue your current warfarin dose with next INR in 3 weeks       Summary  As of 9/12/2022    Full warfarin instructions:  7.5 mg every Mon, Thu; 5 mg all other days   Next INR check:  10/3/2022             Telephone call with Preet who verbalizes understanding and agrees to plan and who agrees to plan and repeated back plan correctly    Lab visit scheduled    Education provided: Please call back if any changes to your diet, medications or how you've been taking warfarin    Plan made per [facility] anticoagulation protocol    Damien Colón, RN  Anticoagulation Clinic  9/12/2022    _______________________________________________________________________     Anticoagulation Episode Summary     Current INR goal:  2.0-3.0   TTR:  70.8 % (1 y)   Target end date:  Indefinite   Send INR reminders to:  [facility]    Indications    S/P AVR (aortic valve replacement)-25 mm St. Rolf [place]  [Z95.2]  H/O mechanical aortic valve replacement [Z95.2]  Long term current use of anticoagulants with INR goal of 2.0-3.0 [Z79.01]           Comments:           Anticoagulation Care Providers     Provider Role Specialty Phone number    Moni Cartagena PA-C Referring Cardiovascular & Thoracic Surgery [phone]    Yan Lucas MD Referring Family Medicine  [phone]

## 2022-09-21 NOTE — PROGRESS NOTES
[facility] Rehabilitation Service    Outpatient Physical Therapy Discharge Note  Patient: Toney Lemos  : 1970    Beginning/End Dates of Reporting Period:  2022 to 2022    Referring Provider: Dr Salazar    Therapy Diagnosis: back and leg pain      Client Self Report: continues to make progress but the left knee and quad still feel weak , numb feeling has resolved in leg has, back is fine just alittle achy    Objective Measurements:  Objective Measure: pain 2-3/10 back 2-5/10 left leg , ava low , JOHN 22 at al  Details: currenty ava low JOHN 4 , pain 0-1/10 left leg pain resolved but leg still weak 50% normal         patient seen for 3 Rx sessions for instruction in exercises in clinic and HEP at last Rx he was completing the following   1 avoid flexion , 2 prone or standing extension every 2 hours , 3 supine 90/90 hold 10 x 5 , 4 body mechanics, 3 core strengthening bridges , SLR, hip abduction , prone knee flexion , hands/knees flexion / extension 10x goal 30, progress as able to single leg heel raises and mini squatts, added blue to progress to gray band standing TKE , leg press           Goals:  Goal Identifier  1   Goal Description  instruction in HEP and compliant with it 5 of 7 days to improve quality of life   Target Date  2022   Date Met      Progress (detail required for progress note):       Goal Identifier  2   Goal Description  patient to have resolved left leg pain and overall decrease in pain currently 2-5/10 goal is 0-2/10   Target Date     Date Met   2022   Progress (detail required for progress note):       Goal Identifier  3   Goal Description  patient to have improved tolerance to activity currently JOHN 22 goal is less than 20   Target Date  2022   Date Met      Progress (detail required for progress note):       Plan:  Discharge from therapy.    Discharge:    Reason for Discharge:  Patient has met all goals.    Equipment Issued: none    Discharge Plan: Patient to continue home program.

## 2022-09-26 DIAGNOSIS — Z95.2 S/P AVR: ICD-10-CM

## 2022-09-28 RX ORDER — WARFARIN SODIUM 5 MG/1
TABLET ORAL
Qty: 100 TABLET | Refills: 1 | Status: SHIPPED | OUTPATIENT
Start: 2022-09-28 | End: 2023-04-14

## 2022-09-28 NOTE — TELEPHONE ENCOUNTER
"Requested Prescriptions   Pending Prescriptions Disp Refills    warfarin ANTICOAGULANT (COUMADIN) 5 MG tablet [Pharmacy Med Name: WARFARIN SODIUM 5MG TABS] 35 tablet 1     Sig: TAKE ONE AND ONE-HALF TABLETS ON THURSDAYS ONLY. TAKE 1 TABLET ONCE DAILY ALL OTHER DAYS OR AS DIRECTED BY THE COUMADIN CLINIC        Vitamin K Antagonists Failed - 9/26/2022  8:47 AM        Failed - INR is within goal in the past 6 weeks     Confirm INR is within goal in the past 6 weeks.     Recent Labs   Lab Test 09/12/22  0805   INR 2.3*                       Passed - Recent (12 mo) or future (30 days) visit within the authorizing provider's specialty     Patient has had an office visit with the authorizing provider or a provider within the authorizing providers department within the previous 12 mos or has a future within next 30 days. See \"Patient Info\" tab in inbasket, or \"Choose Columns\" in Meds & Orders section of the refill encounter.              Passed - Medication is active on med list        Passed - Patient is 18 years of age or older               Alisha Ulloa RN  "

## 2022-09-28 NOTE — TELEPHONE ENCOUNTER
ANTICOAGULATION MANAGEMENT:  Medication Refill    Anticoagulation Summary  As of 9/12/2022    Warfarin maintenance plan:  7.5 mg (5 mg x 1.5) every Mon, Thu; 5 mg (5 mg x 1) all other days   Next INR check:  10/3/2022   Target end date:  Indefinite    Indications    S/P AVR (aortic valve replacement)-25 mm St. Rolf [place]  [Z95.2]  H/O mechanical aortic valve replacement [Z95.2]  Long term current use of anticoagulants with INR goal of 2.0-3.0 [Z79.01]             Anticoagulation Care Providers     Provider Role Specialty Phone number    Moni Cartagena PA-C Referring Cardiovascular & Thoracic Surgery [phone]    LanceYan burgess MD Referring Family Medicine [phone]          Visit with referring provider/group within last year: Yes    [facility] referral signed within last year: Yes    Toney meets all criteria for refill (current [facility] referral, office visit with referring provider/group in last year, lab monitoring up to date or not exceeding 2 weeks overdue). Rx instructions and quantity supplied updated to match patient's current dosing plan. Warfarin 90 day supply with 1 refill granted per [facility] protocol     Cathleen Pacheco, RN  Anticoagulation Clinic

## 2022-10-03 ENCOUNTER — LAB (OUTPATIENT)
Dept: LAB | Facility: CLINIC | Age: 52
End: 2022-10-03
Payer: COMMERCIAL

## 2022-10-03 ENCOUNTER — ANTICOAGULATION THERAPY VISIT (OUTPATIENT)
Dept: ANTICOAGULATION | Facility: CLINIC | Age: 52
End: 2022-10-03

## 2022-10-03 DIAGNOSIS — Z95.2 H/O MECHANICAL AORTIC VALVE REPLACEMENT: ICD-10-CM

## 2022-10-03 DIAGNOSIS — Z79.01 LONG TERM CURRENT USE OF ANTICOAGULANTS WITH INR GOAL OF 2.0-3.0: ICD-10-CM

## 2022-10-03 DIAGNOSIS — Z95.2 S/P AVR (AORTIC VALVE REPLACEMENT): ICD-10-CM

## 2022-10-03 DIAGNOSIS — Z95.2 S/P AVR (AORTIC VALVE REPLACEMENT): Primary | ICD-10-CM

## 2022-10-03 LAB — INR BLD: 3 (ref 0.9–1.1)

## 2022-10-03 PROCEDURE — 36416 COLLJ CAPILLARY BLOOD SPEC: CPT

## 2022-10-03 PROCEDURE — 85610 PROTHROMBIN TIME: CPT

## 2022-10-03 NOTE — PROGRESS NOTES
ANTICOAGULATION MANAGEMENT     Toney Lemos 52 year old male is on warfarin with therapeutic INR result. (Goal INR 2.0-3.0)    Recent labs: (last 7 days)     10/03/22  0744   INR 3.0*       ASSESSMENT       Source(s): Chart Review and Patient/Caregiver Call       Warfarin doses taken: Warfarin taken as instructed    Diet: No new diet changes identified    New illness, injury, or hospitalization: No    Medication/supplement changes: None noted    Signs or symptoms of bleeding or clotting: No    Previous INR: Therapeutic last visit; previously outside of goal range    Additional findings: None       PLAN     Recommended plan for no diet, medication or health factor changes affecting INR     Dosing Instructions: Continue your current warfarin dose with next INR in 4 weeks       Summary  As of 10/3/2022    Full warfarin instructions:  7.5 mg every Mon, Thu; 5 mg all other days   Next INR check:  10/31/2022             Telephone call with Preet who verbalizes understanding and agrees to plan    Lab visit scheduled    Education provided: Please call back if any changes to your diet, medications or how you've been taking warfarin    Plan made per [facility] anticoagulation protocol    Cathleen Pacheco, RN  Anticoagulation Clinic  10/3/2022    _______________________________________________________________________     Anticoagulation Episode Summary     Current INR goal:  2.0-3.0   TTR:  70.8 % (1 y)   Target end date:  Indefinite   Send INR reminders to:  [facility]    Indications    S/P AVR (aortic valve replacement)-25 mm St. Rolf [place]  [Z95.2]  H/O mechanical aortic valve replacement [Z95.2]  Long term current use of anticoagulants with INR goal of 2.0-3.0 [Z79.01]           Comments:           Anticoagulation Care Providers     Provider Role Specialty Phone number    Moni Cartagena PA-C Referring Cardiovascular & Thoracic Surgery [phone]    Yan Lucas MD Referring [facility] Medicine [phone]

## 2022-10-18 ENCOUNTER — VIRTUAL VISIT (OUTPATIENT)
Dept: INTERNAL MEDICINE | Facility: CLINIC | Age: 52
End: 2022-10-18
Payer: COMMERCIAL

## 2022-10-18 DIAGNOSIS — E78.2 MIXED HYPERLIPIDEMIA: ICD-10-CM

## 2022-10-18 DIAGNOSIS — U07.1 INFECTION DUE TO 2019 NOVEL CORONAVIRUS: Primary | ICD-10-CM

## 2022-10-18 DIAGNOSIS — I10 HYPERTENSION GOAL BP (BLOOD PRESSURE) < 140/90: ICD-10-CM

## 2022-10-18 DIAGNOSIS — Z79.01 LONG TERM CURRENT USE OF ANTICOAGULANTS WITH INR GOAL OF 2.0-3.0: ICD-10-CM

## 2022-10-18 PROCEDURE — 99213 OFFICE O/P EST LOW 20 MIN: CPT | Mod: CS | Performed by: INTERNAL MEDICINE

## 2022-10-18 NOTE — PROGRESS NOTES
Toney is a 52 year old male being evaluated via a billable phone visit, and would like to be contacted via the following  Cell number on file:    Telephone Information:   Mobile [phone]       ASSESSMENT and PLAN:  1. Infection due to 2019 novel coronavirus  Preet reports that he had a negative test for COVID 3 days ago, but did notice cough, GI upset.  He did have a positive test for COVID today.  He has not had fevers or respiratory distress.  He had 2 previous during a COVID vaccinations.  Wife tested positive for COVID 3 days ago and has somewhat more severe symptoms.  He wonders about antiviral therapy.  He reports that current symptoms are mild and he actually feels better than yesterday.  He has no respiratory distress.  He notes some nausea but not vomiting or diarrhea.  He has multiple potential drug interactions with use of Paxlovid.  Since his symptoms are relatively mild, and risk for hospitalization seems very low, I would advise supportive measures rather than antiviral therapy    2. Hypertension goal BP (blood pressure) < 140/90  On dose amlodipine and lisinopril    3. Long term current use of anticoagulants with INR goal of 2.0-3.0  On warfarin due to valve replacement    4. Mixed hyperlipidemia  On atorvastatin    Patient Instructions   1. Paxlovid,would have potential for drug interactions with warfarin, amlodipine, and atorvastatin.  Since your symptoms are relatively mild, I would advise supportive treatment rather than antiviral treatment at this time.  If you get worse over the next 2 days, we could reconsider and start an antiviral agent then.    2.  Tylenol for muscle aches, Robitussin or similar as needed for cough.    3.  Self quarantine at home for 5 days.  If feeling better at that time, you could return to work wearing a mask.    4.  Influenza vaccine advised for the future.  Bivalent COVID-vaccine advised in 3 months          CHIEF COMPLAINT:  Chief Complaint   Patient presents with  "    Recheck Medication     COVID TREATMENT       HPI    HISTORY OF PRESENT ILLNESS:  Toney is a 52 year old male contacting the clinic today via phone for evaluation of a recent positive test for COVID-19.  He reports that he started to feel ill on 10/15 with cough, and GI upset.  He has not had fevers or respiratory distress.  Wife also became ill at that time.  He tested negative for COVID on that date, but wife tested positive.  He did a second test this morning prior to considering going to work.  He now tests positive.  He is employed as a .  He states he actually feels better today than yesterday.    He is on several medications that would have potential for drug interactions with Paxlovid including amlodipine, atorvastatin, and warfarin.    REVIEW OF SYSTEMS:  He otherwise had been feeling well    PFSH:  Social History   .  Wife also has tested positive for COVID.  Social History Narrative     Not on file         TOBACCO USE:  History   Smoking Status     Never   Smokeless Tobacco     Never       VITALS:  There were no vitals filed for this visit.  There were no vitals taken for this visit. Estimated body mass index is 28.28 kg/m  as calculated from the following:    Height as of 8/25/22: 1.727 m (5' 8\").    Weight as of 8/25/22: 84.4 kg (186 lb).    PHYSICAL EXAM:  (observations via Phone)  Alert pleasant talkative man with appropriate affect.  He has an intermittent cough, but does not sound in respiratory distress.  Affect is appropriate    MEDICATIONS  Current Outpatient Medications   Medication Sig Dispense Refill     acetaminophen (TYLENOL) 325 MG tablet Take 2 tablets (650 mg) by mouth every 4 hours as needed for mild pain 30 tablet 0     amLODIPine (NORVASC) 2.5 MG tablet Take 1 tablet (2.5 mg) by mouth daily 90 tablet 3     amoxicillin (AMOXIL) 500 MG capsule Take 4 capsules (2 gms) 60 minutes prior to dental work. 4 capsule 1     aspirin (ASA) 81 MG EC tablet Take 1 tablet " (81 mg) by mouth daily 90 tablet 3     atorvastatin (LIPITOR) 40 MG tablet Take 1 tablet (40 mg) by mouth daily 90 tablet 3     lisinopril (ZESTRIL) 5 MG tablet Take 1 tablet (5 mg) by mouth daily 90 tablet 3     loratadine (CLARITIN) 10 MG tablet Take 10 mg by mouth daily       metoprolol succinate ER (TOPROL-XL) 50 MG 24 hr tablet Take 1 tablet (50 mg) by mouth 2 times daily 180 tablet 3     warfarin ANTICOAGULANT (COUMADIN) 5 MG tablet Take 7.5 mg on Mon and Thurs and 5 mg all other days, or as directed by the Coumadin Clinic 100 tablet 1         Recent Labs   Lab Test 08/11/22  0816 08/26/21  1205 02/25/21  0803 12/02/20  0902   HGB  --   --   --  15.6   WBC  --   --   --  6.5    139  --  138   POTASSIUM 4.0 4.2  --  4.2   CR 0.91 0.95  --  0.98   TSH  --   --  1.84  --      Lab Results   Component Value Date    [id number] Negative 09/22/2021     Lab Results   Component Value Date    CHOL 186 08/11/2022    CHOL 200 09/03/2020     New orders: No orders of the defined types were placed in this encounter.      Patient would like to receive their AVS by Yandel Acosta  [facility]    Phone Start Time: 11:05 AM  Phone End time:  11:20 AM  Conversation plus orders:  18minutes  Dictation time:  3 minutes    The visit lasted a total of 21 minutes

## 2022-10-18 NOTE — PATIENT INSTRUCTIONS
Paxlovid,would have potential drug interactions with warfarin, amlodipine, and atorvastatin.  Since your symptoms are relatively mild, I would advise supportive treatment rather than antiviral treatment at this time.  If you get worse over the next 2 days, we could reconsider and start an antiviral agent then.    2.  Tylenol for muscle aches, Robitussin or similar as needed for cough.    3.  Self quarantine at home for 5 days.  If feeling better at that time, you could return to work wearing a mask.    4.  Influenza vaccine advised for the future.  Bivalent COVID-vaccine advised in 3 months

## 2022-10-28 ENCOUNTER — LAB (OUTPATIENT)
Dept: LAB | Facility: CLINIC | Age: 52
End: 2022-10-28
Payer: COMMERCIAL

## 2022-10-28 ENCOUNTER — ANTICOAGULATION THERAPY VISIT (OUTPATIENT)
Dept: ANTICOAGULATION | Facility: CLINIC | Age: 52
End: 2022-10-28

## 2022-10-28 DIAGNOSIS — Z95.2 S/P AVR (AORTIC VALVE REPLACEMENT): ICD-10-CM

## 2022-10-28 DIAGNOSIS — Z95.2 S/P AVR (AORTIC VALVE REPLACEMENT): Primary | ICD-10-CM

## 2022-10-28 DIAGNOSIS — Z79.01 LONG TERM CURRENT USE OF ANTICOAGULANTS WITH INR GOAL OF 2.0-3.0: ICD-10-CM

## 2022-10-28 DIAGNOSIS — Z95.2 H/O MECHANICAL AORTIC VALVE REPLACEMENT: ICD-10-CM

## 2022-10-28 LAB — INR BLD: 2.4 (ref 0.9–1.1)

## 2022-10-28 PROCEDURE — 36416 COLLJ CAPILLARY BLOOD SPEC: CPT

## 2022-10-28 PROCEDURE — 85610 PROTHROMBIN TIME: CPT

## 2022-10-28 NOTE — PROGRESS NOTES
ANTICOAGULATION MANAGEMENT     Toney Lemos 52 year old male is on warfarin with therapeutic INR result. (Goal INR 2.0-3.0)    Recent labs: (last 7 days)     10/28/22  1002   INR 2.4*       ASSESSMENT       Source(s): Chart Review and Patient/Caregiver Call       Warfarin doses taken: Warfarin taken as instructed    Diet: No new diet changes identified    New illness, injury, or hospitalization: No    Medication/supplement changes: None noted    Signs or symptoms of bleeding or clotting: No    Previous INR: Therapeutic last 2(+) visits    Additional findings: None       PLAN     Recommended plan for no diet, medication or health factor changes affecting INR     Dosing Instructions: Continue your current warfarin dose with next INR in 5 weeks       Summary  As of 10/28/2022    Full warfarin instructions:  7.5 mg every Mon, Thu; 5 mg all other days; Starting 10/28/2022   Next INR check:  12/2/2022             Telephone call with Preet who verbalizes understanding and agrees to plan    Lab visit scheduled    Education provided:     Please call back if any changes to your diet, medications or how you've been taking warfarin    Plan made per [facility] anticoagulation protocol    Cathleen Pacheco, RN  Anticoagulation Clinic  10/28/2022    _______________________________________________________________________     Anticoagulation Episode Summary     Current INR goal:  2.0-3.0   TTR:  70.8 % (1 y)   Target end date:  Indefinite   Send INR reminders to:  [facility]    Indications    S/P AVR (aortic valve replacement)-25 mm St. Rolf [place]  [Z95.2]  H/O mechanical aortic valve replacement [Z95.2]  Long term current use of anticoagulants with INR goal of 2.0-3.0 [Z79.01]           Comments:           Anticoagulation Care Providers     Provider Role Specialty Phone number    Moni Cartagena PA-C Referring Cardiovascular & Thoracic Surgery [phone]    Yan Lucas MD Referring [facility] [phone]

## 2022-11-07 ENCOUNTER — MYC MEDICAL ADVICE (OUTPATIENT)
Dept: FAMILY MEDICINE | Facility: CLINIC | Age: 52
End: 2022-11-07

## 2022-11-09 ENCOUNTER — TELEPHONE (OUTPATIENT)
Dept: GASTROENTEROLOGY | Facility: CLINIC | Age: 52
End: 2022-11-09

## 2022-11-09 ENCOUNTER — TELEPHONE (OUTPATIENT)
Dept: CARDIOLOGY | Facility: CLINIC | Age: 52
End: 2022-11-09

## 2022-11-09 NOTE — TELEPHONE ENCOUNTER
As per my last virtual clinic evaluation earlier this year patient does not have any other risk factors like atrial fibrillation, prior history of systemic embolism, congestive heart failure and he has normal LV function on echocardiogram last year.  In the absence of any additional risk factors with new generation mechanical aortic valve he does not need bridging for temporary short duration interruption of anticoagulation.  I recommend patient to work with Coumadin clinic in terms of optimal timing of discontinuation of Coumadin (usually 4 to 5 days prior to the procedure) and then subsequent resumption of Coumadin after the procedure.      Thank you  Evin

## 2022-11-09 NOTE — TELEPHONE ENCOUNTER
Patient sent [employer] message with number to call for getting colonoscopy scheduled.

## 2022-11-09 NOTE — TELEPHONE ENCOUNTER
Received information from patient via [employer], he needs to know about holding coumadin plan for an upcoming Colonoscopy on Nov 18th.    He has a mechanical Aortic Valve, surgery was in 2019.    Requested input from Dr. Mederos for hold of Warfarin and any need for bridging for this process.    Angela Akers RN on 11/9/2022 at 12:39 PM

## 2022-11-09 NOTE — TELEPHONE ENCOUNTER
Screening Questions  BLUE  KIND OF PREP RED  LOCATION [review exclusion criteria] GREEN  SEDATION TYPE    Y  Are you active on mychart?   Yan Lucas MD Ordering/Referring Provider?    MEDICA  What type of coverage do you have?  Y - 10/17/2022 Have you had a positive covid test in the last 90 days?     27.4  1. BMI  [BMI 40+ - review exclusion criteria]    SELF   2. Are you able to give consent for your medical care? [IF NO,RN REVIEW]        N  3. Are you taking any prescription pain medications on a routine schedule?        N 3a. EXTENDED PREP What kind of prescription?     N 4. Do you have any chemical dependencies such as alcohol, street drugs, or methadone?    N 5. Do you have any history of post-traumatic stress syndrome, severe anxiety or history of psychosis?      **If yes 3- 5 , please schedule with MAC sedation.**          IF YES TO ANY 6 - 10 - HOSPITAL SETTING ONLY.     N 6.   Do you need assistance transferring?     N 7.   Have you had a heart or lung transplant?    N 8.   Are you currently on dialysis?   N 9.   Do you use daily home oxygen?   N 10. Do you take nitroglycerin?   10a. N If yes, how often?     11. [FEMALES]   Are you currently pregnant?     11a.  If yes, how many weeks? [ Greater than 12 weeks, OR NEEDED]    N 12. Do you have Pulmonary Hypertension?             *NEED PAC APPT AT [facility]*     N 13. [review exclusion criteria]  Do you have any implantable devices in your body (pacemaker, defib, LVAD)?    N 14. In the past 6 months, have you had any heart related issues including cardiomyopathy or heart attack?     N 14a. If yes, did it require cardiac stenting if so when?     N 15. Have you had a stroke or Transient ischemic attack (TIA - aka  mini stroke ) within 6 months?      Y - KRYSTIAN  16. Do you have mod to severe Obstructive Sleep Apnea?  [Hospital only - Ok at [place]]    N 17. Do you have SEVERE AND UNCONTROLLED asthma?              *NEED PAC APPT AT [facility]*     Y - BLOOD  "THINNER  18. Are you currently taking any blood thinners?     18a. If yes, inform patient to \"follow up w/ ordering provider for bridging instructions.\"    N 19. Do you take the medication Phentermine?    19a. If yes, \"Hold for 7 days before procedure.  Please consult your prescribing provider if you have questions about holding this medication.\"     N  20. Do you have chronic kidney disease?      N  21. Do you have a diagnosis of diabetes?     N  22. On a regular basis do you go 3-5 days between bowel movements?     23. Preferred LOCAL Pharmacy for Pre Prescription    [ LIST ONLY ONE PHARMACY]     [facility] - [place], [place] - [address]        - CLOSING REMINDERS -    Informed patient they will need an adult    Cannot take any type of public or medical transportation alone    Conscious Sedation- Needs  for 6 hours after the procedure       MAC/General-Needs  for 24 hours after procedure    Pre-Procedure Covid test to be completed [[facility] PCR Testing Required]    Confirmed Nurse will call to complete assessment       - SCHEDULING DETAILS -        BARBARA   Surgeon   11/18/2022  Date of Procedure  PH  Location  MAC  Sedation Type     N PAC / Pre-op Required       Type of Procedure Scheduled  Lower Endoscopy [Colonoscopy]      Which Colonoscopy Prep was Sent?     STANDARD GOLYTELY-If you answer yes to questions #8, #20, #21      Additional comments:  N      "

## 2022-11-09 NOTE — TELEPHONE ENCOUNTER
Writer has sent a [employer] message to Preet with the information  from Dr. Mederos.  Instructed Preet that we don't need for him to have bridging with Lovenox while the warfarin is on hold with the type of valve he has.  Also instructed him to work with the [facility] pharmacy staff, like he usually does for his INRs and doses of warfarin.   Informed him that the hold is typically about 4 - 5 days prior to colonoscopy.    Invited Preet to contact us with further questions, and let him know that Dr. Mederos would be happy to speak directly with the GI doctor if that is requested by them.   I told Preet we could help get that set up.    Angela Akers RN on 11/9/2022 at 12:59 PM

## 2022-11-10 ENCOUNTER — TELEPHONE (OUTPATIENT)
Dept: GASTROENTEROLOGY | Facility: CLINIC | Age: 52
End: 2022-11-10

## 2022-11-10 NOTE — TELEPHONE ENCOUNTER
Caller:     Procedure: COLON    Date, Location, and Surgeon of Procedure Cancelled: 11/18  [facility]    Ordering Provider:LIBRADO    Reason for cancel (please be detailed, any staff messages or encounters to note?): WANTED MONDAY        Rescheduled: Y     If rescheduled:    Date: 1/30   Location:    Prep Resent: N(changes to prep?)   Covid Test Rescheduled: HOME   Note any change or update to original order/sedation: BARBARA TO LIBRADO

## 2022-11-20 ENCOUNTER — HEALTH MAINTENANCE LETTER (OUTPATIENT)
Age: 52
End: 2022-11-20

## 2022-11-29 ENCOUNTER — TELEPHONE (OUTPATIENT)
Dept: GASTROENTEROLOGY | Facility: CLINIC | Age: 52
End: 2022-11-29

## 2022-11-29 NOTE — TELEPHONE ENCOUNTER
Caller: Preet    Procedure: Colon    Date, Location, and Surgeon of Procedure Cancelled: 1/30/23 PH Lance-[facility]    Ordering Provider:Lance    Reason for cancel (please be detailed, any staff messages or encounters to note?): Patient        Rescheduled: Yes     If rescheduled:    Date: 3/27/23   Location:    Prep Resent: no changes (changes to prep?)   Covid Test Rescheduled: NO   Note any change or update to original order/sedation: None

## 2022-12-02 ENCOUNTER — ANTICOAGULATION THERAPY VISIT (OUTPATIENT)
Dept: ANTICOAGULATION | Facility: CLINIC | Age: 52
End: 2022-12-02

## 2022-12-02 ENCOUNTER — LAB (OUTPATIENT)
Dept: LAB | Facility: CLINIC | Age: 52
End: 2022-12-02
Payer: COMMERCIAL

## 2022-12-02 DIAGNOSIS — Z95.2 H/O MECHANICAL AORTIC VALVE REPLACEMENT: ICD-10-CM

## 2022-12-02 DIAGNOSIS — Z79.01 LONG TERM CURRENT USE OF ANTICOAGULANTS WITH INR GOAL OF 2.0-3.0: ICD-10-CM

## 2022-12-02 DIAGNOSIS — Z95.2 S/P AVR (AORTIC VALVE REPLACEMENT): Primary | ICD-10-CM

## 2022-12-02 DIAGNOSIS — Z95.2 S/P AVR (AORTIC VALVE REPLACEMENT): ICD-10-CM

## 2022-12-02 LAB — INR BLD: 1.7 (ref 0.9–1.1)

## 2022-12-02 PROCEDURE — 85610 PROTHROMBIN TIME: CPT

## 2022-12-02 PROCEDURE — 36416 COLLJ CAPILLARY BLOOD SPEC: CPT

## 2022-12-02 NOTE — PROGRESS NOTES
ANTICOAGULATION MANAGEMENT     Toney Lemos 52 year old male is on warfarin with subtherapeutic INR result. (Goal INR 2.0-3.0)    Recent labs: (last 7 days)     12/02/22  0805   INR 1.7*       ASSESSMENT       Source(s): Chart Review    Previous INR was Therapeutic last 2(+) visits    Medication, diet, health changes since last INR chart reviewed; none identified      I left a detailed voicemail with the orders reflected in flowsheet. I have also requested a call back if there have been any missed doses, concerns, illness, fever, or if there have been any changes in medications, activity level, or diet         PLAN     Recommended plan for no diet, medication or health factor changes affecting INR     Dosing Instructions: booster dose then continue your current warfarin dose with next INR in 1 week       Summary  As of 12/2/2022    Full warfarin instructions:  12/2: 10 mg; Otherwise 7.5 mg every Mon, Thu; 5 mg all other days; Starting 12/2/2022   Next INR check:  12/9/2022             Detailed voice message left for Preet with dosing instructions and follow up date.     Contact [phone]  to schedule and with any changes, questions or concerns.     Education provided:     Please call back if any changes to your diet, medications or how you've been taking warfarin    Goal range and lab monitoring: goal range and significance of current result, Importance of therapeutic range and Importance of following up at instructed interval    Dietary considerations: importance of consistent vitamin K intake, impact of vitamin K foods on INR and vitamin K content of foods    Symptom monitoring: monitoring for clotting signs and symptoms, monitoring for stroke signs and symptoms and when to seek medical attention/emergency care    Plan made per [facility] anticoagulation protocol    Cathleen Pacheco, RN  Anticoagulation Clinic  12/2/2022    _______________________________________________________________________     Anticoagulation  Episode Summary     Current INR goal:  2.0-3.0   TTR:  66.7 % (1 y)   Target end date:  Indefinite   Send INR reminders to:  MAIA FISHER    Indications    S/P AVR (aortic valve replacement)-25 mm St. Rolf [place]  [Z95.2]  H/O mechanical aortic valve replacement [Z95.2]  Long term current use of anticoagulants with INR goal of 2.0-3.0 [Z79.01]           Comments:           Anticoagulation Care Providers     Provider Role Specialty Phone number    Moni Cartagena PA-C Referring Cardiovascular & Thoracic Surgery [phone]    [facility] Referring [facility] [phone]

## 2022-12-12 ENCOUNTER — TELEPHONE (OUTPATIENT)
Dept: ANTICOAGULATION | Facility: CLINIC | Age: 52
End: 2022-12-12

## 2022-12-22 ENCOUNTER — LAB (OUTPATIENT)
Dept: LAB | Facility: CLINIC | Age: 52
End: 2022-12-22
Payer: COMMERCIAL

## 2022-12-22 ENCOUNTER — ANTICOAGULATION THERAPY VISIT (OUTPATIENT)
Dept: ANTICOAGULATION | Facility: CLINIC | Age: 52
End: 2022-12-22

## 2022-12-22 DIAGNOSIS — Z79.01 LONG TERM CURRENT USE OF ANTICOAGULANTS WITH INR GOAL OF 2.0-3.0: ICD-10-CM

## 2022-12-22 DIAGNOSIS — Z95.2 S/P AVR (AORTIC VALVE REPLACEMENT): ICD-10-CM

## 2022-12-22 DIAGNOSIS — Z95.2 S/P AVR (AORTIC VALVE REPLACEMENT): Primary | ICD-10-CM

## 2022-12-22 DIAGNOSIS — Z95.2 H/O MECHANICAL AORTIC VALVE REPLACEMENT: ICD-10-CM

## 2022-12-22 LAB — INR BLD: 2.1 (ref 0.9–1.1)

## 2022-12-22 PROCEDURE — 36416 COLLJ CAPILLARY BLOOD SPEC: CPT

## 2022-12-22 PROCEDURE — 85610 PROTHROMBIN TIME: CPT

## 2022-12-22 NOTE — PROGRESS NOTES
ANTICOAGULATION MANAGEMENT     Toney Lemos 52 year old male is on warfarin with therapeutic INR result. (Goal INR 2.0-3.0)    Recent labs: (last 7 days)     12/22/22  0900   INR 2.1*       ASSESSMENT       Source(s): Chart Review and Patient/Caregiver Call       Warfarin doses taken: Warfarin taken as instructed    Diet: No new diet changes identified    New illness, injury, or hospitalization: No    Medication/supplement changes: None noted    Signs or symptoms of bleeding or clotting: No    Previous INR: Subtherapeutic    Additional findings: None       PLAN     Recommended plan for no diet, medication or health factor changes affecting INR     Dosing Instructions: Continue your current warfarin dose with next INR in 4 weeks       Summary  As of 12/22/2022    Full warfarin instructions:  7.5 mg every Mon, Thu; 5 mg all other days   Next INR check:  1/19/2023             Telephone call with Preet who verbalizes understanding and agrees to plan and who agrees to plan and repeated back plan correctly    Lab visit scheduled    Education provided:     Please call back if any changes to your diet, medications or how you've been taking warfarin    Plan made per [facility] anticoagulation protocol    Damien Colón, RN  Anticoagulation Clinic  12/22/2022    _______________________________________________________________________     Anticoagulation Episode Summary     Current INR goal:  2.0-3.0   TTR:  62.6 % (1 y)   Target end date:  Indefinite   Send INR reminders to:  [facility]    Indications    S/P AVR (aortic valve replacement)-25 mm St. Rolf [place]  [Z95.2]  H/O mechanical aortic valve replacement [Z95.2]  Long term current use of anticoagulants with INR goal of 2.0-3.0 [Z79.01]           Comments:           Anticoagulation Care Providers     Provider Role Specialty Phone number    Moni Cartagena PA-C Referring Cardiovascular & Thoracic Surgery [phone]    Yan Lucas MD Referring Family  Medicine [phone]

## 2023-01-03 DIAGNOSIS — Z95.2 S/P AVR: ICD-10-CM

## 2023-01-03 NOTE — TELEPHONE ENCOUNTER
Received refill request for:  ASA     Future Appointments   Date Time Provider Department Center   1/19/2023  7:45 AM PH INR LAB [facility]   4/12/2023  9:00 AM Evin Mederos MD [facility] Cardiology Refill Guideline reviewed.  Medication meets criteria for refill.    Radha Petersen RN, BSN  01/03/23 at 2:47 PM

## 2023-01-19 ENCOUNTER — ANTICOAGULATION THERAPY VISIT (OUTPATIENT)
Dept: ANTICOAGULATION | Facility: CLINIC | Age: 53
End: 2023-01-19

## 2023-01-19 ENCOUNTER — LAB (OUTPATIENT)
Dept: LAB | Facility: CLINIC | Age: 53
End: 2023-01-19
Payer: COMMERCIAL

## 2023-01-19 DIAGNOSIS — Z95.2 S/P AVR (AORTIC VALVE REPLACEMENT): Primary | ICD-10-CM

## 2023-01-19 DIAGNOSIS — Z95.2 S/P AVR (AORTIC VALVE REPLACEMENT): ICD-10-CM

## 2023-01-19 DIAGNOSIS — Z79.01 LONG TERM CURRENT USE OF ANTICOAGULANTS WITH INR GOAL OF 2.0-3.0: ICD-10-CM

## 2023-01-19 DIAGNOSIS — Z95.2 H/O MECHANICAL AORTIC VALVE REPLACEMENT: ICD-10-CM

## 2023-01-19 LAB — INR BLD: 2.4 (ref 0.9–1.1)

## 2023-01-19 PROCEDURE — 36416 COLLJ CAPILLARY BLOOD SPEC: CPT

## 2023-01-19 PROCEDURE — 85610 PROTHROMBIN TIME: CPT

## 2023-01-19 NOTE — PROGRESS NOTES
ANTICOAGULATION MANAGEMENT     Toney Lemos 52 year old male is on warfarin with therapeutic INR result. (Goal INR 2.0-3.0)    Recent labs: (last 7 days)     01/19/23  0759   INR 2.4*       ASSESSMENT       Source(s): Chart Review and Patient/Caregiver Call       Warfarin doses taken: Warfarin taken as instructed and Pt states he has only been taking 7.5 mg on Thurs and not on Monday - I have updated his maintenance dose to reflect this    Diet: No new diet changes identified    New illness, injury, or hospitalization: No    Medication/supplement changes: None noted    Signs or symptoms of bleeding or clotting: No    Previous INR: Therapeutic last visit; previously outside of goal range    Additional findings: None       PLAN     Recommended plan for no diet, medication or health factor changes affecting INR     Dosing Instructions: Continue your current warfarin dose with next INR in 4 weeks       Summary  As of 1/19/2023    Full warfarin instructions:  7.5 mg every Thu; 5 mg all other days   Next INR check:  2/16/2023             Telephone call with Preet who verbalizes understanding and agrees to plan and who agrees to plan and repeated back plan correctly    Lab visit scheduled    Education provided:     Please call back if any changes to your diet, medications or how you've been taking warfarin    Plan made per [facility] anticoagulation protocol    Damien Colón RN  Anticoagulation Clinic  1/19/2023    _______________________________________________________________________     Anticoagulation Episode Summary     Current INR goal:  2.0-3.0   TTR:  62.5 % (1 y)   Target end date:  Indefinite   Send INR reminders to:  MAIA FISHER    Indications    S/P AVR (aortic valve replacement)-25 mm St. Rolf [place]  [Z95.2]  H/O mechanical aortic valve replacement [Z95.2]  Long term current use of anticoagulants with INR goal of 2.0-3.0 [Z79.01]           Comments:           Anticoagulation Care Providers     Provider  Role Specialty Phone number    Moni Cartagena PA-C Referring Cardiovascular & Thoracic Surgery [phone]    Yan Lucas MD Referring Family Medicine [phone]

## 2023-02-16 ENCOUNTER — LAB (OUTPATIENT)
Dept: LAB | Facility: CLINIC | Age: 53
End: 2023-02-16
Payer: COMMERCIAL

## 2023-02-16 ENCOUNTER — ANTICOAGULATION THERAPY VISIT (OUTPATIENT)
Dept: ANTICOAGULATION | Facility: CLINIC | Age: 53
End: 2023-02-16

## 2023-02-16 DIAGNOSIS — Z79.01 LONG TERM CURRENT USE OF ANTICOAGULANTS WITH INR GOAL OF 2.0-3.0: ICD-10-CM

## 2023-02-16 DIAGNOSIS — Z95.2 H/O MECHANICAL AORTIC VALVE REPLACEMENT: ICD-10-CM

## 2023-02-16 DIAGNOSIS — Z95.2 S/P AVR (AORTIC VALVE REPLACEMENT): Primary | ICD-10-CM

## 2023-02-16 DIAGNOSIS — Z95.2 S/P AVR (AORTIC VALVE REPLACEMENT): ICD-10-CM

## 2023-02-16 LAB — INR BLD: 2.4 (ref 0.9–1.1)

## 2023-02-16 PROCEDURE — 36416 COLLJ CAPILLARY BLOOD SPEC: CPT

## 2023-02-16 PROCEDURE — 85610 PROTHROMBIN TIME: CPT

## 2023-02-16 NOTE — PROGRESS NOTES
ANTICOAGULATION MANAGEMENT     Toney Lemos 52 year old male is on warfarin with therapeutic INR result. (Goal INR 2.0-3.0)    Recent labs: (last 7 days)     02/16/23  0754   INR 2.4*       ASSESSMENT       Source(s): Chart Review and Patient/Caregiver Call       Warfarin doses taken: Warfarin taken as instructed    Diet: No new diet changes identified    New illness, injury, or hospitalization: No    Medication/supplement changes: None noted    Signs or symptoms of bleeding or clotting: No    Previous INR: Therapeutic last 2(+) visits    Additional findings: None       PLAN     Recommended plan for no diet, medication or health factor changes affecting INR     Dosing Instructions: Continue your current warfarin dose with next INR in 4 weeks       Summary  As of 2/16/2023    Full warfarin instructions:  7.5 mg every Thu; 5 mg all other days   Next INR check:  3/16/2023             Telephone call with Preet who verbalizes understanding and agrees to plan    Lab visit scheduled    Education provided:     Please call back if any changes to your diet, medications or how you've been taking warfarin    Plan made per [facility] anticoagulation protocol    Vanessa Lepe RN  Anticoagulation Clinic  2/16/2023    _______________________________________________________________________     Anticoagulation Episode Summary     Current INR goal:  2.0-3.0   TTR:  62.8 % (1 y)   Target end date:  Indefinite   Send INR reminders to:  MAIA FISHER    Indications    S/P AVR (aortic valve replacement)-25 mm St. Rolf [place]  [Z95.2]  H/O mechanical aortic valve replacement [Z95.2]  Long term current use of anticoagulants with INR goal of 2.0-3.0 [Z79.01]           Comments:           Anticoagulation Care Providers     Provider Role Specialty Phone number    Moni Cartaegna PA-C Referring Cardiovascular & Thoracic Surgery [phone]    LanceYan burgess MD Referring [facility] Medicine [phone]

## 2023-02-17 ENCOUNTER — TELEPHONE (OUTPATIENT)
Dept: ANTICOAGULATION | Facility: CLINIC | Age: 53
End: 2023-02-17
Payer: COMMERCIAL

## 2023-02-17 DIAGNOSIS — Z79.01 LONG TERM CURRENT USE OF ANTICOAGULANTS WITH INR GOAL OF 2.0-3.0: ICD-10-CM

## 2023-02-17 DIAGNOSIS — Z95.2 S/P AVR: ICD-10-CM

## 2023-02-17 DIAGNOSIS — Z95.2 H/O MECHANICAL AORTIC VALVE REPLACEMENT: ICD-10-CM

## 2023-02-17 DIAGNOSIS — Z95.2 S/P AVR (AORTIC VALVE REPLACEMENT): Primary | ICD-10-CM

## 2023-02-20 NOTE — TELEPHONE ENCOUNTER
ANTICOAGULATION CLINIC REFERRAL RENEWAL REQUEST       An annual renewal order is required for all patients referred to [facility] Anticoagulation Clinic.?  Please review and sign the pended referral order for Toney Lemos.       ANTICOAGULATION SUMMARY      Warfarin indication(s)   Mechanical AVR    Mechanical heart valve present?  Mechanical  AVR-Bileaflet       Current goal range   INR: 2.0-3.0     Goal appropriate for indication? Goal INR 2-3, standard for indication(s) above     Time in Therapeutic Range (TTR)  (Goal > 60%) 62.8%       Office visit with referring provider's group within last year yes on 8/11/22       Milana Hernández RN  [facility] Anticoagulation Clinic

## 2023-02-27 ENCOUNTER — TELEPHONE (OUTPATIENT)
Dept: CARDIOLOGY | Facility: CLINIC | Age: 53
End: 2023-02-27
Payer: COMMERCIAL

## 2023-02-27 NOTE — TELEPHONE ENCOUNTER
Team has received [employer] message from Preet.  He is contemplating taking a Magnesium supplement for his chronic back pain.   He would like to know if this would interfere with any of his current medications and if it would affect his heart.    Writer has sent back a [employer] message asking Preet about which Mag supplement, including the amount of each ingredient.   I also referenced that he can ask his pharmacist as well.   Once I hear back about dose etc., I will forward to Dr. Mederos.    Angela Akers RN on 2/27/2023 at 11:53 AM

## 2023-02-28 NOTE — TELEPHONE ENCOUNTER
Received message back from Preet.  He has not purchased any Magnesium supplements yet, as he was unsure if that is something he should or should not take.  He will also be checking with the pharmacist about drug-drug interactions.   He would like to know if there is a certain type of Magnesium that Dr. Mederos would recommend, if he is willing.    Writer has routed question to Dr. Mederos and will get back to Preet with recommendations.    Angela Akers RN on 2/28/2023 at 10:05 AM

## 2023-02-28 NOTE — TELEPHONE ENCOUNTER
I do not see cardiac contra indication for magnesium supplement but only if it is indicated. I am not sure about its indication for chronic back pain. I do not recommend magnesium supplementation as such-patient can review with his PCP as well.    Thanks  Evin

## 2023-03-01 PROBLEM — Z95.2 S/P AVR: Status: ACTIVE | Noted: 2023-03-01

## 2023-03-06 DIAGNOSIS — Z95.2 S/P AVR: ICD-10-CM

## 2023-03-06 DIAGNOSIS — I10 HYPERTENSION GOAL BP (BLOOD PRESSURE) < 140/90: ICD-10-CM

## 2023-03-06 RX ORDER — METOPROLOL SUCCINATE 50 MG/1
50 TABLET, EXTENDED RELEASE ORAL 2 TIMES DAILY
Qty: 180 TABLET | Refills: 0 | Status: SHIPPED | OUTPATIENT
Start: 2023-03-06 | End: 2023-06-05

## 2023-03-06 RX ORDER — AMLODIPINE BESYLATE 2.5 MG/1
2.5 TABLET ORAL DAILY
Qty: 90 TABLET | Refills: 0 | Status: SHIPPED | OUTPATIENT
Start: 2023-03-06 | End: 2023-06-05

## 2023-03-06 NOTE — TELEPHONE ENCOUNTER
REFILL REQUEST    [facility] Cardiology Refill Guideline reviewed.  Medication meets criteria for refill.    Jennifer Velasquez RN 03/06/23 8:22 AM    Future Appointments   Date Time Provider Department Center   3/16/2023  7:45 AM PH INR LAB [facility]   4/12/2023  9:00 AM Evin Mederos MD [facility]   4/20/2023  9:30 AM Latesha Romero, MACRINA [facility]

## 2023-03-09 ENCOUNTER — TELEPHONE (OUTPATIENT)
Dept: ANTICOAGULATION | Facility: CLINIC | Age: 53
End: 2023-03-09
Payer: COMMERCIAL

## 2023-03-09 NOTE — TELEPHONE ENCOUNTER
"SHARNY-PROCEDURAL ANTICOAGULATION  MANAGEMENT    ASSESSMENT     Warfarin interruption plan for colonoscopy on 2023.    Indication for Anticoagulation: Mechanical AVR      25mm St Rolf [place] valve placed 2020      Sharyn-Procedure Risk stratification for thromboembolism: low ( Chest guidelines)    AVR: 2020 AHA/ACC Management of Valvular Heart disease guidelines recommend no bridge in bileaflet mechanical AVR patients with NO other risk factors for thrombosis (Afib, previous thromboembolism, hypercoagulable condition, LV systolic dysfunction, older generation valves, or > 1 valve)  AVR/MVR:  Chest Perioperative Management guideline suggests no bridging for mechanical heart valves except select patients at high thromboembolic risk such as with an older-generation mechanical heart valve, MVR with one more more risk factors for thromboembolism, a recent (within 3 months) thromboembolic event, or with prior perioperative thromboembolism     RECOMMENDATION       Pre-Procedure:  o Hold warfarin for 5 days, until after procedure startin2023   o No Bridge      Post-Procedure:  o Resume warfarin dose if okay with provider doing procedure on night of procedure, 12.5mg PM: 2023  o Recheck INR ~ 7 days after resuming warfarin       Plan routed to referring provider for approval  ?   [facility]    SUBJECTIVE/OBJECTIVE     Toney TATE Canelo, a 52 year old male    Goal INR Range: 2.0-3.0     Patient bridged in past: Yes: only inpatient acutely with valve placement       Wt Readings from Last 3 Encounters:   22 84.4 kg (186 lb)   22 84.6 kg (186 lb 8 oz)   22 85.1 kg (187 lb 9.6 oz)      Patient weight not recorded     Estimated body mass index is 28.28 kg/m  as calculated from the following:    Height as of 22: 1.727 m (5' 8\").    Weight as of 22: 84.4 kg (186 lb).    Lab Results   Component Value Date    INR 2.4 (H) 2023    INR 2.4 (H) 2023    INR " 2.1 (H) 12/22/2022     Lab Results   Component Value Date    HGB 15.6 12/02/2020    HCT 45.0 12/02/2020     12/02/2020     Lab Results   Component Value Date    CR 0.91 08/11/2022    CR 0.95 08/26/2021    CR 0.98 12/02/2020     CrCl cannot be calculated (Patient's most recent lab result is older than the maximum 30 days allowed.).

## 2023-03-09 NOTE — TELEPHONE ENCOUNTER
Preet is scheduled for a colonoscopy on 3/27/23. Please review hold/bridge orders.     I did note in TE from 11/9/22 per cardiology, Dr Mederos wrote: per my last virtual clinic evaluation earlier this year patient does not have any other risk factors like atrial fibrillation, prior history of systemic embolism, congestive heart failure and he has normal LV function on echocardiogram last year.  In the absence of any additional risk factors with new generation mechanical aortic valve he does not need bridging for temporary short duration interruption of anticoagulation.  I recommend patient to work with Coumadin clinic in terms of optimal timing of discontinuation of Coumadin (usually 4 to 5 days prior to the procedure) and then subsequent resumption of Coumadin after the procedure.    Thanks!    Damien Colón, RN, BSN  [facility] Anticoagulation Team

## 2023-03-10 NOTE — TELEPHONE ENCOUNTER
Left message informing pt of the hold orders below. I have asked that he call back to discuss if any questions or concerns arise. Damien Colón RN, BSN  [facility] Anticoagulation Team

## 2023-03-21 ENCOUNTER — LAB (OUTPATIENT)
Dept: LAB | Facility: CLINIC | Age: 53
End: 2023-03-21
Payer: COMMERCIAL

## 2023-03-21 ENCOUNTER — MYC MEDICAL ADVICE (OUTPATIENT)
Dept: FAMILY MEDICINE | Facility: CLINIC | Age: 53
End: 2023-03-21

## 2023-03-21 ENCOUNTER — ANTICOAGULATION THERAPY VISIT (OUTPATIENT)
Dept: ANTICOAGULATION | Facility: CLINIC | Age: 53
End: 2023-03-21

## 2023-03-21 DIAGNOSIS — Z95.2 H/O MECHANICAL AORTIC VALVE REPLACEMENT: ICD-10-CM

## 2023-03-21 DIAGNOSIS — Z95.2 S/P AVR (AORTIC VALVE REPLACEMENT): ICD-10-CM

## 2023-03-21 DIAGNOSIS — Z79.01 LONG TERM CURRENT USE OF ANTICOAGULANTS WITH INR GOAL OF 2.0-3.0: ICD-10-CM

## 2023-03-21 DIAGNOSIS — Z95.2 S/P AVR (AORTIC VALVE REPLACEMENT): Primary | ICD-10-CM

## 2023-03-21 DIAGNOSIS — Z95.2 S/P AVR: ICD-10-CM

## 2023-03-21 LAB — INR BLD: 4.1 (ref 0.9–1.1)

## 2023-03-21 PROCEDURE — 85610 PROTHROMBIN TIME: CPT

## 2023-03-21 PROCEDURE — 36416 COLLJ CAPILLARY BLOOD SPEC: CPT

## 2023-03-21 NOTE — PROGRESS NOTES
ANTICOAGULATION MANAGEMENT     Toney Lemos 52 year old male is on warfarin with supratherapeutic INR result. (Goal INR 2.0-3.0)    Recent labs: (last 7 days)     03/21/23  0745   INR 4.1*       ASSESSMENT       Source(s): Chart Review and Patient/Caregiver Call       Warfarin doses taken: Warfarin taken as instructed    Diet: Decreased greens/vitamin K in diet; plans to resume previous intake and Change in alcohol intake may be affecting INR. was on vacation last week, drank more alcohol than he usually does and less greens      New illness, injury, or hospitalization: No    Medication/supplement changes: None noted    Signs or symptoms of bleeding or clotting: No    Previous INR: Therapeutic last 2(+) visits    Additional findings: Upcoming surgery/procedure colonoscopy scheduled for 3/27/23, hold plan reviewed          PLAN     Recommended plan for temporary change(s) affecting INR     Dosing Instructions: start holding today for upcoming colonoscopy, restart plan reviewed with patient  with next INR in 2 weeks       Summary  As of 3/21/2023    Full warfarin instructions:  3/21: Hold; 3/22: Hold; 3/23: Hold; 3/24: Hold; 3/25: Hold; 3/26: Hold; 3/27: 12.5 mg; Otherwise 7.5 mg every Thu; 5 mg all other days   Next INR check:  4/3/2023             Telephone call with Preet who verbalizes understanding and agrees to plan    Lab visit scheduled    Education provided:     Goal range and lab monitoring: goal range and significance of current result    Dietary considerations: importance of consistent vitamin K intake    Contact [phone]  with any changes, questions or concerns.     Plan made per [facility] anticoagulation protocol    Ann Slaughter RN  Anticoagulation Clinic  3/21/2023    _______________________________________________________________________     Anticoagulation Episode Summary     Current INR goal:  2.0-3.0   TTR:  66.0 % (1 y)   Target end date:  Indefinite   Send INR reminders to:  [facility]  [place]    Indications    S/P AVR (aortic valve replacement)-25 mm St. Rolf [place]  [Z95.2]  H/O mechanical aortic valve replacement [Z95.2]  Long term current use of anticoagulants with INR goal of 2.0-3.0 [Z79.01]  S/P AVR [Z95.2]           Comments:           Anticoagulation Care Providers     Provider Role Specialty Phone number    Moni Cartagena PA-C Referring Cardiovascular & Thoracic Surgery [phone]    Yan Lucas MD Referring [facility] [phone]

## 2023-03-24 DIAGNOSIS — Z95.2 S/P AVR: ICD-10-CM

## 2023-03-24 RX ORDER — AMOXICILLIN 500 MG/1
CAPSULE ORAL
Qty: 4 CAPSULE | Refills: 1 | Status: SHIPPED | OUTPATIENT
Start: 2023-03-24 | End: 2023-09-22

## 2023-03-27 ENCOUNTER — SURGERY (OUTPATIENT)
Age: 53
End: 2023-03-27
Payer: COMMERCIAL

## 2023-03-27 ENCOUNTER — ANESTHESIA EVENT (OUTPATIENT)
Dept: GASTROENTEROLOGY | Facility: CLINIC | Age: 53
End: 2023-03-27
Payer: COMMERCIAL

## 2023-03-27 ENCOUNTER — TELEPHONE (OUTPATIENT)
Dept: ANTICOAGULATION | Facility: CLINIC | Age: 53
End: 2023-03-27
Payer: COMMERCIAL

## 2023-03-27 ENCOUNTER — ANESTHESIA (OUTPATIENT)
Dept: GASTROENTEROLOGY | Facility: CLINIC | Age: 53
End: 2023-03-27
Payer: COMMERCIAL

## 2023-03-27 ENCOUNTER — HOSPITAL ENCOUNTER (OUTPATIENT)
Facility: CLINIC | Age: 53
Discharge: HOME | End: 2023-03-27
Attending: FAMILY MEDICINE | Admitting: FAMILY MEDICINE
Payer: COMMERCIAL

## 2023-03-27 VITALS
DIASTOLIC BLOOD PRESSURE: 85 MMHG | HEART RATE: 67 BPM | OXYGEN SATURATION: 96 % | SYSTOLIC BLOOD PRESSURE: 125 MMHG | TEMPERATURE: 98.1 F

## 2023-03-27 LAB — COLONOSCOPY: NORMAL

## 2023-03-27 PROCEDURE — 45378 DIAGNOSTIC COLONOSCOPY: CPT | Performed by: FAMILY MEDICINE

## 2023-03-27 PROCEDURE — 250N000009 HC RX 250: Performed by: NURSE ANESTHETIST, CERTIFIED REGISTERED

## 2023-03-27 PROCEDURE — G0121 COLON CA SCRN NOT HI RSK IND: HCPCS | Performed by: FAMILY MEDICINE

## 2023-03-27 PROCEDURE — 258N000003 HC RX IP 258 OP 636: Performed by: NURSE ANESTHETIST, CERTIFIED REGISTERED

## 2023-03-27 PROCEDURE — 370N000017 HC ANESTHESIA TECHNICAL FEE, PER MIN: Performed by: FAMILY MEDICINE

## 2023-03-27 PROCEDURE — 250N000011 HC RX IP 250 OP 636: Performed by: NURSE ANESTHETIST, CERTIFIED REGISTERED

## 2023-03-27 RX ORDER — FLUMAZENIL 0.1 MG/ML
0.2 INJECTION, SOLUTION INTRAVENOUS
Status: CANCELLED | OUTPATIENT
Start: 2023-03-27 | End: 2023-03-27

## 2023-03-27 RX ORDER — ONDANSETRON 2 MG/ML
4 INJECTION INTRAMUSCULAR; INTRAVENOUS
Status: DISCONTINUED | OUTPATIENT
Start: 2023-03-27 | End: 2023-03-27 | Stop reason: HOSPADM

## 2023-03-27 RX ORDER — NALOXONE HYDROCHLORIDE 0.4 MG/ML
0.2 INJECTION, SOLUTION INTRAMUSCULAR; INTRAVENOUS; SUBCUTANEOUS
Status: CANCELLED | OUTPATIENT
Start: 2023-03-27

## 2023-03-27 RX ORDER — PROCHLORPERAZINE MALEATE 5 MG
10 TABLET ORAL EVERY 6 HOURS PRN
Status: CANCELLED | OUTPATIENT
Start: 2023-03-27

## 2023-03-27 RX ORDER — PROPOFOL 10 MG/ML
INJECTION, EMULSION INTRAVENOUS CONTINUOUS PRN
Status: DISCONTINUED | OUTPATIENT
Start: 2023-03-27 | End: 2023-03-27

## 2023-03-27 RX ORDER — ONDANSETRON 2 MG/ML
4 INJECTION INTRAMUSCULAR; INTRAVENOUS EVERY 6 HOURS PRN
Status: CANCELLED | OUTPATIENT
Start: 2023-03-27

## 2023-03-27 RX ORDER — LIDOCAINE HYDROCHLORIDE 20 MG/ML
INJECTION, SOLUTION INFILTRATION; PERINEURAL PRN
Status: DISCONTINUED | OUTPATIENT
Start: 2023-03-27 | End: 2023-03-27

## 2023-03-27 RX ORDER — NALOXONE HYDROCHLORIDE 0.4 MG/ML
0.4 INJECTION, SOLUTION INTRAMUSCULAR; INTRAVENOUS; SUBCUTANEOUS
Status: CANCELLED | OUTPATIENT
Start: 2023-03-27

## 2023-03-27 RX ORDER — LIDOCAINE 40 MG/G
CREAM TOPICAL
Status: DISCONTINUED | OUTPATIENT
Start: 2023-03-27 | End: 2023-03-27 | Stop reason: HOSPADM

## 2023-03-27 RX ORDER — PROPOFOL 10 MG/ML
INJECTION, EMULSION INTRAVENOUS PRN
Status: DISCONTINUED | OUTPATIENT
Start: 2023-03-27 | End: 2023-03-27

## 2023-03-27 RX ORDER — ONDANSETRON 4 MG/1
4 TABLET, ORALLY DISINTEGRATING ORAL EVERY 6 HOURS PRN
Status: CANCELLED | OUTPATIENT
Start: 2023-03-27

## 2023-03-27 RX ORDER — SODIUM CHLORIDE, SODIUM LACTATE, POTASSIUM CHLORIDE, CALCIUM CHLORIDE 600; 310; 30; 20 MG/100ML; MG/100ML; MG/100ML; MG/100ML
INJECTION, SOLUTION INTRAVENOUS CONTINUOUS
Status: DISCONTINUED | OUTPATIENT
Start: 2023-03-27 | End: 2023-03-27 | Stop reason: HOSPADM

## 2023-03-27 RX ADMIN — LIDOCAINE HYDROCHLORIDE 60 MG: 20 INJECTION, SOLUTION INFILTRATION; PERINEURAL at 10:05

## 2023-03-27 RX ADMIN — SODIUM CHLORIDE, POTASSIUM CHLORIDE, SODIUM LACTATE AND CALCIUM CHLORIDE: 600; 310; 30; 20 INJECTION, SOLUTION INTRAVENOUS at 09:25

## 2023-03-27 RX ADMIN — LIDOCAINE HYDROCHLORIDE 0.1 ML: 10 INJECTION, SOLUTION EPIDURAL; INFILTRATION; INTRACAUDAL; PERINEURAL at 09:26

## 2023-03-27 RX ADMIN — PROPOFOL 80 MG: 10 INJECTION, EMULSION INTRAVENOUS at 10:05

## 2023-03-27 RX ADMIN — PROPOFOL 150 MCG/KG/MIN: 10 INJECTION, EMULSION INTRAVENOUS at 10:05

## 2023-03-27 ASSESSMENT — ACTIVITIES OF DAILY LIVING (ADL)
ADLS_ACUITY_SCORE: 35
ADLS_ACUITY_SCORE: 35

## 2023-03-27 NOTE — DISCHARGE INSTRUCTIONS
[facility]    Home Care Following Endoscopy          Activity:  You have just undergone an endoscopic procedure usually performed with conscious sedation.  Do not work or operate machinery (including a car) for at least 12 hours.    I encourage you to walk and attempt to pass this air as soon as possible.    Diet:  Return to the diet you were on before your procedure but eat lightly for the first 12-24 hours.  Drink plenty of water.  Resume any regular medications unless otherwise advised by your physician.  Please begin any new medication prescribed as a result of your procedure as directed by your physician.   If you had any biopsy or polyp removed please refrain from aspirin or aspirin products for 2 days.  If on Coumadin please restart as instructed by your physician.   Pain:  You may take Tylenol as needed for pain.  Expected Recovery:  You can expect some mild abdominal fullness and/or discomfort due to the air used to inflate your intestinal tract. It is also normal to have a mild sore throat after upper endoscopy.    Call Your Physician if You Have:  After Colonoscopy:  Worsening persisting abdominal pain which is worse with activity.  Fevers (>101 degrees F), chills or shakes.  Passage of continued blood with bowel movements.     Any questions or concerns about your recovery, please call [phone] or after [phone] ([phone]) Nurse Advice Line.    Follow-up Care:  You did have polyps/biopsy tissue sample(s) removed.  The polyps/biopsy tissue sample(s) will be sent to pathology.    You should receive letter in your My Chart from Dr Ahmadi with your results within 1-2 weeks. If you do not participate in My Chart a physical letter will come in the mail in 2-3 weeks.  Please call if you have not received a notification of your results.  If asked to return to clinic please make an appointment 1 week after your procedure.  Call [phone].

## 2023-03-27 NOTE — ANESTHESIA POSTPROCEDURE EVALUATION
Patient: Toney Lemos    Procedure: Procedure(s):  COLONOSCOPY       Anesthesia Type:  MAC    Note:  Disposition: Outpatient   Postop Pain Control: Uneventful            Sign Out: Well controlled pain   PONV: No   Neuro/Psych: Uneventful            Sign Out: Acceptable/Baseline neuro status   Airway/Respiratory: Uneventful            Sign Out: Acceptable/Baseline resp. status   CV/Hemodynamics: Uneventful            Sign Out: Acceptable CV status   Other NRE: NONE   DID A NON-ROUTINE EVENT OCCUR? No    Event details/Postop Comments:  Pt was happy with anesthesia care.  No complications.  I will follow up with the pt if needed.           Last vitals:  Vitals Value Taken Time   /85 03/27/23 1050   Temp     Pulse 67 03/27/23 1050   Resp     SpO2 97 % 03/27/23 1058   Vitals shown include unvalidated device data.    Electronically Signed By: MACRINA Bowden CRNA  March 27, 2023  11:17 AM

## 2023-03-27 NOTE — ANESTHESIA PREPROCEDURE EVALUATION
Anesthesia Pre-Procedure Evaluation    Patient: Toney Lemos   MRN: [id number] : 1970        Procedure : Procedure(s):  COLONOSCOPY          Past Medical History:   Diagnosis Date     Aortic valve disorders      Hypertension       Past Surgical History:   Procedure Laterality Date     CV CORONARY ANGIOGRAM N/A 2020    Procedure: Coronary Angiogram;  Surgeon: Faisal Telles MD;  Location:  [facility]     INJECT EPIDURAL TRANSFORAMINAL Left 2022    Procedure: Left Lumbar 3-4 and Lumbar 4-5 Transforaminal Epidural Steroid Injections with fluoroscopic guidance and contrast.;  Surgeon: Walter Dotson MD;  Location: [facility]     REPLACE VALVE AORTIC N/A 2/3/2020    Procedure: AORTIC VALVE REPLACEMENT WITH [employer] MECHANICAL HEART VALVE 25 MM; ON PUMP WITH TADEO;  Surgeon: Cindy Matthews MD;  Location:  OR      Allergies   Allergen Reactions     No Known Drug Allergies      Seasonal Allergies       Social History     Tobacco Use     Smoking status: Never     Smokeless tobacco: Never   Substance Use Topics     Alcohol use: Yes     Alcohol/week: 0.0 standard drinks     Comment: beer or two wkly      Wt Readings from Last 1 Encounters:   22 84.4 kg (186 lb)        Anesthesia Evaluation   Pt has had prior anesthetic. Type: General and MAC.        ROS/MED HX  ENT/Pulmonary:  - neg pulmonary ROS     Neurologic:  - neg neurologic ROS     Cardiovascular:     (+) Dyslipidemia hypertension--CAD ---Taking blood thinners Pt has received instructions: valvular problems/murmurs Aortic Valve replace in . Previous cardiac testing   Echo: Date: 20 Results:  Interpretation Summary     1. The left ventricle is normal in structure, function and size.  2. The right ventricle is normal in structure, function and size.  3. s/p mechanical AVR (27mm St Rolf [place], implanted 2020). No AI. The mean  AoV pressure gradient is 5.5 mmHg. Leaflets not well seen.  Compared to  echo on 5/18/2020, no changes.  ______________________________________________________________________________  Left Ventricle  The left ventricle is normal in size. There is normal left ventricular wall  thickness. Left ventricular systolic function is normal. The visual ejection  fraction is 55-60%. Left ventricular diastolic function is normal. Septal  motion is consistent with post-operative state.     Right Ventricle  The right ventricle is normal in structure, function and size.     Atria  Normal left atrial size. Right atrial size is normal.     Mitral Valve  The mitral valve is normal in structure and function.     Tricuspid Valve  The tricuspid valve is normal in structure and function. The right ventricular  systolic pressure is approximated at 22.8 mmHg plus the right atrial pressure.     Aortic Valve  There is a mechanical aortic valve. s/p mechanical AVR (27mm St Rolf [place],  implanted 2/2020). No AI. The peak AoV pressure gradient is 11.0 mmHg. The  mean AoV pressure gradient is 5.5 mmHg. Leaflets not well seen.     Pulmonic Valve  The pulmonic valve is not well visualized.     Vessels  Normal ascending, transverse (arch), and descending aorta. The inferior vena  cava was normal in size with preserved respiratory variability.     Pericardium  There is no pericardial effusion.     Rhythm  Sinus rhythm was noted  Stress Test: Date: Results:    ECG Reviewed: Date: 2/3/20 Results:  SR  Cath: Date: Results:      METS/Exercise Tolerance:     Hematologic:  - neg hematologic  ROS     Musculoskeletal: Comment: Back pain      GI/Hepatic:     (+) bowel prep,     Renal/Genitourinary:  - neg Renal ROS     Endo:  - neg endo ROS     Psychiatric/Substance Use:  - neg psychiatric ROS     Infectious Disease:  - neg infectious disease ROS     Malignancy:   (+) Malignancy, History of Skin.    Other:            Physical Exam    Airway        Mallampati: II   TM distance: > 3 FB   Neck ROM: full   Mouth opening: > 3  cm    Respiratory Devices and Support         Dental    unable to assess        Cardiovascular   cardiovascular exam normal          Pulmonary   pulmonary exam normal                OUTSIDE LABS:  CBC:   Lab Results   Component Value Date    WBC 6.5 12/02/2020    WBC 8.1 02/13/2020    HGB 15.6 12/02/2020    HGB 13.1 (L) 02/13/2020    HCT 45.0 12/02/2020    HCT 38.8 (L) 02/13/2020     12/02/2020     02/13/2020     BMP:   Lab Results   Component Value Date     08/11/2022     08/26/2021    POTASSIUM 4.0 08/11/2022    POTASSIUM 4.2 08/26/2021    CHLORIDE 109 08/11/2022    CHLORIDE 109 08/26/2021    CO2 27 08/11/2022    CO2 28 08/26/2021    BUN 16 08/11/2022    BUN 11 08/26/2021    CR 0.91 08/11/2022    CR 0.95 08/26/2021     (H) 08/11/2022     (H) 08/26/2021     COAGS:   Lab Results   Component Value Date    PTT 61 (H) 02/03/2020    INR 4.1 (H) 03/21/2023    FIBR 181 (L) 02/03/2020     POC:   Lab Results   Component Value Date     (H) 02/08/2020     HEPATIC:   Lab Results   Component Value Date    ALBUMIN 3.6 02/04/2020    PROTTOTAL 6.1 (L) 02/04/2020    ALT 26 02/04/2020    AST 36 02/04/2020    ALKPHOS 50 02/04/2020    BILITOTAL 1.3 02/04/2020     OTHER:   Lab Results   Component Value Date    PH 7.32 (L) 02/03/2020    LACT 2.1 (H) 02/03/2020    A1C 5.3 01/09/2020    BRIANA 8.7 08/11/2022    PHOS 2.9 02/04/2020    MAG 2.1 02/04/2020    TSH 1.84 02/25/2021    SED 6 10/06/2004       Anesthesia Plan    ASA Status:  2   NPO Status:  NPO Appropriate    Anesthesia Type: MAC.     - Reason for MAC: chronic cardiopulmonary disease, immobility needed, straight local not clinically adequate   Induction: Intravenous.   Maintenance: TIVA.        Consents    Anesthesia Plan(s) and associated risks, benefits, and realistic alternatives discussed. Questions answered and patient/representative(s) expressed understanding.     - Discussed: Risks, Benefits and Alternatives for BOTH SEDATION and  the PROCEDURE were discussed     - Discussed with:  Patient    Use of blood products discussed: No .     Postoperative Care            Comments:    Other Comments: The risks and benefits of anesthesia, and the alternatives where applicable, have been discussed with the patient, and they wish to proceed.            MACRINA Bowden CRNA

## 2023-03-27 NOTE — TELEPHONE ENCOUNTER
ANTICOAGULATION  MANAGEMENT: Discharge Review    Toney Lemos chart reviewed for anticoagulation continuity of care    Outpatient surgery/procedure on 3/27 for colonoscopy.    Discharge disposition: Home    Results:    Recent labs: (last 7 days)     23  0745   INR 4.1*     Anticoagulation inpatient management:     not applicable     Anticoagulation discharge instructions:     Warfarin dosin.5 mg 3/27, then home regimen continued   Bridging: No   INR goal change: No      Medication changes affecting anticoagulation: No    Additional factors affecting anticoagulation: No     PLAN     No adjustment to anticoagulation plan needed INR appt . Plan reviewed with pt 3/21.    Patient not contacted    No adjustment to Anticoagulation Calendar was required    Milana Hernández RN

## 2023-03-27 NOTE — H&P
"Pre-Endoscopy History and Physical     Toney Lemos MRN# [id number]   YOB: 1970 Age: 52 year old     Date of Procedure: 3/27/2023  Primary care provider: Yna Lucas  Type of Endoscopy: colonoscopy  Type of Anesthesia Anticipated: MAC     HPI:    Toney is a 52 year old male who was referred to me for colonoscopy.    Toney is feeling well today. We discussed today's colonoscopy in the pre-op area.    Patient Active Problem List   Diagnosis     Hypertension goal BP (blood pressure) < 140/90     Encounter for long-term current use of medication     FH: early coronary artery disease- bro 30s     S/P AVR (aortic valve replacement)-25 mm St. Rolf [place]      H/O mechanical aortic valve replacement     Long term current use of anticoagulants with INR goal of 2.0-3.0     SCC (squamous cell carcinoma), arm, left- hand, + deep margin, watching     S/P AVR          BP (!) 141/93   Temp 98.1  F (36.7  C) (Oral)   SpO2 96%    Estimated body mass index is 28.28 kg/m  as calculated from the following:    Height as of 8/25/22: 1.727 m (5' 8\").    Weight as of 8/25/22: 84.4 kg (186 lb).    GENERAL APPEARANCE: alert and oriented and NAD  See anesthesia exam      Assessment/Plan   ASA Class 2 - Mild systemic disease    Plan for colonoscopy. No medical contraindications to proceed, or further work up needed. The risks and benefits of the procedure and the sedation options and risks were discussed with the patient. These include infection, bleeding, and small risk of colon perforation (1/1000 to 1/28376 depending on patient characteristics and type of procedure). Toney was also explained to alternatives for colo-rectal screening. All questions were answered and informed consent was obtained.      Thank you kindly for the referral and opportunity to provide CRC screening      Signed Electronically by: Yan Lucas MD  March 27, 2023     "

## 2023-04-04 ENCOUNTER — LAB (OUTPATIENT)
Dept: LAB | Facility: CLINIC | Age: 53
End: 2023-04-04
Payer: COMMERCIAL

## 2023-04-04 ENCOUNTER — ANTICOAGULATION THERAPY VISIT (OUTPATIENT)
Dept: ANTICOAGULATION | Facility: CLINIC | Age: 53
End: 2023-04-04

## 2023-04-04 DIAGNOSIS — Z95.2 S/P AVR (AORTIC VALVE REPLACEMENT): Primary | ICD-10-CM

## 2023-04-04 DIAGNOSIS — Z79.01 LONG TERM CURRENT USE OF ANTICOAGULANTS WITH INR GOAL OF 2.0-3.0: ICD-10-CM

## 2023-04-04 DIAGNOSIS — Z95.2 S/P AVR: ICD-10-CM

## 2023-04-04 DIAGNOSIS — Z95.2 H/O MECHANICAL AORTIC VALVE REPLACEMENT: ICD-10-CM

## 2023-04-04 DIAGNOSIS — Z95.2 S/P AVR (AORTIC VALVE REPLACEMENT): ICD-10-CM

## 2023-04-04 LAB — INR BLD: 1.8 (ref 0.9–1.1)

## 2023-04-04 PROCEDURE — 85610 PROTHROMBIN TIME: CPT

## 2023-04-04 PROCEDURE — 36416 COLLJ CAPILLARY BLOOD SPEC: CPT

## 2023-04-04 NOTE — PROGRESS NOTES
ANTICOAGULATION MANAGEMENT     Toney Lemos 52 year old male is on warfarin with subtherapeutic INR result. (Goal INR 2.0-3.0)    Recent labs: (last 7 days)     04/04/23  0755   INR 1.8*       ASSESSMENT       Source(s): Chart Review and Patient/Caregiver Call       Warfarin doses taken: Resumed warfarin on 3/27/23 after interruption for surgery/procedure which may be affecting INR    Diet: No new diet changes identified    New illness, injury, or hospitalization: No    Medication/supplement changes: None noted    Signs or symptoms of bleeding or clotting: No    Previous INR: Supratherapeutic    Additional findings: None         PLAN     Recommended plan for temporary change(s) affecting INR     Dosing Instructions: booster dose then continue your current warfarin dose with next INR in 2 weeks       Summary  As of 4/4/2023    Full warfarin instructions:  4/4: 7.5 mg; Otherwise 7.5 mg every Thu; 5 mg all other days   Next INR check:  4/18/2023             Telephone call with Preet who verbalizes understanding and agrees to plan    Lab visit scheduled    Education provided:     Goal range and lab monitoring: goal range and significance of current result    Contact [phone]  with any changes, questions or concerns.     Plan made per [facility] anticoagulation protocol    Ann Slaughter RN  Anticoagulation Clinic  4/4/2023    _______________________________________________________________________     Anticoagulation Episode Summary     Current INR goal:  2.0-3.0   TTR:  65.5 % (1 y)   Target end date:  Indefinite   Send INR reminders to:  MAIA FISHER    Indications    S/P AVR (aortic valve replacement)-25 mm St. Rolf [place]  [Z95.2]  H/O mechanical aortic valve replacement [Z95.2]  Long term current use of anticoagulants with INR goal of 2.0-3.0 [Z79.01]  S/P AVR [Z95.2]           Comments:           Anticoagulation Care Providers     Provider Role Specialty Phone number    Moni Cartagena PA-C Referring  Cardiovascular & Thoracic Surgery [phone]    Yan Lucas MD [facility] [phone]

## 2023-04-11 DIAGNOSIS — Z95.2 S/P AVR: ICD-10-CM

## 2023-04-11 DIAGNOSIS — E78.5 HYPERLIPIDEMIA LDL GOAL <130: ICD-10-CM

## 2023-04-11 RX ORDER — ATORVASTATIN CALCIUM 40 MG/1
40 TABLET, FILM COATED ORAL DAILY
Qty: 90 TABLET | Refills: 0 | Status: SHIPPED | OUTPATIENT
Start: 2023-04-11 | End: 2023-04-21

## 2023-04-11 RX ORDER — LISINOPRIL 5 MG/1
5 TABLET ORAL DAILY
Qty: 90 TABLET | Refills: 0 | Status: SHIPPED | OUTPATIENT
Start: 2023-04-11 | End: 2023-07-10

## 2023-04-14 ENCOUNTER — MYC MEDICAL ADVICE (OUTPATIENT)
Dept: FAMILY MEDICINE | Facility: CLINIC | Age: 53
End: 2023-04-14
Payer: COMMERCIAL

## 2023-04-14 DIAGNOSIS — Z95.2 S/P AVR: ICD-10-CM

## 2023-04-14 RX ORDER — WARFARIN SODIUM 5 MG/1
TABLET ORAL
Qty: 100 TABLET | Refills: 1 | Status: SHIPPED | OUTPATIENT
Start: 2023-04-14 | End: 2023-09-11

## 2023-04-20 ENCOUNTER — OFFICE VISIT (OUTPATIENT)
Dept: DERMATOLOGY | Facility: CLINIC | Age: 53
End: 2023-04-20
Payer: COMMERCIAL

## 2023-04-20 ENCOUNTER — ANTICOAGULATION THERAPY VISIT (OUTPATIENT)
Dept: ANTICOAGULATION | Facility: CLINIC | Age: 53
End: 2023-04-20

## 2023-04-20 ENCOUNTER — LAB (OUTPATIENT)
Dept: LAB | Facility: CLINIC | Age: 53
End: 2023-04-20
Payer: COMMERCIAL

## 2023-04-20 DIAGNOSIS — Z95.2 S/P AVR: ICD-10-CM

## 2023-04-20 DIAGNOSIS — Z95.2 S/P AVR (AORTIC VALVE REPLACEMENT): ICD-10-CM

## 2023-04-20 DIAGNOSIS — C44.629 SCC (SQUAMOUS CELL CARCINOMA), HAND, LEFT: ICD-10-CM

## 2023-04-20 DIAGNOSIS — Z95.2 S/P AVR (AORTIC VALVE REPLACEMENT): Primary | ICD-10-CM

## 2023-04-20 DIAGNOSIS — L81.4 LENTIGINES: ICD-10-CM

## 2023-04-20 DIAGNOSIS — Z79.01 LONG TERM CURRENT USE OF ANTICOAGULANTS WITH INR GOAL OF 2.0-3.0: ICD-10-CM

## 2023-04-20 DIAGNOSIS — D18.01 CHERRY ANGIOMA: ICD-10-CM

## 2023-04-20 DIAGNOSIS — Z95.2 H/O MECHANICAL AORTIC VALVE REPLACEMENT: ICD-10-CM

## 2023-04-20 DIAGNOSIS — L57.0 AK (ACTINIC KERATOSIS): Primary | ICD-10-CM

## 2023-04-20 DIAGNOSIS — D22.9 MULTIPLE BENIGN NEVI: ICD-10-CM

## 2023-04-20 DIAGNOSIS — L82.1 SEBORRHEIC KERATOSES: ICD-10-CM

## 2023-04-20 DIAGNOSIS — M67.449 GANGLION CYST OF FINGER: ICD-10-CM

## 2023-04-20 DIAGNOSIS — Z12.83 ENCOUNTER FOR SCREENING FOR MALIGNANT NEOPLASM OF SKIN: ICD-10-CM

## 2023-04-20 LAB — INR BLD: 2.6 (ref 0.9–1.1)

## 2023-04-20 PROCEDURE — 85610 PROTHROMBIN TIME: CPT

## 2023-04-20 PROCEDURE — 99204 OFFICE O/P NEW MOD 45 MIN: CPT | Performed by: NURSE PRACTITIONER

## 2023-04-20 PROCEDURE — 36416 COLLJ CAPILLARY BLOOD SPEC: CPT

## 2023-04-20 RX ORDER — FLUOROURACIL 50 MG/G
CREAM TOPICAL
Qty: 40 G | Refills: 0 | Status: SHIPPED | OUTPATIENT
Start: 2023-04-20 | End: 2024-02-14

## 2023-04-20 RX ORDER — CALCIPOTRIENE 50 UG/G
OINTMENT TOPICAL
Qty: 60 G | Refills: 0 | Status: SHIPPED | OUTPATIENT
Start: 2023-04-20 | End: 2024-02-14

## 2023-04-20 NOTE — PROGRESS NOTES
ANTICOAGULATION MANAGEMENT     Toney Lemos 53 year old male is on warfarin with therapeutic INR result. (Goal INR 2.0-3.0)    Recent labs: (last 7 days)     04/20/23  0745   INR 2.6*       ASSESSMENT     Warfarin Lab Questionnaire    Warfarin Doses Last 7 Days      4/20/2023     7:11 AM   Dose in Tablet or Mg   TAB or MG? milligram (mg)     Pt Rptd Dose SUNDAY MONDAY TUESDAY WED THURS FRIDAY SATURDAY 4/20/2023   7:11 AM 5 5 5 5 7.5 5 5         4/20/2023   Warfarin Lab Questionnaire   Missed doses? No   Changes in diet or alcohol? No   Medication changes? No   Injuries or illness since last INR? No   Shortness of breath? No   Abnormal bleeding? No   Upcoming surgery, procedure? No   Best number to call with results? [phone]        Previous INR: Subtherapeutic  Additional findings: None       PLAN     Recommended plan for no diet, medication or health factor changes affecting INR     Dosing Instructions: Continue your current warfarin dose with next INR in 4 weeks       Summary  As of 4/20/2023    Full warfarin instructions:  7.5 mg every Thu; 5 mg all other days   Next INR check:  5/18/2023             Telephone call with Preet who verbalizes understanding and agrees to plan and who agrees to plan and repeated back plan correctly    Lab visit scheduled    Education provided:     Please call back if any changes to your diet, medications or how you've been taking warfarin    Plan made per [facility] anticoagulation protocol    Damien Colón, RN  Anticoagulation Clinic  4/20/2023    _______________________________________________________________________     Anticoagulation Episode Summary     Current INR goal:  2.0-3.0   TTR:  64.4 % (1 y)   Target end date:  Indefinite   Send INR reminders to:  MAIA FISHER    Indications    S/P AVR (aortic valve replacement)-25 mm St. Rolf [place]  [Z95.2]  H/O mechanical aortic valve replacement [Z95.2]  Long term current use of anticoagulants with INR goal of 2.0-3.0  [Z79.01]  S/P AVR [Z95.2]           Comments:           Anticoagulation Care Providers     Provider Role Specialty Phone number    Moni Cartagena PA-C Referring Cardiovascular & Thoracic Surgery [phone]    Yan Lucas MD Referring [facility] [phone]

## 2023-04-20 NOTE — PATIENT INSTRUCTIONS
Efudex and calcipotriene combination Treatment  Today, you are being prescribed Fluorouracil (Efudex) a topical cream used for the treatment of Actinic Keratosis (AKs).  The medication is working to eliminate the unhealthy cells.  This treatment may be unattractive and somewhat uncomfortable.  Your treatment will last 5-10 days. You may experience some mild discomfort while being treated.  You will want to stop any other creams such as glycolic acid products, retin A, Tazorac, etc. to the area. You may use bland makeup/cover-up as long as it doesn't sting or cause you discomfort.  When using calcipotriene and Fluorouracil, apply the cream both morning and night for 5-10 days as your provider recommends. Use a Q-tip or use gloves if applying it with your fingertips. If applied with unprotected fingertips, it is important to wash your hands well after you apply this medicine. If you are using the Fluorouracil alone, apply only at night for 2-4 weeks.   Keep this medication away from pets.  We recommend avoiding excessive sun exposure to the treated area. Wear a sunscreen with SPF 50+ to the areas being treated prior to any sun exposure as you will be more sensitive to the sun and more prone to sunburn while being treated and afterwards during healing process.   You may use ointments such as vaseline or Aquaphor, or moisturizing creams such as Vanicream or Cetaphil cream over bothersome areas. If the reaction becomes itchy you may apply over the counter hydrocorisone 1% cream or ointment twice daily to itchy areas. If the reaction becomes too bothersome, please call the clinic as we may need to discontinue treatment or reevaluate in the clinic.     Potential Side Effects  Your treated areas may be unsightly during therapy.  This will improve slowly following the discontinuation of therapy.   During the beginning of treatment, mild inflammation may occur.   During the end of treatment, redness, and swelling may occur  with some crusting and burning.   Lesions resolve as the skin exfoliates.   Over 1 to 2 weeks, new skin grows into the treatment area.  Keep this medication away from pets as it can be very harmful to them.     Specific side effects that usually do not require medical attention (report to your doctor or health care professional if they continue or are bothersome) include:  Red or dark-colored skin   Mild erosion (loss of upper layer of skin)   Mild eye irritation including burning, itching, sensitivity, stinging, or watering   Increased sensitivity of the skin to sun and ultraviolet light   Pain and burning of the affected area   Dryness, scaling or swelling of the affected area   Skin rash, itching of the affected area   Tenderness   If you have severe pain, please, call the clinic immediately and indicate that you have pain.  Ask for a call from the RN.     Who should I call with questions?  [facility]: [phone]  [facility]: [phone]  For urgent needs outside of business hours call the [facility] at [phone] and ask for the dermatology resident on call

## 2023-04-20 NOTE — LETTER
4/20/2023         RE: Toney Lemos  [address] [facility] [address]        Dear Colleague,    Thank you for referring your patient, Toney Lemos, to the [facility]. Please see a copy of my visit note below.    [facility] Dermatology Note  Encounter Date: Apr 20, 2023  Office Visit     Reviewed patients past medical history and pertinent chart review prior to patients visit today.     Dermatology Problem List:  # SCC, left dorsal hand incompletely excised by PCP 10/1/2021.  Referred to dermatology surgery placed 4/20/2023   # Ganglion cyst, left 4th finger.  Referral to orthopedics for hand surgery consult 4/20/2023   # AK, plans to treat with Efudex/calcipotriene to the face and ears    ____________________________________________    Assessment & Plan:     # Incompletely excised SCC of the left dorsal hand.  Referral to dermatology surgery placed for consultation regarding further removal.     # Actinic keratosis.  Discussed treatment options with patient including cryotherapy, Efudex, and Efudex plus calcipotriene.  Patient prefers to treat the face and ears with Efudex plus calcipotriene.  Start Efudex calcipotriene twice daily x5-10 days. Counseled that patient should expect erythema and scale, but should discontinue this medication if significant oozing or pain greater than 5/10 is to occur. Recommend the patient wash hands after use or use gloves to apply. Keep medication away from pets. Handout provided with administration instructions.     # Ganglion cyst, left dorsal 4th DIP. Benign, no further treatment needed.  Patient would     # Benign skin findings including: seborrheic keratoses, cherry angioma, lentigines and benign nevi.   - No further intervention required. Patient to report changes.   - Patient reassured of the benign nature of these lesions.    #Signs and Symptoms of non-melanoma skin cancer and ABCDEs of melanoma reviewed with patient.  Patient encouraged to perform monthly self skin exams and educated on how to perform them. UV precautions reviewed with patient. Patient was asked about new or changing moles/lesions on body.     #Reviewed Sunscreen: Apply 20 minutes prior to going outdoors and reapply every two hours, when wet or sweating. We recommend using an SPF 30 or higher, and to use one that is water resistant.       Follow-up:  1-2 years for follow up full body skin exam, prn for new or changing lesions or new concerns    Judy Romero CNP  Dermatology     ____________________________________________    CC: Skin Check (Biopsy 1year ago to left hand, unsure what type of skin cancer/Lesion/wart to left ring finger, present 1 year with minimal change, does have drainage at times/Redness to cheeks in last 6-8 months)    HPI:  Mr. Toney Lemos is a(n) 53 year old male who presents today as a new patient for a full body skin cancer screening. Patient has concerns today about Some rough patches on the face.  He has had these for a long time and they are asymptomatic.  He also had a biopsy and an excision done with primary care in the left dorsal hand that was an SCC, the pathology states that it extending to the deep margin on the excision.  He also has a bump on the left fourth finger that sometimes is painful when he bumps it.  He works with his hands and would like it removed if possible.    Patient is otherwise feeling well, without additional skin concerns.     Physical Exam:  Vitals: There were no vitals taken for this visit.  SKIN: Total skin excluding the genitalia areas was performed. The exam included the head/face, neck, both arms, chest, back, abdomen, both legs, digits, mons pubis, buttock and nails.   -diffuse pink scaly papules on face  -Well-healed linear scar in the left dorsal hand, no sign of recurrent SCC at this time  -3 to 4 mm shiny firm papule on the left dorsal DIP joint  -several 1-2mm red dome shaped symmetric papules  scattered on the trunk  -multiple tan/brown flat round macules and raised papules scattered throughout trunk, extremities and head. No worrisome features for malignancy noted on examination.  -scattered tan, homogenous macules scattered on sun exposed areas of trunk, extremities and face.   -scattered waxy, stuck on tan/brown papules and patches on the trunk     - No other lesions of concern on areas examined.     Scar from previous surgery SCC    Ganglion cyst left dorsal fourth finger DIP    Medications:  Current Outpatient Medications   Medication     acetaminophen (TYLENOL) 325 MG tablet     amLODIPine (NORVASC) 2.5 MG tablet     amoxicillin (AMOXIL) 500 MG capsule     aspirin (ASA) 81 MG EC tablet     atorvastatin (LIPITOR) 40 MG tablet     bisacodyl (DULCOLAX) 5 MG EC tablet     lisinopril (ZESTRIL) 5 MG tablet     loratadine (CLARITIN) 10 MG tablet     metoprolol succinate ER (TOPROL XL) 50 MG 24 hr tablet     polyethylene glycol (GOLYTELY) 236 g suspension     warfarin ANTICOAGULANT (COUMADIN) 5 MG tablet     No current facility-administered medications for this visit.      Past Medical History:   Patient Active Problem List   Diagnosis     Hypertension goal BP (blood pressure) < 140/90     Encounter for long-term current use of medication     FH: early coronary artery disease- bro 30s     S/P AVR (aortic valve replacement)-25 mm [facility] [facility] [place]      H/O mechanical aortic valve replacement     Long term current use of anticoagulants with INR goal of 2.0-3.0     SCC (squamous cell carcinoma), arm, left- hand, + deep margin, watching     S/P AVR     Past Medical History:   Diagnosis Date     Aortic valve disorders      Hypertension        CC No referring provider defined for this encounter. on close of this encounter.      Again, thank you for allowing me to participate in the care of your patient.        Sincerely,        Latesha Romero, MACRINA CNP

## 2023-04-20 NOTE — PROGRESS NOTES
[facility] Dermatology Note  Encounter Date: Apr 20, 2023  Office Visit     Reviewed patients past medical history and pertinent chart review prior to patients visit today.     Dermatology Problem List:  # SCC, left dorsal hand incompletely excised by PCP 10/1/2021.  Referred to dermatology surgery placed 4/20/2023   # Ganglion cyst, left 4th finger.  Referral to orthopedics for hand surgery consult 4/20/2023   # AK, plans to treat with Efudex/calcipotriene to the face and ears    ____________________________________________    Assessment & Plan:     # Incompletely excised SCC of the left dorsal hand.  Referral to dermatology surgery placed for consultation regarding further removal.     # Actinic keratosis.  Discussed treatment options with patient including cryotherapy, Efudex, and Efudex plus calcipotriene.  Patient prefers to treat the face and ears with Efudex plus calcipotriene.  Start Efudex calcipotriene twice daily x5-10 days. Counseled that patient should expect erythema and scale, but should discontinue this medication if significant oozing or pain greater than 5/10 is to occur. Recommend the patient wash hands after use or use gloves to apply. Keep medication away from pets. Handout provided with administration instructions.     # Ganglion cyst, left dorsal 4th DIP. Benign, no further treatment needed.  Patient would     # Benign skin findings including: seborrheic keratoses, cherry angioma, lentigines and benign nevi.   - No further intervention required. Patient to report changes.   - Patient reassured of the benign nature of these lesions.    #Signs and Symptoms of non-melanoma skin cancer and ABCDEs of melanoma reviewed with patient. Patient encouraged to perform monthly self skin exams and educated on how to perform them. UV precautions reviewed with patient. Patient was asked about new or changing moles/lesions on body.     #Reviewed Sunscreen: Apply 20 minutes prior to going  outdoors and reapply every two hours, when wet or sweating. We recommend using an SPF 30 or higher, and to use one that is water resistant.       Follow-up:  1-2 years for follow up full body skin exam, prn for new or changing lesions or new concerns    Judy Romero, ELROY  Dermatology     ____________________________________________    CC: Skin Check (Biopsy 1year ago to left hand, unsure what type of skin cancer/Lesion/wart to left ring finger, present 1 year with minimal change, does have drainage at times/Redness to cheeks in last 6-8 months)    HPI:  Mr. Toney Lemos is a(n) 53 year old male who presents today as a new patient for a full body skin cancer screening. Patient has concerns today about Some rough patches on the face.  He has had these for a long time and they are asymptomatic.  He also had a biopsy and an excision done with primary care in the left dorsal hand that was an SCC, the pathology states that it extending to the deep margin on the excision.  He also has a bump on the left fourth finger that sometimes is painful when he bumps it.  He works with his hands and would like it removed if possible.    Patient is otherwise feeling well, without additional skin concerns.     Physical Exam:  Vitals: There were no vitals taken for this visit.  SKIN: Total skin excluding the genitalia areas was performed. The exam included the head/face, neck, both arms, chest, back, abdomen, both legs, digits, mons pubis, buttock and nails.   -diffuse pink scaly papules on face  -Well-healed linear scar in the left dorsal hand, no sign of recurrent SCC at this time  -3 to 4 mm shiny firm papule on the left dorsal DIP joint  -several 1-2mm red dome shaped symmetric papules scattered on the trunk  -multiple tan/brown flat round macules and raised papules scattered throughout trunk, extremities and head. No worrisome features for malignancy noted on examination.  -scattered tan, homogenous macules scattered on sun exposed  areas of trunk, extremities and face.   -scattered waxy, stuck on tan/brown papules and patches on the trunk     - No other lesions of concern on areas examined.     Scar from previous surgery SCC    Ganglion cyst left dorsal fourth finger DIP    Medications:  Current Outpatient Medications   Medication     acetaminophen (TYLENOL) 325 MG tablet     amLODIPine (NORVASC) 2.5 MG tablet     amoxicillin (AMOXIL) 500 MG capsule     aspirin (ASA) 81 MG EC tablet     atorvastatin (LIPITOR) 40 MG tablet     bisacodyl (DULCOLAX) 5 MG EC tablet     lisinopril (ZESTRIL) 5 MG tablet     loratadine (CLARITIN) 10 MG tablet     metoprolol succinate ER (TOPROL XL) 50 MG 24 hr tablet     polyethylene glycol (GOLYTELY) 236 g suspension     warfarin ANTICOAGULANT (COUMADIN) 5 MG tablet     No current facility-administered medications for this visit.      Past Medical History:   Patient Active Problem List   Diagnosis     Hypertension goal BP (blood pressure) < 140/90     Encounter for long-term current use of medication     FH: early coronary artery disease- bro 30s     S/P AVR (aortic valve replacement)-25 mm [facility] Rolf [place]      H/O mechanical aortic valve replacement     Long term current use of anticoagulants with INR goal of 2.0-3.0     SCC (squamous cell carcinoma), arm, left- hand, + deep margin, watching     S/P AVR     Past Medical History:   Diagnosis Date     Aortic valve disorders      Hypertension        CC No referring provider defined for this encounter. on close of this encounter.

## 2023-04-21 ENCOUNTER — TELEPHONE (OUTPATIENT)
Dept: FAMILY MEDICINE | Facility: CLINIC | Age: 53
End: 2023-04-21
Payer: COMMERCIAL

## 2023-04-21 DIAGNOSIS — E78.5 HYPERLIPIDEMIA LDL GOAL <130: ICD-10-CM

## 2023-04-21 RX ORDER — ATORVASTATIN CALCIUM 40 MG/1
40 TABLET, FILM COATED ORAL DAILY
Qty: 90 TABLET | Refills: 0 | Status: SHIPPED | OUTPATIENT
Start: 2023-04-21 | End: 2023-07-06

## 2023-04-21 NOTE — TELEPHONE ENCOUNTER
Attempted to phone pharmacy, they are not open at this time, will call when pharmacy opens.    Landy Steinberg RN    My Chart correspondence:  Good Morning Toney,     We will give the pharmacy a call this morning when they open to confirm that we have sent the refills for atorvastatin and warfarin.       Thank you for your patience.  Have a good day.  Benoit RN triage team  [phone]     This Octrot message has not been read.     Preet MÉNDEZ [employer] Messages (supporting Yan Lucas MD) 14 hours ago (6:05 PM)       [facility] is still telling me they don t have the atorvastatin refill approval       Sarah Escobar RN Dave C Czupta 7 days ago     JR [place],  The Atorvastatin was sent 4/11/23. The warfarin refill was just sent to the provider to address today.       Preet MÉNDEZ [employer] Messages (supporting Yan Lucas MD) 7 days ago       [facility] has not yet received refill authorization for my Warafrin or atorvastatin. I will not have enough for a full week. I submitted 4 prescriptions on Saturday for refill that all needed authorization from doctor, they only received 2 and nothing for the two I have listed here. Would it be possible to get that submitted today?

## 2023-04-21 NOTE — TELEPHONE ENCOUNTER
My chart message sent to patient, we will call pharmacy and confirm our refill submission sent 4/11/2023.    Please see telephone encounter dated 4/21/2023.    Landy Steinberg RN

## 2023-04-21 NOTE — TELEPHONE ENCOUNTER
Pharmacy called to follow up.   Indicating they had not received prescription sent on 4/11/23 of atorvastatin.   RN resending to pharmacy.     Marlen MCDONNELLN, RN  [facility]

## 2023-04-24 ENCOUNTER — TELEPHONE (OUTPATIENT)
Dept: DERMATOLOGY | Facility: CLINIC | Age: 53
End: 2023-04-24
Payer: COMMERCIAL

## 2023-04-24 NOTE — TELEPHONE ENCOUNTER
Left patient a voicemail to schedule a consult , had SCC incompletely removed in 2021 at primary care. Need to be reexcised? with Dr. Aleman or Dr. Romero. Provided my direct phone number.    Rosalind Vazquez on 4/24/2023 at 10:48 AM

## 2023-05-02 NOTE — TELEPHONE ENCOUNTER
Left patient a voicemail to schedule a consult , had SCC incompletely removed in 2021 at primary care. Need to be reexcised? with Dr. Aleman or Dr. Romero. Provided my direct phone number.    Rosalind Vazquez, Procedure  5/2/2023 10:39 AM

## 2023-05-15 NOTE — TELEPHONE ENCOUNTER
Left patient a voicemail to schedule a consult , had SCC incompletely removed in 2021 at primary care. Need to be reexcised? with Dr. Aleman or Dr. Romero. Provided my direct phone number.    Rosalind Vazquez, Procedure  5/15/2023 3:52 PM

## 2023-05-18 ENCOUNTER — LAB (OUTPATIENT)
Dept: LAB | Facility: CLINIC | Age: 53
End: 2023-05-18
Payer: COMMERCIAL

## 2023-05-18 ENCOUNTER — ANTICOAGULATION THERAPY VISIT (OUTPATIENT)
Dept: BEHAVIORAL HEALTH | Facility: CLINIC | Age: 53
End: 2023-05-18

## 2023-05-18 DIAGNOSIS — Z95.2 S/P AVR: ICD-10-CM

## 2023-05-18 DIAGNOSIS — Z95.2 S/P AVR (AORTIC VALVE REPLACEMENT): Primary | ICD-10-CM

## 2023-05-18 DIAGNOSIS — Z95.2 S/P AVR (AORTIC VALVE REPLACEMENT): ICD-10-CM

## 2023-05-18 DIAGNOSIS — Z79.01 LONG TERM CURRENT USE OF ANTICOAGULANTS WITH INR GOAL OF 2.0-3.0: ICD-10-CM

## 2023-05-18 DIAGNOSIS — Z95.2 H/O MECHANICAL AORTIC VALVE REPLACEMENT: ICD-10-CM

## 2023-05-18 LAB — INR BLD: 2.2 (ref 0.9–1.1)

## 2023-05-18 PROCEDURE — 85610 PROTHROMBIN TIME: CPT

## 2023-05-18 PROCEDURE — 36416 COLLJ CAPILLARY BLOOD SPEC: CPT

## 2023-05-18 NOTE — PROGRESS NOTES
ANTICOAGULATION MANAGEMENT     Toney Lemos 53 year old male is on warfarin with therapeutic INR result. (Goal INR 2.0-3.0)    Recent labs: (last 7 days)     05/18/23  0740   INR 2.2*       ASSESSMENT     Warfarin Lab Questionnaire    Warfarin Doses Last 7 Days      5/17/2023     6:19 AM   Dose in Tablet or Mg   TAB or MG? milligram (mg)     Pt Rptd Dose SUNDAY MONDAY TUESDAY WED THURS FRIDAY SATURDAY 5/17/2023   6:19 AM 5 5 5 5 7.5 5 5         5/17/2023   Warfarin Lab Questionnaire   Missed doses within past 14 days? No   Changes in diet or alcohol within past 14 days? No   Medication changes since last result? No   Injuries or illness since last result? No   New shortness of breath, severe headaches or sudden changes in vision since last result? No   Abnormal bleeding since last result? No   Upcoming surgery, procedure? No        Previous result: Therapeutic last visit; previously outside of goal range  Additional findings: None       PLAN     Recommended plan for no diet, medication or health factor changes affecting INR     Dosing Instructions: Continue your current warfarin dose with next INR in 5 weeks       Summary  As of 5/18/2023    Full warfarin instructions:  7.5 mg every Thu; 5 mg all other days   Next INR check:  6/22/2023             Detailed voice message left for Preet with dosing instructions and follow up date.     Contact [phone]  to schedule and with any changes, questions or concerns.     Education provided:     Please call back if any changes to your diet, medications or how you've been taking warfarin    Plan made per [facility] anticoagulation protocol    Tad Matthew RN  Anticoagulation Clinic  5/18/2023    _______________________________________________________________________     Anticoagulation Episode Summary     Current INR goal:  2.0-3.0   TTR:  68.3 % (1 y)   Target end date:  Indefinite   Send INR reminders to:  MAIA FISHER    Indications    S/P AVR (aortic valve  replacement)-25 mm St. Rolf [place]  [Z95.2]  H/O mechanical aortic valve replacement [Z95.2]  Long term current use of anticoagulants with INR goal of 2.0-3.0 [Z79.01]  S/P AVR [Z95.2]           Comments:           Anticoagulation Care Providers     Provider Role Specialty Phone number    Moni Cartagena PA-C Referring Cardiovascular & Thoracic Surgery [phone]    LanceYan burgess MD Referring [facility] [phone]

## 2023-05-22 NOTE — TELEPHONE ENCOUNTER
I have been unable to reach pt to schedule. I am canceling the order out of the WQ, but I am happy to reinstate and schedule if the pt calls back.     Rosalind Vazquez, Procedure  5/22/2023 3:02 PM

## 2023-05-23 NOTE — TELEPHONE ENCOUNTER
Patient replied to MyChart and scheduled a consultation with Dr. Romero.      Rosalind Vazquez, Procedure  5/23/2023 2:51 PM

## 2023-06-05 DIAGNOSIS — Z95.2 S/P AVR: ICD-10-CM

## 2023-06-05 DIAGNOSIS — I10 HYPERTENSION GOAL BP (BLOOD PRESSURE) < 140/90: ICD-10-CM

## 2023-06-05 RX ORDER — METOPROLOL SUCCINATE 50 MG/1
50 TABLET, EXTENDED RELEASE ORAL 2 TIMES DAILY
Qty: 180 TABLET | Refills: 0 | Status: SHIPPED | OUTPATIENT
Start: 2023-06-05 | End: 2023-09-14

## 2023-06-05 RX ORDER — AMLODIPINE BESYLATE 2.5 MG/1
2.5 TABLET ORAL DAILY
Qty: 90 TABLET | Refills: 0 | Status: SHIPPED | OUTPATIENT
Start: 2023-06-05 | End: 2023-09-14

## 2023-06-20 ENCOUNTER — OFFICE VISIT (OUTPATIENT)
Dept: CARDIOLOGY | Facility: CLINIC | Age: 53
End: 2023-06-20
Payer: COMMERCIAL

## 2023-06-20 VITALS
SYSTOLIC BLOOD PRESSURE: 126 MMHG | HEART RATE: 65 BPM | OXYGEN SATURATION: 98 % | BODY MASS INDEX: 28.7 KG/M2 | HEIGHT: 68 IN | DIASTOLIC BLOOD PRESSURE: 84 MMHG | WEIGHT: 189.4 LBS

## 2023-06-20 DIAGNOSIS — Z95.2 S/P AVR: ICD-10-CM

## 2023-06-20 PROCEDURE — 99214 OFFICE O/P EST MOD 30 MIN: CPT | Performed by: INTERNAL MEDICINE

## 2023-06-20 NOTE — PROGRESS NOTES
HPI and Plan:   Mr Lemos is a very pleasant 53-year-old gentleman with history of bicuspid aortic valve severe aortic regurgitation for which he underwent 27 mm [employer] Rolf [place] mechanical aortic valve replacement in February 2020 with pre AVR coronary angiogram showing normal coronary arteries, other comorbid's of hypertension, dyslipidemia.  Today is coming for routine follow-up.  Overall cardiac health wise he feels well.  He is fairly active at his work, no exertional symptoms or chest discomfort shortness of breath dizziness presyncope syncope.  He is on both aspirin and Coumadin and is tolerating combination quite well without any bleeding issues.  For hypertension he is on amlodipine metoprolol and lisinopril.  He is on Lipitor for dyslipidemia.  No tobacco abuse.  Occasional alcohol intake.  Echocardiogram 2021 showed normal LV systolic function with normal functioning mechanical aortic valve with mean gradients of 5.5 mmHg.    Assessment and plan  1.  S/p mechanical aortic valve replacement in 2020.  Crisp mechanical heart sounds heard without any significant murmur.  On aspirin and Coumadin.  Again discussed with patient importance of predental work-up antibiotic prophylaxis  2.  Hypertension well-controlled on amlodipine lisinopril and metoprolol  3.  Dyslipidemia on Lipitor    Recommendations  Continue aspirin, Coumadin, antihypertensive medications  Follow-up in a year, sooner if he notes any change in clinical status          Orders Placed This Encounter   Procedures     Follow-Up with Cardiology       No orders of the defined types were placed in this encounter.      There are no discontinued medications.      Encounter Diagnosis   Name Primary?     S/P AVR        CURRENT MEDICATIONS:  Current Outpatient Medications   Medication Sig Dispense Refill     acetaminophen (TYLENOL) 325 MG tablet Take 2 tablets (650 mg) by mouth every 4 hours as needed for mild pain 30 tablet 0     amLODIPine (NORVASC) 2.5  MG tablet Take 1 tablet (2.5 mg) by mouth daily 90 tablet 0     amoxicillin (AMOXIL) 500 MG capsule Take 4 capsules (2 gms) 60 minutes prior to dental work. 4 capsule 1     aspirin (ASA) 81 MG EC tablet Take 1 tablet (81 mg) by mouth daily 90 tablet 0     atorvastatin (LIPITOR) 40 MG tablet Take 1 tablet (40 mg) by mouth daily 90 tablet 0     bisacodyl (DULCOLAX) 5 MG EC tablet Take 2 tablets at 3 pm the day before your procedure. If your procedure is before 11 am, take 2 additional tablets at 11 pm. If your procedure is after 11 am, take 2 additional tablets at 6 am. For additional instructions refer to your colonoscopy prep instructions. 4 tablet 0     calcipotriene (DOVONOX) 0.005 % external ointment Mix with fluorouracil and apply BID to affected areas for 7-10 days. 60 g 0     fluorouracil (EFUDEX) 5 % external cream Mix with calcipotriene and apply BID to affected areas for 7-10 days. 40 g 0     lisinopril (ZESTRIL) 5 MG tablet Take 1 tablet (5 mg) by mouth daily 90 tablet 0     loratadine (CLARITIN) 10 MG tablet Take 10 mg by mouth daily       metoprolol succinate ER (TOPROL XL) 50 MG 24 hr tablet Take 1 tablet (50 mg) by mouth 2 times daily 180 tablet 0     warfarin ANTICOAGULANT (COUMADIN) 5 MG tablet Take 7.5 mg on  Thurs and 5 mg all other days, or as directed by the Coumadin Clinic 100 tablet 1     polyethylene glycol (GOLYTELY) 236 g suspension The night before the exam at 6 pm drink an 8-ounce glass every 15 minutes until the jug is half empty. If you arrive before 11 AM: Drink the other half of the Golytely jug at 11 PM night before procedure. If you arrive after 11 AM: Drink the other half of the Golytely jug at 6 AM day of procedure. For additional instructions refer to your colonoscopy prep instructions. (Patient not taking: Reported on 6/20/2023) 4000 mL 0       ALLERGIES     Allergies   Allergen Reactions     No Known Drug Allergy      Seasonal Allergies        PAST MEDICAL HISTORY:  Past Medical  History:   Diagnosis Date     Aortic valve disorders      Hypertension        PAST SURGICAL HISTORY:  Past Surgical History:   Procedure Laterality Date     COLONOSCOPY N/A 3/27/2023    Procedure: COLONOSCOPY;  Surgeon: Yan Lucas MD;  Location: [facility]     CV CORONARY ANGIOGRAM N/A 1/9/2020    Procedure: Coronary Angiogram;  Surgeon: Faisal Telles MD;  Location:  HEART CARDIAC CATH LAB     INJECT EPIDURAL TRANSFORAMINAL Left 8/25/2022    Procedure: Left Lumbar 3-4 and Lumbar 4-5 Transforaminal Epidural Steroid Injections with fluoroscopic guidance and contrast.;  Surgeon: Walter Dotson MD;  Location: [facility]     REPLACE VALVE AORTIC N/A 2/3/2020    Procedure: AORTIC VALVE REPLACEMENT WITH [facility] [employer] MECHANICAL HEART VALVE 25 MM; ON PUMP WITH TADEO;  Surgeon: Cindy Matthews MD;  Location:  OR       FAMILY HISTORY:  Family History   Problem Relation Age of Onset     Heart Disease Brother         CAD, age 38     Heart Disease Maternal Grandmother      Lipids Maternal Grandmother      Hypertension Maternal Grandmother      Diabetes Maternal Grandfather         senior onset     Hypertension Maternal Grandfather      Cerebrovascular Disease Maternal Grandfather      Skin Cancer Maternal Grandfather      Cancer Paternal Grandmother         rectal cancer     Lipids Paternal Grandmother      Hypertension Paternal Grandfather      Heart Disease Paternal Grandfather         heart murmur     Prostate Cancer Paternal Grandfather      Cancer Paternal Grandfather         bone ca     Lipids Paternal Grandfather      Pancreatic Cancer Cousin        SOCIAL HISTORY:  Social History     Socioeconomic History     Marital status:      Spouse name: None     Number of children: None     Years of education: None     Highest education level: None   Tobacco Use     Smoking status: Never     Smokeless tobacco: Never   Vaping Use     Vaping status: Never Used   Substance and Sexual Activity      "Alcohol use: Yes     Alcohol/week: 0.0 standard drinks of alcohol     Comment: beer or two wkly     Drug use: No     Sexual activity: Yes     Partners: Female     Comment: partner is on bcp   Other Topics Concern      Service No     Blood Transfusions No     Caffeine Concern No     Occupational Exposure No     Hobby Hazards No     Sleep Concern Yes     Stress Concern No     Weight Concern No     Special Diet Yes     Back Care Yes     Exercise Yes     Bike Helmet Yes     Seat Belt Yes     Self-Exams Yes     Parent/sibling w/ CABG, MI or angioplasty before 65F 55M? Yes     Comment: brother had heart attack &  at age 38yrs       Review of Systems:  Skin:          Eyes:         ENT:         Respiratory:  Negative shortness of breath;cough;wheezing     Cardiovascular:  Negative;palpitations;chest pain;edema;lightheadedness;dizziness;syncope or near-syncope      Gastroenterology:        Genitourinary:         Musculoskeletal:         Neurologic:  Positive for numbness or tingling of hands    Psychiatric:         Heme/Lymph/Imm:  Positive for allergies    Endocrine:           Physical Exam:  Vitals: /84 (BP Location: Right arm, Patient Position: Sitting, Cuff Size: Adult Regular)   Pulse 65   Ht 1.727 m (5' 8\")   Wt 85.9 kg (189 lb 6.4 oz)   SpO2 98%   BMI 28.80 kg/m      General patient appears comfortable  Neck normal JVP  Cardiovascular system crisp mechanical heart sounds, no significant murmur rub or gallop  Respiratory system clear to auscultation  Extremities no edema  Neurological alert, oriented    BONITA Mederos MD  [address]  [place] [address]              "

## 2023-06-20 NOTE — LETTER
6/20/2023    Yan Lucas MD  [address] [place] [address]    RE: Toney TATE Canelo       Dear Colleague,     I had the pleasure of seeing Toney Lemos in the [facility].  HPI and Plan:   Mr Lemos is a very pleasant 53-year-old gentleman with history of bicuspid aortic valve severe aortic regurgitation for which he underwent 27 mm Saint Rolf [place] mechanical aortic valve replacement in February 2020 with pre AVR coronary angiogram showing normal coronary arteries, other comorbid's of hypertension, dyslipidemia.  Today is coming for routine follow-up.  Overall cardiac health wise he feels well.  He is fairly active at his work, no exertional symptoms or chest discomfort shortness of breath dizziness presyncope syncope.  He is on both aspirin and Coumadin and is tolerating combination quite well without any bleeding issues.  For hypertension he is on amlodipine metoprolol and lisinopril.  He is on Lipitor for dyslipidemia.  No tobacco abuse.  Occasional alcohol intake.  Echocardiogram 2021 showed normal LV systolic function with normal functioning mechanical aortic valve with mean gradients of 5.5 mmHg.    Assessment and plan  1.  S/p mechanical aortic valve replacement in 2020.  Crisp mechanical heart sounds heard without any significant murmur.  On aspirin and Coumadin.  Again discussed with patient importance of predental work-up antibiotic prophylaxis  2.  Hypertension well-controlled on amlodipine lisinopril and metoprolol  3.  Dyslipidemia on Lipitor    Recommendations  Continue aspirin, Coumadin, antihypertensive medications  Follow-up in a year, sooner if he notes any change in clinical status          Orders Placed This Encounter   Procedures    Follow-Up with Cardiology       No orders of the defined types were placed in this encounter.      There are no discontinued medications.      Encounter Diagnosis   Name Primary?    S/P AVR        CURRENT MEDICATIONS:  Current  Outpatient Medications   Medication Sig Dispense Refill    acetaminophen (TYLENOL) 325 MG tablet Take 2 tablets (650 mg) by mouth every 4 hours as needed for mild pain 30 tablet 0    amLODIPine (NORVASC) 2.5 MG tablet Take 1 tablet (2.5 mg) by mouth daily 90 tablet 0    amoxicillin (AMOXIL) 500 MG capsule Take 4 capsules (2 gms) 60 minutes prior to dental work. 4 capsule 1    aspirin (ASA) 81 MG EC tablet Take 1 tablet (81 mg) by mouth daily 90 tablet 0    atorvastatin (LIPITOR) 40 MG tablet Take 1 tablet (40 mg) by mouth daily 90 tablet 0    bisacodyl (DULCOLAX) 5 MG EC tablet Take 2 tablets at 3 pm the day before your procedure. If your procedure is before 11 am, take 2 additional tablets at 11 pm. If your procedure is after 11 am, take 2 additional tablets at 6 am. For additional instructions refer to your colonoscopy prep instructions. 4 tablet 0    calcipotriene (DOVONOX) 0.005 % external ointment Mix with fluorouracil and apply BID to affected areas for 7-10 days. 60 g 0    fluorouracil (EFUDEX) 5 % external cream Mix with calcipotriene and apply BID to affected areas for 7-10 days. 40 g 0    lisinopril (ZESTRIL) 5 MG tablet Take 1 tablet (5 mg) by mouth daily 90 tablet 0    loratadine (CLARITIN) 10 MG tablet Take 10 mg by mouth daily      metoprolol succinate ER (TOPROL XL) 50 MG 24 hr tablet Take 1 tablet (50 mg) by mouth 2 times daily 180 tablet 0    warfarin ANTICOAGULANT (COUMADIN) 5 MG tablet Take 7.5 mg on  Thurs and 5 mg all other days, or as directed by the Coumadin Clinic 100 tablet 1    polyethylene glycol (GOLYTELY) 236 g suspension The night before the exam at 6 pm drink an 8-ounce glass every 15 minutes until the jug is half empty. If you arrive before 11 AM: Drink the other half of the Golytely jug at 11 PM night before procedure. If you arrive after 11 AM: Drink the other half of the Golytely jug at 6 AM day of procedure. For additional instructions refer to your colonoscopy prep instructions.  (Patient not taking: Reported on 6/20/2023) 4000 mL 0       ALLERGIES     Allergies   Allergen Reactions    No Known Drug Allergy     Seasonal Allergies        PAST MEDICAL HISTORY:  Past Medical History:   Diagnosis Date    Aortic valve disorders     Hypertension        PAST SURGICAL HISTORY:  Past Surgical History:   Procedure Laterality Date    COLONOSCOPY N/A 3/27/2023    Procedure: COLONOSCOPY;  Surgeon: Yan Lucas MD;  Location:  GI    CV CORONARY ANGIOGRAM N/A 1/9/2020    Procedure: Coronary Angiogram;  Surgeon: Faisal Telles MD;  Location:  HEART CARDIAC CATH LAB    INJECT EPIDURAL TRANSFORAMINAL Left 8/25/2022    Procedure: Left Lumbar 3-4 and Lumbar 4-5 Transforaminal Epidural Steroid Injections with fluoroscopic guidance and contrast.;  Surgeon: Walter Dotson MD;  Location:  OR    REPLACE VALVE AORTIC N/A 2/3/2020    Procedure: AORTIC VALVE REPLACEMENT WITH [facility] [facility] MECHANICAL HEART VALVE 25 MM; ON PUMP WITH TADEO;  Surgeon: Cindy Matthews MD;  Location:  OR       FAMILY HISTORY:  Family History   Problem Relation Age of Onset    Heart Disease Brother         CAD, age 38    Heart Disease Maternal Grandmother     Lipids Maternal Grandmother     Hypertension Maternal Grandmother     Diabetes Maternal Grandfather         senior onset    Hypertension Maternal Grandfather     Cerebrovascular Disease Maternal Grandfather     Skin Cancer Maternal Grandfather     Cancer Paternal Grandmother         rectal cancer    Lipids Paternal Grandmother     Hypertension Paternal Grandfather     Heart Disease Paternal Grandfather         heart murmur    Prostate Cancer Paternal Grandfather     Cancer Paternal Grandfather         bone ca    Lipids Paternal Grandfather     Pancreatic Cancer Cousin        SOCIAL HISTORY:  Social History     Socioeconomic History    Marital status:      Spouse name: None    Number of children: None    Years of education: None    Highest  "education level: None   Tobacco Use    Smoking status: Never    Smokeless tobacco: Never   Vaping Use    Vaping status: Never Used   Substance and Sexual Activity    Alcohol use: Yes     Alcohol/week: 0.0 standard drinks of alcohol     Comment: beer or two wkly    Drug use: No    Sexual activity: Yes     Partners: Female     Comment: partner is on bcp   Other Topics Concern     Service No    Blood Transfusions No    Caffeine Concern No    Occupational Exposure No    Hobby Hazards No    Sleep Concern Yes    Stress Concern No    Weight Concern No    Special Diet Yes    Back Care Yes    Exercise Yes    Bike Helmet Yes    Seat Belt Yes    Self-Exams Yes    Parent/sibling w/ CABG, MI or angioplasty before 65F 55M? Yes     Comment: brother had heart attack &  at age 38yrs       Review of Systems:  Skin:          Eyes:         ENT:         Respiratory:  Negative shortness of breath;cough;wheezing     Cardiovascular:  Negative;palpitations;chest pain;edema;lightheadedness;dizziness;syncope or near-syncope      Gastroenterology:        Genitourinary:         Musculoskeletal:         Neurologic:  Positive for numbness or tingling of hands    Psychiatric:         Heme/Lymph/Imm:  Positive for allergies    Endocrine:           Physical Exam:  Vitals: /84 (BP Location: Right arm, Patient Position: Sitting, Cuff Size: Adult Regular)   Pulse 65   Ht 1.727 m (5' 8\")   Wt 85.9 kg (189 lb 6.4 oz)   SpO2 98%   BMI 28.80 kg/m      General patient appears comfortable  Neck normal JVP  Cardiovascular system crisp mechanical heart sounds, no significant murmur rub or gallop  Respiratory system clear to auscultation  Extremities no edema  Neurological alert, oriented    CC  Evin Mederos MD  [address]  [place] [address]                  Thank you for allowing me to participate in the care of your patient.      Sincerely,     Evin Mederos MD     [facility]" [place] [facility]

## 2023-06-30 ENCOUNTER — OFFICE VISIT (OUTPATIENT)
Dept: FAMILY MEDICINE | Facility: CLINIC | Age: 53
End: 2023-06-30
Payer: COMMERCIAL

## 2023-06-30 VITALS
BODY MASS INDEX: 28.49 KG/M2 | WEIGHT: 188 LBS | OXYGEN SATURATION: 97 % | DIASTOLIC BLOOD PRESSURE: 80 MMHG | TEMPERATURE: 97.3 F | HEIGHT: 68 IN | RESPIRATION RATE: 16 BRPM | HEART RATE: 64 BPM | SYSTOLIC BLOOD PRESSURE: 118 MMHG

## 2023-06-30 DIAGNOSIS — L23.7 CONTACT DERMATITIS DUE TO POISON IVY: ICD-10-CM

## 2023-06-30 DIAGNOSIS — L01.00 IMPETIGO: Primary | ICD-10-CM

## 2023-06-30 PROCEDURE — 99213 OFFICE O/P EST LOW 20 MIN: CPT | Performed by: FAMILY MEDICINE

## 2023-06-30 RX ORDER — MUPIROCIN 20 MG/G
OINTMENT TOPICAL 3 TIMES DAILY
Qty: 15 G | Refills: 0 | Status: SHIPPED | OUTPATIENT
Start: 2023-06-30 | End: 2024-02-14

## 2023-06-30 ASSESSMENT — PAIN SCALES - GENERAL: PAINLEVEL: NO PAIN (0)

## 2023-06-30 NOTE — PROGRESS NOTES
Assessment & Plan     Impetigo  Patient may have a slight impetiginous reaction to the contact dermatitis area so I will give him some Bactroban ointment to apply.  - mupirocin (BACTROBAN) 2 % external ointment; Apply topically 3 times daily    Contact dermatitis due to poison ivy  Will use hydrocortisone 1% cream to the area 3 times daily this should resolve over the next 4 to 7 days.        Louis Patiño MD  [facility] PHILLIP Oviedo is a 53 year old, presenting for the following health issues:  No chief complaint on file.         No data to display              History of Present Illness       Reason for visit:  Infected scratch  Symptom onset:  3-7 days ago  Symptoms include:  Infected spot  Symptom intensity:  Mild  Symptom progression:  Staying the same  Had these symptoms before:  No    He eats 0-1 servings of fruits and vegetables daily.He consumes 0 sweetened beverage(s) daily.He exercises with enough effort to increase his heart rate 9 or less minutes per day.  He exercises with enough effort to increase his heart rate 3 or less days per week.   He is taking medications regularly.     Had a small patch of redness and irritation on the lateral aspect of his right calf.  They thought it might be infection but now that he thinks of it he believes it started as a patch of poison ivy that he scratched and may have gotten irritated he is got a couple other patches on his arm and on his other leg.        Review of Systems   Constitutional, HEENT, cardiovascular, pulmonary, gi and gu systems are negative, except as otherwise noted.      Objective    There were no vitals taken for this visit.  There is no height or weight on file to calculate BMI.  Physical Exam   GENERAL: healthy, alert and no distress  SKIN: Patient has a small area measuring 3 x 3 cm lateral aspect of the right calf slightly erythematous might be some slight induration.  Couple small pencil sized areas of  erythematous rash on the other leg and on the arm.

## 2023-07-03 ENCOUNTER — MYC MEDICAL ADVICE (OUTPATIENT)
Dept: ANTICOAGULATION | Facility: CLINIC | Age: 53
End: 2023-07-03
Payer: COMMERCIAL

## 2023-07-03 NOTE — TELEPHONE ENCOUNTER
ANTICOAGULATION     Toney Lemos is overdue for INR check.      my chart message sent     Ann Slaughter RN

## 2023-07-06 DIAGNOSIS — E78.5 HYPERLIPIDEMIA LDL GOAL <130: ICD-10-CM

## 2023-07-06 RX ORDER — ATORVASTATIN CALCIUM 40 MG/1
40 TABLET, FILM COATED ORAL DAILY
Qty: 90 TABLET | Refills: 4 | Status: SHIPPED | OUTPATIENT
Start: 2023-07-06 | End: 2024-05-07

## 2023-07-10 DIAGNOSIS — Z95.2 S/P AVR: ICD-10-CM

## 2023-07-10 RX ORDER — LISINOPRIL 5 MG/1
5 TABLET ORAL DAILY
Qty: 90 TABLET | Refills: 3 | Status: SHIPPED | OUTPATIENT
Start: 2023-07-10 | End: 2024-05-07

## 2023-07-10 RX ORDER — ASPIRIN 81 MG/1
81 TABLET ORAL DAILY
Qty: 90 TABLET | Refills: 3 | Status: SHIPPED | OUTPATIENT
Start: 2023-07-10 | End: 2024-05-07

## 2023-07-18 ENCOUNTER — DOCUMENTATION ONLY (OUTPATIENT)
Dept: ANTICOAGULATION | Facility: CLINIC | Age: 53
End: 2023-07-18

## 2023-07-18 NOTE — LETTER
[facility]  [address] [facility] [address]  Phone: [phone]  Fax: [phone]     July 18, 2023        Toney Lemos  [address] [facility] [address]            Dear Toney,    You are currently under the care of [facility] for your warfarin (Coumadin , Jantoven ) therapy.  We are contacting you because our records show you were due for an INR on 6/22/23.    There are potentially serious risks when taking warfarin without careful monitoring and we want to make sure you are safely managed.  Routine lab monitoring is required for warfarin refills.     Please call [phone]  as soon as possible to schedule an appointment.  If there has been a change in your care or other concerns, please let us know so we can help and or update our records.     Sincerely,       [facility]

## 2023-07-18 NOTE — PROGRESS NOTES
ANTICOAGULATION     Toney Lemos is overdue for INR check.      Reminder letter sent    Paulie MORRIS RN

## 2023-08-04 ENCOUNTER — LAB (OUTPATIENT)
Dept: LAB | Facility: CLINIC | Age: 53
End: 2023-08-04
Payer: COMMERCIAL

## 2023-08-04 ENCOUNTER — ANTICOAGULATION THERAPY VISIT (OUTPATIENT)
Dept: ANTICOAGULATION | Facility: CLINIC | Age: 53
End: 2023-08-04

## 2023-08-04 DIAGNOSIS — Z95.2 H/O MECHANICAL AORTIC VALVE REPLACEMENT: ICD-10-CM

## 2023-08-04 DIAGNOSIS — Z95.2 S/P AVR (AORTIC VALVE REPLACEMENT): Primary | ICD-10-CM

## 2023-08-04 DIAGNOSIS — Z95.2 S/P AVR: ICD-10-CM

## 2023-08-04 DIAGNOSIS — Z79.01 LONG TERM CURRENT USE OF ANTICOAGULANTS WITH INR GOAL OF 2.0-3.0: ICD-10-CM

## 2023-08-04 DIAGNOSIS — Z95.2 S/P AVR (AORTIC VALVE REPLACEMENT): ICD-10-CM

## 2023-08-04 LAB — INR BLD: 2 (ref 0.9–1.1)

## 2023-08-04 PROCEDURE — 36416 COLLJ CAPILLARY BLOOD SPEC: CPT

## 2023-08-04 PROCEDURE — 85610 PROTHROMBIN TIME: CPT

## 2023-08-04 NOTE — PROGRESS NOTES
ANTICOAGULATION MANAGEMENT     Toney Lemos 53 year old male is on warfarin with therapeutic INR result. (Goal INR 2.0-3.0)    Recent labs: (last 7 days)     08/04/23  0809   INR 2.0*       ASSESSMENT     Source(s): Chart Review and Patient/Caregiver Call     Warfarin doses taken: Less warfarin taken than planned which may be affecting INR  Diet: No new diet changes identified  Medication/supplement changes: None noted  New illness, injury, or hospitalization: No  Signs or symptoms of bleeding or clotting: No  Previous result: Therapeutic last 2(+) visits  Additional findings: None       PLAN     Recommended plan for no diet, medication or health factor changes affecting INR     Dosing Instructions: Continue your current warfarin dose with next INR in 7 weeks- pt wanted to come the next week when he has a dr appt.     Summary  As of 8/4/2023      Full warfarin instructions:  8/4: 7.5 mg; Otherwise 7.5 mg every Thu; 5 mg all other days   Next INR check:  9/22/2023               Telephone call with Preet who verbalizes understanding and agrees to plan and who agrees to plan and repeated back plan correctly    Lab visit scheduled    Education provided:   None required    Plan made per [facility] anticoagulation protocol    Milana Hernández, RN  Anticoagulation Clinic  8/4/2023    _______________________________________________________________________     Anticoagulation Episode Summary       Current INR goal:  2.0-3.0   TTR:  78.5 % (1 y)   Target end date:  Indefinite   Send INR reminders to:  MAIA FISHER    Indications    S/P AVR (aortic valve replacement)-25 mm St. Rolf [place]  [Z95.2]  H/O mechanical aortic valve replacement [Z95.2]  Long term current use of anticoagulants with INR goal of 2.0-3.0 [Z79.01]  S/P AVR [Z95.2]             Comments:               Anticoagulation Care Providers       Provider Role Specialty Phone number    Moni Cartagena PA-C Referring Cardiovascular & Thoracic Surgery [phone]     Yan Lucas MD [facility] [phone]

## 2023-09-09 DIAGNOSIS — Z95.2 S/P AVR: ICD-10-CM

## 2023-09-11 RX ORDER — WARFARIN SODIUM 5 MG/1
TABLET ORAL
Qty: 100 TABLET | Refills: 1 | Status: SHIPPED | OUTPATIENT
Start: 2023-09-11 | End: 2024-04-01

## 2023-09-11 NOTE — TELEPHONE ENCOUNTER
ANTICOAGULATION MANAGEMENT:  Medication Refill    Anticoagulation Summary  As of 8/4/2023      Warfarin maintenance plan:  7.5 mg (5 mg x 1.5) every Thu; 5 mg (5 mg x 1) all other days   Next INR check:  9/22/2023   Target end date:  Indefinite    Indications    S/P AVR (aortic valve replacement)-25 mm St. Rolf [place]  [Z95.2]  H/O mechanical aortic valve replacement [Z95.2]  Long term current use of anticoagulants with INR goal of 2.0-3.0 [Z79.01]  S/P AVR [Z95.2]                 Anticoagulation Care Providers       Provider Role Specialty Phone number    Moni Cartagena PA-C Referring Cardiovascular & Thoracic Surgery [phone]    Yan Lucas MD Referring Family Medicine [phone]            Refill Criteria    Visit with referring provider/group: Meets criteria: office visit within referring provider group in the last 1 year on 06/30/23    [facility] referral signed last signed: 03/01/2023; within last year: Yes    Lab monitoring not exceeding 2 weeks overdue: Yes    Toney meets all criteria for refill. Rx instructions and quantity match patient's current dosing plan. Warfarin 90 day supply with 1 refill granted per [facility] protocol     Ann Slaughter RN  Anticoagulation Clinic

## 2023-09-14 DIAGNOSIS — I10 HYPERTENSION GOAL BP (BLOOD PRESSURE) < 140/90: ICD-10-CM

## 2023-09-14 DIAGNOSIS — Z95.2 S/P AVR: ICD-10-CM

## 2023-09-14 RX ORDER — AMLODIPINE BESYLATE 2.5 MG/1
2.5 TABLET ORAL DAILY
Qty: 90 TABLET | Refills: 3 | Status: SHIPPED | OUTPATIENT
Start: 2023-09-14 | End: 2024-08-26

## 2023-09-14 RX ORDER — METOPROLOL SUCCINATE 50 MG/1
50 TABLET, EXTENDED RELEASE ORAL 2 TIMES DAILY
Qty: 180 TABLET | Refills: 3 | Status: SHIPPED | OUTPATIENT
Start: 2023-09-14 | End: 2024-08-26

## 2023-09-22 ENCOUNTER — LAB (OUTPATIENT)
Dept: LAB | Facility: CLINIC | Age: 53
End: 2023-09-22
Payer: COMMERCIAL

## 2023-09-22 ENCOUNTER — OFFICE VISIT (OUTPATIENT)
Dept: FAMILY MEDICINE | Facility: CLINIC | Age: 53
End: 2023-09-22
Payer: COMMERCIAL

## 2023-09-22 ENCOUNTER — ANTICOAGULATION THERAPY VISIT (OUTPATIENT)
Dept: ANTICOAGULATION | Facility: CLINIC | Age: 53
End: 2023-09-22

## 2023-09-22 VITALS
BODY MASS INDEX: 28.95 KG/M2 | SYSTOLIC BLOOD PRESSURE: 112 MMHG | TEMPERATURE: 97.2 F | DIASTOLIC BLOOD PRESSURE: 76 MMHG | WEIGHT: 191 LBS | OXYGEN SATURATION: 96 % | RESPIRATION RATE: 12 BRPM | HEIGHT: 68 IN | HEART RATE: 60 BPM

## 2023-09-22 DIAGNOSIS — Z95.2 S/P AVR: ICD-10-CM

## 2023-09-22 DIAGNOSIS — Z79.01 LONG TERM CURRENT USE OF ANTICOAGULANTS WITH INR GOAL OF 2.0-3.0: ICD-10-CM

## 2023-09-22 DIAGNOSIS — Z95.2 H/O MECHANICAL AORTIC VALVE REPLACEMENT: ICD-10-CM

## 2023-09-22 DIAGNOSIS — Z79.01 ANTICOAGULATED BY ANTICOAGULATION TREATMENT: ICD-10-CM

## 2023-09-22 DIAGNOSIS — I10 HYPERTENSION GOAL BP (BLOOD PRESSURE) < 140/90: Primary | ICD-10-CM

## 2023-09-22 DIAGNOSIS — Z82.49 FH: EARLY CORONARY ARTERY DISEASE: ICD-10-CM

## 2023-09-22 DIAGNOSIS — Z95.2 S/P AVR (AORTIC VALVE REPLACEMENT): ICD-10-CM

## 2023-09-22 DIAGNOSIS — Z95.2 S/P AVR (AORTIC VALVE REPLACEMENT): Primary | ICD-10-CM

## 2023-09-22 DIAGNOSIS — Z00.00 ANNUAL PHYSICAL EXAM: ICD-10-CM

## 2023-09-22 LAB
ANION GAP SERPL CALCULATED.3IONS-SCNC: 13 MMOL/L (ref 7–15)
BUN SERPL-MCNC: 13.2 MG/DL (ref 6–20)
CALCIUM SERPL-MCNC: 9.3 MG/DL (ref 8.6–10)
CHLORIDE SERPL-SCNC: 105 MMOL/L (ref 98–107)
CHOLEST SERPL-MCNC: 196 MG/DL
CREAT SERPL-MCNC: 0.91 MG/DL (ref 0.67–1.17)
DEPRECATED HCO3 PLAS-SCNC: 20 MMOL/L (ref 22–29)
EGFRCR SERPLBLD CKD-EPI 2021: >90 ML/MIN/1.73M2
GLUCOSE SERPL-MCNC: 112 MG/DL (ref 70–99)
HDLC SERPL-MCNC: 44 MG/DL
HGB BLD-MCNC: 15.4 G/DL (ref 13.3–17.7)
INR BLD: 2.3 (ref 0.9–1.1)
LDLC SERPL CALC-MCNC: 119 MG/DL
NONHDLC SERPL-MCNC: 152 MG/DL
POTASSIUM SERPL-SCNC: 4.7 MMOL/L (ref 3.4–5.3)
SODIUM SERPL-SCNC: 138 MMOL/L (ref 136–145)
TRIGL SERPL-MCNC: 167 MG/DL

## 2023-09-22 PROCEDURE — 85610 PROTHROMBIN TIME: CPT

## 2023-09-22 PROCEDURE — 36415 COLL VENOUS BLD VENIPUNCTURE: CPT | Performed by: FAMILY MEDICINE

## 2023-09-22 PROCEDURE — 99396 PREV VISIT EST AGE 40-64: CPT | Mod: 25 | Performed by: FAMILY MEDICINE

## 2023-09-22 PROCEDURE — 85018 HEMOGLOBIN: CPT | Performed by: FAMILY MEDICINE

## 2023-09-22 PROCEDURE — 99213 OFFICE O/P EST LOW 20 MIN: CPT | Mod: 25 | Performed by: FAMILY MEDICINE

## 2023-09-22 PROCEDURE — 80061 LIPID PANEL: CPT | Performed by: FAMILY MEDICINE

## 2023-09-22 PROCEDURE — 80048 BASIC METABOLIC PNL TOTAL CA: CPT | Performed by: FAMILY MEDICINE

## 2023-09-22 PROCEDURE — 36416 COLLJ CAPILLARY BLOOD SPEC: CPT

## 2023-09-22 RX ORDER — AMOXICILLIN 500 MG/1
CAPSULE ORAL
Qty: 4 CAPSULE | Refills: 1 | Status: SHIPPED | OUTPATIENT
Start: 2023-09-22 | End: 2024-01-08

## 2023-09-22 ASSESSMENT — ENCOUNTER SYMPTOMS
HEMATOCHEZIA: 0
PARESTHESIAS: 0
COUGH: 0
CONSTIPATION: 0
PALPITATIONS: 0
JOINT SWELLING: 0
NAUSEA: 0
SORE THROAT: 0
DIZZINESS: 0
FEVER: 0
DYSURIA: 0
HEMATURIA: 0
DIARRHEA: 0
HEADACHES: 0
NERVOUS/ANXIOUS: 0
WEAKNESS: 0
CHILLS: 0
MYALGIAS: 1
SHORTNESS OF BREATH: 0
ARTHRALGIAS: 1
EYE PAIN: 0
FREQUENCY: 0
HEARTBURN: 0
ABDOMINAL PAIN: 0

## 2023-09-22 ASSESSMENT — PAIN SCALES - GENERAL: PAINLEVEL: MILD PAIN (2)

## 2023-09-22 NOTE — PROGRESS NOTES
ANTICOAGULATION MANAGEMENT     Toney Lemos 53 year old male is on warfarin with therapeutic INR result. (Goal INR 2.0-3.0)    Recent labs: (last 7 days)     09/22/23  0753   INR 2.3*       ASSESSMENT     Warfarin Lab Questionnaire    Warfarin Doses Last 7 Days    Pt Rptd Dose SUNDAY MONDAY TUESDAY WED THURS FRIDAY SATURDAY 9/22/2023   6:56 AM 5 5 5 5 7.5 5 5         9/22/2023   Warfarin Lab Questionnaire   Missed doses within past 14 days? No   Changes in diet or alcohol within past 14 days? No   Medication changes since last result? No   Injuries or illness since last result? No   New shortness of breath, severe headaches or sudden changes in vision since last result? No   Abnormal bleeding since last result? No   Upcoming surgery, procedure? No     Previous result: Therapeutic last 2(+) visits  Additional findings: None       PLAN     Recommended plan for no diet, medication or health factor changes affecting INR     Dosing Instructions: Continue your current warfarin dose with next INR in 6 weeks       Summary  As of 9/22/2023      Full warfarin instructions:  7.5 mg every Thu; 5 mg all other days   Next INR check:  11/3/2023               Telephone call with Preet who verbalizes understanding and agrees to plan and who agrees to plan and repeated back plan correctly    Lab visit scheduled    Education provided:   Contact [phone]  with any changes, questions or concerns.     Plan made per [facility] anticoagulation protocol    Damien Colón RN  Anticoagulation Clinic  9/22/2023    _______________________________________________________________________     Anticoagulation Episode Summary       Current INR goal:  2.0-3.0   TTR:  82.7 % (1 y)   Target end date:  Indefinite   Send INR reminders to:  MAIA FISHER    Indications    S/P AVR (aortic valve replacement)-25 mm St. Rolf [place]  [Z95.2]  H/O mechanical aortic valve replacement [Z95.2]  Long term current use of anticoagulants with INR goal of  2.0-3.0 [Z79.01]  S/P AVR [Z95.2]             Comments:               Anticoagulation Care Providers       Provider Role Specialty Phone number    Moni Cartagena PA-C Referring Cardiovascular & Thoracic Surgery [phone]    [facility] MD Referring [facility] [phone]

## 2023-09-22 NOTE — PROGRESS NOTES
SUBJECTIVE:   CC: Preet is an 53 year old who presents for preventative health visit.       9/22/2023     7:48 AM   Additional Questions   Roomed by Olinda PETERSEN       Healthy Habits:     Getting at least 3 servings of Calcium per day:  Yes    Bi-annual eye exam:  NO    Dental care twice a year:  Yes    Sleep apnea or symptoms of sleep apnea:  Sleep apnea    Diet:  Regular (no restrictions)    Frequency of exercise:  1 day/week    Duration of exercise:  Less than 15 minutes    Taking medications regularly:  Yes    Medication side effects:  None    Additional concerns today:  No      BP's - rarely checked 120s      Today's PHQ-2 Score:       9/22/2023     7:04 AM   PHQ-2 ( 1999 [employer])   Q1: Little interest or pleasure in doing things 0   Q2: Feeling down, depressed or hopeless 0   PHQ-2 Score 0   Q1: Little interest or pleasure in doing things Not at all   Q2: Feeling down, depressed or hopeless Not at all   PHQ-2 Score 0           Social History     Tobacco Use    Smoking status: Never    Smokeless tobacco: Never   Substance Use Topics    Alcohol use: Yes     Alcohol/week: 0.0 standard drinks of alcohol     Comment: beer or two wkly             9/22/2023     7:03 AM   Alcohol Use   Prescreen: >3 drinks/day or >7 drinks/week? No       Last PSA:   PSA   Date Value Ref Range Status   02/10/2016 0.19 0 - 4 ug/L Final       Reviewed orders with patient. Reviewed health maintenance and updated orders accordingly -       Reviewed and updated as needed this visit by clinical staff                  Reviewed and updated as needed this visit by Provider                     Review of Systems   Constitutional:  Negative for chills and fever.   HENT:  Negative for congestion, ear pain, hearing loss and sore throat.    Eyes:  Negative for pain and visual disturbance.   Respiratory:  Negative for cough and shortness of breath.    Cardiovascular:  Negative for chest pain, palpitations and peripheral edema.   Gastrointestinal:   Negative for abdominal pain, constipation, diarrhea, heartburn, hematochezia and nausea.   Genitourinary:  Negative for dysuria, frequency, genital sores, hematuria, impotence, penile discharge and urgency.   Musculoskeletal:  Positive for arthralgias and myalgias. Negative for joint swelling.   Skin:  Negative for rash.   Neurological:  Negative for dizziness, weakness, headaches and paresthesias.   Psychiatric/Behavioral:  Negative for mood changes. The patient is not nervous/anxious.      CONSTITUTIONAL: NEGATIVE for fever, chills, change in weight  INTEGUMENTARY/SKIN: NEGATIVE for worrisome rashes, moles or lesions  EYES: NEGATIVE for vision changes or irritation  ENT: NEGATIVE for ear, mouth and throat problems  RESP: NEGATIVE for significant cough or SOB  CV: NEGATIVE for chest pain, palpitations or peripheral edema  GI: NEGATIVE for nausea, abdominal pain, heartburn, or change in bowel habits   male: negative for dysuria, hematuria, decreased urinary stream, erectile dysfunction, urethral discharge  MUSCULOSKELETAL: NEGATIVE for significant arthralgias or myalgia  NEURO: NEGATIVE for weakness, dizziness or paresthesias  PSYCHIATRIC: NEGATIVE for changes in mood or affect    OBJECTIVE:   There were no vitals taken for this visit.    Physical Exam  GENERAL: healthy, alert and no distress  EYES: Eyes grossly normal to inspection, PERRL and conjunctivae and sclerae normal  HENT: ear canals and TM's normal, nose and mouth without ulcers or lesions  NECK: no adenopathy, no asymmetry, masses, or scars and thyroid normal to palpation  RESP: lungs clear to auscultation - no rales, rhonchi or wheezes  CV: regular rate and rhythm, normal S1 S2, no S3 or S4, no murmur, metallic click noted no rub, no peripheral edema and peripheral pulses strong  ABDOMEN: soft, nontender, no hepatosplenomegaly, no masses and bowel sounds normal  MS: no gross musculoskeletal defects noted, no edema    Diagnostic Test Results:  Labs  "reviewed in Epic    ASSESSMENT/PLAN:       ICD-10-CM    1. Hypertension goal BP (blood pressure) < 140/90  I10 Basic metabolic panel  (Ca, Cl, CO2, Creat, Gluc, K, Na, BUN)     Lipid panel reflex to direct LDL Fasting     Basic metabolic panel  (Ca, Cl, CO2, Creat, Gluc, K, Na, BUN)     Lipid panel reflex to direct LDL Fasting      2. S/P AVR  Z95.2 amoxicillin (AMOXIL) 500 MG capsule      3. FH: early coronary artery disease- bro 30s  Z82.49       4. H/O mechanical aortic valve replacement  Z95.2       5. Annual physical exam  Z00.00       6. Anticoagulated by anticoagulation treatment  Z79.01 Hemoglobin     Hemoglobin        Doing well, no particular complaints  Hypertension at goal.  No changes today.  He is on a low-dose amlodipine, could stop and monitor his home blood pressures and let me know if he really needs to be on this.  I would continue his metoprolol and lisinopril given his prior valve disease.    Annual topics covered    Considering boosting his COVID vaccine, but unsure at this time     Patient has been advised of split billing requirements and indicates understanding: Yes      COUNSELING:   Reviewed preventive health counseling, as reflected in patient instructions      BMI:   Estimated body mass index is 28.59 kg/m  as calculated from the following:    Height as of 6/30/23: 1.727 m (5' 8\").    Weight as of 6/30/23: 85.3 kg (188 lb).         He reports that he has never smoked. He has never used smokeless tobacco.            Yan Lucas MD  [facility]  "

## 2023-11-03 ENCOUNTER — LAB (OUTPATIENT)
Dept: LAB | Facility: CLINIC | Age: 53
End: 2023-11-03
Payer: COMMERCIAL

## 2023-11-03 ENCOUNTER — ANTICOAGULATION THERAPY VISIT (OUTPATIENT)
Dept: ANTICOAGULATION | Facility: CLINIC | Age: 53
End: 2023-11-03

## 2023-11-03 DIAGNOSIS — Z79.01 LONG TERM CURRENT USE OF ANTICOAGULANTS WITH INR GOAL OF 2.0-3.0: ICD-10-CM

## 2023-11-03 DIAGNOSIS — Z95.2 H/O MECHANICAL AORTIC VALVE REPLACEMENT: ICD-10-CM

## 2023-11-03 DIAGNOSIS — Z95.2 S/P AVR (AORTIC VALVE REPLACEMENT): Primary | ICD-10-CM

## 2023-11-03 DIAGNOSIS — Z95.2 S/P AVR (AORTIC VALVE REPLACEMENT): ICD-10-CM

## 2023-11-03 DIAGNOSIS — Z95.2 S/P AVR: ICD-10-CM

## 2023-11-03 LAB — INR BLD: 1.8 (ref 0.9–1.1)

## 2023-11-03 PROCEDURE — 85610 PROTHROMBIN TIME: CPT

## 2023-11-03 PROCEDURE — 36416 COLLJ CAPILLARY BLOOD SPEC: CPT

## 2023-11-03 NOTE — PROGRESS NOTES
ANTICOAGULATION MANAGEMENT     Toney Lemos 53 year old male is on warfarin with subtherapeutic INR result. (Goal INR 2.0-3.0)    Recent labs: (last 7 days)     11/03/23  0750   INR 1.8*       ASSESSMENT     Warfarin Lab Questionnaire    Warfarin Doses Last 7 Days      11/2/2023     8:13 PM   Dose in Tablet or Mg   TAB or MG? milligram (mg)     Pt Rptd Dose SUNDAY MONDAY TUESDAY WED THURS FRIDAY SATURDAY 11/2/2023   8:13 PM 5 5 5 5 7.5 5 5         11/2/2023   Warfarin Lab Questionnaire   Missed doses within past 14 days? No   Changes in diet or alcohol within past 14 days? No   Medication changes since last result? No   Injuries or illness since last result? No   New shortness of breath, severe headaches or sudden changes in vision since last result? No   Abnormal bleeding since last result? No   Upcoming surgery, procedure? No     Previous result: Therapeutic last 2(+) visits  Additional findings: None       PLAN     Recommended plan for no diet, medication or health factor changes affecting INR     Dosing Instructions: Continue your current warfarin dose with next INR in 2 weeks       Summary  As of 11/3/2023      Full warfarin instructions:  7.5 mg every Thu; 5 mg all other days   Next INR check:  11/17/2023               Detailed voice message left for Preet with dosing instructions and follow up date.     Contact [phone]  to schedule and with any changes, questions or concerns.     Education provided:   Please call back if any changes to your diet, medications or how you've been taking warfarin    Plan made per [facility] anticoagulation protocol    Damien Colón, RN  Anticoagulation Clinic  11/3/2023    _______________________________________________________________________     Anticoagulation Episode Summary       Current INR goal:  2.0-3.0   TTR:  78.1% (1 y)   Target end date:  Indefinite   Send INR reminders to:  MAIA FISHER    Indications    S/P AVR (aortic valve replacement)-25 mm St. Rolf  [place]  [Z95.2]  H/O mechanical aortic valve replacement [Z95.2]  Long term current use of anticoagulants with INR goal of 2.0-3.0 [Z79.01]  S/P AVR [Z95.2]             Comments:               Anticoagulation Care Providers       Provider Role Specialty Phone number    Moni Cartagena PA-C Referring Cardiovascular & Thoracic Surgery [phone]    Yan Lucas MD Referring [facility] [phone]

## 2023-11-24 ENCOUNTER — MYC MEDICAL ADVICE (OUTPATIENT)
Dept: ANTICOAGULATION | Facility: CLINIC | Age: 53
End: 2023-11-24
Payer: COMMERCIAL

## 2023-12-04 ENCOUNTER — TELEPHONE (OUTPATIENT)
Dept: ANTICOAGULATION | Facility: CLINIC | Age: 53
End: 2023-12-04
Payer: COMMERCIAL

## 2023-12-04 NOTE — TELEPHONE ENCOUNTER
ANTICOAGULATION     Toney Lemos is overdue for an INR check.     Left message for patient to call and schedule lab appointment as soon as possible. If returning call, please schedule.     Damien Colón RN

## 2023-12-12 ENCOUNTER — DOCUMENTATION ONLY (OUTPATIENT)
Dept: ANTICOAGULATION | Facility: CLINIC | Age: 53
End: 2023-12-12
Payer: COMMERCIAL

## 2023-12-12 NOTE — PROGRESS NOTES
ANTICOAGULATION     Toney Lemos is overdue for an INR check.     Reminder letter sent    Ann Slaughter RN

## 2023-12-12 NOTE — LETTER
[facility] ANTICOAGULATION CLINIC  [address] [facility] [address]  Phone: [phone]  Fax: [phone]   December 12, 2023        Toney Lemos  [address] [facility] [address]            Dear Toney,    You are currently under the care of [facility]ticoagulation [facility] for your warfarin (Coumadin , Jantoven ) therapy.  We are contacting you because our records show you were due for an INR on 11/17/23.    There are potentially serious risks when taking warfarin without careful monitoring and we want to make sure you are safely managed.  Routine lab monitoring is required for warfarin refills.     Please call [phone]  as soon as possible to schedule a lab appointment. If it is difficult for you to get to lab, please call us to discuss options.  If there has been a change in your care or other concerns, please let us know so we can help and/or update our records.         Sincerely,       [facility] Anticoagulation Clinic

## 2023-12-20 ENCOUNTER — LAB (OUTPATIENT)
Dept: LAB | Facility: CLINIC | Age: 53
End: 2023-12-20
Payer: COMMERCIAL

## 2023-12-20 ENCOUNTER — ANTICOAGULATION THERAPY VISIT (OUTPATIENT)
Dept: ANTICOAGULATION | Facility: CLINIC | Age: 53
End: 2023-12-20

## 2023-12-20 DIAGNOSIS — Z79.01 LONG TERM CURRENT USE OF ANTICOAGULANTS WITH INR GOAL OF 2.0-3.0: ICD-10-CM

## 2023-12-20 DIAGNOSIS — Z95.2 H/O MECHANICAL AORTIC VALVE REPLACEMENT: ICD-10-CM

## 2023-12-20 DIAGNOSIS — Z95.2 S/P AVR: ICD-10-CM

## 2023-12-20 DIAGNOSIS — Z95.2 S/P AVR (AORTIC VALVE REPLACEMENT): ICD-10-CM

## 2023-12-20 DIAGNOSIS — Z95.2 S/P AVR (AORTIC VALVE REPLACEMENT): Primary | ICD-10-CM

## 2023-12-20 LAB — INR BLD: 3.3 (ref 0.9–1.1)

## 2023-12-20 PROCEDURE — 36416 COLLJ CAPILLARY BLOOD SPEC: CPT

## 2023-12-20 PROCEDURE — 85610 PROTHROMBIN TIME: CPT

## 2023-12-20 NOTE — PROGRESS NOTES
ANTICOAGULATION MANAGEMENT     Toney Lemos 53 year old male is on warfarin with supratherapeutic INR result. (Goal INR 2.0-3.0)    Recent labs: (last 7 days)     12/20/23  0747   INR 3.3*       ASSESSMENT     Source(s): Chart Review and Patient/Caregiver Call     Warfarin doses taken: Warfarin taken as instructed  Diet: No new diet changes identified  Medication/supplement changes: None noted  New illness, injury, or hospitalization: No  Signs or symptoms of bleeding or clotting: No  Previous result: Subtherapeutic  Additional findings: None       PLAN     Recommended plan for no diet, medication or health factor changes affecting INR     Dosing Instructions: partial hold then continue your current warfarin dose with next INR in 2 weeks       Summary  As of 12/20/2023      Full warfarin instructions:  12/20: 2.5 mg; Otherwise 7.5 mg every Thu; 5 mg all other days   Next INR check:  1/3/2024               Telephone call with Preet who verbalizes understanding and agrees to plan    Lab visit scheduled    Education provided:   Contact [phone]  with any changes, questions or concerns.     Plan made per [facility] anticoagulation protocol    Ann Slaughter RN  Anticoagulation Clinic  12/20/2023    _______________________________________________________________________     Anticoagulation Episode Summary       Current INR goal:  2.0-3.0   TTR:  82.0% (1 y)   Target end date:  Indefinite   Send INR reminders to:  MAIA FISHER    Indications    S/P AVR (aortic valve replacement)-25 mm St. Rolf [place]  [Z95.2]  H/O mechanical aortic valve replacement [Z95.2]  Long term current use of anticoagulants with INR goal of 2.0-3.0 [Z79.01]  S/P AVR [Z95.2]             Comments:               Anticoagulation Care Providers       Provider Role Specialty Phone number    Moni Cartagena PA-C Referring Cardiovascular & Thoracic Surgery [phone]    Yan Lucas MD Referring [facility] [phone]

## 2024-01-05 ENCOUNTER — ANTICOAGULATION THERAPY VISIT (OUTPATIENT)
Dept: ANTICOAGULATION | Facility: CLINIC | Age: 54
End: 2024-01-05

## 2024-01-05 ENCOUNTER — LAB (OUTPATIENT)
Dept: LAB | Facility: CLINIC | Age: 54
End: 2024-01-05
Payer: COMMERCIAL

## 2024-01-05 ENCOUNTER — DOCUMENTATION ONLY (OUTPATIENT)
Dept: ANTICOAGULATION | Facility: CLINIC | Age: 54
End: 2024-01-05

## 2024-01-05 DIAGNOSIS — Z95.2 S/P AVR (AORTIC VALVE REPLACEMENT): ICD-10-CM

## 2024-01-05 DIAGNOSIS — Z95.2 H/O MECHANICAL AORTIC VALVE REPLACEMENT: ICD-10-CM

## 2024-01-05 DIAGNOSIS — Z95.2 S/P AVR (AORTIC VALVE REPLACEMENT): Primary | ICD-10-CM

## 2024-01-05 DIAGNOSIS — Z79.01 LONG TERM CURRENT USE OF ANTICOAGULANTS WITH INR GOAL OF 2.0-3.0: ICD-10-CM

## 2024-01-05 DIAGNOSIS — Z95.2 S/P AVR: ICD-10-CM

## 2024-01-05 LAB — INR BLD: 2.2 (ref 0.9–1.1)

## 2024-01-05 PROCEDURE — 36416 COLLJ CAPILLARY BLOOD SPEC: CPT

## 2024-01-05 PROCEDURE — 85610 PROTHROMBIN TIME: CPT

## 2024-01-05 NOTE — PROGRESS NOTES
ANTICOAGULATION MANAGEMENT     Toney Lemos 53 year old male is on warfarin with therapeutic INR result. (Goal INR 2.0-3.0)    Recent labs: (last 7 days)     01/05/24  0744   INR 2.2*       ASSESSMENT     Source(s): Chart Review and Patient/Caregiver Call     Warfarin doses taken: Warfarin taken as instructed  Diet: No new diet changes identified  Medication/supplement changes: None noted  New illness, injury, or hospitalization: No  Signs or symptoms of bleeding or clotting: No  Previous result: Supratherapeutic  Additional findings: None       PLAN     Recommended plan for no diet, medication or health factor changes affecting INR     Dosing Instructions: Continue your current warfarin dose with next INR in 4 weeks       Summary  As of 1/5/2024      Full warfarin instructions:  7.5 mg every Thu; 5 mg all other days   Next INR check:  2/2/2024               Telephone call with Preet who verbalizes understanding and agrees to plan      Lab visit scheduled    Education provided:   Contact [phone]  with any changes, questions or concerns.     Plan made per [facility] anticoagulation protocol    Paulie MORRIS RN  Anticoagulation Clinic  1/5/2024    _______________________________________________________________________     Anticoagulation Episode Summary       Current INR goal:  2.0-3.0   TTR:  80.8% (1 y)   Target end date:  Indefinite   Send INR reminders to:  MAIA FISHER    Indications    S/P AVR (aortic valve replacement)-25 mm St. Rolf [place]  [Z95.2]  H/O mechanical aortic valve replacement [Z95.2]  Long term current use of anticoagulants with INR goal of 2.0-3.0 [Z79.01]  S/P AVR [Z95.2]             Comments:               Anticoagulation Care Providers       Provider Role Specialty Phone number    Moni Cartagena PA-C Referring Cardiovascular & Thoracic Surgery [phone]    Yan Lucas MD Referring [facility] [phone]

## 2024-01-05 NOTE — PROGRESS NOTES
ANTICOAGULATION CLINIC REFERRAL RENEWAL REQUEST       An annual renewal order is required for all patients referred to [facility].?  Please review and sign the pended referral order for Toney Lemos.       ANTICOAGULATION SUMMARY      Warfarin indication(s)   Mechanical AVR    Mechanical heart valve present?  Mechanical  AVR-Bileaflet       Current goal range   INR: 2.0-3.0     Goal appropriate for indication? Goal INR 2-3, standard for indication(s) above     Time in Therapeutic Range (TTR)  (Goal > 60%) 82.0%       Office visit with referring provider's group within last year yes on 9/22/23         [facility]

## 2024-02-02 ENCOUNTER — ANTICOAGULATION THERAPY VISIT (OUTPATIENT)
Dept: ANTICOAGULATION | Facility: CLINIC | Age: 54
End: 2024-02-02

## 2024-02-02 ENCOUNTER — LAB (OUTPATIENT)
Dept: LAB | Facility: CLINIC | Age: 54
End: 2024-02-02
Payer: COMMERCIAL

## 2024-02-02 DIAGNOSIS — Z79.01 LONG TERM CURRENT USE OF ANTICOAGULANTS WITH INR GOAL OF 2.0-3.0: ICD-10-CM

## 2024-02-02 DIAGNOSIS — Z95.2 S/P AVR (AORTIC VALVE REPLACEMENT): ICD-10-CM

## 2024-02-02 DIAGNOSIS — Z95.2 H/O MECHANICAL AORTIC VALVE REPLACEMENT: ICD-10-CM

## 2024-02-02 DIAGNOSIS — Z95.2 S/P AVR: ICD-10-CM

## 2024-02-02 DIAGNOSIS — Z95.2 S/P AVR (AORTIC VALVE REPLACEMENT): Primary | ICD-10-CM

## 2024-02-02 LAB — INR BLD: 2.3 (ref 0.9–1.1)

## 2024-02-02 PROCEDURE — 85610 PROTHROMBIN TIME: CPT

## 2024-02-02 PROCEDURE — 36416 COLLJ CAPILLARY BLOOD SPEC: CPT

## 2024-02-02 NOTE — PROGRESS NOTES
ANTICOAGULATION MANAGEMENT     Toney Lemos 53 year old male is on warfarin with therapeutic INR result. (Goal INR 2.0-3.0)    Recent labs: (last 7 days)     02/02/24  0739   INR 2.3*       ASSESSMENT     Warfarin Lab Questionnaire    Warfarin Doses Last 7 Days    Pt Rptd Dose SUNDAY MONDAY TUESDAY WED THURS FRIDAY SATURDAY 2/1/2024   8:43 PM 5 5 5 5 7.5 5 5         2/1/2024   Warfarin Lab Questionnaire   Missed doses within past 14 days? No   Changes in diet or alcohol within past 14 days? No   Medication changes since last result? No   Injuries or illness since last result? No   New shortness of breath, severe headaches or sudden changes in vision since last result? No   Abnormal bleeding since last result? No   Upcoming surgery, procedure? No   Best number to call with results? [phone]     Previous result: Therapeutic last 2(+) visits  Additional findings: None       PLAN     Recommended plan for no diet, medication or health factor changes affecting INR     Dosing Instructions: Continue your current warfarin dose with next INR in 5 weeks       Summary  As of 2/2/2024      Full warfarin instructions:  7.5 mg every Thu; 5 mg all other days   Next INR check:  3/8/2024               Telephone call with Preet who verbalizes understanding and agrees to plan    Lab visit scheduled    Education provided:   Contact [phone]  with any changes, questions or concerns.     Plan made per [facility] anticoagulation protocol    Paulie MORRIS RN  Anticoagulation Clinic  2/2/2024    _______________________________________________________________________     Anticoagulation Episode Summary       Current INR goal:  2.0-3.0   TTR:  80.8% (1 y)   Target end date:  Indefinite   Send INR reminders to:  MAIA FISHER    Indications    S/P AVR (aortic valve replacement)-25 mm St. Rolf [place]  [Z95.2]  H/O mechanical aortic valve replacement [Z95.2]  Long term current use of anticoagulants with INR goal of 2.0-3.0 [Z79.01]  S/P AVR  [Z95.2]             Comments:               Anticoagulation Care Providers       Provider Role Specialty Phone number    Moni Cartagena PA-C Referring Cardiovascular & Thoracic Surgery [phone]    Yan Lucas MD Referring Family Medicine [phone]

## 2024-02-12 ENCOUNTER — MYC MEDICAL ADVICE (OUTPATIENT)
Dept: FAMILY MEDICINE | Facility: CLINIC | Age: 54
End: 2024-02-12
Payer: COMMERCIAL

## 2024-02-13 ENCOUNTER — NURSE TRIAGE (OUTPATIENT)
Dept: FAMILY MEDICINE | Facility: CLINIC | Age: 54
End: 2024-02-13
Payer: COMMERCIAL

## 2024-02-13 NOTE — TELEPHONE ENCOUNTER
"Nurse Triage SBAR    Is this a 2nd Level Triage? NO    Situation: Patient sent MyChart regarding abdominal pain for about a week..    Background: Been going on since last Wednesday    Assessment: Patient is not having chest pain or shortness of breath.    No blood or black stools. Patient has been having regular bowel movements and not having any urinary symptoms.     Pain level is about a 3/10. No vomiting, no nausea, no fevers.     Patient say the abdominal pain feels like he is \"full across his whole belly\". He does not think his abdomen is more swollen than usually and does not actually look bloated but he feels bloated. He still has an appetite though. It does not hurt to touch any where. Has not tried any medications for gas.     Protocol Recommended Disposition:   See in Office Today or Tomorrow    Recommendation: Scheduled patient with same day provider tomorrow. Went over red flag symptoms with patient and when to go in or call back.      Does the patient meet one of the following criteria for ADS visit consideration? No    Stacy Huitron RN on 2/13/2024 at 1:01 PM      Reason for Disposition   MILD pain (e.g., does not interfere with normal activities) and pain comes and goes (cramps) lasts > 48 hours  (Exception: This same abdominal pain is a chronic symptom recurrent or ongoing AND present > 4 weeks.)    Additional Information   Negative: Passed out (i.e., fainted, collapsed and was not responding)   Negative: Shock suspected (e.g., cold/pale/clammy skin, too weak to stand, low BP, rapid pulse)   Negative: Sounds like a life-threatening emergency to the triager   Negative: Followed an abdomen (stomach) injury   Negative: Chest pain   Negative: Pain is mainly in upper abdomen (if needed ask: 'is it mainly above the belly button?')   Negative: Abdomen bloating or swelling are main symptoms   Negative: SEVERE abdominal pain (e.g., excruciating)   Negative: Vomiting red blood or black (coffee ground) " material   Negative: Blood in bowel movements  (Exception: Blood on surface of BM with constipation.)   Negative: Black or tarry bowel movements  (Exception: Chronic-unchanged black-grey BMs AND is taking iron pills or Pepto-Bismol.)   Negative: Unable to urinate (or only a few drops) and bladder feels very full   Negative: Pain in scrotum persists > 1 hour   Negative: MILD TO MODERATE constant pain lasting > 2 hours   Negative: Vomiting bile (green color)   Negative: Patient sounds very sick or weak to the triager   Negative: Vomiting and abdomen looks much more swollen than usual   Negative: White of the eyes have turned yellow (i.e., jaundice)   Negative: Blood in urine (red, pink, or tea-colored)   Negative: Fever > 103 F (39.4 C)   Negative: Fever > 101 F (38.3 C) and over 60 years of age   Negative: Fever > 100.0 F (37.8 C) and has diabetes mellitus or a weak immune system (e.g., HIV positive, cancer chemotherapy, organ transplant, splenectomy, chronic steroids)   Negative: Fever > 100.0 F (37.8 C) and bedridden (e.g., CVA, chronic illness, recovering from surgery)   Negative: MODERATE pain (e.g., interferes with normal activities) that comes and goes (cramps) lasts > 24 hours  (Exception: Pain with Vomiting or Diarrhea - see that Protocol.)   Negative: Age > 60 years   Negative: Patient wants to be seen    Protocols used: Abdominal Pain - Male-A-[place]

## 2024-02-14 ENCOUNTER — HOSPITAL ENCOUNTER (OUTPATIENT)
Dept: GENERAL RADIOLOGY | Facility: CLINIC | Age: 54
Discharge: HOME | End: 2024-02-14
Attending: NURSE PRACTITIONER | Admitting: NURSE PRACTITIONER
Payer: COMMERCIAL

## 2024-02-14 ENCOUNTER — OFFICE VISIT (OUTPATIENT)
Dept: FAMILY MEDICINE | Facility: CLINIC | Age: 54
End: 2024-02-14
Payer: COMMERCIAL

## 2024-02-14 VITALS
WEIGHT: 196 LBS | BODY MASS INDEX: 29.7 KG/M2 | HEIGHT: 68 IN | RESPIRATION RATE: 18 BRPM | HEART RATE: 63 BPM | TEMPERATURE: 97.3 F | OXYGEN SATURATION: 98 % | SYSTOLIC BLOOD PRESSURE: 104 MMHG | DIASTOLIC BLOOD PRESSURE: 72 MMHG

## 2024-02-14 DIAGNOSIS — R31.29 OTHER MICROSCOPIC HEMATURIA: ICD-10-CM

## 2024-02-14 DIAGNOSIS — R14.0 ABDOMINAL BLOATING: ICD-10-CM

## 2024-02-14 DIAGNOSIS — R14.0 ABDOMINAL BLOATING: Primary | ICD-10-CM

## 2024-02-14 DIAGNOSIS — Z95.2 H/O MECHANICAL AORTIC VALVE REPLACEMENT: ICD-10-CM

## 2024-02-14 DIAGNOSIS — Z80.0 FAMILY HISTORY OF PANCREATIC CANCER: ICD-10-CM

## 2024-02-14 DIAGNOSIS — Z79.01 LONG TERM CURRENT USE OF ANTICOAGULANTS WITH INR GOAL OF 2.0-3.0: ICD-10-CM

## 2024-02-14 LAB
ALBUMIN SERPL BCG-MCNC: 4.5 G/DL (ref 3.5–5.2)
ALBUMIN UR-MCNC: NEGATIVE MG/DL
ALP SERPL-CCNC: 85 U/L (ref 40–150)
ALT SERPL W P-5'-P-CCNC: 37 U/L (ref 0–70)
ANION GAP SERPL CALCULATED.3IONS-SCNC: 11 MMOL/L (ref 7–15)
APPEARANCE UR: CLEAR
AST SERPL W P-5'-P-CCNC: 29 U/L (ref 0–45)
BASOPHILS # BLD AUTO: 0 10E3/UL (ref 0–0.2)
BASOPHILS NFR BLD AUTO: 1 %
BILIRUB SERPL-MCNC: 0.7 MG/DL
BILIRUB UR QL STRIP: NEGATIVE
BUN SERPL-MCNC: 14.7 MG/DL (ref 6–20)
CALCIUM SERPL-MCNC: 9.1 MG/DL (ref 8.6–10)
CHLORIDE SERPL-SCNC: 104 MMOL/L (ref 98–107)
COLOR UR AUTO: YELLOW
CREAT SERPL-MCNC: 1 MG/DL (ref 0.67–1.17)
DEPRECATED HCO3 PLAS-SCNC: 23 MMOL/L (ref 22–29)
EGFRCR SERPLBLD CKD-EPI 2021: >90 ML/MIN/1.73M2
EOSINOPHIL # BLD AUTO: 0.1 10E3/UL (ref 0–0.7)
EOSINOPHIL NFR BLD AUTO: 2 %
ERYTHROCYTE [DISTWIDTH] IN BLOOD BY AUTOMATED COUNT: 12.7 % (ref 10–15)
GLUCOSE SERPL-MCNC: 106 MG/DL (ref 70–99)
GLUCOSE UR STRIP-MCNC: NEGATIVE MG/DL
HCT VFR BLD AUTO: 42.2 % (ref 40–53)
HGB BLD-MCNC: 14.6 G/DL (ref 13.3–17.7)
HGB UR QL STRIP: ABNORMAL
IMM GRANULOCYTES # BLD: 0 10E3/UL
IMM GRANULOCYTES NFR BLD: 0 %
KETONES UR STRIP-MCNC: NEGATIVE MG/DL
LEUKOCYTE ESTERASE UR QL STRIP: NEGATIVE
LIPASE SERPL-CCNC: 20 U/L (ref 13–60)
LYMPHOCYTES # BLD AUTO: 2.2 10E3/UL (ref 0.8–5.3)
LYMPHOCYTES NFR BLD AUTO: 34 %
MCH RBC QN AUTO: 32.6 PG (ref 26.5–33)
MCHC RBC AUTO-ENTMCNC: 34.6 G/DL (ref 31.5–36.5)
MCV RBC AUTO: 94 FL (ref 78–100)
MONOCYTES # BLD AUTO: 0.7 10E3/UL (ref 0–1.3)
MONOCYTES NFR BLD AUTO: 10 %
MUCOUS THREADS #/AREA URNS LPF: PRESENT /LPF
NEUTROPHILS # BLD AUTO: 3.6 10E3/UL (ref 1.6–8.3)
NEUTROPHILS NFR BLD AUTO: 54 %
NITRATE UR QL: NEGATIVE
PH UR STRIP: 5 [PH] (ref 5–7)
PLATELET # BLD AUTO: 183 10E3/UL (ref 150–450)
POTASSIUM SERPL-SCNC: 4.2 MMOL/L (ref 3.4–5.3)
PROT SERPL-MCNC: 7.1 G/DL (ref 6.4–8.3)
RBC # BLD AUTO: 4.48 10E6/UL (ref 4.4–5.9)
RBC URINE: 7 /HPF
SODIUM SERPL-SCNC: 138 MMOL/L (ref 135–145)
SP GR UR STRIP: 1.02 (ref 1–1.03)
UROBILINOGEN UR STRIP-MCNC: NORMAL MG/DL
WBC # BLD AUTO: 6.6 10E3/UL (ref 4–11)
WBC URINE: 1 /HPF

## 2024-02-14 PROCEDURE — 85025 COMPLETE CBC W/AUTO DIFF WBC: CPT | Performed by: NURSE PRACTITIONER

## 2024-02-14 PROCEDURE — 83690 ASSAY OF LIPASE: CPT | Performed by: NURSE PRACTITIONER

## 2024-02-14 PROCEDURE — 81001 URINALYSIS AUTO W/SCOPE: CPT | Performed by: NURSE PRACTITIONER

## 2024-02-14 PROCEDURE — 36415 COLL VENOUS BLD VENIPUNCTURE: CPT | Performed by: NURSE PRACTITIONER

## 2024-02-14 PROCEDURE — 80053 COMPREHEN METABOLIC PANEL: CPT | Performed by: NURSE PRACTITIONER

## 2024-02-14 PROCEDURE — 83013 H PYLORI (C-13) BREATH: CPT | Mod: 90 | Performed by: NURSE PRACTITIONER

## 2024-02-14 PROCEDURE — 99215 OFFICE O/P EST HI 40 MIN: CPT | Performed by: NURSE PRACTITIONER

## 2024-02-14 PROCEDURE — 99000 SPECIMEN HANDLING OFFICE-LAB: CPT | Performed by: NURSE PRACTITIONER

## 2024-02-14 PROCEDURE — 74019 RADEX ABDOMEN 2 VIEWS: CPT

## 2024-02-14 ASSESSMENT — PAIN SCALES - GENERAL: PAINLEVEL: MILD PAIN (3)

## 2024-02-14 NOTE — PROGRESS NOTES
Assessment & Plan     Abdominal bloating  - REVIEW OF HEALTH MAINTENANCE PROTOCOL ORDERS  - XR Abdomen 2 Views; Future  - Comprehensive metabolic panel; Future  - CBC with Platelets & Differential; Future  - Lipase; Future  - UA with Microscopic reflex to Culture; Future  - Helicobacter pylori Breath Test; Future  - omeprazole (PRILOSEC) 20 MG DR capsule; Take 1 capsule (20 mg) by mouth daily for 60 days  - Comprehensive metabolic panel  - CBC with Platelets & Differential  - Lipase  - UA with Microscopic reflex to Culture  - Helicobacter pylori Breath Test    H/O mechanical aortic valve replacement    Long term current use of anticoagulants with INR goal of 2.0-3.0    Family history of pancreatic cancer    Hematuria     Preet presents today with concerns of abdominal bloating and pressure for the past week. He has concern given family history of pancreatic cancer and personal history of AVR and mechanical valve replacement. Exam is overall benign. Lab work overall is reassuring with normal pancreatic, liver and kidney function. Blood counts are also normal. Urinalysis does show blood and RBC's within the urine. H. Pylori is pending. X-ray of the abdomen without significant findings, small amount of retained stool. Given his symptoms and hematuria on urinalysis, I do think proceeding with a CT of the abdomen and pelvis is warranted. I have placed orders to have this completed sooner rather than later. We will have him start a trial of PPI given most of his symptoms are epigastric in nature. Encouraged to use this for at least two weeks before gaging effectiveness. Follow-up pending imaging results or sooner with any new or worsening symptoms, questions or concerns.     I explained my diagnostic considerations and recommendations to the patient, who voiced understanding and agreement with the assessment and treatment plan. All questions were answered to patient's apparent satisfaction. We discussed potential side  "effects of any prescribed or recommended therapies, as well as expectations for response to treatments and importance of lifestyle measures that may improve symptoms. Patient was advised to contact our office if there are new symptoms or no improvement or worsening of conditions or symptoms.          Greater than 40 minutes were spent today with more than 50% dedicated to counseling and coordination of care of the above mentioned problems.          Subjective   Preet is a 53 year old, presenting for the following health issues:  Abdominal Pain (Last Wednesday- all of a sudden it started. Not as intense in the morning, feels more pressure on upper abdomen at the end of the day. Has not tried any pain reliever or Tums. Feels like bloating and underneath rib cage feels like pressure pushing out. )        2/14/2024     9:46 AM   Additional Questions   Roomed by Maria COLLINS     History of Present Illness       Reason for visit:  Abdominal pain/pressure 6+ days  Symptom onset:  3-7 days ago  Symptom intensity:  Moderate  Symptom progression:  Staying the same  Had these symptoms before:  No    He eats 0-1 servings of fruits and vegetables daily.He consumes 1 sweetened beverage(s) daily.He exercises with enough effort to increase his heart rate 9 or less minutes per day.  He exercises with enough effort to increase his heart rate 3 or less days per week.   He is taking medications regularly.     Preet presents today with concerns of a \"pressure\" in his abdomen for one week. He reports a feeling of being \"full across his entire belly\" which is uncomfortable for him, 2-3/10. He describes the feeling as being similar to being \"too full\" after eating a large meal. He denies any visual swelling or bloating. He denies any pain to touch, nausea, vomiting, diarrhea, constipation or urinary symptoms. He denies having any heart burn symptoms but notes the feeling is worst just under his ribcage. His bowel movements have been regular " "for him, he denies any black or tarry stools. He averages about 2 per day, last BM was this morning. He has not taken any OTC medications in relation to his symptoms. He did try drinking a can of Coke the other day to see if burping would help the feeling improve, however did not notice any improvement. He notes family history of pancreatic cancer in his cousin whose presentation started with similar symptoms. He also notes a history of aortic valve replacement in 2020. He is currently on ASA and Warfarin daily.     Patient Active Problem List   Diagnosis    Hypertension goal BP (blood pressure) < 140/90    Encounter for long-term current use of medication    FH: early coronary artery disease- bro 30s    S/P AVR (aortic valve replacement)-25 mm St. Rolf [place]     H/O mechanical aortic valve replacement    Long term current use of anticoagulants with INR goal of 2.0-3.0    SCC (squamous cell carcinoma), arm, left- hand, + deep margin, watching    S/P AVR     Current Outpatient Medications   Medication    amLODIPine (NORVASC) 2.5 MG tablet    amoxicillin (AMOXIL) 500 MG capsule    aspirin 81 MG EC tablet    atorvastatin (LIPITOR) 40 MG tablet    lisinopril (ZESTRIL) 5 MG tablet    loratadine (CLARITIN) 10 MG tablet    metoprolol succinate ER (TOPROL XL) 50 MG 24 hr tablet    omeprazole (PRILOSEC) 20 MG DR capsule    warfarin ANTICOAGULANT (COUMADIN) 5 MG tablet     No current facility-administered medications for this visit.                 Review of Systems  Constitutional, HEENT, cardiovascular, pulmonary, gi and gu systems are negative, except as otherwise noted.      Objective    /72   Pulse 63   Temp 97.3  F (36.3  C) (Temporal)   Resp 18   Ht 1.727 m (5' 8\")   Wt 88.9 kg (196 lb)   SpO2 98%   BMI 29.80 kg/m    Body mass index is 29.8 kg/m .  Physical Exam  Constitutional:       General: He is not in acute distress.     Appearance: Normal appearance.   HENT:      Head: Normocephalic and atraumatic. "      Mouth/Throat:      Mouth: Mucous membranes are moist.      Pharynx: Oropharynx is clear.   Eyes:      Extraocular Movements: Extraocular movements intact.      Conjunctiva/sclera: Conjunctivae normal.   Cardiovascular:      Rate and Rhythm: Normal rate and regular rhythm.      Heart sounds: Normal heart sounds.   Pulmonary:      Effort: Pulmonary effort is normal.      Breath sounds: Normal breath sounds.   Abdominal:      General: Abdomen is flat. Bowel sounds are normal.      Palpations: Abdomen is soft.   Musculoskeletal:      Cervical back: Neck supple.   Lymphadenopathy:      Cervical: No cervical adenopathy.   Skin:     General: Skin is warm and dry.      Capillary Refill: Capillary refill takes less than 2 seconds.   Neurological:      Mental Status: He is alert and oriented to person, place, and time. Mental status is at baseline.   Psychiatric:         Mood and Affect: Mood normal.         Behavior: Behavior normal.            Results for orders placed or performed in visit on 02/14/24 (from the past 24 hour(s))   Comprehensive metabolic panel   Result Value Ref Range    Sodium 138 135 - 145 mmol/L    Potassium 4.2 3.4 - 5.3 mmol/L    Carbon Dioxide (CO2) 23 22 - 29 mmol/L    Anion Gap 11 7 - 15 mmol/L    Urea Nitrogen 14.7 6.0 - 20.0 mg/dL    Creatinine 1.00 0.67 - 1.17 mg/dL    GFR Estimate >90 >60 mL/min/1.73m2    Calcium 9.1 8.6 - 10.0 mg/dL    Chloride 104 98 - 107 mmol/L    Glucose 106 (H) 70 - 99 mg/dL    Alkaline Phosphatase 85 40 - 150 U/L    AST 29 0 - 45 U/L    ALT 37 0 - 70 U/L    Protein Total 7.1 6.4 - 8.3 g/dL    Albumin 4.5 3.5 - 5.2 g/dL    Bilirubin Total 0.7 <=1.2 mg/dL   CBC with Platelets & Differential    Narrative    The following orders were created for panel order CBC with Platelets & Differential.  Procedure                               Abnormality         Status                     ---------                               -----------         ------                     CBC  with platelets and d...[[id number]]                      Final result                 Please view results for these tests on the individual orders.   Lipase   Result Value Ref Range    Lipase 20 13 - 60 U/L   CBC with platelets and differential   Result Value Ref Range    WBC Count 6.6 4.0 - 11.0 10e3/uL    RBC Count 4.48 4.40 - 5.90 10e6/uL    Hemoglobin 14.6 13.3 - 17.7 g/dL    Hematocrit 42.2 40.0 - 53.0 %    MCV 94 78 - 100 fL    MCH 32.6 26.5 - 33.0 pg    MCHC 34.6 31.5 - 36.5 g/dL    RDW 12.7 10.0 - 15.0 %    Platelet Count 183 150 - 450 10e3/uL    % Neutrophils 54 %    % Lymphocytes 34 %    % Monocytes 10 %    % Eosinophils 2 %    % Basophils 1 %    % Immature Granulocytes 0 %    Absolute Neutrophils 3.6 1.6 - 8.3 10e3/uL    Absolute Lymphocytes 2.2 0.8 - 5.3 10e3/uL    Absolute Monocytes 0.7 0.0 - 1.3 10e3/uL    Absolute Eosinophils 0.1 0.0 - 0.7 10e3/uL    Absolute Basophils 0.0 0.0 - 0.2 10e3/uL    Absolute Immature Granulocytes 0.0 <=0.4 10e3/uL   UA with Microscopic reflex to Culture    Specimen: Urine, NOS   Result Value Ref Range    Color Urine Yellow Colorless, Straw, Light Yellow, Yellow    Appearance Urine Clear Clear    Glucose Urine Negative Negative mg/dL    Bilirubin Urine Negative Negative    Ketones Urine Negative Negative mg/dL    Specific Gravity Urine 1.023 1.003 - 1.035    Blood Urine Moderate (A) Negative    pH Urine 5.0 5.0 - 7.0    Protein Albumin Urine Negative Negative mg/dL    Urobilinogen Urine Normal Normal, 2.0 mg/dL    Nitrite Urine Negative Negative    Leukocyte Esterase Urine Negative Negative    Mucus Urine Present (A) None Seen /LPF    RBC Urine 7 (H) <=2 /HPF    WBC Urine 1 <=5 /HPF    Narrative    Urine Culture not indicated         Study Result    Narrative & Impression   ABDOMEN 2 VIEWS 2/14/2024 11:08 AM     HISTORY: Abdominal bloating.     COMPARISON: None.     FINDINGS/                                                                   IMPRESSION: No signs of bowel  obstruction, pneumatosis, or  pneumoperitoneum. Small volume of formed stool throughout the colon.  No pathologic calcifications or signs of organomegaly. Small pelvic  phleboliths. Imaged lung bases appear clear. Prior median sternotomy  and cardiac valve repair. Slight scoliotic curvature of the spine. No  acute osseous abnormality.     RODGER BROUSSARD MD        Signed Electronically by: Sherri Villagomez NP

## 2024-02-15 LAB — UREA BREATH TEST QL: NEGATIVE

## 2024-02-16 ENCOUNTER — HOSPITAL ENCOUNTER (OUTPATIENT)
Dept: CT IMAGING | Facility: CLINIC | Age: 54
Discharge: HOME | End: 2024-02-16
Attending: NURSE PRACTITIONER | Admitting: NURSE PRACTITIONER
Payer: COMMERCIAL

## 2024-02-16 DIAGNOSIS — R14.0 ABDOMINAL BLOATING: ICD-10-CM

## 2024-02-16 DIAGNOSIS — R31.29 OTHER MICROSCOPIC HEMATURIA: ICD-10-CM

## 2024-02-16 DIAGNOSIS — Z79.01 LONG TERM CURRENT USE OF ANTICOAGULANTS WITH INR GOAL OF 2.0-3.0: ICD-10-CM

## 2024-02-16 PROCEDURE — 250N000009 HC RX 250: Performed by: NURSE PRACTITIONER

## 2024-02-16 PROCEDURE — 74177 CT ABD & PELVIS W/CONTRAST: CPT

## 2024-02-16 PROCEDURE — 250N000011 HC RX IP 250 OP 636: Performed by: NURSE PRACTITIONER

## 2024-02-16 RX ORDER — IOPAMIDOL 755 MG/ML
500 INJECTION, SOLUTION INTRAVASCULAR ONCE
Status: COMPLETED | OUTPATIENT
Start: 2024-02-16 | End: 2024-02-16

## 2024-02-16 RX ADMIN — SODIUM CHLORIDE 60 ML: 9 INJECTION, SOLUTION INTRAVENOUS at 07:58

## 2024-02-16 RX ADMIN — IOPAMIDOL 98 ML: 755 INJECTION, SOLUTION INTRAVENOUS at 07:58

## 2024-02-26 ENCOUNTER — TELEPHONE (OUTPATIENT)
Dept: CARDIOLOGY | Facility: CLINIC | Age: 54
End: 2024-02-26
Payer: COMMERCIAL

## 2024-02-26 DIAGNOSIS — Z95.2 S/P AVR: Primary | ICD-10-CM

## 2024-02-26 DIAGNOSIS — E78.5 HYPERLIPIDEMIA LDL GOAL <130: ICD-10-CM

## 2024-02-26 DIAGNOSIS — I10 HYPERTENSION GOAL BP (BLOOD PRESSURE) < 140/90: ICD-10-CM

## 2024-02-26 NOTE — TELEPHONE ENCOUNTER
Reason for Call:  Other appointment    Detailed comments: Patient is scheduled for July to see Dr. Mederos. He says he normally would have labwork to do but there are no orders. He's already coming early on his appointment day to do them but just need orders placed.     Phone Number Patient can be reached at: Home number on file [phone] (home)    Best Time: any    Can we leave a detailed message on this number? YES    Call taken on 2/26/2024 at 8:47 AM by Ila Joe

## 2024-03-08 ENCOUNTER — ANTICOAGULATION THERAPY VISIT (OUTPATIENT)
Dept: ANTICOAGULATION | Facility: CLINIC | Age: 54
End: 2024-03-08

## 2024-03-08 ENCOUNTER — LAB (OUTPATIENT)
Dept: LAB | Facility: CLINIC | Age: 54
End: 2024-03-08
Payer: COMMERCIAL

## 2024-03-08 DIAGNOSIS — Z95.2 S/P AVR (AORTIC VALVE REPLACEMENT): ICD-10-CM

## 2024-03-08 DIAGNOSIS — Z79.01 LONG TERM CURRENT USE OF ANTICOAGULANTS WITH INR GOAL OF 2.0-3.0: ICD-10-CM

## 2024-03-08 DIAGNOSIS — Z95.2 H/O MECHANICAL AORTIC VALVE REPLACEMENT: ICD-10-CM

## 2024-03-08 DIAGNOSIS — Z95.2 S/P AVR: ICD-10-CM

## 2024-03-08 DIAGNOSIS — Z95.2 S/P AVR (AORTIC VALVE REPLACEMENT): Primary | ICD-10-CM

## 2024-03-08 LAB — INR BLD: 2.6 (ref 0.9–1.1)

## 2024-03-08 PROCEDURE — 36416 COLLJ CAPILLARY BLOOD SPEC: CPT

## 2024-03-08 PROCEDURE — 85610 PROTHROMBIN TIME: CPT

## 2024-03-08 NOTE — PROGRESS NOTES
ANTICOAGULATION MANAGEMENT     Toney Lemos 53 year old male is on warfarin with therapeutic INR result. (Goal INR 2.0-3.0)    Recent labs: (last 7 days)     03/08/24  0748   INR 2.6*       ASSESSMENT     Warfarin Lab Questionnaire    Warfarin Doses Last 7 Days      3/7/2024     6:24 PM   Dose in Tablet or Mg   TAB or MG? milligram (mg)     Pt Rptd Dose YONATHAN MONDAY TUESDAY WED THURS FRIDAY SATURDAY   3/7/2024   6:24 PM 5 5 5 5 7.5 5 5         3/7/2024   Warfarin Lab Questionnaire   Missed doses within past 14 days? No   Changes in diet or alcohol within past 14 days? No   Medication changes since last result? Yes   Please list: Heartburn med: Omeprazole   Injuries or illness since last result? No   New shortness of breath, severe headaches or sudden changes in vision since last result? No   Abnormal bleeding since last result? No   Upcoming surgery, procedure? No     Previous result: Therapeutic last 2(+) visits  Additional findings: None       PLAN     Recommended plan for no diet, medication or health factor changes affecting INR     Dosing Instructions: Continue your current warfarin dose with next INR in 6 weeks       Summary  As of 3/8/2024      Full warfarin instructions:  7.5 mg every Thu; 5 mg all other days   Next INR check:  4/19/2024               Telephone call with Preet who verbalizes understanding and agrees to plan    Lab visit scheduled    Education provided:   Contact [phone]  with any changes, questions or concerns.     Plan made per [facility] anticoagulation protocol    Paulie MORRIS RN  Anticoagulation Clinic  3/8/2024    _______________________________________________________________________     Anticoagulation Episode Summary       Current INR goal:  2.0-3.0   TTR:  83.3% (1 y)   Target end date:  Indefinite   Send INR reminders to:  MAIA FISHER    Indications    S/P AVR (aortic valve replacement)-25 mm St. Rolf [place]  [Z95.2]  H/O mechanical aortic valve replacement [Z95.2]  Long term  current use of anticoagulants with INR goal of 2.0-3.0 [Z79.01]  S/P AVR [Z95.2]             Comments:               Anticoagulation Care Providers       Provider Role Specialty Phone number    Moni Cartagena PA-C Referring Cardiovascular & Thoracic Surgery [phone]    Yan Lucas MD Referring [facility] Medicine [phone]

## 2024-03-31 DIAGNOSIS — Z95.2 S/P AVR: ICD-10-CM

## 2024-04-01 RX ORDER — WARFARIN SODIUM 5 MG/1
TABLET ORAL
Qty: 90 TABLET | Refills: 1 | Status: SHIPPED | OUTPATIENT
Start: 2024-04-01 | End: 2024-08-26

## 2024-04-01 NOTE — TELEPHONE ENCOUNTER
ANTICOAGULATION MANAGEMENT:  Medication Refill    Anticoagulation Summary  As of 3/8/2024      Warfarin maintenance plan:  7.5 mg (5 mg x 1.5) every Thu; 5 mg (5 mg x 1) all other days   Next INR check:  4/19/2024   Target end date:  Indefinite    Indications    S/P AVR (aortic valve replacement)-25 mm St. Rolf [place]  [Z95.2]  H/O mechanical aortic valve replacement [Z95.2]  Long term current use of anticoagulants with INR goal of 2.0-3.0 [Z79.01]  S/P AVR [Z95.2]                 Anticoagulation Care Providers       Provider Role Specialty Phone number    Moni Cartagena PA-C Referring Cardiovascular & Thoracic Surgery [phone]    Yan Lucas MD Referring Family Medicine [phone]            Refill Criteria    Visit with referring provider/group: Meets criteria: office visit within referring provider group in the last 1 year on 6/22/23    [facility] referral last signed: 01/05/2024; within last year: Yes    Lab monitoring not exceeding 2 weeks overdue: No    Toney meets all criteria for refill. Rx instructions and quantity match patient's current dosing plan. Warfarin 90 day supply with 1 refill granted per [facility] protocol     Paulie MORRIS RN  Anticoagulation Clinic

## 2024-04-19 ENCOUNTER — LAB (OUTPATIENT)
Dept: LAB | Facility: CLINIC | Age: 54
End: 2024-04-19
Payer: COMMERCIAL

## 2024-04-19 ENCOUNTER — ANTICOAGULATION THERAPY VISIT (OUTPATIENT)
Dept: ANTICOAGULATION | Facility: CLINIC | Age: 54
End: 2024-04-19

## 2024-04-19 DIAGNOSIS — Z95.2 S/P AVR: ICD-10-CM

## 2024-04-19 DIAGNOSIS — Z79.01 LONG TERM CURRENT USE OF ANTICOAGULANTS WITH INR GOAL OF 2.0-3.0: ICD-10-CM

## 2024-04-19 DIAGNOSIS — Z95.2 S/P AVR (AORTIC VALVE REPLACEMENT): ICD-10-CM

## 2024-04-19 DIAGNOSIS — Z95.2 S/P AVR (AORTIC VALVE REPLACEMENT): Primary | ICD-10-CM

## 2024-04-19 DIAGNOSIS — Z95.2 H/O MECHANICAL AORTIC VALVE REPLACEMENT: ICD-10-CM

## 2024-04-19 LAB — INR BLD: 1.8 (ref 0.9–1.1)

## 2024-04-19 PROCEDURE — 36416 COLLJ CAPILLARY BLOOD SPEC: CPT

## 2024-04-19 PROCEDURE — 85610 PROTHROMBIN TIME: CPT

## 2024-04-19 NOTE — PROGRESS NOTES
ANTICOAGULATION MANAGEMENT     Toney Lemos 54 year old male is on warfarin with subtherapeutic INR result. (Goal INR 2.0-3.0)    Recent labs: (last 7 days)     04/19/24  0919   INR 1.8*       ASSESSMENT     Warfarin Lab Questionnaire    Warfarin Doses Last 7 Days    Pt Rptd Dose SUNDAY MONDAY TUESDAY WED THURS FRIDAY SATURDAY 4/18/2024   1:31 PM 5 5 5 5 7.5 5 5         4/18/2024   Warfarin Lab Questionnaire   Missed doses within past 14 days? No   Changes in diet or alcohol within past 14 days? Yes: More greens.    Please explain:  Vacation - additional alcohol consumption   Medication changes since last result? No   Injuries or illness since last result? No   New shortness of breath, severe headaches or sudden changes in vision since last result? No   Abnormal bleeding since last result? No   Upcoming surgery, procedure? No   Best number to call with results? [phone]     Previous result: Therapeutic last 2(+) visits  Additional findings:  Patient was vacationing in [facility] for a week. Diet was different.        PLAN     Recommended plan for temporary change(s) affecting INR     Dosing Instructions: booster dose then continue your current warfarin dose with next INR in 2 weeks       Summary  As of 4/19/2024      Full warfarin instructions:  4/19: 7.5 mg; Otherwise 7.5 mg every Thu; 5 mg all other days   Next INR check:  5/3/2024               Telephone call with Preet who verbalizes understanding and agrees to plan    Lab visit scheduled    Education provided:   Contact [phone]  with any changes, questions or concerns.     Plan made per [facility] anticoagulation protocol    Paulie MORRIS RN  Anticoagulation Clinic  4/19/2024    _______________________________________________________________________     Anticoagulation Episode Summary       Current INR goal:  2.0-3.0   TTR:  87.0% (1 y)   Target end date:  Indefinite   Send INR reminders to:  MAIA FISHER    Indications    S/P AVR (aortic valve  replacement)-25 mm St. Rolf [place]  [Z95.2]  H/O mechanical aortic valve replacement [Z95.2]  Long term current use of anticoagulants with INR goal of 2.0-3.0 [Z79.01]  S/P AVR [Z95.2]             Comments:               Anticoagulation Care Providers       Provider Role Specialty Phone number    Moni Cartagena PA-C Referring Cardiovascular & Thoracic Surgery [phone]    LanceYan burgess MD Referring [facility] [phone]

## 2024-05-03 ENCOUNTER — ANTICOAGULATION THERAPY VISIT (OUTPATIENT)
Dept: ANTICOAGULATION | Facility: CLINIC | Age: 54
End: 2024-05-03

## 2024-05-03 ENCOUNTER — LAB (OUTPATIENT)
Dept: LAB | Facility: CLINIC | Age: 54
End: 2024-05-03
Payer: COMMERCIAL

## 2024-05-03 DIAGNOSIS — Z95.2 S/P AVR (AORTIC VALVE REPLACEMENT): ICD-10-CM

## 2024-05-03 DIAGNOSIS — Z95.2 S/P AVR (AORTIC VALVE REPLACEMENT): Primary | ICD-10-CM

## 2024-05-03 DIAGNOSIS — Z95.2 S/P AVR: ICD-10-CM

## 2024-05-03 DIAGNOSIS — Z95.2 H/O MECHANICAL AORTIC VALVE REPLACEMENT: ICD-10-CM

## 2024-05-03 DIAGNOSIS — Z79.01 LONG TERM CURRENT USE OF ANTICOAGULANTS WITH INR GOAL OF 2.0-3.0: ICD-10-CM

## 2024-05-03 LAB — INR BLD: 1.5 (ref 0.9–1.1)

## 2024-05-03 PROCEDURE — 85610 PROTHROMBIN TIME: CPT

## 2024-05-03 PROCEDURE — 36415 COLL VENOUS BLD VENIPUNCTURE: CPT

## 2024-05-03 NOTE — PROGRESS NOTES
ANTICOAGULATION MANAGEMENT     Toney Lemos 54 year old male is on warfarin with subtherapeutic INR result. (Goal INR 2.0-3.0)    Recent labs: (last 7 days)     05/03/24  0807   INR 1.5*       ASSESSMENT     Warfarin Lab Questionnaire    Warfarin Doses Last 7 Days      5/3/2024     7:05 AM   Dose in Tablet or Mg   TAB or MG? milligram (mg)     Pt Rptd Dose YONATHAN MONDAY TUESDAY WED THURS FRIDAY SATURDAY   5/3/2024   7:05 AM 5 5 5 5 7.5 5 5         5/3/2024   Warfarin Lab Questionnaire   Missed doses within past 14 days? No   Changes in diet or alcohol within past 14 days? Yes   Please explain:  Less alcohol, more Vit K   Medication changes since last result? No   Injuries or illness since last result? No   New shortness of breath, severe headaches or sudden changes in vision since last result? No   Abnormal bleeding since last result? No   Upcoming surgery, procedure? No   Best number to call with results? [phone]     Previous result: Subtherapeutic  Additional findings:  Increased activity as Preet states he is trying to exercise more and eat healthier. Both can be affecting his INR       PLAN     Recommended plan for ongoing change(s) affecting INR     Dosing Instructions: booster dose then Increase your warfarin dose (13.3% change) with next INR in 1 week       Summary  As of 5/3/2024      Full warfarin instructions:  5/3: 10 mg; Otherwise 7.5 mg every Mon, Wed, Fri; 5 mg all other days   Next INR check:  5/10/2024               Telephone call with Preet who verbalizes understanding and agrees to plan and who agrees to plan and repeated back plan correctly    Lab visit scheduled    Education provided:   Goal range and lab monitoring: goal range and significance of current result, Importance of therapeutic range, and Importance of following up at instructed interval  Dietary considerations: importance of consistent vitamin K intake  Healthy lifestyle considerations: impact of exercise/activity level on  INR    Plan made per [facility] anticoagulation protocol    Damien Colón, RN  Anticoagulation Clinic  5/3/2024    _______________________________________________________________________     Anticoagulation Episode Summary       Current INR goal:  2.0-3.0   TTR:  83.2% (1 y)   Target end date:  Indefinite   Send INR reminders to:  MAIA FISHER    Indications    S/P AVR (aortic valve replacement)-25 mm St. Rolf [place]  [Z95.2]  H/O mechanical aortic valve replacement [Z95.2]  Long term current use of anticoagulants with INR goal of 2.0-3.0 [Z79.01]  S/P AVR [Z95.2]             Comments:               Anticoagulation Care Providers       Provider Role Specialty Phone number    Moni Cartagena PA-C Referring Cardiovascular & Thoracic Surgery [phone]    LanceYan burgess MD Referring Family Medicine [phone]

## 2024-05-07 DIAGNOSIS — E78.5 HYPERLIPIDEMIA LDL GOAL <130: ICD-10-CM

## 2024-05-07 DIAGNOSIS — Z95.2 S/P AVR: ICD-10-CM

## 2024-05-07 RX ORDER — ASPIRIN 81 MG/1
81 TABLET ORAL DAILY
Qty: 90 TABLET | Refills: 1 | Status: SHIPPED | OUTPATIENT
Start: 2024-05-07

## 2024-05-07 RX ORDER — ATORVASTATIN CALCIUM 40 MG/1
40 TABLET, FILM COATED ORAL DAILY
Qty: 90 TABLET | Refills: 1 | Status: SHIPPED | OUTPATIENT
Start: 2024-05-07

## 2024-05-07 RX ORDER — LISINOPRIL 5 MG/1
5 TABLET ORAL DAILY
Qty: 90 TABLET | Refills: 1 | Status: SHIPPED | OUTPATIENT
Start: 2024-05-07

## 2024-05-07 NOTE — TELEPHONE ENCOUNTER
[facility] Refill Guideline reviewed.  Medication meets criteria for refill. Annual follow up is scheduled for July with note to send to lab prior.

## 2024-05-10 ENCOUNTER — LAB (OUTPATIENT)
Dept: LAB | Facility: CLINIC | Age: 54
End: 2024-05-10
Payer: COMMERCIAL

## 2024-05-10 ENCOUNTER — ANTICOAGULATION THERAPY VISIT (OUTPATIENT)
Dept: ANTICOAGULATION | Facility: CLINIC | Age: 54
End: 2024-05-10

## 2024-05-10 DIAGNOSIS — Z95.2 S/P AVR: ICD-10-CM

## 2024-05-10 DIAGNOSIS — Z95.2 S/P AVR (AORTIC VALVE REPLACEMENT): ICD-10-CM

## 2024-05-10 DIAGNOSIS — Z95.2 H/O MECHANICAL AORTIC VALVE REPLACEMENT: ICD-10-CM

## 2024-05-10 DIAGNOSIS — Z95.2 S/P AVR (AORTIC VALVE REPLACEMENT): Primary | ICD-10-CM

## 2024-05-10 DIAGNOSIS — Z79.01 LONG TERM CURRENT USE OF ANTICOAGULANTS WITH INR GOAL OF 2.0-3.0: ICD-10-CM

## 2024-05-10 LAB — INR BLD: 2.3 (ref 0.9–1.1)

## 2024-05-10 PROCEDURE — 85610 PROTHROMBIN TIME: CPT

## 2024-05-10 PROCEDURE — 36416 COLLJ CAPILLARY BLOOD SPEC: CPT

## 2024-05-10 NOTE — PROGRESS NOTES
ANTICOAGULATION MANAGEMENT     Toney Lemos 54 year old male is on warfarin with therapeutic INR result. (Goal INR 2.0-3.0)    Recent labs: (last 7 days)     05/10/24  0810   INR 2.3*       ASSESSMENT     Warfarin Lab Questionnaire    Warfarin Doses Last 7 Days      5/9/2024     9:49 PM   Dose in Tablet or Mg   TAB or MG? milligram (mg)     Pt Rptd Dose SUNDAY MONDAY TUESDAY WED THURS FRIDAY SATURDAY 5/9/2024   9:49 PM 5 7.5 5 7.5 5 7.5 5         5/9/2024   Warfarin Lab Questionnaire   Missed doses within past 14 days? No   Changes in diet or alcohol within past 14 days? No   Medication changes since last result? No   Injuries or illness since last result? No   New shortness of breath, severe headaches or sudden changes in vision since last result? No   Abnormal bleeding since last result? No   Upcoming surgery, procedure? No   Best number to call with results? [phone]     Previous result: Subtherapeutic  Additional findings: None       PLAN     Recommended plan for no diet, medication or health factor changes affecting INR     Dosing Instructions: Continue your current warfarin dose with next INR in 1 week       Summary  As of 5/10/2024      Full warfarin instructions:  7.5 mg every Mon, Wed, Fri; 5 mg all other days   Next INR check:  5/17/2024               Detailed voice message left for Preet with dosing instructions and follow up date.     Contact [phone]  to schedule and with any changes, questions or concerns.     Education provided:   Please call back if any changes to your diet, medications or how you've been taking warfarin    Plan made per [facility] anticoagulation protocol    Milana Hernández RN  Anticoagulation Clinic  5/10/2024    _______________________________________________________________________     Anticoagulation Episode Summary       Current INR goal:  2.0-3.0   TTR:  82.0% (1 y)   Target end date:  Indefinite   Send INR reminders to:  MAIA FISHER    Indications    S/P AVR (aortic  valve replacement)-25 mm St. Rolf [place]  [Z95.2]  H/O mechanical aortic valve replacement [Z95.2]  Long term current use of anticoagulants with INR goal of 2.0-3.0 [Z79.01]  S/P AVR [Z95.2]             Comments:               Anticoagulation Care Providers       Provider Role Specialty Phone number    Moni Cartagena PA-C Referring Cardiovascular & Thoracic Surgery [phone]    LanceYan burgess MD Referring [facility] [phone]

## 2024-05-20 ENCOUNTER — MYC MEDICAL ADVICE (OUTPATIENT)
Dept: ANTICOAGULATION | Facility: CLINIC | Age: 54
End: 2024-05-20
Payer: COMMERCIAL

## 2024-05-20 NOTE — TELEPHONE ENCOUNTER
ANTICOAGULATION     Toney Lemos is overdue for an INR check.     My chart message sent     Ann Slaughter RN

## 2024-05-28 ENCOUNTER — MYC MEDICAL ADVICE (OUTPATIENT)
Dept: ANTICOAGULATION | Facility: CLINIC | Age: 54
End: 2024-05-28
Payer: COMMERCIAL

## 2024-05-28 NOTE — TELEPHONE ENCOUNTER
ANTICOAGULATION     Toney Lemos is overdue for an INR check.     My chart sent     Ann Slaughter RN

## 2024-06-04 ENCOUNTER — TELEPHONE (OUTPATIENT)
Dept: ANTICOAGULATION | Facility: CLINIC | Age: 54
End: 2024-06-04
Payer: COMMERCIAL

## 2024-06-04 NOTE — LETTER
[facility]  [address] [place] [address]  Phone: [phone]  Fax: [phone]   June 4, 2024        Toney Lemos  [id number] [address]            Dear Toney,    You are currently under the care of [facility] for your warfarin (Coumadin , Jantoven ) therapy.  We are contacting you because our records show you were due for an INR on 5/14/24.    There are potentially serious risks when taking warfarin without careful monitoring and we want to make sure you are safely managed.  Routine lab monitoring is required for warfarin refills.     Please call [phone]  as soon as possible to schedule a lab appointment. If it is difficult for you to get to lab, please call us to discuss options.  If there has been a change in your care or other concerns, please let us know so we can help and/or update our records.         Sincerely,       [facility]

## 2024-06-14 ENCOUNTER — ANTICOAGULATION THERAPY VISIT (OUTPATIENT)
Dept: ANTICOAGULATION | Facility: CLINIC | Age: 54
End: 2024-06-14

## 2024-06-14 ENCOUNTER — LAB (OUTPATIENT)
Dept: LAB | Facility: CLINIC | Age: 54
End: 2024-06-14
Payer: COMMERCIAL

## 2024-06-14 DIAGNOSIS — Z95.2 S/P AVR (AORTIC VALVE REPLACEMENT): ICD-10-CM

## 2024-06-14 DIAGNOSIS — Z95.2 S/P AVR: ICD-10-CM

## 2024-06-14 DIAGNOSIS — Z79.01 LONG TERM CURRENT USE OF ANTICOAGULANTS WITH INR GOAL OF 2.0-3.0: ICD-10-CM

## 2024-06-14 DIAGNOSIS — Z95.2 H/O MECHANICAL AORTIC VALVE REPLACEMENT: ICD-10-CM

## 2024-06-14 DIAGNOSIS — Z95.2 S/P AVR (AORTIC VALVE REPLACEMENT): Primary | ICD-10-CM

## 2024-06-14 LAB — INR BLD: 1.9 (ref 0.9–1.1)

## 2024-06-14 PROCEDURE — 85610 PROTHROMBIN TIME: CPT

## 2024-06-14 PROCEDURE — 36416 COLLJ CAPILLARY BLOOD SPEC: CPT

## 2024-06-14 NOTE — PROGRESS NOTES
ANTICOAGULATION MANAGEMENT     Toney Lemos 54 year old male is on warfarin with subtherapeutic INR result. (Goal INR 2.0-3.0)    Recent labs: (last 7 days)     06/14/24  1458   INR 1.9*       ASSESSMENT     Warfarin Lab Questionnaire    Warfarin Doses Last 7 Days      6/14/2024    11:53 AM   Dose in Tablet or Mg   TAB or MG? milligram (mg)     Pt Rptd Dose SUNDAY MONDAY TUESDAY WED THURS FRIDAY SATURDAY 6/14/2024  11:53 AM 5 5 5 5 7.5 5 5         6/14/2024   Warfarin Lab Questionnaire   Missed doses within past 14 days? No but less warfarin taken than [facility] advised, see above   Changes in diet or alcohol within past 14 days? No   Medication changes since last result? No   Injuries or illness since last result? No   New shortness of breath, severe headaches or sudden changes in vision since last result? No   Abnormal bleeding since last result? No   Upcoming surgery, procedure? No   Best number to call with results? [phone]     Previous result: Therapeutic last visit; previously outside of goal range  Additional findings: None       PLAN     Recommended plan for temporary change(s) affecting INR     Dosing Instructions: Continue your current warfarin dose with next INR in 2 weeks       Summary  As of 6/14/2024      Full warfarin instructions:  7.5 mg every Mon, Wed, Fri; 5 mg all other days   Next INR check:  6/28/2024               Telephone call with Preet who verbalizes understanding and agrees to plan and who agrees to plan and repeated back plan correctly  Sent [employer] message with dosing and follow up instructions    Lab visit scheduled    Education provided:   Taking warfarin: Importance of taking warfarin as instructed  Goal range and lab monitoring: goal range and significance of current result, Importance of therapeutic range, and Importance of following up at instructed interval  Contact [phone]  with any changes, questions or concerns.     Plan made per [facility] anticoagulation protocol    Damien PETERSEN  Eldon RN  Anticoagulation Clinic  6/14/2024    _______________________________________________________________________     Anticoagulation Episode Summary       Current INR goal:  2.0-3.0   TTR:  79.6% (1 y)   Target end date:  Indefinite   Send INR reminders to:  MAIA FISHER    Indications    S/P AVR (aortic valve replacement)-25 mm St. Rolf [place]  [Z95.2]  H/O mechanical aortic valve replacement [Z95.2]  Long term current use of anticoagulants with INR goal of 2.0-3.0 [Z79.01]  S/P AVR [Z95.2]             Comments:               Anticoagulation Care Providers       Provider Role Specialty Phone number    Moni Cartagena PA-C Referring Cardiovascular & Thoracic Surgery [phone]    Yan Lucas MD Referring [facility] Medicine [phone]

## 2024-06-28 ENCOUNTER — LAB (OUTPATIENT)
Dept: LAB | Facility: CLINIC | Age: 54
End: 2024-06-28
Payer: COMMERCIAL

## 2024-06-28 ENCOUNTER — ANTICOAGULATION THERAPY VISIT (OUTPATIENT)
Dept: ANTICOAGULATION | Facility: CLINIC | Age: 54
End: 2024-06-28

## 2024-06-28 DIAGNOSIS — Z95.2 S/P AVR (AORTIC VALVE REPLACEMENT): Primary | ICD-10-CM

## 2024-06-28 DIAGNOSIS — Z95.2 H/O MECHANICAL AORTIC VALVE REPLACEMENT: ICD-10-CM

## 2024-06-28 DIAGNOSIS — Z95.2 S/P AVR (AORTIC VALVE REPLACEMENT): ICD-10-CM

## 2024-06-28 DIAGNOSIS — Z95.2 S/P AVR: ICD-10-CM

## 2024-06-28 DIAGNOSIS — Z79.01 LONG TERM CURRENT USE OF ANTICOAGULANTS WITH INR GOAL OF 2.0-3.0: ICD-10-CM

## 2024-06-28 LAB — INR BLD: 2.7 (ref 0.9–1.1)

## 2024-06-28 PROCEDURE — 85610 PROTHROMBIN TIME: CPT

## 2024-06-28 PROCEDURE — 36416 COLLJ CAPILLARY BLOOD SPEC: CPT

## 2024-06-28 NOTE — PROGRESS NOTES
ANTICOAGULATION MANAGEMENT     Toney Lemos 54 year old male is on warfarin with therapeutic INR result. (Goal INR 2.0-3.0)    Recent labs: (last 7 days)     06/28/24  0729   INR 2.7*       ASSESSMENT     Warfarin Lab Questionnaire    Warfarin Doses Last 7 Days      6/28/2024     6:30 AM   Dose in Tablet or Mg   TAB or MG? milligram (mg)     Pt Rptd Dose SUNDAY MONDAY TUESDAY WED THURS 6/28/2024   6:30 AM 5 7.5 5 7.5 5         6/28/2024   Warfarin Lab Questionnaire   Missed doses within past 14 days? No   Changes in diet or alcohol within past 14 days? No   Medication changes since last result? No   Injuries or illness since last result? No   New shortness of breath, severe headaches or sudden changes in vision since last result? No   Abnormal bleeding since last result? No   Upcoming surgery, procedure? No   Best number to call with results? [phone]        Previous result: Subtherapeutic  Additional findings: None       PLAN     Recommended plan for no diet, medication or health factor changes affecting INR     Dosing Instructions: Continue your current warfarin dose with next INR in 3 weeks       Summary  As of 6/28/2024      Full warfarin instructions:  7.5 mg every Mon, Wed, Fri; 5 mg all other days   Next INR check:  7/18/2024               Telephone call with Preet who verbalizes understanding and agrees to plan and who agrees to plan and repeated back plan correctly    Lab visit scheduled    Education provided: Contact [phone] with any changes, questions or concerns.     Plan made per [facility] anticoagulation protocol    Damien Colón, RN  Anticoagulation Clinic  6/28/2024    _______________________________________________________________________     Anticoagulation Episode Summary       Current INR goal:  2.0-3.0   TTR:  79.1% (1 y)   Target end date:  Indefinite   Send INR reminders to:  MAIA FISHER    Indications    S/P AVR (aortic valve replacement)-25 mm St. Rolf [place]  [Z95.2]  H/O  mechanical aortic valve replacement [Z95.2]  Long term current use of anticoagulants with INR goal of 2.0-3.0 [Z79.01]  S/P AVR [Z95.2]             Comments:               Anticoagulation Care Providers       Provider Role Specialty Phone number    Moni Cartagena PA-C Referring Cardiovascular & Thoracic Surgery [phone]    Yan Lucas MD Referring [facility] [phone]

## 2024-07-18 ENCOUNTER — OFFICE VISIT (OUTPATIENT)
Dept: CARDIOLOGY | Facility: CLINIC | Age: 54
End: 2024-07-18
Payer: COMMERCIAL

## 2024-07-18 ENCOUNTER — LAB (OUTPATIENT)
Dept: LAB | Facility: CLINIC | Age: 54
End: 2024-07-18
Payer: COMMERCIAL

## 2024-07-18 ENCOUNTER — ANTICOAGULATION THERAPY VISIT (OUTPATIENT)
Dept: ANTICOAGULATION | Facility: CLINIC | Age: 54
End: 2024-07-18

## 2024-07-18 VITALS
OXYGEN SATURATION: 96 % | WEIGHT: 190 LBS | SYSTOLIC BLOOD PRESSURE: 132 MMHG | DIASTOLIC BLOOD PRESSURE: 86 MMHG | HEART RATE: 59 BPM | BODY MASS INDEX: 28.89 KG/M2

## 2024-07-18 DIAGNOSIS — E78.5 HYPERLIPIDEMIA LDL GOAL <130: ICD-10-CM

## 2024-07-18 DIAGNOSIS — Z95.2 S/P AVR (AORTIC VALVE REPLACEMENT): ICD-10-CM

## 2024-07-18 DIAGNOSIS — Z79.01 LONG TERM CURRENT USE OF ANTICOAGULANTS WITH INR GOAL OF 2.0-3.0: ICD-10-CM

## 2024-07-18 DIAGNOSIS — Z95.2 S/P AVR: ICD-10-CM

## 2024-07-18 DIAGNOSIS — Z95.2 S/P AVR: Primary | ICD-10-CM

## 2024-07-18 DIAGNOSIS — Z95.2 H/O MECHANICAL AORTIC VALVE REPLACEMENT: ICD-10-CM

## 2024-07-18 DIAGNOSIS — Z95.2 S/P AVR (AORTIC VALVE REPLACEMENT): Primary | ICD-10-CM

## 2024-07-18 DIAGNOSIS — I10 HYPERTENSION GOAL BP (BLOOD PRESSURE) < 140/90: ICD-10-CM

## 2024-07-18 LAB
ALT SERPL W P-5'-P-CCNC: 35 U/L (ref 0–70)
ANION GAP SERPL CALCULATED.3IONS-SCNC: 11 MMOL/L (ref 7–15)
BUN SERPL-MCNC: 15 MG/DL (ref 6–20)
CALCIUM SERPL-MCNC: 9 MG/DL (ref 8.8–10.4)
CHLORIDE SERPL-SCNC: 104 MMOL/L (ref 98–107)
CHOLEST SERPL-MCNC: 203 MG/DL
CREAT SERPL-MCNC: 1.07 MG/DL (ref 0.67–1.17)
EGFRCR SERPLBLD CKD-EPI 2021: 83 ML/MIN/1.73M2
FASTING STATUS PATIENT QL REPORTED: YES
FASTING STATUS PATIENT QL REPORTED: YES
GLUCOSE SERPL-MCNC: 116 MG/DL (ref 70–99)
HCO3 SERPL-SCNC: 24 MMOL/L (ref 22–29)
HDLC SERPL-MCNC: 45 MG/DL
INR BLD: 3.2 (ref 0.9–1.1)
LDLC SERPL CALC-MCNC: 120 MG/DL
NONHDLC SERPL-MCNC: 158 MG/DL
POTASSIUM SERPL-SCNC: 4.3 MMOL/L (ref 3.4–5.3)
SODIUM SERPL-SCNC: 139 MMOL/L (ref 135–145)
TRIGL SERPL-MCNC: 192 MG/DL

## 2024-07-18 PROCEDURE — 84460 ALANINE AMINO (ALT) (SGPT): CPT

## 2024-07-18 PROCEDURE — 80061 LIPID PANEL: CPT

## 2024-07-18 PROCEDURE — 99214 OFFICE O/P EST MOD 30 MIN: CPT | Performed by: INTERNAL MEDICINE

## 2024-07-18 PROCEDURE — 80048 BASIC METABOLIC PNL TOTAL CA: CPT

## 2024-07-18 PROCEDURE — 36415 COLL VENOUS BLD VENIPUNCTURE: CPT

## 2024-07-18 PROCEDURE — G2211 COMPLEX E/M VISIT ADD ON: HCPCS | Performed by: INTERNAL MEDICINE

## 2024-07-18 PROCEDURE — 85610 PROTHROMBIN TIME: CPT

## 2024-07-18 ASSESSMENT — PAIN SCALES - GENERAL: PAINLEVEL: NO PAIN (0)

## 2024-07-18 NOTE — PROGRESS NOTES
HPI and Plan:   Mr Lemos is a very pleasant 54-year-old gentleman with history of bicuspid aortic valve severe aortic regurgitation for which he underwent 27 mm [place] mechanical aortic valve replacement in February 2020 with pre AVR coronary angiogram showing normal coronary arteries, other comorbid's of hypertension, dyslipidemia.  Today is coming for routine follow-up.  He had an echocardiogram done in 2021 that showed normal functioning mechanical aortic valve.  Overall health wise patient tells me he feels quite well.  He and his wife has not started doing exercise on a regular basis which is a mix of treadmill and elliptical over 1 hour every day.  No exertional symptoms like chest discomfort shortness of breath dizziness presyncope syncope.  He had labs done today that showed normal kidney function lipid panel shows stable LDL at 120.  He is on Lipitor 40 mg daily.  He is on aspirin and Coumadin and tolerating combination quite well without any bleeding issues.  For hypertension he is on amlodipine lisinopril and metoprolol.  Blood pressure is controlled.      Assessment and plan  S/p mechanical aortic valve replacement 2020.  Crisp mechanical heart sounds heard without any significant murmur.  On aspirin and Coumadin.  Again discussed the importance of predental workup antibiotic prophylaxis.  Hypertension controlled on amlodipine lisinopril metoprolol.  Dyslipidemia LDL stable on high intensity statin.    Recommendations  Overall cardiac status wise he is doing well.  Discussed with patient that going forward if you notice any bleeding issues low threshold to discontinue aspirin at that time.  Follow-up in a year with an echocardiogram, sooner if he notes any change in clinical status.    Orders Placed This Encounter   Procedures    Basic metabolic panel    Lipid Profile    ALT    Follow-Up with Cardiology    Echocardiogram Complete       No orders of the defined types were placed in this  encounter.      There are no discontinued medications.      Encounter Diagnosis   Name Primary?    S/P AVR Yes       CURRENT MEDICATIONS:  Current Outpatient Medications   Medication Sig Dispense Refill    amLODIPine (NORVASC) 2.5 MG tablet Take 1 tablet (2.5 mg) by mouth daily 90 tablet 3    amoxicillin (AMOXIL) 500 MG capsule Take 4 capsules (2 gms) 60 minutes prior to dental work. 4 capsule 1    aspirin 81 MG EC tablet Take 1 tablet (81 mg) by mouth daily 90 tablet 1    atorvastatin (LIPITOR) 40 MG tablet Take 1 tablet (40 mg) by mouth daily 90 tablet 1    lisinopril (ZESTRIL) 5 MG tablet Take 1 tablet (5 mg) by mouth daily 90 tablet 1    loratadine (CLARITIN) 10 MG tablet Take 10 mg by mouth daily      metoprolol succinate ER (TOPROL XL) 50 MG 24 hr tablet Take 1 tablet (50 mg) by mouth 2 times daily 180 tablet 3    warfarin ANTICOAGULANT (COUMADIN) 5 MG tablet TAKE 1 AND 1/2 TABLET BY MOUTH ON THURSDAY AND 1 TABLET ON ALL OTHER DAYS OR AS DIRECTED BY THE COUMADIN CLINIC 90 tablet 1       ALLERGIES     Allergies   Allergen Reactions    No Known Drug Allergy     Seasonal Allergies        PAST MEDICAL HISTORY:  Past Medical History:   Diagnosis Date    Aortic valve disorders     Hypertension        PAST SURGICAL HISTORY:  Past Surgical History:   Procedure Laterality Date    COLONOSCOPY N/A 3/27/2023    Procedure: COLONOSCOPY;  Surgeon: Yan Lucas MD;  Location:  GI    CV CORONARY ANGIOGRAM N/A 1/9/2020    Procedure: Coronary Angiogram;  Surgeon: Faisal Telles MD;  Location: [facility] CARDIAC CATH LAB    INJECT EPIDURAL TRANSFORAMINAL Left 8/25/2022    Procedure: Left Lumbar 3-4 and Lumbar 4-5 Transforaminal Epidural Steroid Injections with fluoroscopic guidance and contrast.;  Surgeon: Walter Dotson MD;  Location: [facility] OR    REPLACE VALVE AORTIC N/A 2/3/2020    Procedure: AORTIC VALVE REPLACEMENT WITH [facility] MECHANICAL HEART VALVE 25 MM; ON PUMP WITH TADEO;  Surgeon: Byron  Cindy Huntley MD;  Location:  OR       FAMILY HISTORY:  Family History   Problem Relation Age of Onset    Heart Disease Brother         CAD, age 38    Heart Disease Maternal Grandmother     Lipids Maternal Grandmother     Hypertension Maternal Grandmother     Diabetes Maternal Grandfather         senior onset    Hypertension Maternal Grandfather     Cerebrovascular Disease Maternal Grandfather     Skin Cancer Maternal Grandfather     Cancer Paternal Grandmother         rectal cancer    Lipids Paternal Grandmother     Hypertension Paternal Grandfather     Heart Disease Paternal Grandfather         heart murmur    Prostate Cancer Paternal Grandfather     Cancer Paternal Grandfather         bone ca    Lipids Paternal Grandfather     Pancreatic Cancer Cousin        SOCIAL HISTORY:  Social History     Socioeconomic History    Marital status:      Spouse name: None    Number of children: None    Years of education: None    Highest education level: None   Tobacco Use    Smoking status: Never    Smokeless tobacco: Never   Vaping Use    Vaping status: Never Used   Substance and Sexual Activity    Alcohol use: Yes     Alcohol/week: 0.0 standard drinks of alcohol     Comment: beer or two wkly    Drug use: No    Sexual activity: Yes     Partners: Female     Comment: partner is on bcp   Other Topics Concern     Service No    Blood Transfusions No    Caffeine Concern No    Occupational Exposure No    Hobby Hazards No    Sleep Concern Yes    Stress Concern No    Weight Concern No    Special Diet Yes    Back Care Yes    Exercise Yes    Bike Helmet Yes    Seat Belt Yes    Self-Exams Yes    Parent/sibling w/ CABG, MI or angioplasty before 65F 55M? Yes     Comment: brother had heart attack &  at age 38yrs     Social Determinants of Health     Financial Resource Strain: Low Risk  (2023)    Financial Resource Strain     Within the past 12 months, have you or your family members you live with been unable to  get utilities (heat, electricity) when it was really needed?: No   Food Insecurity: Low Risk  (9/22/2023)    Food Insecurity     Within the past 12 months, did you worry that your food would run out before you got money to buy more?: No     Within the past 12 months, did the food you bought just not last and you didn t have money to get more?: No   Transportation Needs: Low Risk  (9/22/2023)    Transportation Needs     Within the past 12 months, has lack of transportation kept you from medical appointments, getting your medicines, non-medical meetings or appointments, work, or from getting things that you need?: No   Interpersonal Safety: Low Risk  (9/22/2023)    Interpersonal Safety     Do you feel physically and emotionally safe where you currently live?: Yes     Within the past 12 months, have you been hit, slapped, kicked or otherwise physically hurt by someone?: No     Within the past 12 months, have you been humiliated or emotionally abused in other ways by your partner or ex-partner?: No   Housing Stability: Low Risk  (9/22/2023)    Housing Stability     Do you have housing? : Yes     Are you worried about losing your housing?: No       Review of Systems:  Skin:          Eyes:         ENT:         Respiratory:          Cardiovascular:         Gastroenterology:        Genitourinary:         Musculoskeletal:         Neurologic:         Psychiatric:         Heme/Lymph/Imm:         Endocrine:           Physical Exam:  Vitals: /86   Pulse 59   Wt 86.2 kg (190 lb)   SpO2 96%   BMI 28.89 kg/m      General patient appears comfortable  Neck normal JVP  Cardiovascular system crisp mechanical heart sounds, regular, no murmur rub or  Respiratory system clear to auscultation  Extremities no edema        CC  Referred Self, MD  No address on file

## 2024-07-18 NOTE — PROGRESS NOTES
ANTICOAGULATION MANAGEMENT     Toney Lemos 54 year old male is on warfarin with supratherapeutic INR result. (Goal INR 2.0-3.0)    Recent labs: (last 7 days)     07/18/24  0848   INR 3.2*       ASSESSMENT     Warfarin Lab Questionnaire    Warfarin Doses Last 7 Days      7/18/2024     7:50 AM   Dose in Tablet or Mg   TAB or MG? milligram (mg)     Pt Rptd Dose SUNDAY MONDAY TUESDAY WED THURS FRIDAY SATURDAY 7/18/2024   7:50 AM 5 7.5 5 7.5 5 7.5 5         7/18/2024   Warfarin Lab Questionnaire   Missed doses within past 14 days? No   Changes in diet or alcohol within past 14 days? No   Medication changes since last result? No   Injuries or illness since last result? No   New shortness of breath, severe headaches or sudden changes in vision since last result? No   Abnormal bleeding since last result? No   Upcoming surgery, procedure? No   Best number to call with results? [phone]        Previous result: Therapeutic last visit; previously outside of goal range  Additional findings: None       PLAN     Recommended plan for no diet, medication or health factor changes affecting INR     Dosing Instructions: Continue your current warfarin dose with next INR in 2 weeks       Summary  As of 7/18/2024      Full warfarin instructions:  7.5 mg every Mon, Wed, Fri; 5 mg all other days   Next INR check:  8/1/2024               Detailed voice message left for Preet with dosing instructions and follow up date.     Contact [phone] to schedule and with any changes, questions or concerns.     Education provided: Please call back if any changes to your diet, medications or how you've been taking warfarin    Plan made per [facility] anticoagulation protocol    Damien Colón, RN  Anticoagulation Clinic  7/18/2024    _______________________________________________________________________     Anticoagulation Episode Summary       Current INR goal:  2.0-3.0   TTR:  76.9% (1 y)   Target end date:  Indefinite   Send INR reminders to:   [facility] [place]    Indications    S/P AVR (aortic valve replacement)-25 mm St. Rolf [place]  [Z95.2]  H/O mechanical aortic valve replacement [Z95.2]  Long term current use of anticoagulants with INR goal of 2.0-3.0 [Z79.01]  S/P AVR [Z95.2]             Comments:               Anticoagulation Care Providers       Provider Role Specialty Phone number    Moni Cartagena PA-C Referring Cardiovascular & Thoracic Surgery [phone]    Yan Lucas MD Referring [facility] Medicine [phone]

## 2024-08-08 ENCOUNTER — MYC MEDICAL ADVICE (OUTPATIENT)
Dept: ANTICOAGULATION | Facility: CLINIC | Age: 54
End: 2024-08-08
Payer: COMMERCIAL

## 2024-08-15 ENCOUNTER — MYC MEDICAL ADVICE (OUTPATIENT)
Dept: ANTICOAGULATION | Facility: CLINIC | Age: 54
End: 2024-08-15
Payer: COMMERCIAL

## 2024-08-19 ENCOUNTER — ANTICOAGULATION THERAPY VISIT (OUTPATIENT)
Dept: ANTICOAGULATION | Facility: CLINIC | Age: 54
End: 2024-08-19

## 2024-08-19 ENCOUNTER — LAB (OUTPATIENT)
Dept: LAB | Facility: CLINIC | Age: 54
End: 2024-08-19
Payer: COMMERCIAL

## 2024-08-19 DIAGNOSIS — Z95.2 S/P AVR (AORTIC VALVE REPLACEMENT): Primary | ICD-10-CM

## 2024-08-19 DIAGNOSIS — Z95.2 S/P AVR: ICD-10-CM

## 2024-08-19 DIAGNOSIS — Z79.01 LONG TERM CURRENT USE OF ANTICOAGULANTS WITH INR GOAL OF 2.0-3.0: ICD-10-CM

## 2024-08-19 DIAGNOSIS — Z95.2 S/P AVR (AORTIC VALVE REPLACEMENT): ICD-10-CM

## 2024-08-19 DIAGNOSIS — Z95.2 H/O MECHANICAL AORTIC VALVE REPLACEMENT: ICD-10-CM

## 2024-08-19 LAB — INR BLD: 2.9 (ref 0.9–1.1)

## 2024-08-19 PROCEDURE — 85610 PROTHROMBIN TIME: CPT

## 2024-08-19 PROCEDURE — 36416 COLLJ CAPILLARY BLOOD SPEC: CPT

## 2024-08-19 NOTE — PROGRESS NOTES
ANTICOAGULATION MANAGEMENT     Toney Lemos 54 year old male is on warfarin with therapeutic INR result. (Goal INR 2.0-3.0)    Recent labs: (last 7 days)     08/19/24  1100   INR 2.9*       ASSESSMENT     Source(s): Chart Review and Patient/Caregiver Call     Warfarin doses taken: Warfarin taken as instructed  Diet: No new diet changes identified  Medication/supplement changes: None noted  New illness, injury, or hospitalization: No  Signs or symptoms of bleeding or clotting: No  Previous result: Supratherapeutic  Additional findings: None       PLAN     Recommended plan for no diet, medication or health factor changes affecting INR     Dosing Instructions: Continue your current warfarin dose with next INR in 4 weeks       Summary  As of 8/19/2024      Full warfarin instructions:  7.5 mg every Mon, Wed, Fri; 5 mg all other days   Next INR check:  9/16/2024               Telephone call with Preet who verbalizes understanding and agrees to plan and who agrees to plan and repeated back plan correctly    Lab visit scheduled    Education provided: None required    Plan made per [facility] anticoagulation protocol    Milana Hernández RN  Anticoagulation Clinic  8/19/2024    _______________________________________________________________________     Anticoagulation Episode Summary       Current INR goal:  2.0-3.0   TTR:  71.1% (1 y)   Target end date:  Indefinite   Send INR reminders to:  MAIA FISHER    Indications    S/P AVR (aortic valve replacement)-25 mm St[address] Rolf [place]  [Z95.2]  H/O mechanical aortic valve replacement [Z95.2]  Long term current use of anticoagulants with INR goal of 2.0-3.0 [Z79.01]  S/P AVR [Z95.2]             Comments:               Anticoagulation Care Providers       Provider Role Specialty Phone number    Moni Cartagena PA-C Referring Cardiovascular & Thoracic Surgery [phone]    LanceYan burgess MD Referring [facility] Medicine [phone]

## 2024-08-24 DIAGNOSIS — Z95.2 S/P AVR: ICD-10-CM

## 2024-08-26 DIAGNOSIS — Z95.2 S/P AVR: ICD-10-CM

## 2024-08-26 DIAGNOSIS — I10 HYPERTENSION GOAL BP (BLOOD PRESSURE) < 140/90: ICD-10-CM

## 2024-08-26 RX ORDER — WARFARIN SODIUM 5 MG/1
TABLET ORAL
Qty: 105 TABLET | Refills: 1 | Status: SHIPPED | OUTPATIENT
Start: 2024-08-26

## 2024-08-26 RX ORDER — AMLODIPINE BESYLATE 2.5 MG/1
2.5 TABLET ORAL DAILY
Qty: 90 TABLET | Refills: 3 | Status: SHIPPED | OUTPATIENT
Start: 2024-08-26

## 2024-08-26 RX ORDER — METOPROLOL SUCCINATE 50 MG/1
50 TABLET, EXTENDED RELEASE ORAL 2 TIMES DAILY
Qty: 180 TABLET | Refills: 3 | Status: SHIPPED | OUTPATIENT
Start: 2024-08-26

## 2024-08-26 NOTE — TELEPHONE ENCOUNTER
ANTICOAGULATION MANAGEMENT:  Medication Refill    Anticoagulation Summary  As of 8/19/2024      Warfarin maintenance plan:  7.5 mg (5 mg x 1.5) every Mon, Wed, Fri; 5 mg (5 mg x 1) all other days   Next INR check:  9/16/2024   Target end date:  Indefinite    Indications    S/P AVR (aortic valve replacement)-25 mm St. Rolf [place]  [Z95.2]  H/O mechanical aortic valve replacement [Z95.2]  Long term current use of anticoagulants with INR goal of 2.0-3.0 [Z79.01]  S/P AVR [Z95.2]                 Anticoagulation Care Providers       Provider Role Specialty Phone number    Moni Cartagena PA-C Referring Cardiovascular & Thoracic Surgery [phone]    Yan Lucas MD Referring Family Medicine [phone]            Refill Criteria    Visit with referring provider/group: Meets criteria: visit within referring provider group in the last 15 months on 2/14/24    [facility] referral last signed: 01/05/2024; within last year: Yes    Lab monitoring not exceeding 2 weeks overdue: Yes    Toney meets all criteria for refill. Rx instructions and quantity supplied updated to match patient's current dosing plan. Warfarin 90 day supply with 1 refill granted per [facility] protocol     Ann Slaughter RN  Anticoagulation Clinic

## 2024-09-19 ENCOUNTER — ANTICOAGULATION THERAPY VISIT (OUTPATIENT)
Dept: ANTICOAGULATION | Facility: CLINIC | Age: 54
End: 2024-09-19

## 2024-09-19 ENCOUNTER — LAB (OUTPATIENT)
Dept: LAB | Facility: CLINIC | Age: 54
End: 2024-09-19
Payer: COMMERCIAL

## 2024-09-19 DIAGNOSIS — Z79.01 LONG TERM CURRENT USE OF ANTICOAGULANTS WITH INR GOAL OF 2.0-3.0: ICD-10-CM

## 2024-09-19 DIAGNOSIS — Z95.2 S/P AVR: ICD-10-CM

## 2024-09-19 DIAGNOSIS — Z95.2 S/P AVR (AORTIC VALVE REPLACEMENT): Primary | ICD-10-CM

## 2024-09-19 DIAGNOSIS — Z95.2 S/P AVR (AORTIC VALVE REPLACEMENT): ICD-10-CM

## 2024-09-19 DIAGNOSIS — Z95.2 H/O MECHANICAL AORTIC VALVE REPLACEMENT: ICD-10-CM

## 2024-09-19 LAB — INR BLD: 2.5 (ref 0.9–1.1)

## 2024-09-19 PROCEDURE — 36416 COLLJ CAPILLARY BLOOD SPEC: CPT

## 2024-09-19 PROCEDURE — 85610 PROTHROMBIN TIME: CPT

## 2024-09-19 NOTE — PROGRESS NOTES
ANTICOAGULATION MANAGEMENT     Toney Lemos 54 year old male is on warfarin with therapeutic INR result. (Goal INR 2.0-3.0)    Recent labs: (last 7 days)     09/19/24  0731   INR 2.5*       ASSESSMENT     Source(s): Chart Review and Patient/Caregiver Call     Warfarin doses taken: Warfarin taken as instructed  Diet: No new diet changes identified  Medication/supplement changes: None noted  New illness, injury, or hospitalization: No  Signs or symptoms of bleeding or clotting: No  Previous result: Therapeutic last visit; previously outside of goal range  Additional findings: None       PLAN     Recommended plan for no diet, medication or health factor changes affecting INR     Dosing Instructions: Continue your current warfarin dose with next INR in 4 weeks       Summary  As of 9/19/2024      Full warfarin instructions:  7.5 mg every Mon, Wed, Fri; 5 mg all other days   Next INR check:  10/17/2024               Telephone call with Preet who verbalizes understanding and agrees to plan and who agrees to plan and repeated back plan correctly    Lab visit scheduled    Education provided: Contact [phone] with any changes, questions or concerns.     Plan made per [facility] anticoagulation protocol    Damien Colón RN  9/19/2024  Anticoagulation Clinic  MagTag for routing messages: poli FISHER  [facility] patient phone line: [phone]        _______________________________________________________________________     Anticoagulation Episode Summary       Current INR goal:  2.0-3.0   TTR:  71.1% (1 y)   Target end date:  Indefinite   Send INR reminders to:  MAIA FISHER    Indications    S/P AVR (aortic valve replacement)-25 mm St. Rolf [place]  [Z95.2]  H/O mechanical aortic valve replacement [Z95.2]  Long term current use of anticoagulants with INR goal of 2.0-3.0 [Z79.01]  S/P AVR [Z95.2]             Comments:               Anticoagulation Care Providers       Provider Role Specialty Phone number     Moni Cartagena PA-C Referring Cardiovascular & Thoracic Surgery [phone]    Yan Lucas MD Referring [facility] Medicine [phone]

## 2024-10-07 SDOH — HEALTH STABILITY: PHYSICAL HEALTH: ON AVERAGE, HOW MANY DAYS PER WEEK DO YOU ENGAGE IN MODERATE TO STRENUOUS EXERCISE (LIKE A BRISK WALK)?: 4 DAYS

## 2024-10-07 SDOH — HEALTH STABILITY: PHYSICAL HEALTH: ON AVERAGE, HOW MANY MINUTES DO YOU ENGAGE IN EXERCISE AT THIS LEVEL?: 60 MIN

## 2024-10-07 ASSESSMENT — SOCIAL DETERMINANTS OF HEALTH (SDOH): HOW OFTEN DO YOU GET TOGETHER WITH FRIENDS OR RELATIVES?: ONCE A WEEK

## 2024-10-08 ENCOUNTER — OFFICE VISIT (OUTPATIENT)
Dept: FAMILY MEDICINE | Facility: CLINIC | Age: 54
End: 2024-10-08
Payer: COMMERCIAL

## 2024-10-08 VITALS
TEMPERATURE: 98.2 F | BODY MASS INDEX: 28.07 KG/M2 | DIASTOLIC BLOOD PRESSURE: 78 MMHG | OXYGEN SATURATION: 97 % | SYSTOLIC BLOOD PRESSURE: 131 MMHG | HEIGHT: 68 IN | WEIGHT: 185.2 LBS | HEART RATE: 67 BPM | RESPIRATION RATE: 19 BRPM

## 2024-10-08 DIAGNOSIS — R40.0 DAYTIME SOMNOLENCE: ICD-10-CM

## 2024-10-08 DIAGNOSIS — I10 HYPERTENSION GOAL BP (BLOOD PRESSURE) < 140/90: ICD-10-CM

## 2024-10-08 DIAGNOSIS — Z00.00 ANNUAL PHYSICAL EXAM: Primary | ICD-10-CM

## 2024-10-08 PROCEDURE — 99396 PREV VISIT EST AGE 40-64: CPT | Performed by: FAMILY MEDICINE

## 2024-10-08 ASSESSMENT — PAIN SCALES - GENERAL: PAINLEVEL: MILD PAIN (3)

## 2024-10-08 NOTE — PROGRESS NOTES
"Preventive Care Visit  [facility]  Yan Lucas MD, Family Medicine  Oct 8, 2024      Assessment & Plan     Assessment & Plan       ICD-10-CM    1. Annual physical exam  Z00.00       2. Hypertension goal BP (blood pressure) < 140/90  I10            Doing well  Annual topics covered  Labs UTD    No follow-ups on file.    Yan Lucas MD  [facility]                 BMI  Estimated body mass index is 28.43 kg/m  as calculated from the following:    Height as of this encounter: 1.719 m (5' 7.68\").    Weight as of this encounter: 84 kg (185 lb 3.2 oz).       Counseling  Appropriate preventive services were addressed with this patient via screening, questionnaire, or discussion as appropriate for fall prevention, nutrition, physical activity, Tobacco-use cessation, social engagement, weight loss and cognition.  Checklist reviewing preventive services available has been given to the patient.  Reviewed patient's diet, addressing concerns and/or questions.           Roverto Oviedo is a 54 year old, presenting for the following:  Physical        10/8/2024     8:39 AM   Additional Questions   Roomed by [facility] Care Directive  Patient does not have a Health Care Directive or Living Will: Discussed advance care planning with patient; however, patient declined at this time.    HPI              10/7/2024   General Health   How would you rate your overall physical health? Good   Feel stress (tense, anxious, or unable to sleep) Not at all            10/7/2024   Nutrition   Three or more servings of calcium each day? (!) NO   Diet: Regular (no restrictions)   How many servings of fruit and vegetables per day? (!) 2-3   How many sweetened beverages each day? 0-1            10/7/2024   Exercise   Days per week of moderate/strenous exercise 4 days   Average minutes spent exercising at this level 60 min            10/7/2024   Social Factors   Frequency of " gathering with friends or relatives Once a week   Worry food won't last until get money to buy more No   Food not last or not have enough money for food? No   Do you have housing? (Housing is defined as stable permanent housing and does not include staying ouside in a car, in a tent, in an abandoned building, in an overnight shelter, or couch-surfing.) Yes   Are you worried about losing your housing? No   Lack of transportation? No   Unable to get utilities (heat,electricity)? No            10/7/2024   Fall Risk   Fallen 2 or more times in the past year? No   Trouble with walking or balance? No             10/7/2024   Dental   Dentist two times every year? Yes            10/7/2024   TB Screening   Were you born outside of the [place]? No                  10/7/2024   Substance Use   Alcohol more than 3/day or more than 7/wk No   Do you use any other substances recreationally? No        Social History     Tobacco Use    Smoking status: Never    Smokeless tobacco: Never   Vaping Use    Vaping status: Never Used   Substance Use Topics    Alcohol use: Yes     Alcohol/week: 0.0 standard drinks of alcohol     Comment: beer or two wkly    Drug use: No           10/7/2024   STI Screening   New sexual partner(s) since last STI/HIV test? No      ASCVD Risk   The 10-year ASCVD risk score (Agustina GRACIA, et al., 2019) is: 6.4%    Values used to calculate the score:      Age: 54 years      Sex: Male      Is Non- : No      Diabetic: No      Tobacco smoker: No      Systolic Blood Pressure: 131 mmHg      Is BP treated: Yes      HDL Cholesterol: 45 mg/dL      Total Cholesterol: 203 mg/dL           Reviewed and updated as needed this visit by Provider                          Review of Systems  Constitutional, HEENT, cardiovascular, pulmonary, gi and gu systems are negative, except as otherwise noted.     Objective    Exam  /78   Pulse 67   Temp 98.2  F (36.8  C) (Temporal)   Resp 19   Ht 1.719 m (5'  "7.68\")   Wt 84 kg (185 lb 3.2 oz)   SpO2 97%   BMI 28.43 kg/m     Estimated body mass index is 28.43 kg/m  as calculated from the following:    Height as of this encounter: 1.719 m (5' 7.68\").    Weight as of this encounter: 84 kg (185 lb 3.2 oz).    Physical Exam  GENERAL: alert and no distress  NECK: no adenopathy, no asymmetry, masses, or scars  RESP: lungs clear to auscultation - no rales, rhonchi or wheezes  CV: regular rate and rhythm, normal S1 S2, no S3 or S4, no murmur, click or rub, no peripheral edema  ABDOMEN: soft, nontender, no hepatosplenomegaly, no masses and bowel sounds normal  MS: no gross musculoskeletal defects noted, no edema        Signed Electronically by: Yan Lucas MD    "

## 2024-10-17 ENCOUNTER — ANTICOAGULATION THERAPY VISIT (OUTPATIENT)
Dept: ANTICOAGULATION | Facility: CLINIC | Age: 54
End: 2024-10-17

## 2024-10-17 ENCOUNTER — LAB (OUTPATIENT)
Dept: LAB | Facility: CLINIC | Age: 54
End: 2024-10-17
Payer: COMMERCIAL

## 2024-10-17 DIAGNOSIS — Z95.2 H/O MECHANICAL AORTIC VALVE REPLACEMENT: ICD-10-CM

## 2024-10-17 DIAGNOSIS — Z95.2 S/P AVR (AORTIC VALVE REPLACEMENT): Primary | ICD-10-CM

## 2024-10-17 DIAGNOSIS — Z95.2 S/P AVR: ICD-10-CM

## 2024-10-17 DIAGNOSIS — Z79.01 LONG TERM CURRENT USE OF ANTICOAGULANTS WITH INR GOAL OF 2.0-3.0: ICD-10-CM

## 2024-10-17 DIAGNOSIS — Z95.2 S/P AVR (AORTIC VALVE REPLACEMENT): ICD-10-CM

## 2024-10-17 LAB — INR BLD: 3.2 (ref 0.9–1.1)

## 2024-10-17 PROCEDURE — 85610 PROTHROMBIN TIME: CPT

## 2024-10-17 PROCEDURE — 36416 COLLJ CAPILLARY BLOOD SPEC: CPT

## 2024-10-31 ENCOUNTER — LAB (OUTPATIENT)
Dept: LAB | Facility: CLINIC | Age: 54
End: 2024-10-31
Payer: COMMERCIAL

## 2024-10-31 ENCOUNTER — ANTICOAGULATION THERAPY VISIT (OUTPATIENT)
Dept: ANTICOAGULATION | Facility: CLINIC | Age: 54
End: 2024-10-31

## 2024-10-31 DIAGNOSIS — Z95.2 S/P AVR: ICD-10-CM

## 2024-10-31 DIAGNOSIS — Z95.2 H/O MECHANICAL AORTIC VALVE REPLACEMENT: ICD-10-CM

## 2024-10-31 DIAGNOSIS — Z79.01 LONG TERM CURRENT USE OF ANTICOAGULANTS WITH INR GOAL OF 2.0-3.0: ICD-10-CM

## 2024-10-31 DIAGNOSIS — Z95.2 S/P AVR (AORTIC VALVE REPLACEMENT): ICD-10-CM

## 2024-10-31 DIAGNOSIS — Z95.2 S/P AVR (AORTIC VALVE REPLACEMENT): Primary | ICD-10-CM

## 2024-10-31 LAB — INR BLD: 2.9 (ref 0.9–1.1)

## 2024-10-31 PROCEDURE — 36416 COLLJ CAPILLARY BLOOD SPEC: CPT

## 2024-10-31 PROCEDURE — 85610 PROTHROMBIN TIME: CPT

## 2024-10-31 NOTE — PROGRESS NOTES
ANTICOAGULATION MANAGEMENT     Toney Lemos 54 year old male is on warfarin with therapeutic INR result. (Goal INR 2.0-3.0)    Recent labs: (last 7 days)     10/31/24  0724   INR 2.9*       ASSESSMENT     Warfarin Lab Questionnaire    Warfarin Doses Last 7 Days    Pt Rptd Dose YONATHAN MONDAY TUESDAY WED THURS FRIDAY SATURDAY   10/30/2024   4:22 PM 5 7.5 5 7.5 5 7.5 5         10/30/2024   Warfarin Lab Questionnaire   Missed doses within past 14 days? No   Changes in diet or alcohol within past 14 days? No   Medication changes since last result? No   Injuries or illness since last result? No   New shortness of breath, severe headaches or sudden changes in vision since last result? No   Abnormal bleeding since last result? No   Upcoming surgery, procedure? No   Best number to call with results? [phone]        Previous result: Supratherapeutic  Additional findings: None       PLAN     Recommended plan for no diet, medication or health factor changes affecting INR     Dosing Instructions: Continue your current warfarin dose with next INR in 3 weeks       Summary  As of 10/31/2024      Full warfarin instructions:  7.5 mg every Mon, Wed, Fri; 5 mg all other days   Next INR check:  11/21/2024               Telephone call with Preet who verbalizes understanding and agrees to plan and who agrees to plan and repeated back plan correctly    Lab visit scheduled    Education provided: Contact [phone] with any changes, questions or concerns.     Plan made per [facility] anticoagulation protocol    Damien Colón RN  10/31/2024  Anticoagulation Clinic  [facility] for routing messages: poli [place]  [facility] patient phone line: [phone]        _______________________________________________________________________     Anticoagulation Episode Summary       Current INR goal:  2.0-3.0   TTR:  70.3% (1 y)   Target end date:  Indefinite   Send INR reminders to:  MAIA [place]    Indications    S/P AVR (aortic valve  replacement)-25 mm St. Rolf [place]  [Z95.2]  H/O mechanical aortic valve replacement [Z95.2]  Long term current use of anticoagulants with INR goal of 2.0-3.0 [Z79.01]  S/P AVR [Z95.2]             Comments:  --             Anticoagulation Care Providers       Provider Role Specialty Phone number    Moni Cartagena PA-C Referring Cardiovascular & Thoracic Surgery [phone]    Yan Lucas MD Referring [facility] [phone]

## 2024-11-18 ENCOUNTER — MYC REFILL (OUTPATIENT)
Dept: FAMILY MEDICINE | Facility: CLINIC | Age: 54
End: 2024-11-18
Payer: COMMERCIAL

## 2024-11-18 DIAGNOSIS — Z95.2 S/P AVR: ICD-10-CM

## 2024-11-18 DIAGNOSIS — E78.5 HYPERLIPIDEMIA LDL GOAL <130: ICD-10-CM

## 2024-11-18 RX ORDER — LISINOPRIL 5 MG/1
5 TABLET ORAL DAILY
Qty: 90 TABLET | Refills: 3 | Status: SHIPPED | OUTPATIENT
Start: 2024-11-18

## 2024-11-18 RX ORDER — ATORVASTATIN CALCIUM 40 MG/1
40 TABLET, FILM COATED ORAL DAILY
Qty: 90 TABLET | Refills: 3 | Status: SHIPPED | OUTPATIENT
Start: 2024-11-18

## 2024-11-18 RX ORDER — ASPIRIN 81 MG/1
81 TABLET ORAL DAILY
Qty: 90 TABLET | Refills: 3 | Status: SHIPPED | OUTPATIENT
Start: 2024-11-18

## 2024-11-18 RX ORDER — AMOXICILLIN 500 MG/1
CAPSULE ORAL
Qty: 4 CAPSULE | Refills: 1 | Status: SHIPPED | OUTPATIENT
Start: 2024-11-18

## 2024-11-21 ENCOUNTER — ANTICOAGULATION THERAPY VISIT (OUTPATIENT)
Dept: ANTICOAGULATION | Facility: CLINIC | Age: 54
End: 2024-11-21

## 2024-11-21 ENCOUNTER — DOCUMENTATION ONLY (OUTPATIENT)
Dept: ANTICOAGULATION | Facility: CLINIC | Age: 54
End: 2024-11-21

## 2024-11-21 ENCOUNTER — LAB (OUTPATIENT)
Dept: LAB | Facility: CLINIC | Age: 54
End: 2024-11-21
Payer: COMMERCIAL

## 2024-11-21 DIAGNOSIS — Z95.2 H/O MECHANICAL AORTIC VALVE REPLACEMENT: ICD-10-CM

## 2024-11-21 DIAGNOSIS — Z79.01 LONG TERM CURRENT USE OF ANTICOAGULANTS WITH INR GOAL OF 2.0-3.0: Primary | ICD-10-CM

## 2024-11-21 DIAGNOSIS — Z95.2 S/P AVR (AORTIC VALVE REPLACEMENT): Primary | ICD-10-CM

## 2024-11-21 DIAGNOSIS — Z79.01 LONG TERM CURRENT USE OF ANTICOAGULANTS WITH INR GOAL OF 2.0-3.0: ICD-10-CM

## 2024-11-21 DIAGNOSIS — Z95.2 S/P AVR (AORTIC VALVE REPLACEMENT): ICD-10-CM

## 2024-11-21 DIAGNOSIS — Z95.2 S/P AVR: ICD-10-CM

## 2024-11-21 LAB — INR BLD: 3.1 (ref 0.9–1.1)

## 2024-11-21 NOTE — PROGRESS NOTES
ANTICOAGULATION CLINIC REFERRAL RENEWAL REQUEST       An annual renewal order is required for all patients referred to [facility].?  Please review and sign the pended referral order for Toney Lemos.       ANTICOAGULATION SUMMARY      Warfarin indication(s)   Mechanical AVR    Mechanical heart valve present?  Mechanical  AVR-Bileaflet       Current goal range   INR: 2.0-3.0     Goal appropriate for indication? Goal INR 2-3, standard for indication(s) above     Time in Therapeutic Range (TTR)  (Goal > 60%) 70.3%       Office visit with referring provider's group within last year yes on 10/8/24         [facility]

## 2024-11-21 NOTE — PROGRESS NOTES
ANTICOAGULATION MANAGEMENT     Toney Lemos 54 year old male is on warfarin with supratherapeutic INR result. (Goal INR 2.0-3.0)    Recent labs: (last 7 days)     11/21/24  0728   INR 3.1*       ASSESSMENT     Warfarin Lab Questionnaire    Warfarin Doses Last 7 Days      11/21/2024     6:48 AM   Dose in Tablet or Mg   TAB or MG? milligram (mg)     Pt Rptd Dose SUNDAY MONDAY TUESDAY WED THURS FRIDAY SATURDAY 11/21/2024   6:48 AM 5 7.5 5 7.5 5 7.5 5         11/21/2024   Warfarin Lab Questionnaire   Missed doses within past 14 days? No   Changes in diet or alcohol within past 14 days? No   Medication changes since last result? No   Injuries or illness since last result? No   New shortness of breath, severe headaches or sudden changes in vision since last result? No   Abnormal bleeding since last result? No   Upcoming surgery, procedure? No   Best number to call with results? [phone]        Previous result: Therapeutic last visit; previously outside of goal range  Additional findings: None       PLAN     Recommended plan for no diet, medication or health factor changes affecting INR     Dosing Instructions: decrease your warfarin dose (5.9% change) with next INR in 2 weeks       Summary  As of 11/21/2024      Full warfarin instructions:  7.5 mg every Mon, Fri; 5 mg all other days   Next INR check:  12/5/2024               Sent [email or web address] message with dosing and follow up instructions    Contact [phone] to schedule and with any changes, questions or concerns.     Education provided: None required    Plan made per [facility] anticoagulation protocol    Paulie MORRIS RN  11/21/2024  Anticoagulation Clinic  [employer] for routing messages: poli [place]  [facility] patient phone line: [phone]        _______________________________________________________________________     Anticoagulation Episode Summary       Current INR goal:  2.0-3.0   TTR:  69.8% (1 y)   Target end date:  Indefinite   [facility]    Indications    S/P AVR (aortic valve replacement)-25 mm St. Rolf [place]  [Z95.2]  H/O mechanical aortic valve replacement [Z95.2]  Long term current use of anticoagulants with INR goal of 2.0-3.0 [Z79.01]  S/P AVR [Z95.2]             Comments:  --             Anticoagulation Care Providers       Provider Role Specialty Phone number    Moni Cartagena PA-C Referring Cardiovascular & Thoracic Surgery [phone]    Yan Lucas MD Referring [facility] Medicine [phone]

## 2025-04-12 DIAGNOSIS — Z95.2 S/P AVR: ICD-10-CM

## 2025-04-14 RX ORDER — WARFARIN SODIUM 5 MG/1
TABLET ORAL
Qty: 105 TABLET | Refills: 1 | Status: SHIPPED | OUTPATIENT
Start: 2025-04-14

## 2025-04-14 NOTE — TELEPHONE ENCOUNTER
ANTICOAGULATION MANAGEMENT:  Medication Refill    Anticoagulation Summary  As of 3/28/2025      Warfarin maintenance plan:  7.5 mg (5 mg x 1.5) every Mon, Fri; 5 mg (5 mg x 1) all other days   Next INR check:  5/9/2025   Target end date:  Indefinite    Indications    S/P AVR (aortic valve replacement)-25 mm St. Rolf [place]  [Z95.2]  H/O mechanical aortic valve replacement [Z95.2]  Long term current use of anticoagulants with INR goal of 2.0-3.0 [Z79.01]  S/P AVR [Z95.2]                 Anticoagulation Care Providers       Provider Role Specialty Phone number    Moni Cartagena PA-C Referring Cardiovascular & Thoracic Surgery [phone]    Yan Lucas MD Referring Family Medicine [phone]            Refill Criteria    Visit with referring provider/group: Meets criteria: visit within referring provider group in the last 15 months on 10/8/24    [facility] referral last signed: 11/27/2024; within last year:  Yes    Lab monitoring is up to date (not exceeding 2 weeks overdue): Yes    Toney meets all criteria for refill. Rx instructions and quantity supplied updated to match patient's current dosing plan. Warfarin 90 day supply with 1 refill granted per [facility] protocol     Ann Slaughter RN  Anticoagulation Clinic

## 2025-04-18 NOTE — PROGRESS NOTES
[facility] Dermatology Note  Encounter Date: May 1, 2025  Office Visit     Reviewed patients past medical history and pertinent chart review prior to patients visit today.     Dermatology Problem List:  Last skin check: 05/01/25    0. NUB Right mid back, shave biopsy 05/01/25 .     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  Referred to dermatology surgery placed 4/20/2023, picture of area obtained 5/1/2025  Ganglion cyst, left 4th finger  - referral to orthopedics for hand surgery consult 04/20/23  AK  - Face s/p cryotherapy 05/01/25   -s/p efudex/calcipotriene to ears/face 04/20/23   4. Impetigo  -s/p mupirocin ointment 06/30/23  5. Contact dermatitis/poison ivy  -s/p hydrocortisone cream 06/20/23      Family Hx: Family history of skin cancer- Maternal grandfather, unknown type.     ____________________________________________    Assessment & Plan:       # Neoplasm of uncertain behavior:  right mid back  DDx includes NMSC vs other. Shave biopsy today.    Procedure Note: Biopsy by shave technique  The risks and benefits of the procedure were described to the patient. These include but are not limited to bleeding, infection, scar, incomplete removal, and non-diagnostic biopsy. Verbal informed consent was obtained. The above site(s) was cleansed with an alcohol pad and injected with 1% lidocaine with epinephrine. Once anesthesia was obtained, a biopsy(ies) was performed with Gilette blade. The tissue(s) was placed in a labeled container(s) with formalin and sent to pathology. Hemostasis was achieved with aluminum chloride. Vaseline and a bandage were applied to the wound(s). The patient tolerated the procedure well and was given post biopsy care instructions.     # actinic keratoses  Actinic keratoses are pre-cancerous skin growths caused by sun exposure. Treatment is recommended and medically indicated. Treated with cryotherapy as outlined below.      Procedures performed:   - Cryotherapy procedure note, location(s): Face x 7 . After verbal consent and discussion of risks and benefits including, but not limited to, dyspigmentation/scar, blister, and pain, 7 lesion(s) were treated with 1-2 mm freeze border for 1-2 cycles with liquid nitrogen. Post cryotherapy instructions were provided.      # Rosacea   - Rosacea is a common, chronic inflammatory condition with a relapsing remitting course.  Patient advised to avoid triggers that aggravate rosacea.  These triggers could include hot beverages, spicy foods, chocolate, alcohol and caffeine but vary depending on the individual.    - wash face with gentle cleanser morning and night. (Cerave, cetaphil, vanicream)  - Begin applying Metronidazole cream to face once daily.   - use gentle moisturizer morning and night (cerave, cetaphil, vanicream)  - apply broad spectrum SPF of 30 or more to face after moisturizing in AM    # Personal history of nonmelanoma skin cancer  - No signs of recurrence. Continued observation recommended.   - Sun protection: Counseled SPF 30+ sunscreen, UPF clothing, sun avoidance, tanning bed avoidance.    # Multiple nevi, trunk and extremities  # Solar lentigines  - Nevi demonstrate no concerning features on dermoscopy. We discussed the importance of self exams at home.   - ABCDEs: Counseled ABCDEs of melanoma: Asymmetry, Border (irregularity), Color (not uniform, changes in color), Diameter (greater than 6 mm which is about the size of a pencil eraser), and Evolving (any changes in preexisting moles).    # Cherry angiomas  # Seborrheic keratoses  - We discussed the benign nature of the skin lesions. No treatment required. Continued observation recommended. Follow up with any concerns.      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  Since then, site on the left dorsal hand has continued to be well-healed without evidence of recurrence.  He denies pain in the left dorsal hand.  No axillary, supraclavicular or cervical lymphadenopathy.  Photograph obtained today for continued observation.  We discussed risk benefits of continuing with observation versus referral to dermatology surgery.  Patient is comfortable continuing with observation.  I will also discuss his case with Dr. Aleman to see if he has any further insight into recommendations. Review of systems negative.       Follow-up:  1 year for follow up full body skin exam, as needed for new or changing lesions or new concerns    All risks, benefits and alternatives were discussed with patient.  Patient is in agreement and understands the assessment and plan.  All questions were answered.  Moni Kennedy PA-C  [facility] Dermatology    ____________________________________________    CC: Skin Check (FBSC- bilateral cheeks, forearms, chest, and general back. )    HPI:  Mr. oTney Lemos is a(n) 55 year old male who presents today as a return patient for a full body skin cancer screening. The patient has a history of nonmelanoma skin cancer. The patient was last seen in dermatology 04/20/23.,  The patient has several lesions of concern scattered throughout the body.  None of them are symptomatic.  He would like to make sure that they are not of concern.  He reports being diligent with photoprotection.     Physical Exam:  Vitals: There were no vitals taken for this visit.   SKIN: Total skin excluding the genitalia areas was performed. The exam included the scalp, face, neck, bilateral arms, chest, back, abdomen, bilateral legs, digits, mons pubis, buttocks, and nails.  - no cervical, supraclavicular, axillary lymphadenopathy   - Cordero II.  - NUB, right mid back, erythematous plaque  - Face x 7, pink macule(s) with  overlying adherent scale consistent with an actinic keratosis    -Face, telangiectasias with background of erythema   -Left dorsal hand, well-healed scar without evidence of recurrence, repigmentation or nodularity  - Multiple tan/brown macules and papules scattered throughout exam, consistent with benign nevi. No concerning features on dermoscopy.   - Scattered tan, homogenous macules scattered on sun exposed skin, consistent with solar lentigines.   - Scattered waxy, stuck on appearing papules and patches, consistent with seborrheic keratoses.  - Several 1-2 mm red dome shaped symmetric papules, consistent with cherry angiomas.     - No other lesions of concern on areas examined.         Medications:  Current Outpatient Medications   Medication Sig Dispense Refill    amLODIPine (NORVASC) 2.5 MG tablet Take 1 tablet (2.5 mg) by mouth daily. 90 tablet 3    amoxicillin (AMOXIL) 500 MG capsule Take 4 capsules (2 gms) 60 minutes prior to dental work. 4 capsule 1    aspirin 81 MG EC tablet Take 1 tablet (81 mg) by mouth daily. 90 tablet 3    atorvastatin (LIPITOR) 40 MG tablet Take 1 tablet (40 mg) by mouth daily. 90 tablet 3    lisinopril (ZESTRIL) 5 MG tablet Take 1 tablet (5 mg) by mouth daily. 90 tablet 3    loratadine (CLARITIN) 10 MG tablet Take 10 mg by mouth daily      metoprolol succinate ER (TOPROL XL) 50 MG 24 hr tablet Take 1 tablet (50 mg) by mouth 2 times daily. 180 tablet 3    warfarin ANTICOAGULANT (COUMADIN) 5 MG tablet TAKE 1 & 1/2 TABLETS ON MON//FRI AND 1 TABLET ALL OTHER DAYS OR AS DIRECTED BY INR CLINIC 105 tablet 1     No current facility-administered medications for this visit.      Past Medical History:   Patient Active Problem List   Diagnosis    Hypertension goal BP (blood pressure) < 140/90    Encounter for long-term current use of medication    FH: early coronary artery disease- bro 30s    S/P AVR (aortic valve replacement)-25 mm St. Rolf [place]     H/O mechanical aortic valve replacement     Long term current use of anticoagulants with INR goal of 2.0-3.0    SCC (squamous cell carcinoma), arm, left- hand, + deep margin, watching    S/P AVR     Past Medical History:   Diagnosis Date    Aortic valve disorders     Hypertension        CC Yan Lucas MD  [address] [place] [address] on close of this encounter.

## 2025-04-18 NOTE — PATIENT INSTRUCTIONS
Wound Care After a Biopsy    What is a skin biopsy?  A skin biopsy allows the doctor to examine a very small piece of tissue under the microscope to determine the diagnosis and the best treatment for the skin condition. A local anesthetic (numbing medicine) is injected with a very small needle into the skin area to be tested. A small piece of skin is taken from the area. Sometimes a suture (stitch) is used.     What are the risks of a skin biopsy?  I will experience scar, bleeding, swelling, pain, crusting and redness. I may experience incomplete removal or recurrence. Risks of this procedure are excessive bleeding, bruising, infection, nerve damage, numbness, thick (hypertrophic or keloidal) scar and non-diagnostic biopsy.    How should I care for my wound for the first 24 hours?  Keep the wound dry and covered for 24 hours  If it bleeds, hold direct pressure on the area for 15 minutes. If bleeding does not stop, call us or go to the emergency room  Avoid strenuous exercise the first 1-2 days or as your doctor instructs you    How should I care for the wound after 24 hours?  After 24 hours, remove the bandage  You may bathe or shower as normal  If you had a scalp biopsy, you can shampoo as usual and can use shower water to clean the biopsy site daily  Clean the wound once a day with gentle soap and water  Do not scrub, be gentle  Apply white petroleum/Vaseline after cleaning the wound with a cotton swab or a clean finger, and keep the site covered with a Bandaid /bandage. Bandages are not necessary with a scalp biopsy  If you are unable to cover the site with a Bandaid /bandage, re-apply ointment 2-3 times a day to keep the site moist. Moisture will help with healing  Avoid strenuous activity for first 1-2 days  Avoid lakes, rivers, pools, and oceans until the stitches are removed or the site is healed    How do I clean my wound?  Wash hands thoroughly with soap or use hand  before all wound care  Clean  the wound with gentle soap and water  Apply white petroleum/Vaseline  to wound after it is clean  Replace the Bandaid /bandage to keep the wound covered for the first few days or as instructed by your doctor  If you had a scalp biopsy, warm shower water to the area on a daily basis should suffice    What should I use to clean my wound?   Cotton-tipped applicators (Qtips )  White petroleum jelly (Vaseline ). Use a clean new container and use Q-tips to apply.  Bandaids  as needed  Gentle soap     How should I care for my wound long term?  Do not get your wound dirty  Keep up with wound care for one week or until the area is healed.  A small scab will form and fall off by itself when the area is completely healed. The area will be red and will become pink in color as it heals. Sun protection is very important for how your scar will turn out. Sunscreen with an SPF 30 or greater is recommended once the area is healed.  You should have some soreness but it should be mild and slowly go away over several days. Talk to your doctor about using tylenol for pain,    When should I call my doctor?  If you have increased:   Pain or swelling  Pus or drainage (clear or slightly yellow drainage is ok)  Temperature over 100F  Spreading redness or warmth around wound    When will I hear about my results?  The biopsy results can take 2 weeks to come back.  Your results will automatically release to [employer] before your provider has even reviewed them.  The clinic will call you with the results, send you a [employer] message, or have you schedule a follow-up clinic or phone time to discuss the results.  Contact our clinics if you do not hear from us in 2 weeks.    Who should I call with questions?  [facility]: [phone]  [facility]: [phone]  For urgent needs outside of business hours call the [facility] at [phone] and ask for the dermatology resident on call      Cryotherapy    What is it?  Use of a very cold liquid, such as liquid nitrogen, to freeze and destroy abnormal skin cells that need to be removed    What should I expect?  Tenderness and redness  A small blister that might grow and fill with dark purple blood. There may be crusting.  More than one treatment may be needed if the lesions do not go away.    How do I care for the treated area?  Gently wash the area with your hands when bathing.  Use a thin layer of Vaseline to help with healing. You may use a Band-Aid.   The area should heal within 7-10 days and may leave behind a pink or lighter color.   Do not use an antibiotic or Neosporin ointment.   You may take acetaminophen (Tylenol) for pain.     Call your doctor if you have:  Severe pain  Signs of infection (warmth, redness, cloudy yellow drainage, and or a bad smell)  Questions or concerns    Who should I call with questions?      [facility]: [phone]      [facility]: [phone]      For urgent needs outside of business hours call the [facility] at [phone] and ask for the dermatology resident on call     Patient Education       Proper skin care from [facility]:    -Eliminate harsh soaps as they strip the natural oils from the skin, often resulting in dry itchy skin ( i.e. Dial, Zest, Trudy Spring)  -Use mild soaps such as Cetaphil or Dove Sensitive Skin in the shower. You do not need to use soap on arms, legs, and trunk every time you shower unless visibly soiled.   -Avoid hot or cold showers.  -After showering, lightly dry off and apply moisturizing within 2-3 minutes. This will help trap moisture in the skin.   -Aggressive use of a moisturizer at least 1-2 times a day to the entire body (including -Vanicream, Cetaphil, Aquaphor or Cerave) and moisturize hands after every washing.  -We recommend using moisturizers that come in a tub that needs to be scooped out,  not a pump. This has more of an oil base. It will hold moisture in your skin much better than a water base moisturizer. The above recommended are non-pore clogging.      Wear a sunscreen with at least SPF 30 on your face, ears, neck and V of the chest daily. Wear sunscreen on other areas of the body if those areas are exposed to the sun throughout the day. Sunscreens can contain physical and/or chemical blockers. Physical blockers are less likely to clog pores, these include zinc oxide and titanium dioxide. Reapply every two hour and after swimming.     Sunscreen examples: https://www.ewg.org/sunscreen/    UV radiation  UVA radiation remains constant throughout the day and throughout the year. It is a longer wavelength than UVB and therefore penetrates deeper into the skin leading to immediate and delayed tanning, photoaging, and skin cancer. 70-80% of UVA and UVB radiation occurs between the hours of 10am-2pm.  UVB radiation  UVB radiation causes the most harmful effects and is more significant during the summer months. However, snow and ice can reflect UVB radiation leading to skin damage during the winter months as well. UVB radiation is responsible for tanning, burning, inflammation, delayed erythema (pinkness), pigmentation (brown spots), and skin cancer.     I recommend self monthly full body exams and yearly full body exams with a dermatology provider. If you develop a new or changing lesion please follow up for examination. Most skin cancers are pink and scaly or pink and pearly. However, we do see blue/brown/black skin cancers.  Consider the ABCDEs of melanoma when giving yourself your monthly full body exam ( don't forget the groin, buttocks, feet, toes, etc). A-asymmetry, B-borders, C-color, D-diameter, E-elevation or evolving. If you see any of these changes please follow up in clinic. If you cannot see your back I recommend purchasing a hand held mirror to use with a larger wall mirror.       Checking  for Skin Cancer  You can find cancer early by checking your skin each month. There are 3 kinds of skin cancer. They are melanoma, basal cell carcinoma, and squamous cell carcinoma. Doing monthly skin checks is the best way to find new marks or skin changes. Follow the instructions below for checking your skin.   The ABCDEs of checking moles for melanoma   Check your moles or growths for signs of melanoma using ABCDE:   Asymmetry: the sides of the mole or growth don t match  Border: the edges are ragged, notched, or blurred  Color: the color within the mole or growth varies  Diameter: the mole or growth is larger than 6 mm (size of a pencil eraser)  Evolving: the size, shape, or color of the mole or growth is changing (evolving is not shown in the images below)    Checking for other types of skin cancer  Basal cell carcinoma or squamous cell carcinoma have symptoms such as:     A spot or mole that looks different from all other marks on your skin  Changes in how an area feels, such as itching, tenderness, or pain  Changes in the skin's surface, such as oozing, bleeding, or scaliness  A sore that does not heal  New swelling or redness beyond the border of a mole    Who s at risk?  Anyone can get skin cancer. But you are at greater risk if you have:   Fair skin, light-colored hair, or light-colored eyes  Many moles or abnormal moles on your skin  A history of sunburns from sunlight or tanning beds  A family history of skin cancer  A history of exposure to radiation or chemicals  A weakened immune system  If you have had skin cancer in the past, you are at risk for recurring skin cancer.   How to check your skin  Do your monthly skin checkups in front of a full-length mirror. Check all parts of your body, including your:   Head (ears, face, neck, and scalp)  Torso (front, back, and sides)  Arms (tops, undersides, upper, and lower armpits)  Hands (palms, backs, and fingers, including under the nails)  Buttocks and  genitals  Legs (front, back, and sides)  Feet (tops, soles, toes, including under the nails, and between toes)  If you have a lot of moles, take digital photos of them each month. Make sure to take photos both up close and from a distance. These can help you see if any moles change over time.   Most skin changes are not cancer. But if you see any changes in your skin, call your doctor right away. Only he or she can diagnose a problem. If you have skin cancer, seeing your doctor can be the first step toward getting the treatment that could save your life.   [employer] last reviewed this educational content on 4/1/2019 2000-2020 [employer]. [address] [place] [address], [place] [address]. All rights reserved. This information is not intended as a substitute for professional medical care. Always follow your healthcare professional's instructions.       When should I call my doctor?  If you are worsening or not improving, please, contact us or seek urgent care as noted below.     Who should I call with questions (adults)?  [facility] (adult and pediatric): [phone]  [facility] (adult): [phone]  [facility] [place]) [phone]  For urgent needs outside of business hours call the [facility] at [phone] and ask for the dermatology resident on call to be paged  If this is a medical emergency and you are unable to reach an ER, Call [phone]      If you need a prescription refill, please contact your pharmacy. Refills are approved or denied by our Physicians during normal business hours, Monday through Fridays  Per office policy, refills will not be granted if you have not been seen within the past year (or sooner depending on your child's condition The ABCDEs of Melanoma    Skin cancer can develop anywhere on the skin. Ask someone for help when checking your skin,  especially in hard to see places. If you notice a mole different from others, or that changes, enlarges, itches, or bleeds (even if it is small), you should see a dermatologist.

## 2025-05-01 ENCOUNTER — OFFICE VISIT (OUTPATIENT)
Dept: DERMATOLOGY | Facility: CLINIC | Age: 55
End: 2025-05-01
Attending: FAMILY MEDICINE
Payer: COMMERCIAL

## 2025-05-01 ENCOUNTER — LAB (OUTPATIENT)
Dept: LAB | Facility: CLINIC | Age: 55
End: 2025-05-01
Payer: COMMERCIAL

## 2025-05-01 ENCOUNTER — ANTICOAGULATION THERAPY VISIT (OUTPATIENT)
Dept: ANTICOAGULATION | Facility: CLINIC | Age: 55
End: 2025-05-01

## 2025-05-01 DIAGNOSIS — L71.9 ROSACEA: ICD-10-CM

## 2025-05-01 DIAGNOSIS — Z85.828 HISTORY OF NONMELANOMA SKIN CANCER: Primary | ICD-10-CM

## 2025-05-01 DIAGNOSIS — Z95.2 S/P AVR: ICD-10-CM

## 2025-05-01 DIAGNOSIS — Z95.2 S/P AVR (AORTIC VALVE REPLACEMENT): Primary | ICD-10-CM

## 2025-05-01 DIAGNOSIS — Z79.01 LONG TERM CURRENT USE OF ANTICOAGULANTS WITH INR GOAL OF 2.0-3.0: ICD-10-CM

## 2025-05-01 DIAGNOSIS — L57.0 ACTINIC KERATOSES: ICD-10-CM

## 2025-05-01 DIAGNOSIS — D22.9 MULTIPLE BENIGN NEVI: ICD-10-CM

## 2025-05-01 DIAGNOSIS — Z95.2 H/O MECHANICAL AORTIC VALVE REPLACEMENT: ICD-10-CM

## 2025-05-01 DIAGNOSIS — L81.4 SOLAR LENTIGINOSIS: ICD-10-CM

## 2025-05-01 DIAGNOSIS — L82.1 SEBORRHEIC KERATOSES: ICD-10-CM

## 2025-05-01 DIAGNOSIS — Z00.00 ANNUAL PHYSICAL EXAM: ICD-10-CM

## 2025-05-01 DIAGNOSIS — D18.01 CHERRY ANGIOMA: ICD-10-CM

## 2025-05-01 DIAGNOSIS — D48.5 NEOPLASM OF UNCERTAIN BEHAVIOR OF SKIN: ICD-10-CM

## 2025-05-01 LAB — INR BLD: 3 (ref 0.9–1.1)

## 2025-05-01 ASSESSMENT — PAIN SCALES - GENERAL: PAINLEVEL_OUTOF10: MILD PAIN (2)

## 2025-05-01 NOTE — LETTER
5/1/2025      Toney Lemos  [address] [facility] [address]      Dear Colleague,    Thank you for referring your patient, Tnoey Lemos, to the [facility]. Please see a copy of my visit note below.    [facility] Dermatology Note  Encounter Date: May 1, 2025  Office Visit     Reviewed patients past medical history and pertinent chart review prior to patients visit today.     Dermatology Problem List:  Last skin check: 05/01/25    0. NUB Right mid back, shave biopsy 05/01/25 .     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  Referred to dermatology surgery placed 4/20/2023, picture of area obtained 5/1/2025  Ganglion cyst, left 4th finger  - referral to orthopedics for hand surgery consult 04/20/23  AK  - Face s/p cryotherapy 05/01/25   -s/p efudex/calcipotriene to ears/face 04/20/23   4. Impetigo  -s/p mupirocin ointment 06/30/23  5. Contact dermatitis/poison ivy  -s/p hydrocortisone cream 06/20/23      Family Hx: Family history of skin cancer- Maternal grandfather, unknown type.     ____________________________________________    Assessment & Plan:       # Neoplasm of uncertain behavior:  right mid back  DDx includes NMSC vs other. Shave biopsy today.    Procedure Note: Biopsy by shave technique  The risks and benefits of the procedure were described to the patient. These include but are not limited to bleeding, infection, scar, incomplete removal, and non-diagnostic biopsy. Verbal informed consent was obtained. The above site(s) was cleansed with an alcohol pad and injected with 1% lidocaine with epinephrine. Once anesthesia was obtained, a biopsy(ies) was performed with Gilette blade. The tissue(s) was placed in a labeled container(s) with formalin and sent to pathology. Hemostasis was achieved with aluminum chloride. Vaseline and a bandage were applied to the wound(s). The patient tolerated the procedure well and was  given post biopsy care instructions.     # actinic keratoses  Actinic keratoses are pre-cancerous skin growths caused by sun exposure. Treatment is recommended and medically indicated. Treated with cryotherapy as outlined below.     Procedures performed:   - Cryotherapy procedure note, location(s): Face x 7 . After verbal consent and discussion of risks and benefits including, but not limited to, dyspigmentation/scar, blister, and pain, 7 lesion(s) were treated with 1-2 mm freeze border for 1-2 cycles with liquid nitrogen. Post cryotherapy instructions were provided.      # Rosacea   - Rosacea is a common, chronic inflammatory condition with a relapsing remitting course.  Patient advised to avoid triggers that aggravate rosacea.  These triggers could include hot beverages, spicy foods, chocolate, alcohol and caffeine but vary depending on the individual.    - wash face with gentle cleanser morning and night. (Cerave, cetaphil, vanicream)  - Begin applying Metronidazole cream to face once daily.   - use gentle moisturizer morning and night (cerave, cetaphil, vanicream)  - apply broad spectrum SPF of 30 or more to face after moisturizing in AM    # Personal history of nonmelanoma skin cancer  - No signs of recurrence. Continued observation recommended.   - Sun protection: Counseled SPF 30+ sunscreen, UPF clothing, sun avoidance, tanning bed avoidance.    # Multiple nevi, trunk and extremities  # Solar lentigines  - Nevi demonstrate no concerning features on dermoscopy. We discussed the importance of self exams at home.   - ABCDEs: Counseled ABCDEs of melanoma: Asymmetry, Border (irregularity), Color (not uniform, changes in color), Diameter (greater than 6 mm which is about the size of a pencil eraser), and Evolving (any changes in preexisting moles).    # Cherry angiomas  # Seborrheic keratoses  - We discussed the benign nature of the skin lesions. No treatment required. Continued observation recommended. Follow up  with any concerns.      # incompletely excised SCC of the left dorsal hand (excision done 10/2/21 showing SCC still extended to deep margin)   - Patient was referred to dermatology surgery in May 2023 for further evaluation of this but unfortunately the appointment was eventually canceled.  Since then, site on the left dorsal hand has continued to be well-healed without evidence of recurrence.  He denies pain in the left dorsal hand.  No axillary, supraclavicular or cervical lymphadenopathy.  Photograph obtained today for continued observation.  We discussed risk benefits of continuing with observation versus referral to dermatology surgery.  Patient is comfortable continuing with observation.  I will also discuss his case with Dr. Aleman to see if he has any further insight into recommendations. Review of systems negative.       Follow-up:  1 year for follow up full body skin exam, as needed for new or changing lesions or new concerns    All risks, benefits and alternatives were discussed with patient.  Patient is in agreement and understands the assessment and plan.  All questions were answered.  Moni Kennedy PA-C  [facility]    ____________________________________________    CC: Skin Check (FBSC- bilateral cheeks, forearms, chest, and general back. )    HPI:  Mr. Toney Lemos is a(n) 55 year old male who presents today as a return patient for a full body skin cancer screening. The patient has a history of nonmelanoma skin cancer. The patient was last seen in dermatology 04/20/23.,  The patient has several lesions of concern scattered throughout the body.  None of them are symptomatic.  He would like to make sure that they are not of concern.  He reports being diligent with photoprotection.     Physical Exam:  Vitals: There were no vitals taken for this visit.   SKIN: Total skin excluding the genitalia areas was performed. The exam included the scalp, face, neck, bilateral arms, chest,  back, abdomen, bilateral legs, digits, mons pubis, buttocks, and nails.  - no cervical, supraclavicular, axillary lymphadenopathy   - Cordero II.  - NUB, right mid back, erythematous plaque  - Face x 7, pink macule(s) with overlying adherent scale consistent with an actinic keratosis    -Face, telangiectasias with background of erythema   -Left dorsal hand, well-healed scar without evidence of recurrence, repigmentation or nodularity  - Multiple tan/brown macules and papules scattered throughout exam, consistent with benign nevi. No concerning features on dermoscopy.   - Scattered tan, homogenous macules scattered on sun exposed skin, consistent with solar lentigines.   - Scattered waxy, stuck on appearing papules and patches, consistent with seborrheic keratoses.  - Several 1-2 mm red dome shaped symmetric papules, consistent with cherry angiomas.     - No other lesions of concern on areas examined.         Medications:  Current Outpatient Medications   Medication Sig Dispense Refill     amLODIPine (NORVASC) 2.5 MG tablet Take 1 tablet (2.5 mg) by mouth daily. 90 tablet 3     amoxicillin (AMOXIL) 500 MG capsule Take 4 capsules (2 gms) 60 minutes prior to dental work. 4 capsule 1     aspirin 81 MG EC tablet Take 1 tablet (81 mg) by mouth daily. 90 tablet 3     atorvastatin (LIPITOR) 40 MG tablet Take 1 tablet (40 mg) by mouth daily. 90 tablet 3     lisinopril (ZESTRIL) 5 MG tablet Take 1 tablet (5 mg) by mouth daily. 90 tablet 3     loratadine (CLARITIN) 10 MG tablet Take 10 mg by mouth daily       metoprolol succinate ER (TOPROL XL) 50 MG 24 hr tablet Take 1 tablet (50 mg) by mouth 2 times daily. 180 tablet 3     warfarin ANTICOAGULANT (COUMADIN) 5 MG tablet TAKE 1 & 1/2 TABLETS ON MON//FRI AND 1 TABLET ALL OTHER DAYS OR AS DIRECTED BY INR CLINIC 105 tablet 1     No current facility-administered medications for this visit.      Past Medical History:   Patient Active Problem List   Diagnosis     Hypertension  goal BP (blood pressure) < 140/90     Encounter for long-term current use of medication     FH: early coronary artery disease- bro 30s     S/P AVR (aortic valve replacement)-25 mm [facility] [facility] [place]      H/O mechanical aortic valve replacement     Long term current use of anticoagulants with INR goal of 2.0-3.0     SCC (squamous cell carcinoma), arm, left- hand, + deep margin, watching     S/P AVR     Past Medical History:   Diagnosis Date     Aortic valve disorders      Hypertension        CC Yan Lucas MD  [address] [place] [address] on close of this encounter.      Again, thank you for allowing me to participate in the care of your patient.        Sincerely,        Moni Kennedy PA-C    Electronically signed

## 2025-05-01 NOTE — NURSING NOTE
The following medication was given:     MEDICATION:  Lidocaine with epinephrine 1% 1:[id number]  ROUTE: SQ  SITE: see procedure note  DOSE: 0.5 mL  LOT #: [id number]  : Pfizer  EXPIRATION DATE: 11-  NDC#: 2976-5610-86  Was there drug waste? Yes, 0.5 mL  Multi-dose vial: Yes    Jayleen Wilks RN  May 1, 2025

## 2025-05-01 NOTE — PROGRESS NOTES
ANTICOAGULATION MANAGEMENT     Toney Lemos 55 year old male is on warfarin with therapeutic INR result. (Goal INR 2.0-3.0)    Recent labs: (last 7 days)     05/01/25  0744   INR 3.0*       ASSESSMENT     Source(s): Chart Review and Patient/Caregiver Call     Warfarin doses taken: Warfarin taken as instructed  Diet: No new diet changes identified  Medication/supplement changes: None noted  New illness, injury, or hospitalization: No  Signs or symptoms of bleeding or clotting: No  Previous result: Therapeutic last 2(+) visits  Additional findings: None       PLAN     Recommended plan for no diet, medication or health factor changes affecting INR     Dosing Instructions: Continue your current warfarin dose with next INR in 6 weeks       Summary  As of 5/1/2025      Full warfarin instructions:  7.5 mg every Mon, Fri; 5 mg all other days   Next INR check:  6/12/2025               Telephone call with Preet who verbalizes understanding and agrees to plan and who agrees to plan and repeated back plan correctly    Lab visit scheduled    Education provided: Contact [phone] with any changes, questions or concerns.     Plan made per [facility] anticoagulation protocol    Damien Colón RN  5/1/2025  Anticoagulation Clinic  [employer] for routing messages: poli [place]  [facility] patient phone line: [phone]        _______________________________________________________________________     Anticoagulation Episode Summary       Current INR goal:  2.0-3.0   TTR:  70.2% (1 y)   Target end date:  Indefinite   Send INR reminders to:  MAIA [place]    Indications    S/P AVR (aortic valve replacement)-25 mm [facility] [place]  [Z95.2]  H/O mechanical aortic valve replacement [Z95.2]  Long term current use of anticoagulants with INR goal of 2.0-3.0 [Z79.01]  S/P AVR [Z95.2]             Comments:  --             Anticoagulation Care Providers       Provider Role Specialty Phone number    Moni Cartagena PA-C Referring  Cardiovascular & Thoracic Surgery [phone]    Yan Lucas MD [facility] [phone]

## 2025-05-01 NOTE — NURSING NOTE
Toney Lemos's chief complaint for this visit includes:  Chief Complaint   Patient presents with    Skin Check     FBSC- bilateral cheeks, forearms, chest, and general back.      PCP: Yan Lucas    Referring Provider:  Yan Luacs MD  [address] [place] [address] [place] [address]    There were no vitals taken for this visit.  Mild Pain (2)        Allergies   Allergen Reactions    No Known Drug Allergy     Seasonal Allergies          Do you need any medication refills at today's visit?  No.    Jayleen Wilks RN on 5/1/2025 at 7:05 AM

## 2025-05-05 ENCOUNTER — MYC MEDICAL ADVICE (OUTPATIENT)
Dept: DERMATOLOGY | Facility: CLINIC | Age: 55
End: 2025-05-05
Payer: COMMERCIAL

## 2025-05-19 ENCOUNTER — TELEPHONE (OUTPATIENT)
Dept: DERMATOLOGY | Facility: CLINIC | Age: 55
End: 2025-05-19
Payer: COMMERCIAL

## 2025-05-19 NOTE — TELEPHONE ENCOUNTER
Pt left VM on clinic phone, please call pt back regarding message below.    Annmarie Jacobs RN on 5/19/2025 at 2:23 PM

## 2025-05-19 NOTE — TELEPHONE ENCOUNTER
Attempted to reach patient to discuss adding on the left hand site for Mohs on 6/30/2025.  We can do this same day.  We need to go over previsit questions and schedule patient for a Mohs consult.    Left message for patient to return call to clinic at [phone].

## 2025-05-19 NOTE — TELEPHONE ENCOUNTER
SCCIS on the back and SCC on the left dorsal hand  Received: Today  Moni Kennedy PA-C [facility] D[facility]atology [facility] [place]  This patient is scheduled for an excision of an SCCIS on the back on 6/30/2025 at 930.  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  The recommendation would be to complete Mohs on this site.  Are we able to add the Mohs site on the already scheduled time and date or can we find a different block of time for patient to have both of them treated?    Please let me know if you have questions  Moni

## 2025-05-20 NOTE — TELEPHONE ENCOUNTER
Called and LVM for pt to return call to clinic. Please see below messages.     Radha Conn LPN on 5/20/2025 at 12:46 PM

## 2025-05-20 NOTE — TELEPHONE ENCOUNTER
Pt is scheduled for June 18th with Dr. Aleman via telephone for MOHS consult.    Radha Conn LPN on 5/20/2025 at 12:54 PM

## 2025-05-20 NOTE — TELEPHONE ENCOUNTER
Patient left a voicemail returning our call.  He can be reached at [phone].  Lakisha Fowler RN

## 2025-05-20 NOTE — TELEPHONE ENCOUNTER
Called pt and LVM to call clinic for consult date.  Current opening is on June 18th at 11:15a. Pt is wanting MOHS on the hand the same day. Appt for cristian is June 30th.    Radha Conn LPN on 5/20/2025 at 9:44 AM

## 2025-06-12 ENCOUNTER — RESULTS FOLLOW-UP (OUTPATIENT)
Dept: ANTICOAGULATION | Facility: CLINIC | Age: 55
End: 2025-06-12

## 2025-06-12 ENCOUNTER — LAB (OUTPATIENT)
Dept: LAB | Facility: CLINIC | Age: 55
End: 2025-06-12
Payer: COMMERCIAL

## 2025-06-12 ENCOUNTER — ANTICOAGULATION THERAPY VISIT (OUTPATIENT)
Dept: ANTICOAGULATION | Facility: CLINIC | Age: 55
End: 2025-06-12

## 2025-06-12 DIAGNOSIS — Z79.01 LONG TERM CURRENT USE OF ANTICOAGULANTS WITH INR GOAL OF 2.0-3.0: ICD-10-CM

## 2025-06-12 DIAGNOSIS — Z95.2 H/O MECHANICAL AORTIC VALVE REPLACEMENT: ICD-10-CM

## 2025-06-12 DIAGNOSIS — Z95.2 S/P AVR (AORTIC VALVE REPLACEMENT): Primary | ICD-10-CM

## 2025-06-12 DIAGNOSIS — Z95.2 S/P AVR: ICD-10-CM

## 2025-06-12 LAB — INR BLD: 2.6 (ref 0.9–1.1)

## 2025-06-12 NOTE — PROGRESS NOTES
ANTICOAGULATION MANAGEMENT     Toney Lemos 55 year old male is on warfarin with therapeutic INR result. (Goal INR 2.0-3.0)    Recent labs: (last 7 days)     06/12/25  0741   INR 2.6*       ASSESSMENT     Warfarin Lab Questionnaire    Warfarin Doses Last 7 Days      6/11/2025     7:05 PM   Dose in Tablet or Mg   TAB or MG? milligram (mg)     Pt Rptd Dose SUNDAY MONDAY TUESDAY WED THURS FRIDAY SATURDAY 6/11/2025   7:05 PM 5 7.5 5 5 5 7.5 5         6/11/2025   Warfarin Lab Questionnaire   Missed doses within past 14 days? No   Changes in diet or alcohol within past 14 days? No   Medication changes since last result? Yes   Please list: Took prescription for dental appt this morning   Injuries or illness since last result? Yes   If yes, please explain: Left shoulder - work comp claim - back to work 100%   New shortness of breath, severe headaches or sudden changes in vision since last result? No   Abnormal bleeding since last result? No   Upcoming surgery, procedure? Yes   Please explain, date scheduled? Skin cancer - going back for clean margins - MOHS on 6/30/25 - INR on 6/23/25   Best number to call with results? [phone]     Previous result: Therapeutic last 2(+) visits  Additional findings: None       PLAN     Recommended plan for no diet, medication or health factor changes affecting INR     Dosing Instructions: Continue your current warfarin dose with next INR in 6 weeks       Summary  As of 6/12/2025      Full warfarin instructions:  7.5 mg every Mon, Fri; 5 mg all other days   Next INR check:  7/24/2025               Detailed voice message left for Preet with dosing instructions and follow up date.     Contact [phone] to schedule and with any changes, questions or concerns.     Education provided: Please call back if any changes to your diet, medications or how you've been taking warfarin    Plan made per [facility] anticoagulation protocol    Damien Colón, RN  6/12/2025  Anticoagulation Clinic  Epic  pool for routing messages: p MAIA [place]  [facility] patient phone line: [phone]        _______________________________________________________________________     Anticoagulation Episode Summary       Current INR goal:  2.0-3.0   TTR:  73.8% (1 y)   Target end date:  Indefinite   Send INR reminders to:  MAIA [place]    Indications    S/P AVR (aortic valve replacement)-25 mm St. Rolf [place]  [Z95.2]  H/O mechanical aortic valve replacement [Z95.2]  Long term current use of anticoagulants with INR goal of 2.0-3.0 [Z79.01]  S/P AVR [Z95.2]             Comments:  --             Anticoagulation Care Providers       Provider Role Specialty Phone number    Moni Cartagena PA-C Referring Cardiovascular & Thoracic Surgery [phone]    Yan Lucas MD Referring Family Medicine [phone]

## 2025-06-18 ENCOUNTER — VIRTUAL VISIT (OUTPATIENT)
Dept: DERMATOLOGY | Facility: CLINIC | Age: 55
End: 2025-06-18
Payer: COMMERCIAL

## 2025-06-18 DIAGNOSIS — C44.629 SQUAMOUS CELL CARCINOMA OF SKIN OF DORSUM OF LEFT HAND: ICD-10-CM

## 2025-06-18 DIAGNOSIS — D04.5 SQUAMOUS CELL CARCINOMA IN SITU (SCCIS) OF SKIN OF BACK: Primary | ICD-10-CM

## 2025-06-18 NOTE — NURSING NOTE
Toney Lemos's chief complaint for this visit includes:  Chief Complaint   Patient presents with    Consult     Mohs consult- SCC left hand. Checklist complete. Mohs/Excision 6/30. Patient is wondering if he will be getting the Mohs procedure done on the left hand, or if Dr. Aleman will just be looking at it to see if it needs surgery.      PCP: Yan Lucas    Referring Provider:  Yan Lucas MD  [address] [place] [address] [place] [address]    There were no vitals taken for this visit.  Data Unavailable        Allergies   Allergen Reactions    No Known Drug Allergy     Seasonal Allergies          Do you need any medication refills at today's visit?   No.        Teledermatology Nurse Call Patients:     Are you in the [facility] at the time of the encounter? yes    Today's visit will be billed to you and your insurance.    A teledermatology visit is not as thorough as an in-person visit and the quality of the photograph sent may not be of the same quality as that taken by the dermatology clinic.       Jayleen Wilks RN on 6/18/2025 at 10:22 AM

## 2025-06-18 NOTE — LETTER
6/18/2025      Toney Lemos  [address] [facility] [address]      Dear Colleague,    Thank you for referring your patient, Toney Lemos, to the [facility]. Please see a copy of my visit note below.    Virtual Visit Details    Type of service:  Telephone Visit   Phone call duration: 7 minutes   Originating Location (pt. Location): Home    Distant Location (provider location):  On-site  Telephone visit completed due to the patient did not consent to a video visit.      [facility] Dermatology Note  Encounter Date: Jun 18, 2025  Store-and-Forward and Telephone ([phone]). Location of teledermatologist: [facility].  Start time: 1109. End time: 1115  .    Dermatology Problem List:  1. History of NMSC   - SCCiS, right mid back, s/p bx 5/1/25, pending excision 6/30/25   - SCC, left dorsal hand incompletely excised by PCP 10/1/2021.  Referred to dermatology surgery placed 4/20/2023, picture of area obtained 5/1/2025. Pending Mohs 06/30/2025    Ganglion cyst, left 4th finger  - referral to orthopedics for hand surgery consult 04/20/23  AK  - Face s/p cryotherapy 05/01/25   -s/p efudex/calcipotriene to ears/face 04/20/23   4. Impetigo  -s/p mupirocin ointment 06/30/23  5. Contact dermatitis/poison ivy  -s/p hydrocortisone cream 06/20/23        Family Hx: Family history of skin cancer- Maternal grandfather, unknown type.     ____________________________________________    Assessment & Plan:     - SCCiS, right mid back, s/p bx 5/1/25, pending excision 6/30/25   - The nature, risks, benefits, and alternatives to excision surgery were discussed. The patient would like to proceed with excision surgery.  - Linear repair planned.     - SCC, left dorsal hand incompletely excised by PCP 10/1/2021.  Referred to dermatology surgery placed 4/20/2023, picture of area obtained 5/1/2025. Pending Mohs 06/30/2025  - Case reviewed at cutaneous oncology  tumor board, consensus was that Mohs should be recommended to the patient to ensure deep margin is clear.   - The nature, risks, benefits, and alternatives to Mohs surgery were discussed. The patient would like to proceed with Mohs surgery.  - likely repair is linear    Procedures Performed:    None    Follow-up: Mohs and excision scheduled 6/30/25    Staff:     Christian Aleman DO    Department of Dermatology  [facility]: Phone: [phone], Fax:[phone]  [facility]: Phone: [phone], Fax: [phone]    ____________________________________________    CC: Consult (Mohs consult- SCC left hand. Checklist complete. )    HPI:  Mr. Toney Lemos is a(n) 55 year old male who presents today as a return patient for Mohs consultation for treatment of SCCIS on his left hand, positive margin after excision left hand.     Mohs/Excision 6/30. Patient is wondering if he will be getting the Mohs procedure done on the left hand, or if Dr. Aleman will just be looking at it to see if it needs surgery.     SCCIS on his R mid back referred for treatment.     Patient is otherwise feeling well, without additional skin concerns.    Labs Reviewed:  Dermpath reports 5/1/25 and 10/1/21 reviewed    5/1/25  Right mid back:  - Squamous cell carcinoma in situ     10/1/21  A(A). Left hand:  - Squamous cell carcinoma, well-differentiated, extending to the deep margin - (see description)      Physical Exam:  Vitals: There were no vitals taken for this visit.  SKIN: Teledermatology photos were reviewed; image quality and interpretability: acceptable. Image date: 5/1/25.  - right mid back - pink ~1cm papule  - left dorsal hand linear surgical scar, normal appearing adjacent skin  - No other lesions of concern on areas examined.     Medications:  Current Outpatient Medications   Medication Sig Dispense Refill      amLODIPine (NORVASC) 2.5 MG tablet Take 1 tablet (2.5 mg) by mouth daily. 90 tablet 3     amoxicillin (AMOXIL) 500 MG capsule Take 4 capsules (2 gms) 60 minutes prior to dental work. 4 capsule 1     aspirin 81 MG EC tablet Take 1 tablet (81 mg) by mouth daily. 90 tablet 3     atorvastatin (LIPITOR) 40 MG tablet Take 1 tablet (40 mg) by mouth daily. 90 tablet 3     lisinopril (ZESTRIL) 5 MG tablet Take 1 tablet (5 mg) by mouth daily. 90 tablet 3     loratadine (CLARITIN) 10 MG tablet Take 10 mg by mouth daily       metoprolol succinate ER (TOPROL XL) 50 MG 24 hr tablet Take 1 tablet (50 mg) by mouth 2 times daily. 180 tablet 3     metroNIDAZOLE (METROCREAM) 0.75 % external cream Apply topically to the face once a day. 45 g 3     warfarin ANTICOAGULANT (COUMADIN) 5 MG tablet TAKE 1 & 1/2 TABLETS ON MON//FRI AND 1 TABLET ALL OTHER DAYS OR AS DIRECTED BY INR CLINIC 105 tablet 1     No current facility-administered medications for this visit.      Past Medical/Surgical History:   Patient Active Problem List   Diagnosis     Hypertension goal BP (blood pressure) < 140/90     Encounter for long-term current use of medication     FH: early coronary artery disease- bro 30s     S/P AVR (aortic valve replacement)-25 mm St[employer] Rolf [place]      H/O mechanical aortic valve replacement     Long term current use of anticoagulants with INR goal of 2.0-3.0     SCC (squamous cell carcinoma), arm, left- hand, + deep margin, watching     S/P AVR     Past Medical History:   Diagnosis Date     Aortic valve disorders      Hypertension        CC Yan Lucas MD  [address] [place] [address] on close of this encounter.      Again, thank you for allowing me to participate in the care of your patient.        Sincerely,        Christian Aleman MD    Electronically signed

## 2025-06-18 NOTE — PROGRESS NOTES
Virtual Visit Details    Type of service:  Telephone Visit   Phone call duration: 7 minutes   Originating Location (pt. Location): Home    Distant Location (provider location):  On-site  Telephone visit completed due to the patient did not consent to a video visit.      [facility] Dermatology Note  Encounter Date: Jun 18, 2025  Store-and-Forward and Telephone ([phone]). Location of teledermatologist: [facility].  Start time: 1109. End time: 1115  .    Dermatology Problem List:  1. History of NMSC   - SCCiS, right mid back, s/p bx 5/1/25, pending excision 6/30/25   - SCC, left dorsal hand incompletely excised by PCP 10/1/2021.  Referred to dermatology surgery placed 4/20/2023, picture of area obtained 5/1/2025. Pending Mohs 06/30/2025    Ganglion cyst, left 4th finger  - referral to orthopedics for hand surgery consult 04/20/23  AK  - Face s/p cryotherapy 05/01/25   -s/p efudex/calcipotriene to ears/face 04/20/23   4. Impetigo  -s/p mupirocin ointment 06/30/23  5. Contact dermatitis/poison ivy  -s/p hydrocortisone cream 06/20/23        Family Hx: Family history of skin cancer- Maternal grandfather, unknown type.     ____________________________________________    Assessment & Plan:     - SCCiS, right mid back, s/p bx 5/1/25, pending excision 6/30/25   - The nature, risks, benefits, and alternatives to excision surgery were discussed. The patient would like to proceed with excision surgery.  - Linear repair planned.     - SCC, left dorsal hand incompletely excised by PCP 10/1/2021.  Referred to dermatology surgery placed 4/20/2023, picture of area obtained 5/1/2025. Pending Mohs 06/30/2025  - Case reviewed at cutaneous oncology tumor board, consensus was that Mohs should be recommended to the patient to ensure deep margin is clear.   - The nature, risks, benefits, and alternatives to Mohs surgery were discussed. The patient would like to proceed with Mohs surgery.  -  likely repair is linear    Procedures Performed:    None    Follow-up: Mohs and excision scheduled 6/30/25    Staff:     Christian Aleman DO    Department of Dermatology  [facility]: Phone: [phone], Fax:[phone]  [facility]: Phone: [phone], Fax: [phone]    ____________________________________________    CC: Consult (Mohs consult- SCC left hand. Checklist complete. )    HPI:  Mr. Toney Lemos is a(n) 55 year old male who presents today as a return patient for Mohs consultation for treatment of SCCIS on his left hand, positive margin after excision left hand.     Mohs/Excision 6/30. Patient is wondering if he will be getting the Mohs procedure done on the left hand, or if Dr. Aleman will just be looking at it to see if it needs surgery.     SCCIS on his R mid back referred for treatment.     Patient is otherwise feeling well, without additional skin concerns.    Labs Reviewed:  Dermpath reports 5/1/25 and 10/1/21 reviewed    5/1/25  Right mid back:  - Squamous cell carcinoma in situ     10/1/21  A(A). Left hand:  - Squamous cell carcinoma, well-differentiated, extending to the deep margin - (see description)      Physical Exam:  Vitals: There were no vitals taken for this visit.  SKIN: Teledermatology photos were reviewed; image quality and interpretability: acceptable. Image date: 5/1/25.  - right mid back - pink ~1cm papule  - left dorsal hand linear surgical scar, normal appearing adjacent skin  - No other lesions of concern on areas examined.     Medications:  Current Outpatient Medications   Medication Sig Dispense Refill    amLODIPine (NORVASC) 2.5 MG tablet Take 1 tablet (2.5 mg) by mouth daily. 90 tablet 3    amoxicillin (AMOXIL) 500 MG capsule Take 4 capsules (2 gms) 60 minutes prior to dental work. 4 capsule 1    aspirin 81 MG EC tablet Take 1 tablet (81 mg) by mouth  daily. 90 tablet 3    atorvastatin (LIPITOR) 40 MG tablet Take 1 tablet (40 mg) by mouth daily. 90 tablet 3    lisinopril (ZESTRIL) 5 MG tablet Take 1 tablet (5 mg) by mouth daily. 90 tablet 3    loratadine (CLARITIN) 10 MG tablet Take 10 mg by mouth daily      metoprolol succinate ER (TOPROL XL) 50 MG 24 hr tablet Take 1 tablet (50 mg) by mouth 2 times daily. 180 tablet 3    metroNIDAZOLE (METROCREAM) 0.75 % external cream Apply topically to the face once a day. 45 g 3    warfarin ANTICOAGULANT (COUMADIN) 5 MG tablet TAKE 1 & 1/2 TABLETS ON MON//FRI AND 1 TABLET ALL OTHER DAYS OR AS DIRECTED BY INR CLINIC 105 tablet 1     No current facility-administered medications for this visit.      Past Medical/Surgical History:   Patient Active Problem List   Diagnosis    Hypertension goal BP (blood pressure) < 140/90    Encounter for long-term current use of medication    FH: early coronary artery disease- bro 30s    S/P AVR (aortic valve replacement)-25 mm St. Rolf [place]     H/O mechanical aortic valve replacement    Long term current use of anticoagulants with INR goal of 2.0-3.0    SCC (squamous cell carcinoma), arm, left- hand, + deep margin, watching    S/P AVR     Past Medical History:   Diagnosis Date    Aortic valve disorders     Hypertension        CC Yan Lucas MD  [address] [address] on close of this encounter.

## 2025-06-23 ENCOUNTER — ANTICOAGULATION THERAPY VISIT (OUTPATIENT)
Dept: ANTICOAGULATION | Facility: CLINIC | Age: 55
End: 2025-06-23

## 2025-06-23 ENCOUNTER — LAB (OUTPATIENT)
Dept: LAB | Facility: CLINIC | Age: 55
End: 2025-06-23
Payer: COMMERCIAL

## 2025-06-23 DIAGNOSIS — Z95.2 H/O MECHANICAL AORTIC VALVE REPLACEMENT: ICD-10-CM

## 2025-06-23 DIAGNOSIS — Z79.01 LONG TERM CURRENT USE OF ANTICOAGULANTS WITH INR GOAL OF 2.0-3.0: ICD-10-CM

## 2025-06-23 DIAGNOSIS — Z95.2 S/P AVR (AORTIC VALVE REPLACEMENT): Primary | ICD-10-CM

## 2025-06-23 DIAGNOSIS — Z95.2 S/P AVR: ICD-10-CM

## 2025-06-23 LAB — INR BLD: 2.4 (ref 0.9–1.1)

## 2025-06-23 PROCEDURE — 85610 PROTHROMBIN TIME: CPT

## 2025-06-23 PROCEDURE — 36416 COLLJ CAPILLARY BLOOD SPEC: CPT

## 2025-06-23 NOTE — PROGRESS NOTES
ANTICOAGULATION MANAGEMENT     Toney Lemos 55 year old male is on warfarin with therapeutic INR result. (Goal INR 2.0-3.0)    Recent labs: (last 7 days)     06/23/25  0747   INR 2.4*       ASSESSMENT     Warfarin Lab Questionnaire    Warfarin Doses Last 7 Days      6/23/2025     7:31 AM   Dose in Tablet or Mg   TAB or MG? milligram (mg)     Pt Rptd Dose SUNDAY MONDAY TUESDAY WED THURS FRIDAY SATURDAY 6/23/2025   7:31 AM 5 7.5 5 5 5 7.5 5         6/23/2025   Warfarin Lab Questionnaire   Missed doses within past 14 days? No   Changes in diet or alcohol within past 14 days? No   Medication changes since last result? No   Injuries or illness since last result? No   New shortness of breath, severe headaches or sudden changes in vision since last result? No   Abnormal bleeding since last result? No   Upcoming surgery, procedure? Yes   Please explain, date scheduled? 6/30 SKIN CANCER REMOVAL   Best number to call with results? [phone]     Previous result: Therapeutic last 2(+) visits  Additional findings: None no hold requested from Derm for procedure       PLAN     Recommended plan for no diet, medication or health factor changes affecting INR     Dosing Instructions: Continue your current warfarin dose with next INR in 4 weeks       Summary  As of 6/23/2025      Full warfarin instructions:  7.5 mg every Mon, Fri; 5 mg all other days   Next INR check:  7/21/2025               Telephone call with Preet who verbalizes understanding and agrees to plan    Lab visit scheduled    Education provided: Please call back if any changes to your diet, medications or how you've been taking warfarin    Plan made per [facility] anticoagulation protocol    Vanessa Lepe RN  6/23/2025  Anticoagulation Clinic  [facility] for routing messages: poli [place]  [facility] patient phone line: [phone]        _______________________________________________________________________     Anticoagulation Episode Summary       Current INR  goal:  2.0-3.0   TTR:  75.0% (1 y)   Target end date:  Indefinite   Send INR reminders to:  MAIA [place]    Indications    S/P AVR (aortic valve replacement)-25 mm St. Rolf [place]  [Z95.2]  H/O mechanical aortic valve replacement [Z95.2]  Long term current use of anticoagulants with INR goal of 2.0-3.0 [Z79.01]  S/P AVR [Z95.2]             Comments:  --             Anticoagulation Care Providers       Provider Role Specialty Phone number    Moin Cartagena PA-C Referring Cardiovascular & Thoracic Surgery [phone]    LanceYan burgess MD Referring [facility] Medicine [phone]

## 2025-06-30 ENCOUNTER — OFFICE VISIT (OUTPATIENT)
Dept: DERMATOLOGY | Facility: CLINIC | Age: 55
End: 2025-06-30
Payer: COMMERCIAL

## 2025-06-30 VITALS — SYSTOLIC BLOOD PRESSURE: 142 MMHG | OXYGEN SATURATION: 96 % | HEART RATE: 61 BPM | DIASTOLIC BLOOD PRESSURE: 82 MMHG

## 2025-06-30 DIAGNOSIS — C44.629 SQUAMOUS CELL CARCINOMA OF SKIN OF DORSUM OF LEFT HAND: Primary | ICD-10-CM

## 2025-06-30 DIAGNOSIS — D04.5 SQUAMOUS CELL CARCINOMA IN SITU OF SKIN OF BACK: ICD-10-CM

## 2025-06-30 NOTE — PATIENT INSTRUCTIONS
Caring for your skin after surgery     For the first 48 hours after your surgery:  Leave the pressure dressing on and keep it dry. If it should come loose, you may re-tape it, but do not take it off.  Relax and take it easy.  Do not do any vigorous exercise, heavy lifting or bending forward. This could cause the wound to bleed.  If the wound is on your head, sleeping with your head elevated for the first few nights will help with swelling and bleeding. (Use linens/pillow cases that would be ok to get blood on in the event there is some oozing from the bandage).  Post-operative pain is usually mild.  You may alternate between 1000 mg of Tylenol (acetaminophen) and 400 mg of Ibuprofen every 4 hours.  This means, for example, that you could take the followin,000 mg of Tylenol, followed 4 hours later by 400 mg Ibuprofen, followed 4 hours later with 1,000 mg of Tylenol, and so forth.  Do not take more than 4,000 mg of acetaminophen in a 24-hour period or 3200 mg of Ibuprofen in a 24-hr period.    Avoid alcohol and vitamin E as these may increase your tendency to bleed.  Apply an ice pack for 20 min every 2-3 hours while awake.  This may help reduce swelling, bruising, and pain.  Make sure the ice pack is waterproof so that the pressure bandage doesn't get wet.  You may see a small amount of drainage or blood on your pressure bandage. This is normal. However:  If drainage or bleeding continues or saturates the bandage, you will need to apply firm pressure over the bandage with a clean washcloth for 15 minutes.    Remove the saturated bandage.  If bleeding has stopped, apply Vaseline over the suture line and cover with a non-stick bandage.  To add some pressure over the wound, fold a piece of gauze and tape over the area.  If bleeding continues after applying pressure for 15 minutes, apply an ice pack with gentle pressure to the bandaged area for another 15 minutes.  If bleeding still continues, call our office or go  to the nearest emergency room.    48 Hours After Surgery:  Your surgical site has been closed with DermaBond, a  super glue,   for skin procedures.  DermaBond seals your incision site.  Carefully remove the pressure bandage.  If it seems sticky or difficult to remove, you may need to soak it off in the shower.  Once the pressure bandage has been removed, avoid prolonged wetness to the area covered with DermaBond.  Pat dry when out of the shower.  Avoid topical medications, lotions, creams, ointment, or oils.  Cover with a non-stick bandage.    Avoid rubbing or scrubbing the site.  If skin glue and sutures are still intact at 2 weeks after your procedure, you can use an adhesive remover wipe to loosen the skin glue. If sutures are still present, start applying Vaseline daily and cover with non-stick gauze OR you may use hydrogen peroxide to help the sutures dissolve.     What to expect:  The first couple of days your wound may be tender and may bleed slightly when doing wound care.  There may be swelling and bruising around the wound, especially if it is near the eyes. For your comfort, you may apply ice or cold compresses to the area.  The area around your wound may be numb for several weeks or even months.  You may experience periodic sharp pain or mild itching around the wound as it heals.   The suture line will look dark pink at first and the edges of the wound will be reddened. This will lighten up each day.    Call Us If:  You have bleeding that will not stop after applying pressure and ice.  You have pain that is not controlled with Tylenol and Ibuprofen.  You have signs or symptoms of an infection such as fever over 100 degrees Fahrenheit, redness, swelling, or warmth spreading from the wound, increasing pain after the first 48 hours, or white/yellow/green drainage from the wound (may or may not have a foul odor).    [facility], Dermatology [phone].  Available M-F  8-5.  For urgent needs outside of business hours, call the [facility] at [phone] and ask to speak with/page the dermatology resident on call.

## 2025-06-30 NOTE — NURSING NOTE
Toney Lemos's goals for this visit include:   Chief Complaint   Patient presents with    Procedure     SCC, Right hand (Mohs) & SCCis R mid back (Excision)       He requests these members of his care team be copied on today's visit information:     PCP: Yan Lucas    Referring Provider:  Referred Self, MD  No address on file    BP (!) 142/82   Pulse 61   SpO2 96%     Do you need any medication refills at today's visit?       Marlen Man EMT    The following medication was given:     MEDICATION:  Lidocaine with epinephrine 1% 1:[id number]  ROUTE: SQ  SITE: see procedure note  DOSE: 18.25  LOT #: [id number]  : [employer]  EXPIRATION DATE: 11/30/2026  NDC#: 36690-625-58  Was there drug waste? Yes 2.75  Multi-dose vial: Yes    Marlen Man  June 30, 2025       Vaseline and pressure dressing applied to Mohs site on R Hand.  Wound care instructions reviewed with patient and AVS provided.  Patient verbalized understanding.  Post op appointment scheduled: N/A. Patient will follow up for suture removal: Patient to remove sutures at home.  Disposable suture removal kit provided.  No further questions or concerns at this time.      Dermabond and pressure dressing applied to excision site on R Mid back.  Wound care instructions reviewed with patient and AVS provided.  Patient verbalized understanding.  Post op appointment scheduled: N/A. Patient will follow up for suture removal: N/A  No further questions or concerns at this time.

## 2025-06-30 NOTE — LETTER
6/30/2025      Toney Lemos  [address] [facility] [address]      Dear Colleague,    Thank you for referring your patient, Toney Lemos, to the [facility]. Please see a copy of my visit note below.    [facility] [place] Procedure Note    Dermatology Problem List:  1. 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  Referred to dermatology surgery placed 4/20/2023, picture of area obtained 5/1/2025, s/p Mohs 6/30/25  Ganglion cyst, left 4th finger  - referral to orthopedics for hand surgery consult 04/20/23  AK  - Face s/p cryotherapy 05/01/25   -s/p efudex/calcipotriene to ears/face 04/20/23   4. Impetigo  -s/p mupirocin ointment 06/30/23  5. Contact dermatitis/poison ivy  -s/p hydrocortisone cream 06/20/23        Family Hx: Family history of skin cancer- Maternal grandfather, unknown type.     _______________________________    Date of Service:  Jun 30, 2025  Surgery: Mohs micrographic surgery    Case 1  Repair Type: intermediate  Repair Size: 4.9 cm  Suture Material: 4-0 monocryl, prolene 5-0  Tumor Type: SCC - Squamous cell carcinoma  Location: left hand  Derm-Path Accession #: [id number]  PreOp Size: 3.4x1.2 cm  PostOp Size: 4.2x1.8 cm  Mohs Accession #: [id number]  Level of Defect: fascia      Procedure:  We discussed the principles of treatment and most likely complications including scarring, bleeding, infection, swelling, pain, crusting, nerve damage, large wound,  incomplete excision, wound dehiscence,  nerve damage, recurrence, and a second procedure may be recommended to obtain the best cosmetic or functional result.    Informed consent was obtained and the patient underwent the procedure as follows:  The patient was placed Other (comments) (seated) on the operating table.  The cancer was identified, outlined with a marker, and  verified by the patient.  The entire surgical field was prepped with ChoraPrep.  The surgical site was anesthetized using Lidocaine 1% with epi 1:100,000.      The area of clinically apparent tumor was debulked. The layer of tissue was then surgically excised using a #15 blade and was then transferred onto a specimen sheet maintaining the orientation of the specimen. Hemostasis was obtained using bipolar electrocoagulation. The wound site was then covered with a dressing while the tissue samples were processed for examination.    The excised tissue was transported to the Mohs histology laboratory maintaining the tissue orientation.  The tissue specimen was relaxed so that the entire surgical margin was in a a single horizontal plane for sectioning and inked for precise mapping.  A precise reference map was drawn to reflect the sectioning of the specimen, colored inking of the margins, and orientation on the patient. The tissue was processed using horizontal sectioning of the base and continuous peripheral margins.  The histopathologic sections were reviewed in conjunction with the reference map.    Total blocks: 1    Total slides:  4    There were no cancer cells visualized on examination, therefore Mohs surgery was complete.    Mohs repair type: Reconstruction: Intermediate Linear Closure      The patient was taken to the operative suite and placed Other (comments) (seated) on the operating room table. The defect was identified. Appropriate markings were made with a marking pen to plan the repair. The area was infiltrated with Lidocaine 1% with epi 1:100,000 and prepped with ChoraPrep and draped with sterile towels.     The wound was debeveled and undermined widely. Cones were excised within relaxed skin tension lines on both sides of the defect. Hemostasis was obtained using bipolar electrocoagulation. The deeper layers of subcutaneous and superficial (nonmuscle) fascia tissues were then approximated using  monocryl 4-0 buried vertical mattress sutures (deep layer suturing). The wound edges were then approximated; additional buried sutures were placed in a similar fashion where needed. prolene 5-0 simple running (superficial layer suturing) were carefully placed for maximum eversion and meticulous approximation.    Estimated blood loss was less than 10 ml for all surgical sites. The wound was cleansed with saline and ointment was applied along the wound surface. A sterile pressure dressing was applied and wound care instructions were given verbally and in writing. Patient was informed that additional refinement of the resulting surgical scar may be used as a second stage of this reconstruction. The patient was discharged from the [facility]atologic Surgery Center alert and ambulatory.    The patient elected for pathology results to automatically release and understands that the clinical staff will contact them as soon as possible to notify them of the results.    Repair Size: 4.9 cm  Sutures Used:  Deep: monocryl 4-0 Superficial: prolene 5-0     Dr. Christian Aleman MD was physically present  for the entire procedure, key portions of the reconstruction and always immediately available.     Patient will return to clinic in 2 weeks for suture removal.      Excision note:    NAME OF PROCEDURE: Excision with intermediate linear closure  Staff surgeon: Christian Aleman DO  Assitant: Moni Kennedy PA-C  Scrub Nurse: Marlen BERMEO    PRE-OPERATIVE DIAGNOSIS:  Squamous cell carcinoma in situ  POST-OPERATIVE DIAGNOSIS: Same   LOCATION: right mid back  FINAL EXCISION SIZE(DEFECT SIZE): 1.7 x 1.8 cm  MARGIN: 4 mm  FINAL REPAIR LENGTH: 4.4 cm   ANESTHESIA: 9.75 mL 1% lidocaine with 1:100,000 epinephrine    INDICATIONS: This patient presented with 0.9 x 1.0 cm Squamous cell carcinoma in situ. Excision was indicated. We discussed the principles of treatment and most likely complications including scarring, bleeding, infection, incomplete  excision, wound dehiscence, pain, nerve damage, and recurrence. Informed consent was obtained and the patient underwent the procedure as follows:    PROCEDURE: The patient was taken to the operative suite. Time-out was performed. The treatment area was anesthetized with 1% lidocaine with epinephrine. The area was prepped with Chlorhexidine and rinsed with sterile saline and draped with sterile towels. The lesion was delineated and excised down to subcutaneous fat in a elliptical manner. Hemostasis was obtained by electrocoagulation.     REPAIR: An intermediate layered linear closure was selected as the procedure which would maximally preserve both function and cosmesis.    After the excision of the tumor, the area was carefully undermined. Hemostasis was obtained with bipolar electrocoagulation. Closure was oriented so that the wound was in the patient's natural skin tension lines. The deeper layers of subcutaneous and superficial (nonmuscle) fascia tissues were then approximated using 3-0 vicryl and 4-0 monocryl sutures (deep layer suturing). The wound edges were then approximated; additional buried sutures were placed in a similar fashion where needed. 4-0 monocryl sutures (superficial layer suturing) were carefully placed for maximum eversion and meticulous approximation.    Estimated blood loss was less than 10 ml for all surgical sites. A sterile pressure dressing was applied and wound care instructions, with a written handout, were given. The patient was discharged from the D[facility]atologic Surgery Center alert and ambulatory.    The patient elected for pathology results to automatically release and understands that the clinical staff will contact them as soon as possible to notify them of the results.    Follow-up: PRN with dermatology surgery    Dr. Christian Aleman was immediately available for the entire surgery and was physicially present for the key portions of the procedure.    Anatomic Pathology Results:  pending    Clinical Follow-Up: 2 weeks for suture removal on left hand, November 2025 for next [facility]    Staff Involved:  Staff/Scribe    Scribe Disclosure:   I, Marisol Chand, am serving as a scribe; to document services personally performed by Dr. Christian Aleman - based on data collection and the provider's statements to me.     Electronically signed by:   Marisol Morales MD  Mohs Micrographic Surgery and Dermatologic Oncology Fellow  [facility]     Staff Physician Comments:   I saw and evaluated the patient with the Mohs Surgery Fellow (Dr. Marisol Morales) and physician assistant (Moni Christopher PA-C) and I agree with the assessment and plan and the above description of the procedure as documented by the scribe. I was present for the key portions of the procedure and entire exam. I was immediately available in the clinic throughout the procedures.     Christian Aleman DO    Department of Dermatology  [facility] Clin[facility]: Phone: [phone], Fax:[phone]  [facility]: Phone: [phone], Fax: [phone]    Care and Laboratory Testing Performed at:  [facility]   Dermatology Clinic  [address] [place] [address]      Again, thank you for allowing me to participate in the care of your patient.        Sincerely,        Christian Aleman MD    Electronically signed

## 2025-06-30 NOTE — PROGRESS NOTES
[facility] [place] Procedure Note    Dermatology Problem List:  1. 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  Referred to dermatology surgery placed 4/20/2023, picture of area obtained 5/1/2025, s/p Mohs 6/30/25  Ganglion cyst, left 4th finger  - referral to orthopedics for hand surgery consult 04/20/23  AK  - Face s/p cryotherapy 05/01/25   -s/p efudex/calcipotriene to ears/face 04/20/23   4. Impetigo  -s/p mupirocin ointment 06/30/23  5. Contact dermatitis/poison ivy  -s/p hydrocortisone cream 06/20/23        Family Hx: Family history of skin cancer- Maternal grandfather, unknown type.     _______________________________    Date of Service:  Jun 30, 2025  Surgery: Mohs micrographic surgery    Case 1  Repair Type: intermediate  Repair Size: 4.9 cm  Suture Material: 4-0 monocryl, prolene 5-0  Tumor Type: SCC - Squamous cell carcinoma  Location: left hand  Derm-Path Accession #: [id number]  PreOp Size: 3.4x1.2 cm  PostOp Size: 4.2x1.8 cm  Mohs Accession #: [id number]  Level of Defect: fascia      Procedure:  We discussed the principles of treatment and most likely complications including scarring, bleeding, infection, swelling, pain, crusting, nerve damage, large wound,  incomplete excision, wound dehiscence,  nerve damage, recurrence, and a second procedure may be recommended to obtain the best cosmetic or functional result.    Informed consent was obtained and the patient underwent the procedure as follows:  The patient was placed Other (comments) (seated) on the operating table.  The cancer was identified, outlined with a marker, and verified by the patient.  The entire surgical field was prepped with ChoraPrep.  The surgical site was anesthetized using Lidocaine 1% with epi 1:100,000.      The area of clinically apparent tumor was debulked. The layer of tissue was then  surgically excised using a #15 blade and was then transferred onto a specimen sheet maintaining the orientation of the specimen. Hemostasis was obtained using bipolar electrocoagulation. The wound site was then covered with a dressing while the tissue samples were processed for examination.    The excised tissue was transported to the [facility] histology laboratory maintaining the tissue orientation.  The tissue specimen was relaxed so that the entire surgical margin was in a a single horizontal plane for sectioning and inked for precise mapping.  A precise reference map was drawn to reflect the sectioning of the specimen, colored inking of the margins, and orientation on the patient. The tissue was processed using horizontal sectioning of the base and continuous peripheral margins.  The histopathologic sections were reviewed in conjunction with the reference map.    Total blocks: 1    Total slides:  4    There were no cancer cells visualized on examination, therefore Mohs surgery was complete.    Mohs repair type: Reconstruction: Intermediate Linear Closure      The patient was taken to the operative suite and placed Other (comments) (seated) on the operating room table. The defect was identified. Appropriate markings were made with a marking pen to plan the repair. The area was infiltrated with Lidocaine 1% with epi 1:100,000 and prepped with ChoraPrep and draped with sterile towels.     The wound was debeveled and undermined widely. Cones were excised within relaxed skin tension lines on both sides of the defect. Hemostasis was obtained using bipolar electrocoagulation. The deeper layers of subcutaneous and superficial (nonmuscle) fascia tissues were then approximated using monocryl 4-0 buried vertical mattress sutures (deep layer suturing). The wound edges were then approximated; additional buried sutures were placed in a similar fashion where needed. prolene 5-0 simple running (superficial layer suturing) were  carefully placed for maximum eversion and meticulous approximation.    Estimated blood loss was less than 10 ml for all surgical sites. The wound was cleansed with saline and ointment was applied along the wound surface. A sterile pressure dressing was applied and wound care instructions were given verbally and in writing. Patient was informed that additional refinement of the resulting surgical scar may be used as a second stage of this reconstruction. The patient was discharged from the Dermatologic Surgery Center alert and ambulatory.    The patient elected for pathology results to automatically release and understands that the clinical staff will contact them as soon as possible to notify them of the results.    Repair Size: 4.9 cm  Sutures Used:  Deep: monocryl 4-0 Superficial: prolene 5-0     Dr. Christian Aleman MD was physically present  for the entire procedure, key portions of the reconstruction and always immediately available.     Patient will return to clinic in 2 weeks for suture removal.      Excision note:    NAME OF PROCEDURE: Excision with intermediate linear closure  Staff surgeon: Christian Aleman DO  Assitant: Moni Kennedy PA-C  Scrub Nurse: Marlen Man EMT    PRE-OPERATIVE DIAGNOSIS:  Squamous cell carcinoma in situ  POST-OPERATIVE DIAGNOSIS: Same   LOCATION: right mid back  FINAL EXCISION SIZE(DEFECT SIZE): 1.7 x 1.8 cm  MARGIN: 4 mm  FINAL REPAIR LENGTH: 4.4 cm   ANESTHESIA: 9.75 mL 1% lidocaine with 1:100,000 epinephrine    INDICATIONS: This patient presented with 0.9 x 1.0 cm Squamous cell carcinoma in situ. Excision was indicated. We discussed the principles of treatment and most likely complications including scarring, bleeding, infection, incomplete excision, wound dehiscence, pain, nerve damage, and recurrence. Informed consent was obtained and the patient underwent the procedure as follows:    PROCEDURE: The patient was taken to the operative suite. Time-out was performed. The  treatment area was anesthetized with 1% lidocaine with epinephrine. The area was prepped with Chlorhexidine and rinsed with sterile saline and draped with sterile towels. The lesion was delineated and excised down to subcutaneous fat in a elliptical manner. Hemostasis was obtained by electrocoagulation.     REPAIR: An intermediate layered linear closure was selected as the procedure which would maximally preserve both function and cosmesis.    After the excision of the tumor, the area was carefully undermined. Hemostasis was obtained with bipolar electrocoagulation. Closure was oriented so that the wound was in the patient's natural skin tension lines. The deeper layers of subcutaneous and superficial (nonmuscle) fascia tissues were then approximated using 3-0 vicryl and 4-0 monocryl sutures (deep layer suturing). The wound edges were then approximated; additional buried sutures were placed in a similar fashion where needed. 4-0 monocryl sutures (superficial layer suturing) were carefully placed for maximum eversion and meticulous approximation.    Estimated blood loss was less than 10 ml for all surgical sites. A sterile pressure dressing was applied and wound care instructions, with a written handout, were given. The patient was discharged from the Dermatologic Surgery Center alert and ambulatory.    The patient elected for pathology results to automatically release and understands that the clinical staff will contact them as soon as possible to notify them of the results.    Follow-up: PRN with dermatology surgery    Dr. Christian Aleman was immediately available for the entire surgery and was physicially present for the key portions of the procedure.    Anatomic Pathology Results: pending    Clinical Follow-Up: 2 weeks for suture removal on left hand, November 2025 for next [facility]    Staff Involved:  Staff/Scribe    Scribe Disclosure:   Marisol BARAJAS, am serving as a scribe; to document services personally  performed by Dr. Christian Aleman - based on data collection and the provider's statements to me.     Electronically signed by:   Marisol Morales MD  Mohs Micrographic Surgery and Dermatologic Oncology Fellow  [facility]     Staff Physician Comments:   I saw and evaluated the patient with the Mohs Surgery Fellow (Dr. Marisol Morales) and physician assistant (Moni Christopher PA-C) and I agree with the assessment and plan and the above description of the procedure as documented by the scribe. I was present for the key portions of the procedure and entire exam. I was immediately available in the clinic throughout the procedures.     Christian Aleman DO    Department of Dermatology  [facility] Clinics: Phone: [phone], Fax:[phone]  [facility]: Phone: [phone], Fax: [phone]    Care and Laboratory Testing Performed at:  [facility]   Dermatology Clinic  [address] [place] [address]

## 2025-07-02 ENCOUNTER — RESULTS FOLLOW-UP (OUTPATIENT)
Dept: DERMATOLOGY | Facility: CLINIC | Age: 55
End: 2025-07-02
Payer: COMMERCIAL

## 2025-07-03 ENCOUNTER — TELEPHONE (OUTPATIENT)
Dept: DERMATOLOGY | Facility: CLINIC | Age: 55
End: 2025-07-03
Payer: COMMERCIAL

## 2025-07-03 NOTE — TELEPHONE ENCOUNTER
Toney is 3 days s/p Mohs on   left hand.    What are you doing to manage your pain?  N/A    Is it helping?  N/A    Are you applying ice? No    Have you had any noticeable bleeding through the bandage?  No, dressing is dry and intact.    Do you have any concerns?  Yes, just tightness around the wound and some redness. Writer explained that this is normal as long as the redness not spreading which he declined. He denied any symptoms of infection. He will reach out if noticing any symptoms of infection.     Wound care directions reviewed.  Patient declined a post op appointment.  Next skin check has been scheduled.     Annmarie Jacobs RN on 7/3/2025 at 1:57 PM

## 2025-07-07 ENCOUNTER — MYC MEDICAL ADVICE (OUTPATIENT)
Dept: DERMATOLOGY | Facility: CLINIC | Age: 55
End: 2025-07-07
Payer: COMMERCIAL

## 2025-07-10 ENCOUNTER — ALLIED HEALTH/NURSE VISIT (OUTPATIENT)
Dept: DERMATOLOGY | Facility: CLINIC | Age: 55
End: 2025-07-10
Payer: COMMERCIAL

## 2025-07-10 DIAGNOSIS — L08.9 SKIN INFECTION: Primary | ICD-10-CM

## 2025-07-10 DIAGNOSIS — Z48.89 ENCOUNTER FOR POST SURGICAL WOUND CHECK: ICD-10-CM

## 2025-07-10 LAB
GRAM STAIN RESULT: ABNORMAL
GRAM STAIN RESULT: ABNORMAL

## 2025-07-10 ASSESSMENT — PAIN SCALES - GENERAL: PAINLEVEL_OUTOF10: MILD PAIN (1)

## 2025-07-10 NOTE — PROGRESS NOTES
"Post op wound check note:        Subjective:       Preet Lemos comes into clinic today at the request of Dr. Christian Aleman, ordering provider for a wound check / suture removal post op Mohs procedure dated 6/30/2025, intermediate repair, for a squamous cell carcinoma on the left hand.  Site was previously incompletely excised by PCP 10/1/21.     Patient is requesting a wound check of his excision site, completed 6/30/2025 for a squamous cell carcinoma in situ on the right mid back.     Patient comes into the clinic with concerns regarding the left hand. He has noticed increased redness, yellow discharge, and some tenderness on site but unsure if this is due to healing or potential infection. Patient is unsure if there is a foul smell with the yellow discharge but states he has not gone out of his way to smell it. Denies fever or chills. Patient notes this started \"from the get go\" on Wednesday July 2nd when he removed his bandage to begin daily wound care. Bleeding and discharge were not present at this point.     Patient reports a concern with the right mid back as his wife had noticed some redness around site. Otherwise denies fever, increased warmth around area,  purulent drainage, or pain that is greater than what he felt after the procedure. No other concerns with the right mid back.     Patient has been applying Vaseline to the left hand daily and covering with bandage. He will apply a glove over this hand while working but notes increased moisture. For the right mid back, he has left it uncovered, dermabond applied following excision, and has been removing as needed/ as it comes off the skin.       Objective:      A focused exam of the left hand was performed. Pictures are in the media tab. There is increased redness and some swelling localized to site. Serosanguinous drainage noted on bandage with a slight amount of yellow discharge when removed.  There are 5-0 prolene sutures present on the surface of the " site. There is a small open area located in the center, slightly proximal on the suture line.     A focused exam of the right mid back was performed. Pictures are in the media tab. There is some redness that is localized to site. There is no bleeding, drainage, tenderness. There are no sutures present on the surface of the site. Dermabond present on surgical site but appears to be coming off mostly. The site appears to be healing as expected.        Assessment and Plan:     A bacterial culture swab was obtained for the left hand. Wound was cleansed with Chlorhexidine and sterile saline afterwards. Vaseline and bandage applied. Advised patient that due to the small area that is still slightly open in the center of the wound, it would be best to keep the sutures in for another 2-3 days to see if it comes together, especially after patient noted using left hand to  things/ make a fist at work due to slight risk of dehiscence, but we are okay to have them removed today. Patient would like to keep them in. Advised patient he is on day 10 of having sutures in and could take them out himself after Sunday (if wound edges were approximated completely) or he could come into clinic to have them removed. Patient opted to schedule a nurse appointment on Tuesday 7/15, declined appointment on 7/14 due to other events. Advised patient the skin can become irritated when sutures are ready to be removed as the body views it as a foreign object and it may become more irritated over the weekend. Patient aware and will keep appointment. Vaseline and bandage were applied to site.       The right mid back was cleansed with chlorhexidine, dermabond was removed, and vaseline/ bandage was applied to site. Advised patient to keep area covered and moisturized for about 3-4 days and then okay to treat as normal, healthy skin as long as skin is meeting skin and he has no concerns.       Advised patient to apply Vaseline and Bandage until he  "removes sutures or suture removal appointment for the left hand and 3-4 days for the right mid back. Patient verbally understands and has no questions at this time. Asked patient to contact us if any signs of infection develop (temperature over 100 degrees, redness or warmth spreading from site, increased pain, foul smelling drainage) or if he/she has questions.  Also, reminded patient they may feel \"zings\" as the skin and nerves continue to repair themselves on the left hand and right mid back.  Advised patient to call urgent business needs phone number or visit an urgent care over the weekend if concerns arise prior to appointment. Patient agrees with plan.       Will send an FYI to Dr. Aleman and team to keep in the loop.         Full body skin check appointment scheduled with Moni Kennedy PA-C on 11/21/2025 at 8:45 am  in [place].          This service provided today was under the supervising provider of the day Moni Kennedy PA-C , who was available if needed.               Jayleen Wilsk RN      [facility]      [facility]    "

## 2025-07-10 NOTE — PATIENT INSTRUCTIONS
Dale [place]!     As discussed,            - Please continue using the chlorhexidine to wash the left hand and right mid back. Making sure these areas are the  last thing you wash in the shower.      - Pat dry with a towel or clean paper towel.      - Apply Vaseline onto area with a clean Q-tip each time you touch the wound.      - Cover with a bandage, preferably non-stick so that it does not stick to the wound.             Also I will attach the emergency number to contact if you are concerned with healing over the weekend -- otherwise please visit an urgent care if you have concerns with infection as we discussed.     Please let us know if you have any questions going forward :)           Thank you,      Jayleen TELLES RN      [facility]     [facility]            What to expect:  The first couple of days your wound may be tender and may bleed slightly when doing wound care.  There may be swelling and bruising around the wound, especially if it is near the eyes. For your comfort, you may apply ice or cold compresses to the area.  The area around your wound may be numb for several weeks or even months.  You may experience periodic sharp pain or mild itching around the wound as it heals.    Call Us If:  You have bleeding that will not stop after applying pressure and ice.  You have pain that is not controlled with Tylenol and Ibuprofen.  You have signs or symptoms of an infection such as fever over 100 degrees Fahrenheit, redness, swelling, or warmth spreading from the wound, increasing pain after the first 48 hours, or white/yellow/green drainage from the wound (may or may not have a foul odor).    [facility], Dermatology [phone].  Available -.  For urgent needs outside of business hours, call the [facility] at [phone] and ask to speak with/page the dermatology resident on call.

## 2025-07-10 NOTE — NURSING NOTE
Toney Lemos's chief complaint for this visit includes:  Chief Complaint   Patient presents with    Wound Check     Still kind of red, puss is still coming out of it (yellowish color, has not noticed a foul smell but states has not tried to smell it). It is wrapped up daily, wound care daily. No new pain, fever. Some tenderness but unsure if due to healing or infection. Localized redness around site. Noticed it to be progressing from the get go on the Wednesday following surgery but no puss or bleeding was present.   Has site on back that is red per wife. Noticed some uncomfortableness on the back as it is healing.     PCP: Yan Lucas    Referring Provider:  Referred Self, MD  No address on file    There were no vitals taken for this visit.  Mild Pain (1)        Allergies   Allergen Reactions    No Known Drug Allergy     Seasonal Allergies          Do you need any medication refills at today's visit?   No      Jayleen Wilks RN on 7/10/2025 at 9:46 AM

## 2025-07-12 LAB
BACTERIA WND CULT: ABNORMAL
GRAM STAIN RESULT: ABNORMAL
GRAM STAIN RESULT: ABNORMAL

## 2025-07-15 ENCOUNTER — ALLIED HEALTH/NURSE VISIT (OUTPATIENT)
Dept: DERMATOLOGY | Facility: CLINIC | Age: 55
End: 2025-07-15
Payer: COMMERCIAL

## 2025-07-15 DIAGNOSIS — C44.629 SQUAMOUS CELL CARCINOMA OF SKIN OF DORSUM OF LEFT HAND: Primary | ICD-10-CM

## 2025-07-15 NOTE — NURSING NOTE
Toney LEA Danielargelia presents to the clinic for removal of sutures that were in place for 15 days. Patient was seen on 07/10/25 for a wound check and the bacterial swab determined no abnormal bacteria was present. Results and treatment plan were communicated on 07/14/25. On today's visit, the patient had completed a few vinegar soaks and used the mupirocin ointment.     Lakisha Fowler RN was present to review the sutures on the left hand and healing of the excision on the right mid back. No signs of infection were present for either wound. The left hand shows pink skin coloration around the healing wound, and some yellow granulation tissue. Patient was instructed to continue daily wound care with Vaseline and a bandage for the next 7 days.      Simple running sutures were seen and located on the left hand. All sutures were easily removed today. Routine wound care discussed by the RN or provider. The patient will follow up as needed.    Brittany Ho, EMT

## 2025-07-24 ENCOUNTER — RESULTS FOLLOW-UP (OUTPATIENT)
Dept: ANTICOAGULATION | Facility: CLINIC | Age: 55
End: 2025-07-24

## 2025-07-24 ENCOUNTER — ANTICOAGULATION THERAPY VISIT (OUTPATIENT)
Dept: ANTICOAGULATION | Facility: CLINIC | Age: 55
End: 2025-07-24

## 2025-07-24 ENCOUNTER — LAB (OUTPATIENT)
Dept: LAB | Facility: CLINIC | Age: 55
End: 2025-07-24
Payer: COMMERCIAL

## 2025-07-24 DIAGNOSIS — Z79.01 LONG TERM CURRENT USE OF ANTICOAGULANTS WITH INR GOAL OF 2.0-3.0: ICD-10-CM

## 2025-07-24 DIAGNOSIS — Z95.2 H/O MECHANICAL AORTIC VALVE REPLACEMENT: ICD-10-CM

## 2025-07-24 DIAGNOSIS — Z95.2 S/P AVR: ICD-10-CM

## 2025-07-24 DIAGNOSIS — Z95.2 S/P AVR (AORTIC VALVE REPLACEMENT): Primary | ICD-10-CM

## 2025-07-24 LAB — INR BLD: 1.9 (ref 0.9–1.1)

## 2025-07-24 NOTE — PROGRESS NOTES
ANTICOAGULATION MANAGEMENT     Toney Lemos 55 year old male is on warfarin with subtherapeutic INR result. (Goal INR 2.0-3.0)    Recent labs: (last 7 days)     07/24/25  0753   INR 1.9*       ASSESSMENT     Warfarin Lab Questionnaire    Warfarin Doses Last 7 Days      7/23/2025     8:54 AM   Dose in Tablet or Mg   TAB or MG? milligram (mg)     Pt Rptd Dose SUNDAY MONDAY TUESDAY WED THURS FRIDAY SATURDAY 7/23/2025   8:54 AM 5 7.5 5 5 5 7.5 5         7/23/2025   Warfarin Lab Questionnaire   Missed doses within past 14 days? No   Changes in diet or alcohol within past 14 days? No   Medication changes since last result? No   Injuries or illness since last result? No   New shortness of breath, severe headaches or sudden changes in vision since last result? No   Abnormal bleeding since last result? Yes   If yes, please explain: Skin cancer spots removed ([facility])   Upcoming surgery, procedure? No   Best number to call with results? [phone]     Previous result: Therapeutic last 2(+) visits  Additional findings: None       PLAN     Recommended plan for no diet, medication or health factor changes affecting INR     Dosing Instructions: Continue your current warfarin dose with next INR in 2 weeks       Summary  As of 7/24/2025      Full warfarin instructions:  7.5 mg every Mon, Fri; 5 mg all other days   Next INR check:  8/7/2025               Telephone call with Preet who verbalizes understanding and agrees to plan and who agrees to plan and repeated back plan correctly    Lab visit scheduled    Education provided: None required    Plan made per [facility] anticoagulation protocol    Milana Hernández RN  7/24/2025  Anticoagulation Clinic  hipix for routing messages: poli FISHER  [facility] patient phone line: [phone]        _______________________________________________________________________     Anticoagulation Episode Summary       Current INR goal:  2.0-3.0   TTR:  77.0% (1 y)   Target end date:  Indefinite    Send INR reminders to:  [facility] [place]    Indications    S/P AVR (aortic valve replacement)-25 mm [employer]  [Z95.2]  H/O mechanical aortic valve replacement [Z95.2]  Long term current use of anticoagulants with INR goal of 2.0-3.0 [Z79.01]  S/P AVR [Z95.2]             Comments:  --             Anticoagulation Care Providers       Provider Role Specialty Phone number    Moni Cartagena PA-C Referring Cardiovascular & Thoracic Surgery [phone]    [facility] Referring [facility] [phone]

## 2025-08-07 ENCOUNTER — ANTICOAGULATION THERAPY VISIT (OUTPATIENT)
Dept: ANTICOAGULATION | Facility: CLINIC | Age: 55
End: 2025-08-07

## 2025-08-07 ENCOUNTER — RESULTS FOLLOW-UP (OUTPATIENT)
Dept: ANTICOAGULATION | Facility: CLINIC | Age: 55
End: 2025-08-07

## 2025-08-07 ENCOUNTER — LAB (OUTPATIENT)
Dept: LAB | Facility: CLINIC | Age: 55
End: 2025-08-07
Payer: COMMERCIAL

## 2025-08-07 DIAGNOSIS — Z95.2 S/P AVR: ICD-10-CM

## 2025-08-07 DIAGNOSIS — Z79.01 LONG TERM CURRENT USE OF ANTICOAGULANTS WITH INR GOAL OF 2.0-3.0: ICD-10-CM

## 2025-08-07 DIAGNOSIS — Z95.2 S/P AVR (AORTIC VALVE REPLACEMENT): Primary | ICD-10-CM

## 2025-08-07 DIAGNOSIS — Z95.2 H/O MECHANICAL AORTIC VALVE REPLACEMENT: ICD-10-CM

## 2025-08-07 LAB — INR BLD: 1.9 (ref 0.9–1.1)

## 2025-08-12 ENCOUNTER — MYC MEDICAL ADVICE (OUTPATIENT)
Dept: DERMATOLOGY | Facility: CLINIC | Age: 55
End: 2025-08-12
Payer: COMMERCIAL

## 2025-08-20 ENCOUNTER — RESULTS FOLLOW-UP (OUTPATIENT)
Dept: ANTICOAGULATION | Facility: CLINIC | Age: 55
End: 2025-08-20

## 2025-08-20 ENCOUNTER — LAB (OUTPATIENT)
Dept: LAB | Facility: CLINIC | Age: 55
End: 2025-08-20
Payer: COMMERCIAL

## 2025-08-20 ENCOUNTER — ANTICOAGULATION THERAPY VISIT (OUTPATIENT)
Dept: ANTICOAGULATION | Facility: CLINIC | Age: 55
End: 2025-08-20

## 2025-08-20 DIAGNOSIS — Z79.01 LONG TERM CURRENT USE OF ANTICOAGULANTS WITH INR GOAL OF 2.0-3.0: ICD-10-CM

## 2025-08-20 DIAGNOSIS — Z95.2 S/P AVR (AORTIC VALVE REPLACEMENT): Primary | ICD-10-CM

## 2025-08-20 DIAGNOSIS — Z95.2 S/P AVR: ICD-10-CM

## 2025-08-20 DIAGNOSIS — Z95.2 H/O MECHANICAL AORTIC VALVE REPLACEMENT: ICD-10-CM

## 2025-08-20 LAB — INR BLD: 3.7 (ref 0.9–1.1)

## 2025-08-20 PROCEDURE — 85610 PROTHROMBIN TIME: CPT

## 2025-08-20 PROCEDURE — 36416 COLLJ CAPILLARY BLOOD SPEC: CPT

## 2025-08-25 DIAGNOSIS — Z95.2 S/P AVR: ICD-10-CM

## 2025-08-25 DIAGNOSIS — I10 HYPERTENSION GOAL BP (BLOOD PRESSURE) < 140/90: ICD-10-CM

## 2025-08-25 RX ORDER — METOPROLOL SUCCINATE 50 MG/1
50 TABLET, EXTENDED RELEASE ORAL 2 TIMES DAILY
Qty: 180 TABLET | Refills: 0 | Status: SHIPPED | OUTPATIENT
Start: 2025-08-25

## 2025-08-25 RX ORDER — AMLODIPINE BESYLATE 2.5 MG/1
2.5 TABLET ORAL DAILY
Qty: 90 TABLET | Refills: 0 | Status: SHIPPED | OUTPATIENT
Start: 2025-08-25

## 2025-09-03 ENCOUNTER — ANTICOAGULATION THERAPY VISIT (OUTPATIENT)
Dept: ANTICOAGULATION | Facility: CLINIC | Age: 55
End: 2025-09-03

## 2025-09-03 ENCOUNTER — RESULTS FOLLOW-UP (OUTPATIENT)
Dept: ANTICOAGULATION | Facility: CLINIC | Age: 55
End: 2025-09-03

## 2025-09-03 ENCOUNTER — LAB (OUTPATIENT)
Dept: LAB | Facility: CLINIC | Age: 55
End: 2025-09-03
Payer: COMMERCIAL

## 2025-09-03 DIAGNOSIS — Z95.2 S/P AVR: ICD-10-CM

## 2025-09-03 DIAGNOSIS — Z95.2 S/P AVR (AORTIC VALVE REPLACEMENT): Primary | ICD-10-CM

## 2025-09-03 DIAGNOSIS — Z79.01 LONG TERM CURRENT USE OF ANTICOAGULANTS WITH INR GOAL OF 2.0-3.0: ICD-10-CM

## 2025-09-03 DIAGNOSIS — Z95.2 H/O MECHANICAL AORTIC VALVE REPLACEMENT: ICD-10-CM

## 2025-09-03 LAB — INR BLD: 2.2 (ref 0.9–1.1)

## 2025-09-03 PROCEDURE — 85610 PROTHROMBIN TIME: CPT

## 2025-09-03 PROCEDURE — 36416 COLLJ CAPILLARY BLOOD SPEC: CPT

## (undated) DEVICE — SOL WATER IRRIG 1000ML BOTTLE 2F7114

## (undated) DEVICE — SU VICRYL 3-0 FS-1 27" J442H

## (undated) DEVICE — DRAIN CHEST TUBE RIGHT ANGLED 32FR 8132

## (undated) DEVICE — SU PROLENE 3-0 SHDA 36" 8522H

## (undated) DEVICE — DRAIN CHEST TUBE 32FR STR 8032

## (undated) DEVICE — PACK TUBING MINI VAC CUSTOM 1/2X3/8T BB9J78R4

## (undated) DEVICE — SYR 05ML LL W/O NDL

## (undated) DEVICE — GLOVE PROTEXIS W/NEU-THERA 7.5  2D73TE75

## (undated) DEVICE — CABLE MYO/LEAD PACING WHITE DISP 019-530

## (undated) DEVICE — KIT WASH CELL SAVING ATL2001

## (undated) DEVICE — RESERVOIR CELL SAVING BLOOD COLLECTION EL2120

## (undated) DEVICE — PROTECTOR ARM ONE-STEP TRENDELENBURG 40418

## (undated) DEVICE — SU SILK 1 TIE 10X30" SA87G

## (undated) DEVICE — MANIFOLD KIT ANGIO AUTOMATED 014613

## (undated) DEVICE — KIT ENDO TURNOVER/PROCEDURE CARRY-ON 101822

## (undated) DEVICE — CATH JACKY 5FR 3.5 CURVE 40-5023

## (undated) DEVICE — CANNULA PERFUSION CORONARY ARTERY OSTIAL 12FR 6" SS 30012

## (undated) DEVICE — COVER TABLE POLY 65X90" 8186

## (undated) DEVICE — PREP CHLORAPREP W/ORANGE TINT 10.5ML 260715

## (undated) DEVICE — Device

## (undated) DEVICE — SU ETHIBOND 0 CT-1 CR 8X18" CX21D

## (undated) DEVICE — PACK MINI VAC CUSTOM CARDOPULMONARY BB5Z97R15

## (undated) DEVICE — TUBING IV EXTENSION SET 34"

## (undated) DEVICE — DEFIB PRO-PADZ LVP LQD GEL ADULT 8900-2105-01

## (undated) DEVICE — SU VICRYL 0 CTX 36" J370H

## (undated) DEVICE — SUCTION CATH AIRLIFE TRI-FLO W/CONTROL PORT 14FR  T60C

## (undated) DEVICE — INTRO GLIDESHEATH SLENDER 6FR 10X45CM 60-1060

## (undated) DEVICE — LINEN TOWEL PACK X5 5464

## (undated) DEVICE — SOL NACL 0.9% IRRIG 1000ML BOTTLE 2F7124

## (undated) DEVICE — LINEN TOWEL PACK X30 5481

## (undated) DEVICE — PACK OPEN HEART PV12OH524

## (undated) DEVICE — SU ETHIBOND 2-0 V-5DA 10X30" PXX52

## (undated) DEVICE — CANNULA PERFUSION 14FRX16" LEFT 12016

## (undated) DEVICE — LINEN TOWEL PACK X6 WHITE 5487

## (undated) DEVICE — TUBING SUCTION 6"X3/16" N56A

## (undated) DEVICE — TUBING SUCTION 12"X1/4" N612

## (undated) DEVICE — SYR ANGIOGRAPHY MULTIUSE KIT ACIST 014612

## (undated) DEVICE — NEEDLE SPINAL DISP 22GA X 5" QUINCKE 3333355

## (undated) DEVICE — SOMASENSOR CEREBRAL OXIMETER ADULT SAFB-SM

## (undated) DEVICE — CONNECTOR DRAIN CHEST Y EXTENSION SET 19909

## (undated) DEVICE — BONE WAX OSTENE 3.5GM CT410

## (undated) DEVICE — SU STEEL 6 CCS 4X18" M654G

## (undated) DEVICE — PREP CHLORAPREP 26ML TINTED ORANGE  260815

## (undated) DEVICE — SYR 10ML LL W/O NDL

## (undated) DEVICE — CANNULA PERFUSION AORTIC ROOT 22FR 20012

## (undated) DEVICE — SPONGE PEANUT

## (undated) DEVICE — LINEN LEG ROLL 5489

## (undated) DEVICE — SLEEVE TR BAND RADIAL COMPRESSION DEVICE 24CM TRB24-REG

## (undated) DEVICE — SU PROLENE 4-0 RB-1DA 36" 8557H

## (undated) DEVICE — TUBING PERFUSION 1/4X1/16X8FT

## (undated) DEVICE — KIT HAND CONTROL ANGIOTOUCH ACIST 65CM AT-P65

## (undated) DEVICE — SU STEEL MYO/WIRE II STERNOTOMY 8 BE-1 3X14" 048-217

## (undated) DEVICE — TOTE ANGIO CORP PC15AT SAN32CC83O

## (undated) DEVICE — CONNECTOR PREFUSION REDUCER 3/8X1/2" W/O LL 6013

## (undated) DEVICE — TOURNIQUET VASCULAR

## (undated) DEVICE — CANNULA PERFUSION CORONARY ARTERY OSTIAL 14FR 6" SS 30014

## (undated) DEVICE — SU PLEDGET SOFT TFE 3/8"X3/26"X1/16" PCP40

## (undated) DEVICE — SUCTION CANISTER MEDIVAC LINER 3000ML W/LID 65651-530

## (undated) DEVICE — SU DEVICE COR KNOT KIT STD SHORT 2/KIT 030884

## (undated) DEVICE — DEVICE ASSEMBLY SUCTION/ANTI COAG BTC93

## (undated) DEVICE — DRAIN SUMP INTRACARDIAC 20FR 12" POOL TIP 12112

## (undated) DEVICE — CANNULA PERFUSION VENOUS 3 STAGE 29FR 15" 91429

## (undated) DEVICE — VALVE VRV 9156R2

## (undated) DEVICE — TRAY PROCEDURE SUPPORT PAIN MANAGEMENT 332114

## (undated) DEVICE — SU ETHIBOND 3-0 BBDA 36" X588H

## (undated) DEVICE — SU PROLENE 4-0 SHDA 36" 8521H

## (undated) DEVICE — GOWN XLG DISP 9545

## (undated) DEVICE — SURGICEL HEMOSTAT 2X14" 1951

## (undated) DEVICE — SU ETHIBOND 2-0 SHDA 30" X563H

## (undated) DEVICE — COR KNOT QUICK LOAD

## (undated) DEVICE — CANNULA PERFUSION ARTERIAL EOPA 18FR 12" 77418

## (undated) RX ORDER — VECURONIUM BROMIDE 1 MG/ML
INJECTION, POWDER, LYOPHILIZED, FOR SOLUTION INTRAVENOUS
Status: DISPENSED
Start: 2020-02-03

## (undated) RX ORDER — CEFAZOLIN SODIUM 2 G/100ML
INJECTION, SOLUTION INTRAVENOUS
Status: DISPENSED
Start: 2020-02-03

## (undated) RX ORDER — PROTAMINE SULFATE 10 MG/ML
INJECTION, SOLUTION INTRAVENOUS
Status: DISPENSED
Start: 2020-02-03

## (undated) RX ORDER — POTASSIUM CHLORIDE 1500 MG/1
TABLET, EXTENDED RELEASE ORAL
Status: DISPENSED
Start: 2020-01-09

## (undated) RX ORDER — HEPARIN SODIUM 1000 [USP'U]/ML
INJECTION, SOLUTION INTRAVENOUS; SUBCUTANEOUS
Status: DISPENSED
Start: 2020-02-03

## (undated) RX ORDER — FENTANYL CITRATE 50 UG/ML
INJECTION, SOLUTION INTRAMUSCULAR; INTRAVENOUS
Status: DISPENSED
Start: 2020-01-09

## (undated) RX ORDER — LIDOCAINE HYDROCHLORIDE 10 MG/ML
INJECTION, SOLUTION EPIDURAL; INFILTRATION; INTRACAUDAL; PERINEURAL
Status: DISPENSED
Start: 2020-01-09

## (undated) RX ORDER — FAMOTIDINE 20 MG/1
TABLET, FILM COATED ORAL
Status: DISPENSED
Start: 2020-02-03

## (undated) RX ORDER — LIDOCAINE HYDROCHLORIDE 20 MG/ML
INJECTION, SOLUTION EPIDURAL; INFILTRATION; INTRACAUDAL; PERINEURAL
Status: DISPENSED
Start: 2020-02-03

## (undated) RX ORDER — CEFAZOLIN SODIUM 1 G/3ML
INJECTION, POWDER, FOR SOLUTION INTRAMUSCULAR; INTRAVENOUS
Status: DISPENSED
Start: 2020-02-03

## (undated) RX ORDER — HEPARIN SODIUM 200 [USP'U]/100ML
INJECTION, SOLUTION INTRAVENOUS
Status: DISPENSED
Start: 2020-01-09

## (undated) RX ORDER — VERAPAMIL HYDROCHLORIDE 2.5 MG/ML
INJECTION, SOLUTION INTRAVENOUS
Status: DISPENSED
Start: 2020-01-09

## (undated) RX ORDER — FENTANYL CITRATE 0.05 MG/ML
INJECTION, SOLUTION INTRAMUSCULAR; INTRAVENOUS
Status: DISPENSED
Start: 2020-02-03

## (undated) RX ORDER — BUPIVACAINE HYDROCHLORIDE 5 MG/ML
INJECTION, SOLUTION EPIDURAL; INTRACAUDAL
Status: DISPENSED
Start: 2020-02-03

## (undated) RX ORDER — PROPOFOL 10 MG/ML
INJECTION, EMULSION INTRAVENOUS
Status: DISPENSED
Start: 2020-02-03

## (undated) RX ORDER — MUPIROCIN 20 MG/G
OINTMENT TOPICAL
Status: DISPENSED
Start: 2020-02-03

## (undated) RX ORDER — HEPARIN SODIUM 1000 [USP'U]/ML
INJECTION, SOLUTION INTRAVENOUS; SUBCUTANEOUS
Status: DISPENSED
Start: 2020-01-09

## (undated) RX ORDER — NITROGLYCERIN 5 MG/ML
VIAL (ML) INTRAVENOUS
Status: DISPENSED
Start: 2020-01-09

## (undated) RX ORDER — FENTANYL CITRATE 50 UG/ML
INJECTION, SOLUTION INTRAMUSCULAR; INTRAVENOUS
Status: DISPENSED
Start: 2020-02-03